# Patient Record
Sex: FEMALE | Race: WHITE | Employment: OTHER | URBAN - METROPOLITAN AREA
[De-identification: names, ages, dates, MRNs, and addresses within clinical notes are randomized per-mention and may not be internally consistent; named-entity substitution may affect disease eponyms.]

---

## 2017-02-22 PROBLEM — M99.05 SOMATIC DYSFUNCTION OF PELVIS REGION: Status: ACTIVE | Noted: 2017-02-22

## 2017-02-22 PROBLEM — M99.06 SOMATIC DYSFUNCTION OF LOWER EXTREMITIES: Status: ACTIVE | Noted: 2017-02-22

## 2017-02-22 PROBLEM — M99.03 SOMATIC DYSFUNCTION OF LUMBAR REGION: Status: ACTIVE | Noted: 2017-02-22

## 2017-10-02 PROBLEM — G89.18 CHEST WALL PAIN FOLLOWING SURGERY: Status: ACTIVE | Noted: 2017-10-02

## 2017-10-02 PROBLEM — R07.89 CHEST WALL PAIN FOLLOWING SURGERY: Status: ACTIVE | Noted: 2017-10-02

## 2018-03-15 ENCOUNTER — NURSE ONLY (OUTPATIENT)
Dept: FAMILY MEDICINE CLINIC | Age: 68
End: 2018-03-15
Payer: COMMERCIAL

## 2018-03-15 ENCOUNTER — HOSPITAL ENCOUNTER (OUTPATIENT)
Age: 68
Discharge: HOME | End: 2018-03-17
Payer: COMMERCIAL

## 2018-03-15 DIAGNOSIS — I10 ESSENTIAL HYPERTENSION: ICD-10-CM

## 2018-03-15 LAB
BUN BLDV-MCNC: 16 MG/DL (ref 8–23)
CREAT SERPL-MCNC: 1 MG/DL (ref 0.5–1)
GFR AFRICAN AMERICAN: >60
GFR NON-AFRICAN AMERICAN: 56 ML/MIN/1.73

## 2018-03-15 PROCEDURE — 84520 ASSAY OF UREA NITROGEN: CPT

## 2018-03-15 PROCEDURE — 36415 COLL VENOUS BLD VENIPUNCTURE: CPT | Performed by: FAMILY MEDICINE

## 2018-03-15 PROCEDURE — 82565 ASSAY OF CREATININE: CPT

## 2018-03-20 ENCOUNTER — HOSPITAL ENCOUNTER (OUTPATIENT)
Dept: MRI IMAGING | Age: 68
Discharge: HOME | End: 2018-03-22
Payer: COMMERCIAL

## 2018-03-20 DIAGNOSIS — M54.16 LUMBAR RADICULOPATHY: ICD-10-CM

## 2018-03-20 PROCEDURE — 6360000004 HC RX CONTRAST MEDICATION: Performed by: RADIOLOGY

## 2018-03-20 PROCEDURE — 72149 MRI LUMBAR SPINE W/DYE: CPT

## 2018-03-20 PROCEDURE — A9579 GAD-BASE MR CONTRAST NOS,1ML: HCPCS | Performed by: RADIOLOGY

## 2018-03-20 RX ADMIN — GADOTERIDOL 17 ML: 279.3 INJECTION, SOLUTION INTRAVENOUS at 18:32

## 2018-03-22 ENCOUNTER — TELEPHONE (OUTPATIENT)
Dept: FAMILY MEDICINE CLINIC | Age: 68
End: 2018-03-22

## 2018-04-05 ENCOUNTER — HOSPITAL ENCOUNTER (OUTPATIENT)
Dept: CT IMAGING | Age: 68
Discharge: HOME | End: 2018-04-07
Admitting: INTERNAL MEDICINE
Payer: COMMERCIAL

## 2018-04-05 DIAGNOSIS — C34.01 MALIGNANT NEOPLASM OF HILUS OF RIGHT LUNG (HCC): ICD-10-CM

## 2018-04-05 PROCEDURE — 74177 CT ABD & PELVIS W/CONTRAST: CPT

## 2018-04-05 PROCEDURE — 71260 CT THORAX DX C+: CPT

## 2018-04-05 PROCEDURE — 6360000004 HC RX CONTRAST MEDICATION: Performed by: RADIOLOGY

## 2018-04-05 RX ADMIN — IOPAMIDOL 110 ML: 755 INJECTION, SOLUTION INTRAVENOUS at 14:22

## 2018-04-05 RX ADMIN — IOHEXOL 50 ML: 240 INJECTION, SOLUTION INTRATHECAL; INTRAVASCULAR; INTRAVENOUS; ORAL at 14:22

## 2018-04-06 ENCOUNTER — HOSPITAL ENCOUNTER (OUTPATIENT)
Dept: INFUSION THERAPY | Age: 68
Discharge: HOME | End: 2018-04-06
Payer: COMMERCIAL

## 2018-04-06 ENCOUNTER — OFFICE VISIT (OUTPATIENT)
Dept: ONCOLOGY | Age: 68
End: 2018-04-06
Payer: COMMERCIAL

## 2018-04-06 VITALS
HEART RATE: 66 BPM | TEMPERATURE: 98 F | HEIGHT: 70 IN | BODY MASS INDEX: 26.13 KG/M2 | SYSTOLIC BLOOD PRESSURE: 138 MMHG | RESPIRATION RATE: 20 BRPM | WEIGHT: 182.5 LBS | DIASTOLIC BLOOD PRESSURE: 73 MMHG

## 2018-04-06 DIAGNOSIS — C34.01 MALIGNANT NEOPLASM OF HILUS OF RIGHT LUNG (HCC): Primary | ICD-10-CM

## 2018-04-06 DIAGNOSIS — C50.919 MALIGNANT NEOPLASM OF FEMALE BREAST, UNSPECIFIED ESTROGEN RECEPTOR STATUS, UNSPECIFIED LATERALITY, UNSPECIFIED SITE OF BREAST (HCC): ICD-10-CM

## 2018-04-06 LAB
ALBUMIN SERPL-MCNC: 3.9 G/DL (ref 3.5–5.2)
ALP BLD-CCNC: 87 U/L (ref 35–104)
ALT SERPL-CCNC: 30 U/L (ref 0–32)
ANION GAP SERPL CALCULATED.3IONS-SCNC: 15 MMOL/L (ref 7–16)
AST SERPL-CCNC: 21 U/L (ref 0–31)
BASOPHILS ABSOLUTE: 0.06 E9/L (ref 0–0.2)
BASOPHILS RELATIVE PERCENT: 0.8 % (ref 0–2)
BILIRUB SERPL-MCNC: <0.2 MG/DL (ref 0–1.2)
BUN BLDV-MCNC: 22 MG/DL (ref 8–23)
CALCIUM SERPL-MCNC: 9.4 MG/DL (ref 8.6–10.2)
CHLORIDE BLD-SCNC: 96 MMOL/L (ref 98–107)
CO2: 25 MMOL/L (ref 22–29)
CREAT SERPL-MCNC: 1 MG/DL (ref 0.5–1)
EOSINOPHILS ABSOLUTE: 0.16 E9/L (ref 0.05–0.5)
EOSINOPHILS RELATIVE PERCENT: 2 % (ref 0–6)
GFR AFRICAN AMERICAN: >60
GFR NON-AFRICAN AMERICAN: 56 ML/MIN/1.73
GLUCOSE BLD-MCNC: 93 MG/DL (ref 74–109)
HCT VFR BLD CALC: 39.3 % (ref 34–48)
HEMOGLOBIN: 13.1 G/DL (ref 11.5–15.5)
IMMATURE GRANULOCYTES #: 0.01 E9/L
IMMATURE GRANULOCYTES %: 0.1 % (ref 0–5)
LYMPHOCYTES ABSOLUTE: 2.98 E9/L (ref 1.5–4)
LYMPHOCYTES RELATIVE PERCENT: 38.2 % (ref 20–42)
MCH RBC QN AUTO: 29.4 PG (ref 26–35)
MCHC RBC AUTO-ENTMCNC: 33.3 % (ref 32–34.5)
MCV RBC AUTO: 88.1 FL (ref 80–99.9)
MONOCYTES ABSOLUTE: 0.75 E9/L (ref 0.1–0.95)
MONOCYTES RELATIVE PERCENT: 9.6 % (ref 2–12)
NEUTROPHILS ABSOLUTE: 3.85 E9/L (ref 1.8–7.3)
NEUTROPHILS RELATIVE PERCENT: 49.3 % (ref 43–80)
PDW BLD-RTO: 12.9 FL (ref 11.5–15)
PLATELET # BLD: 245 E9/L (ref 130–450)
PMV BLD AUTO: 10.4 FL (ref 7–12)
POTASSIUM SERPL-SCNC: 3.9 MMOL/L (ref 3.5–5)
RBC # BLD: 4.46 E12/L (ref 3.5–5.5)
SODIUM BLD-SCNC: 136 MMOL/L (ref 132–146)
TOTAL PROTEIN: 6.8 G/DL (ref 6.4–8.3)
WBC # BLD: 7.8 E9/L (ref 4.5–11.5)

## 2018-04-06 PROCEDURE — 80053 COMPREHEN METABOLIC PANEL: CPT

## 2018-04-06 PROCEDURE — 99213 OFFICE O/P EST LOW 20 MIN: CPT

## 2018-04-06 PROCEDURE — 1036F TOBACCO NON-USER: CPT | Performed by: INTERNAL MEDICINE

## 2018-04-06 PROCEDURE — 85025 COMPLETE CBC W/AUTO DIFF WBC: CPT

## 2018-04-06 PROCEDURE — 3014F SCREEN MAMMO DOC REV: CPT | Performed by: INTERNAL MEDICINE

## 2018-04-06 PROCEDURE — G8400 PT W/DXA NO RESULTS DOC: HCPCS | Performed by: INTERNAL MEDICINE

## 2018-04-06 PROCEDURE — 4040F PNEUMOC VAC/ADMIN/RCVD: CPT | Performed by: INTERNAL MEDICINE

## 2018-04-06 PROCEDURE — 1090F PRES/ABSN URINE INCON ASSESS: CPT | Performed by: INTERNAL MEDICINE

## 2018-04-06 PROCEDURE — 1123F ACP DISCUSS/DSCN MKR DOCD: CPT | Performed by: INTERNAL MEDICINE

## 2018-04-06 PROCEDURE — G8417 CALC BMI ABV UP PARAM F/U: HCPCS | Performed by: INTERNAL MEDICINE

## 2018-04-06 PROCEDURE — G8427 DOCREV CUR MEDS BY ELIG CLIN: HCPCS | Performed by: INTERNAL MEDICINE

## 2018-04-06 PROCEDURE — 3017F COLORECTAL CA SCREEN DOC REV: CPT | Performed by: INTERNAL MEDICINE

## 2018-04-06 PROCEDURE — 99214 OFFICE O/P EST MOD 30 MIN: CPT | Performed by: INTERNAL MEDICINE

## 2018-04-06 PROCEDURE — 36415 COLL VENOUS BLD VENIPUNCTURE: CPT

## 2018-04-12 DIAGNOSIS — E55.9 VITAMIN D DEFICIENCY: ICD-10-CM

## 2018-04-12 RX ORDER — ERGOCALCIFEROL 1.25 MG/1
CAPSULE ORAL
Qty: 30 CAPSULE | Refills: 3 | Status: SHIPPED | OUTPATIENT
Start: 2018-04-12 | End: 2019-05-06 | Stop reason: SDUPTHER

## 2018-04-17 ENCOUNTER — TELEPHONE (OUTPATIENT)
Dept: FAMILY MEDICINE CLINIC | Age: 68
End: 2018-04-17

## 2018-04-18 ENCOUNTER — TELEPHONE (OUTPATIENT)
Dept: FAMILY MEDICINE CLINIC | Age: 68
End: 2018-04-18

## 2018-04-18 DIAGNOSIS — M51.16 LUMBAR DISC DISEASE WITH RADICULOPATHY: Primary | ICD-10-CM

## 2018-05-21 ENCOUNTER — OFFICE VISIT (OUTPATIENT)
Dept: FAMILY MEDICINE CLINIC | Age: 68
End: 2018-05-21
Payer: COMMERCIAL

## 2018-05-21 VITALS
SYSTOLIC BLOOD PRESSURE: 164 MMHG | RESPIRATION RATE: 16 BRPM | DIASTOLIC BLOOD PRESSURE: 84 MMHG | OXYGEN SATURATION: 99 % | TEMPERATURE: 97.8 F | BODY MASS INDEX: 25.2 KG/M2 | HEART RATE: 58 BPM | HEIGHT: 70 IN | WEIGHT: 176 LBS

## 2018-05-21 DIAGNOSIS — G89.29 CHRONIC BILATERAL LOW BACK PAIN WITH SCIATICA, SCIATICA LATERALITY UNSPECIFIED: ICD-10-CM

## 2018-05-21 DIAGNOSIS — I10 ESSENTIAL HYPERTENSION: Primary | ICD-10-CM

## 2018-05-21 DIAGNOSIS — M54.40 CHRONIC BILATERAL LOW BACK PAIN WITH SCIATICA, SCIATICA LATERALITY UNSPECIFIED: ICD-10-CM

## 2018-05-21 PROCEDURE — 1090F PRES/ABSN URINE INCON ASSESS: CPT | Performed by: FAMILY MEDICINE

## 2018-05-21 PROCEDURE — G8400 PT W/DXA NO RESULTS DOC: HCPCS | Performed by: FAMILY MEDICINE

## 2018-05-21 PROCEDURE — 3017F COLORECTAL CA SCREEN DOC REV: CPT | Performed by: FAMILY MEDICINE

## 2018-05-21 PROCEDURE — 99214 OFFICE O/P EST MOD 30 MIN: CPT | Performed by: FAMILY MEDICINE

## 2018-05-21 PROCEDURE — 1123F ACP DISCUSS/DSCN MKR DOCD: CPT | Performed by: FAMILY MEDICINE

## 2018-05-21 PROCEDURE — G8417 CALC BMI ABV UP PARAM F/U: HCPCS | Performed by: FAMILY MEDICINE

## 2018-05-21 PROCEDURE — 1036F TOBACCO NON-USER: CPT | Performed by: FAMILY MEDICINE

## 2018-05-21 PROCEDURE — 4040F PNEUMOC VAC/ADMIN/RCVD: CPT | Performed by: FAMILY MEDICINE

## 2018-05-21 PROCEDURE — G8427 DOCREV CUR MEDS BY ELIG CLIN: HCPCS | Performed by: FAMILY MEDICINE

## 2018-05-21 ASSESSMENT — PATIENT HEALTH QUESTIONNAIRE - PHQ9
1. LITTLE INTEREST OR PLEASURE IN DOING THINGS: 0
2. FEELING DOWN, DEPRESSED OR HOPELESS: 0
SUM OF ALL RESPONSES TO PHQ QUESTIONS 1-9: 0
SUM OF ALL RESPONSES TO PHQ9 QUESTIONS 1 & 2: 0

## 2018-06-08 ENCOUNTER — TELEPHONE (OUTPATIENT)
Dept: FAMILY MEDICINE CLINIC | Age: 68
End: 2018-06-08

## 2018-08-15 ENCOUNTER — TELEPHONE (OUTPATIENT)
Dept: FAMILY MEDICINE CLINIC | Age: 68
End: 2018-08-15

## 2018-08-16 ENCOUNTER — OFFICE VISIT (OUTPATIENT)
Dept: FAMILY MEDICINE CLINIC | Age: 68
End: 2018-08-16
Payer: COMMERCIAL

## 2018-08-16 VITALS
HEIGHT: 70 IN | RESPIRATION RATE: 16 BRPM | SYSTOLIC BLOOD PRESSURE: 134 MMHG | BODY MASS INDEX: 24.77 KG/M2 | HEART RATE: 60 BPM | DIASTOLIC BLOOD PRESSURE: 88 MMHG | WEIGHT: 173 LBS | OXYGEN SATURATION: 97 %

## 2018-08-16 DIAGNOSIS — E06.3 HASHIMOTO'S DISEASE: ICD-10-CM

## 2018-08-16 DIAGNOSIS — L03.211 FACIAL CELLULITIS: Primary | ICD-10-CM

## 2018-08-16 PROCEDURE — 1101F PT FALLS ASSESS-DOCD LE1/YR: CPT | Performed by: FAMILY MEDICINE

## 2018-08-16 PROCEDURE — 4040F PNEUMOC VAC/ADMIN/RCVD: CPT | Performed by: FAMILY MEDICINE

## 2018-08-16 PROCEDURE — 1036F TOBACCO NON-USER: CPT | Performed by: FAMILY MEDICINE

## 2018-08-16 PROCEDURE — G8510 SCR DEP NEG, NO PLAN REQD: HCPCS | Performed by: FAMILY MEDICINE

## 2018-08-16 PROCEDURE — 99213 OFFICE O/P EST LOW 20 MIN: CPT | Performed by: FAMILY MEDICINE

## 2018-08-16 PROCEDURE — G8420 CALC BMI NORM PARAMETERS: HCPCS | Performed by: FAMILY MEDICINE

## 2018-08-16 PROCEDURE — G8427 DOCREV CUR MEDS BY ELIG CLIN: HCPCS | Performed by: FAMILY MEDICINE

## 2018-08-16 PROCEDURE — 1090F PRES/ABSN URINE INCON ASSESS: CPT | Performed by: FAMILY MEDICINE

## 2018-08-16 PROCEDURE — 1123F ACP DISCUSS/DSCN MKR DOCD: CPT | Performed by: FAMILY MEDICINE

## 2018-08-16 PROCEDURE — G8400 PT W/DXA NO RESULTS DOC: HCPCS | Performed by: FAMILY MEDICINE

## 2018-08-16 PROCEDURE — 3017F COLORECTAL CA SCREEN DOC REV: CPT | Performed by: FAMILY MEDICINE

## 2018-08-16 RX ORDER — CEPHALEXIN 500 MG/1
500 CAPSULE ORAL 3 TIMES DAILY
Qty: 30 CAPSULE | Refills: 0 | Status: SHIPPED | OUTPATIENT
Start: 2018-08-16 | End: 2018-08-26

## 2018-08-16 ASSESSMENT — PATIENT HEALTH QUESTIONNAIRE - PHQ9
SUM OF ALL RESPONSES TO PHQ QUESTIONS 1-9: 0
1. LITTLE INTEREST OR PLEASURE IN DOING THINGS: 0
SUM OF ALL RESPONSES TO PHQ QUESTIONS 1-9: 0
SUM OF ALL RESPONSES TO PHQ9 QUESTIONS 1 & 2: 0
2. FEELING DOWN, DEPRESSED OR HOPELESS: 0

## 2018-08-20 ENCOUNTER — OFFICE VISIT (OUTPATIENT)
Dept: FAMILY MEDICINE CLINIC | Age: 68
End: 2018-08-20
Payer: COMMERCIAL

## 2018-08-20 VITALS
DIASTOLIC BLOOD PRESSURE: 64 MMHG | OXYGEN SATURATION: 98 % | SYSTOLIC BLOOD PRESSURE: 128 MMHG | HEART RATE: 64 BPM | WEIGHT: 173 LBS | BODY MASS INDEX: 24.82 KG/M2 | RESPIRATION RATE: 16 BRPM

## 2018-08-20 DIAGNOSIS — L03.211 CELLULITIS, FACE: Primary | ICD-10-CM

## 2018-08-20 PROCEDURE — 99212 OFFICE O/P EST SF 10 MIN: CPT | Performed by: FAMILY MEDICINE

## 2018-08-20 PROCEDURE — G8420 CALC BMI NORM PARAMETERS: HCPCS | Performed by: FAMILY MEDICINE

## 2018-08-20 PROCEDURE — G8400 PT W/DXA NO RESULTS DOC: HCPCS | Performed by: FAMILY MEDICINE

## 2018-08-20 PROCEDURE — 1123F ACP DISCUSS/DSCN MKR DOCD: CPT | Performed by: FAMILY MEDICINE

## 2018-08-20 PROCEDURE — G8427 DOCREV CUR MEDS BY ELIG CLIN: HCPCS | Performed by: FAMILY MEDICINE

## 2018-08-20 PROCEDURE — 4040F PNEUMOC VAC/ADMIN/RCVD: CPT | Performed by: FAMILY MEDICINE

## 2018-08-20 PROCEDURE — 3017F COLORECTAL CA SCREEN DOC REV: CPT | Performed by: FAMILY MEDICINE

## 2018-08-20 PROCEDURE — 1036F TOBACCO NON-USER: CPT | Performed by: FAMILY MEDICINE

## 2018-08-20 PROCEDURE — 1090F PRES/ABSN URINE INCON ASSESS: CPT | Performed by: FAMILY MEDICINE

## 2018-08-20 PROCEDURE — 1101F PT FALLS ASSESS-DOCD LE1/YR: CPT | Performed by: FAMILY MEDICINE

## 2018-08-20 NOTE — PROGRESS NOTES
above. Call or go to ED immediately if symptoms worsen or persist.  No Follow-up on file. , or sooner if necessary. Educational materials and/or home exercises printed for patient's review and were included in patient instructions on his/her After Visit Summary and given to patient at the end of visit. Counseled regarding above diagnosis, including possible risks and complications,  especially if left uncontrolled. Counseled regarding the possible side effects, risks, benefits and alternatives to treatment; patient and/or guardian verbalizes understanding, agrees, feels comfortable with and wishes to proceed with above treatment plan. Advised patient to call with any new medication issues, and read all Rx info from pharmacy to assure aware of all possible risks and side effects of medication before taking. Reviewed age and gender appropriate health screening exams and vaccinations. Advised patient regarding importance of keeping up with recommended health maintenance and to schedule as soon as possible if overdue, as this is important in assessing for undiagnosed pathology, especially cancer, as well as protecting against potentially harmful/life threatening disease. Patient and/or guardian verbalizes understanding and agrees with above counseling, assessment and plan. All questions answered.

## 2018-08-24 ENCOUNTER — PATIENT MESSAGE (OUTPATIENT)
Dept: FAMILY MEDICINE CLINIC | Age: 68
End: 2018-08-24

## 2018-08-24 RX ORDER — FLUCONAZOLE 150 MG/1
150 TABLET ORAL ONCE
Qty: 1 TABLET | Refills: 0 | Status: SHIPPED | OUTPATIENT
Start: 2018-08-24 | End: 2018-08-24

## 2018-09-14 DIAGNOSIS — C34.90 MALIGNANT NEOPLASM OF LUNG, UNSPECIFIED LATERALITY, UNSPECIFIED PART OF LUNG (HCC): Primary | ICD-10-CM

## 2018-09-18 ENCOUNTER — OFFICE VISIT (OUTPATIENT)
Dept: ENDOCRINOLOGY | Age: 68
End: 2018-09-18
Payer: COMMERCIAL

## 2018-09-18 VITALS
HEIGHT: 70 IN | BODY MASS INDEX: 24.62 KG/M2 | RESPIRATION RATE: 16 BRPM | DIASTOLIC BLOOD PRESSURE: 76 MMHG | SYSTOLIC BLOOD PRESSURE: 120 MMHG | OXYGEN SATURATION: 96 % | WEIGHT: 172 LBS | HEART RATE: 70 BPM

## 2018-09-18 DIAGNOSIS — R63.5 WEIGHT GAIN: ICD-10-CM

## 2018-09-18 DIAGNOSIS — E55.9 VITAMIN D DEFICIENCY: ICD-10-CM

## 2018-09-18 DIAGNOSIS — E03.9 HYPOTHYROIDISM, UNSPECIFIED TYPE: ICD-10-CM

## 2018-09-18 DIAGNOSIS — R53.82 CHRONIC FATIGUE: ICD-10-CM

## 2018-09-18 DIAGNOSIS — E78.5 HYPERLIPIDEMIA, UNSPECIFIED HYPERLIPIDEMIA TYPE: Primary | ICD-10-CM

## 2018-09-18 DIAGNOSIS — E06.3 HASHIMOTO'S DISEASE: ICD-10-CM

## 2018-09-18 DIAGNOSIS — I10 RESISTANT HYPERTENSION: ICD-10-CM

## 2018-09-18 PROCEDURE — 99204 OFFICE O/P NEW MOD 45 MIN: CPT | Performed by: INTERNAL MEDICINE

## 2018-09-18 RX ORDER — ATORVASTATIN CALCIUM 10 MG/1
10 TABLET, FILM COATED ORAL DAILY
Qty: 30 TABLET | Refills: 5 | Status: SHIPPED | OUTPATIENT
Start: 2018-09-18 | End: 2019-12-17

## 2018-09-18 NOTE — PROGRESS NOTES
ENDOCRINOLOGY CLINIC NOTE    Date of Service: 9/18/2018    Medical Records Reviewed:   Inpatient records, outpatient records, outside records     Care Team:  Primary Care Physician: Tess Andrade MD.  Provider: Cm Pantoja MD  Other provider(s):            Reason for the visit:  Primary Hypothyroidism, vitD deficiency, chronic fatigue     Type of visit:  New visit     History of Present Illness: The history is provided by the patient. No  was used. Accuracy of the patient data is excellent. Artem Cardona is a very pleasant 79 y.o. female with past medical h/o lunch cancer seen in the clinic today for management of hypothyroidism     The patient was diagnosed with hypothyroidism in 2008 and was told that she has Hashimoto's thyroid disease   She is currently on Levothyroxine 100 mcg daily. Patient takes levothyroxine in the morning at empty stomach, wait one hour before eating , avoid multivitamins containing calcium  or iron with it.    3/2018 TFT   3/2/2018    TSH 1.660   T4 Free 1.53      The patient still c/o unexplained weight gain, fatigue, dry skin, brittle fingernails, and brittle hair    She reported positive FH of thyroid disease in her mother and two sisters      MNG  The patient was also diagnosed with thyroid nodule at the same time of hypothyroidism   Most recent Thyroid US 8/2017   The right thyroid lobe measures 3.8 x 1.4 x 1.4 cm in size. The left thyroid lobe measures  3.3 x 1.0 x 1.1 cm in size. The isthmus measures 0.1 cm  in thickness. The thyroid gland is heterogeneous. A 0.6 cm small hypoechoic nodule present in the thyroid isthmus on the right. No other suspicious nodules noted. Artem Cardona denies any new lumps, bumps in her neck, change in her voice, or shortness of breath. No family history of thyroid cancer. No prior history of radiation to head or neck region.     vitD deficiency    The patient currently taking vitD 50,000 iu/wk and has been on MCG tablet take 1 tablet by mouth once daily 30 tablet 3    hydrALAZINE (APRESOLINE) 25 MG tablet take 1 tablet by mouth twice a day 60 tablet 3    lidocaine (LIDODERM) 5 % PLACE 3 PATCHES ONTO THE SKIN EVERY 24 HOURS, 12 HOUR ON, 12 HOURS OFF 30 patch 5    vitamin D (ERGOCALCIFEROL) 17063 units CAPS capsule take 1 capsule by mouth every week 30 capsule 3    benzonatate (TESSALON) 100 MG capsule take 1 capsule by mouth three times a day if needed for cough 90 capsule 5    tiZANidine (ZANAFLEX) 4 MG tablet take 1 tablet by mouth three times a day 90 tablet 4    ranitidine (ZANTAC) 150 MG tablet Take 1 tablet by mouth 2 times daily 60 tablet 3    SYRINGE-NEEDLE, DISP, 3 ML (LUER LOCK SAFETY SYRINGES) 22G X 1-1/2\" 3 ML MISC Use monthly as directed. 15 each 0    Insulin Syringe-Needle U-100 25G X 1\" 1 ML MISC Use as directed for B12 injection 20 each 0    cyanocobalamin 1000 MCG/ML injection INJECT 1 MILLILITER INTO MUSCLE EVERY 7 DAYS 12 vial 3    RA ACETAMINOPHEN EX  MG tablet take 1 tablet by mouth every 6 hours if needed for pain 60 tablet 2    montelukast (SINGULAIR) 10 MG tablet Take 1 tablet by mouth nightly 30 tablet 5    omega-3 acid ethyl esters (LOVAZA) 1 G capsule Take 1 g by mouth 2 times daily      fluticasone (FLONASE) 50 MCG/ACT nasal spray 1 spray by Nasal route daily 1 Bottle 0    loratadine (CLARITIN) 10 MG tablet Take 10 mg by mouth daily      Umeclidinium-Vilanterol 62.5-25 MCG/INH AEPB Inhale 1 Inhaler into the lungs daily      conjugated estrogens (PREMARIN) 0.625 MG/GM vaginal cream Use as directed two times a week. 1 Tube 3    Melatonin 1 MG SUBL Place 2 mg under the tongue nightly      ipratropium (ATROVENT) 0.02 % nebulizer solution   Take 0.5 mg by nebulization 4 times daily As needed.       atenolol (TENORMIN) 25 MG tablet Take 2 tablets by mouth 2 times daily 360 tablet 3     Current Facility-Administered Medications   Medication Dose Route Frequency Provider Last have reviewed the following:  Lab Results   Component Value Date/Time    WBC 7.8 04/06/2018 09:18 AM    RBC 4.46 04/06/2018 09:18 AM    HGB 13.1 04/06/2018 09:18 AM    HCT 39.3 04/06/2018 09:18 AM    MCV 88.1 04/06/2018 09:18 AM    MCH 29.4 04/06/2018 09:18 AM    MCHC 33.3 04/06/2018 09:18 AM    RDW 12.9 04/06/2018 09:18 AM     04/06/2018 09:18 AM    MPV 10.4 04/06/2018 09:18 AM      Lab Results   Component Value Date/Time     04/06/2018 09:18 AM    K 3.9 04/06/2018 09:18 AM    CO2 25 04/06/2018 09:18 AM    BUN 22 04/06/2018 09:18 AM    CALCIUM 9.4 04/06/2018 09:18 AM      Lab Results   Component Value Date    GLUCOSE 93 04/06/2018    LABCREA 43 02/24/2017     Lab Results   Component Value Date/Time    TSH 1.660 03/02/2018 03:00 PM    T4FREE 1.53 03/02/2018 03:00 PM    FT3 2.6 03/02/2018 03:00 PM     Lab Results   Component Value Date/Time    PTH 44 02/20/2017 08:38 AM     Lab Results   Component Value Date/Time    VITD25 34 10/16/2015 12:00 PM       All labs medical records and images were reviewed independently     [address] [place] [address], a 79 y.o.-old female seen in for evaluation of hypothyroidism     Primary hypothyroidism   · Continue Levothyroxine 100 mcg daily  · Check TFT and adjust the dose if needed   · Will likely switch to name brand synthroid  · With h/o hashimoto's thyroid disease and tiredness, I will check AM cortisol to r/o adrenal insufficiency     Multinodular goiter   · Prevalence of thyroid nodule on thyroid ultrasound is 50% and 95 % of these nodules are benign.   · Given nodule size and lack of ultrasound suspicious features which making the nodule less likely to be malignant, I will continue following with periodic US  · Plan to repeat US in 5/2019     vitD deficiency   · Continue current dose of VitD 50,000 iu/wk  · Check vitD level     Resistant HTN  · Will check Anand/Renin level   · Pt currently on [facility], will keep this in mind while interpreting the result     Follow up:  · 6 months     The above issues were reviewed with the patient who understood and agreed with the plan. 30 minutes were spent today in management of this patient. More than 50% of time spent on counseling of patient on above diagnosis. Thank you for allowing us to participate in the care of this patient. Please do not hesitate to contact us with any additional questions. Diagnosis Orders   1. Hyperlipidemia, unspecified hyperlipidemia type  atorvastatin (LIPITOR) 10 MG tablet    Lipid Panel    Hemoglobin A1C   2. Vitamin D deficiency  Vitamin D 25 Hydrox, D2 & D3   3. Chronic fatigue  Cortisol Total    TSH without Reflex    T4, Free    Vitamin B12    Vitamin D 25 Hydrox, D2 & D3   4. Hypothyroidism, unspecified type     5. Hashimoto's disease  TSH without Reflex    T4, Free   6. Weight gain  Lipid Panel    Hemoglobin A1C   7.  Resistant hypertension  Aldosterone    ALDOSTERONE & RENIN, DIRECT WITH RATIO    Renin       Lidya Pollard MD  Endocrinologist, [facility])   [address] [place] [address], [address] [place] [address],[id number]   Phone: [phone]  Fax: [phone]

## 2018-09-18 NOTE — LETTER
 fluticasone (FLONASE) 50 MCG/ACT nasal spray 1 spray by Nasal route daily 1 Bottle 0    loratadine (CLARITIN) 10 MG tablet Take 10 mg by mouth daily      Umeclidinium-Vilanterol 62.5-25 MCG/INH AEPB Inhale 1 Inhaler into the lungs daily      conjugated estrogens (PREMARIN) 0.625 MG/GM vaginal cream Use as directed two times a week. 1 Tube 3    Melatonin 1 MG SUBL Place 2 mg under the tongue nightly      ipratropium (ATROVENT) 0.02 % nebulizer solution   Take 0.5 mg by nebulization 4 times daily As needed.  atenolol (TENORMIN) 25 MG tablet Take 2 tablets by mouth 2 times daily 360 tablet 3     Current Facility-Administered Medications   Medication Dose Route Frequency Provider Last Rate Last Dose    betamethasone acetate-betamethasone sodium phosphate (CELESTONE) injection 6 mg  6 mg Intra-articular Once Em Comer MD        lidocaine 1 % injection 1 mL  1 mL Intradermal Once Em Comer MD           Review of Systems  Constitutional: No fever, no chills, no diaphoresis, no generalized weakness. HEENT: No blurred vision, No sore throat, no ear pain, no hair loss  Neck: denied any neck swelling, difficulty swallowing,   Cadrdiopulomary: No CP, SOB or palpitation, No orthopnea or PND. No cough or wheezing. GI: No N/V/D, no constipation, No abdominal pain, no melena or hematochezia   : Denied any dysuria, hematuria, flank pain, discharge, or incontinence. Skin: denied any rash, ulcer, Hirsute, or hyperpigmentation. MSK: denied any joint deformity, joint pain/swelling, muscle pain, or back pain.   Neuro: no numbess, no tingling, no weakness,     OBJECTIVE    /76   Pulse 70   Resp 16   Ht 5' 10\" (1.778 m)   Wt 172 lb (78 kg)   LMP  (Exact Date)   SpO2 96%   BMI 24.68 kg/m²    BP Readings from Last 4 Encounters:   09/18/18 120/76   08/20/18 128/64   08/16/18 134/88   05/21/18 (!) 164/84     Wt Readings from Last 6 Encounters:   09/18/18 172 lb (78 kg) 08/20/18 173 lb (78.5 kg)   08/16/18 173 lb (78.5 kg)   05/21/18 176 lb (79.8 kg)   04/06/18 182 lb 8 oz (82.8 kg)   03/02/18 181 lb (82.1 kg)       Physical examination:  General: awake alert, oriented x3, no abnormal position or movements. HEENT: normocephalic non traumatic  Neck: supple, no LN enlargement, no thyromegaly, no thyroid tenderness, no JVD. Pulm: Clear equal air entry no added sounds, no wheezing or rhonchi    CVS: S1 + S2, no murmur, no heave. Dorsalis pedis pulse palpable   Abd: soft lax, no tenderness, no organomegaly, audible bowel sounds. Skin: warm, no lesions, no rash.   Neuro: CN intact, sensation normal , muscle power normal.  Psych: normal mood, and affect    Review of Laboratory Data:  I have reviewed the following:  Lab Results   Component Value Date/Time    WBC 7.8 04/06/2018 09:18 AM    RBC 4.46 04/06/2018 09:18 AM    HGB 13.1 04/06/2018 09:18 AM    HCT 39.3 04/06/2018 09:18 AM    MCV 88.1 04/06/2018 09:18 AM    MCH 29.4 04/06/2018 09:18 AM    MCHC 33.3 04/06/2018 09:18 AM    RDW 12.9 04/06/2018 09:18 AM     04/06/2018 09:18 AM    MPV 10.4 04/06/2018 09:18 AM      Lab Results   Component Value Date/Time     04/06/2018 09:18 AM    K 3.9 04/06/2018 09:18 AM    CO2 25 04/06/2018 09:18 AM    BUN 22 04/06/2018 09:18 AM    CALCIUM 9.4 04/06/2018 09:18 AM      Lab Results   Component Value Date    GLUCOSE 93 04/06/2018    LABCREA 43 02/24/2017     Lab Results   Component Value Date/Time    TSH 1.660 03/02/2018 03:00 PM    T4FREE 1.53 03/02/2018 03:00 PM    FT3 2.6 03/02/2018 03:00 PM     Lab Results   Component Value Date/Time    PTH 44 02/20/2017 08:38 AM     Lab Results   Component Value Date/Time    VITD25 34 10/16/2015 12:00 PM       All labs medical records and images were reviewed independently     [address] [place] [address], a 79 y.o.-old female seen in for evaluation of hypothyroidism     Primary hypothyroidism

## 2018-09-27 ENCOUNTER — HOSPITAL ENCOUNTER (OUTPATIENT)
Dept: INFUSION THERAPY | Age: 68
Discharge: HOME | End: 2018-09-27
Payer: COMMERCIAL

## 2018-09-27 ENCOUNTER — TELEPHONE (OUTPATIENT)
Dept: ENDOCRINOLOGY | Age: 68
End: 2018-09-27

## 2018-09-27 DIAGNOSIS — I10 RESISTANT HYPERTENSION: ICD-10-CM

## 2018-09-27 DIAGNOSIS — C34.90 MALIGNANT NEOPLASM OF LUNG, UNSPECIFIED LATERALITY, UNSPECIFIED PART OF LUNG (HCC): ICD-10-CM

## 2018-09-27 DIAGNOSIS — E78.5 HYPERLIPIDEMIA, UNSPECIFIED HYPERLIPIDEMIA TYPE: ICD-10-CM

## 2018-09-27 DIAGNOSIS — E55.9 VITAMIN D DEFICIENCY: ICD-10-CM

## 2018-09-27 DIAGNOSIS — R53.82 CHRONIC FATIGUE: ICD-10-CM

## 2018-09-27 DIAGNOSIS — E06.3 HASHIMOTO'S DISEASE: ICD-10-CM

## 2018-09-27 DIAGNOSIS — R63.5 WEIGHT GAIN: ICD-10-CM

## 2018-09-27 LAB
ALBUMIN SERPL-MCNC: 4.3 G/DL (ref 3.5–5.2)
ALP BLD-CCNC: 72 U/L (ref 35–104)
ALT SERPL-CCNC: 22 U/L (ref 0–32)
ANION GAP SERPL CALCULATED.3IONS-SCNC: 13 MMOL/L (ref 7–16)
AST SERPL-CCNC: 21 U/L (ref 0–31)
BASOPHILS ABSOLUTE: 0.07 E9/L (ref 0–0.2)
BASOPHILS RELATIVE PERCENT: 1 % (ref 0–2)
BILIRUB SERPL-MCNC: 0.3 MG/DL (ref 0–1.2)
BUN BLDV-MCNC: 15 MG/DL (ref 8–23)
CALCIUM SERPL-MCNC: 9.6 MG/DL (ref 8.6–10.2)
CHLORIDE BLD-SCNC: 97 MMOL/L (ref 98–107)
CHOLESTEROL, TOTAL: 175 MG/DL (ref 0–199)
CO2: 27 MMOL/L (ref 22–29)
CORTISOL TOTAL: 9.9 MCG/DL (ref 2.68–18.4)
CREAT SERPL-MCNC: 1.1 MG/DL (ref 0.5–1)
EOSINOPHILS ABSOLUTE: 0.2 E9/L (ref 0.05–0.5)
EOSINOPHILS RELATIVE PERCENT: 2.9 % (ref 0–6)
GFR AFRICAN AMERICAN: >60
GFR NON-AFRICAN AMERICAN: 50 ML/MIN/1.73
GLUCOSE BLD-MCNC: 100 MG/DL (ref 74–109)
HBA1C MFR BLD: 6.1 % (ref 4–5.6)
HCT VFR BLD CALC: 39.9 % (ref 34–48)
HDLC SERPL-MCNC: 51 MG/DL
HEMOGLOBIN: 13.3 G/DL (ref 11.5–15.5)
IMMATURE GRANULOCYTES #: 0.01 E9/L
IMMATURE GRANULOCYTES %: 0.1 % (ref 0–5)
LDL CHOLESTEROL CALCULATED: 102 MG/DL (ref 0–99)
LYMPHOCYTES ABSOLUTE: 2.32 E9/L (ref 1.5–4)
LYMPHOCYTES RELATIVE PERCENT: 33.8 % (ref 20–42)
MCH RBC QN AUTO: 29 PG (ref 26–35)
MCHC RBC AUTO-ENTMCNC: 33.3 % (ref 32–34.5)
MCV RBC AUTO: 87.1 FL (ref 80–99.9)
MONOCYTES ABSOLUTE: 0.6 E9/L (ref 0.1–0.95)
MONOCYTES RELATIVE PERCENT: 8.7 % (ref 2–12)
NEUTROPHILS ABSOLUTE: 3.67 E9/L (ref 1.8–7.3)
NEUTROPHILS RELATIVE PERCENT: 53.5 % (ref 43–80)
PDW BLD-RTO: 12.5 FL (ref 11.5–15)
PLATELET # BLD: 267 E9/L (ref 130–450)
PMV BLD AUTO: 10.2 FL (ref 7–12)
POTASSIUM SERPL-SCNC: 4 MMOL/L (ref 3.5–5)
RBC # BLD: 4.58 E12/L (ref 3.5–5.5)
SODIUM BLD-SCNC: 137 MMOL/L (ref 132–146)
T4 FREE: 1.92 NG/DL (ref 0.93–1.7)
TOTAL PROTEIN: 7.1 G/DL (ref 6.4–8.3)
TRIGL SERPL-MCNC: 109 MG/DL (ref 0–149)
TSH SERPL DL<=0.05 MIU/L-ACNC: 2 UIU/ML (ref 0.27–4.2)
VITAMIN B-12: 1460 PG/ML (ref 211–946)
VITAMIN D 25-HYDROXY: 49 NG/ML (ref 30–100)
VLDLC SERPL CALC-MCNC: 22 MG/DL
WBC # BLD: 6.9 E9/L (ref 4.5–11.5)

## 2018-09-27 PROCEDURE — 80061 LIPID PANEL: CPT

## 2018-09-27 PROCEDURE — 84244 ASSAY OF RENIN: CPT

## 2018-09-27 PROCEDURE — 82306 VITAMIN D 25 HYDROXY: CPT

## 2018-09-27 PROCEDURE — 85025 COMPLETE CBC W/AUTO DIFF WBC: CPT

## 2018-09-27 PROCEDURE — 36415 COLL VENOUS BLD VENIPUNCTURE: CPT

## 2018-09-27 PROCEDURE — 83036 HEMOGLOBIN GLYCOSYLATED A1C: CPT

## 2018-09-27 PROCEDURE — 82607 VITAMIN B-12: CPT

## 2018-09-27 PROCEDURE — 80053 COMPREHEN METABOLIC PANEL: CPT

## 2018-09-27 PROCEDURE — 84439 ASSAY OF FREE THYROXINE: CPT

## 2018-09-27 PROCEDURE — 82088 ASSAY OF ALDOSTERONE: CPT

## 2018-09-27 PROCEDURE — 84443 ASSAY THYROID STIM HORMONE: CPT

## 2018-09-27 PROCEDURE — 82533 TOTAL CORTISOL: CPT

## 2018-09-27 NOTE — TELEPHONE ENCOUNTER
Notify pt,  I have reviewed your recent lab results    Thyroid hormones are very good. Continue current dose of levothyroxine.  If she still feel tired, will try to switch to name brand synthroid and see if this will help

## 2018-09-28 ENCOUNTER — OFFICE VISIT (OUTPATIENT)
Dept: FAMILY MEDICINE CLINIC | Age: 68
End: 2018-09-28
Payer: COMMERCIAL

## 2018-09-28 VITALS
DIASTOLIC BLOOD PRESSURE: 84 MMHG | OXYGEN SATURATION: 97 % | SYSTOLIC BLOOD PRESSURE: 134 MMHG | RESPIRATION RATE: 20 BRPM | BODY MASS INDEX: 24.82 KG/M2 | HEART RATE: 67 BPM | WEIGHT: 173 LBS

## 2018-09-28 DIAGNOSIS — Z82.49 FAMILY HISTORY OF CHRONIC ISCHEMIC HEART DISEASE: ICD-10-CM

## 2018-09-28 DIAGNOSIS — E03.9 ACQUIRED HYPOTHYROIDISM: ICD-10-CM

## 2018-09-28 DIAGNOSIS — Z23 NEED FOR INFLUENZA VACCINATION: ICD-10-CM

## 2018-09-28 DIAGNOSIS — Z12.39 SCREENING FOR BREAST CANCER: ICD-10-CM

## 2018-09-28 DIAGNOSIS — I10 ESSENTIAL HYPERTENSION: Primary | ICD-10-CM

## 2018-09-28 DIAGNOSIS — E78.2 MIXED HYPERLIPIDEMIA: ICD-10-CM

## 2018-09-28 DIAGNOSIS — F41.9 ANXIETY: ICD-10-CM

## 2018-09-28 PROCEDURE — 1090F PRES/ABSN URINE INCON ASSESS: CPT | Performed by: FAMILY MEDICINE

## 2018-09-28 PROCEDURE — 3017F COLORECTAL CA SCREEN DOC REV: CPT | Performed by: FAMILY MEDICINE

## 2018-09-28 PROCEDURE — 90471 IMMUNIZATION ADMIN: CPT | Performed by: FAMILY MEDICINE

## 2018-09-28 PROCEDURE — 4040F PNEUMOC VAC/ADMIN/RCVD: CPT | Performed by: FAMILY MEDICINE

## 2018-09-28 PROCEDURE — 1036F TOBACCO NON-USER: CPT | Performed by: FAMILY MEDICINE

## 2018-09-28 PROCEDURE — 1123F ACP DISCUSS/DSCN MKR DOCD: CPT | Performed by: FAMILY MEDICINE

## 2018-09-28 PROCEDURE — G8400 PT W/DXA NO RESULTS DOC: HCPCS | Performed by: FAMILY MEDICINE

## 2018-09-28 PROCEDURE — G8420 CALC BMI NORM PARAMETERS: HCPCS | Performed by: FAMILY MEDICINE

## 2018-09-28 PROCEDURE — 1101F PT FALLS ASSESS-DOCD LE1/YR: CPT | Performed by: FAMILY MEDICINE

## 2018-09-28 PROCEDURE — 90662 IIV NO PRSV INCREASED AG IM: CPT | Performed by: FAMILY MEDICINE

## 2018-09-28 PROCEDURE — 99214 OFFICE O/P EST MOD 30 MIN: CPT | Performed by: FAMILY MEDICINE

## 2018-09-28 PROCEDURE — G8427 DOCREV CUR MEDS BY ELIG CLIN: HCPCS | Performed by: FAMILY MEDICINE

## 2018-09-28 RX ORDER — ESCITALOPRAM OXALATE 5 MG/1
5 TABLET ORAL DAILY
Qty: 30 TABLET | Refills: 2 | Status: SHIPPED | OUTPATIENT
Start: 2018-09-28 | End: 2018-10-18 | Stop reason: SDUPTHER

## 2018-09-29 LAB
ALDOSTERONE: 14.3 NG/DL
RENIN ACTIVITY: 4.6 NG/ML/HR

## 2018-10-04 ENCOUNTER — TELEPHONE (OUTPATIENT)
Dept: ADMINISTRATIVE | Age: 68
End: 2018-10-04

## 2018-10-12 ENCOUNTER — HOSPITAL ENCOUNTER (OUTPATIENT)
Dept: INFUSION THERAPY | Age: 68
Discharge: HOME | End: 2018-10-12
Payer: COMMERCIAL

## 2018-10-12 ENCOUNTER — HOSPITAL ENCOUNTER (OUTPATIENT)
Dept: GENERAL RADIOLOGY | Age: 68
Discharge: HOME | End: 2018-10-14
Payer: COMMERCIAL

## 2018-10-12 ENCOUNTER — OFFICE VISIT (OUTPATIENT)
Dept: ONCOLOGY | Age: 68
End: 2018-10-12
Payer: COMMERCIAL

## 2018-10-12 VITALS
SYSTOLIC BLOOD PRESSURE: 133 MMHG | HEIGHT: 70 IN | WEIGHT: 172.2 LBS | HEART RATE: 58 BPM | DIASTOLIC BLOOD PRESSURE: 70 MMHG | RESPIRATION RATE: 20 BRPM | TEMPERATURE: 96.9 F | BODY MASS INDEX: 24.65 KG/M2

## 2018-10-12 DIAGNOSIS — C34.90 NON-SMALL CELL LUNG CANCER, UNSPECIFIED LATERALITY (HCC): Primary | ICD-10-CM

## 2018-10-12 DIAGNOSIS — Z12.39 SCREENING FOR BREAST CANCER: ICD-10-CM

## 2018-10-12 PROCEDURE — G8420 CALC BMI NORM PARAMETERS: HCPCS | Performed by: INTERNAL MEDICINE

## 2018-10-12 PROCEDURE — 1101F PT FALLS ASSESS-DOCD LE1/YR: CPT | Performed by: INTERNAL MEDICINE

## 2018-10-12 PROCEDURE — 77067 SCR MAMMO BI INCL CAD: CPT

## 2018-10-12 PROCEDURE — 99214 OFFICE O/P EST MOD 30 MIN: CPT | Performed by: INTERNAL MEDICINE

## 2018-10-12 PROCEDURE — 99212 OFFICE O/P EST SF 10 MIN: CPT

## 2018-10-12 PROCEDURE — 1036F TOBACCO NON-USER: CPT | Performed by: INTERNAL MEDICINE

## 2018-10-12 PROCEDURE — 4040F PNEUMOC VAC/ADMIN/RCVD: CPT | Performed by: INTERNAL MEDICINE

## 2018-10-12 PROCEDURE — G8482 FLU IMMUNIZE ORDER/ADMIN: HCPCS | Performed by: INTERNAL MEDICINE

## 2018-10-12 PROCEDURE — 1123F ACP DISCUSS/DSCN MKR DOCD: CPT | Performed by: INTERNAL MEDICINE

## 2018-10-12 PROCEDURE — G8427 DOCREV CUR MEDS BY ELIG CLIN: HCPCS | Performed by: INTERNAL MEDICINE

## 2018-10-12 PROCEDURE — G8400 PT W/DXA NO RESULTS DOC: HCPCS | Performed by: INTERNAL MEDICINE

## 2018-10-12 PROCEDURE — 3017F COLORECTAL CA SCREEN DOC REV: CPT | Performed by: INTERNAL MEDICINE

## 2018-10-12 PROCEDURE — 1090F PRES/ABSN URINE INCON ASSESS: CPT | Performed by: INTERNAL MEDICINE

## 2018-10-12 NOTE — PROGRESS NOTES
IA    No Postop RT or chemotherapy required at that time. She was put on surveillance and didn't follow with a medical oncologist.    She was then found to have RUL PET avid lesion in 2013; Flexible bronchoscopy, right redo VATS lobectomy and thoracic lymphadenectomy was performed on 11/04/2013 (at [facility]):  Tumor Location: Right upper lobe  Histologic Type: Invasive Adenocarcinoma  Tumor Size: Greatest dimension: 2 cm  Histologic Grade: G2 Moderately Differentiated  Lymph nodes: All 3 lymph nodes are negative for tumor. Margins: Margins uninvolved by invasive carcinoma  Distance of invasive carcinoma from nearest margin: 1.9 cm  Specify margin: parenchymal resection margin  Venous/arterial invasion: Absent  Lymphatic invasion: Absent  Additional path findings: low R 4 Lymph node dissection: (0/2) lymph nodes: Negative for tumor. Station 7 lymph node (dissection): (0/1) Negative for tumor    No Postop RT or chemotherapy required at that time. She was kept on surveillance. Re-staging scans on 07/01/2016 showed no evidence of recurrent/metastatic disease. Re-staging scans on 12/29/2016 noted no evidence of recurrent/metastatic disease. Re-staging scans on 07/05/2017 noted no evidence of recurrent/metastatic disease. Re-staging scans on 04/05/2018 noted no evidence of recurrent/metastatic disease. Re-staging scans on 10/03/2018 noted no evidence of recurrent/metastatic disease. No clinical evidence of recurrence. Continue surveillance. RTC in 6 months.     Irma Estrada MD   [id number]  Board Certified Medical Oncologist   [address] [facility] MEDICAL ONCOLOGY   [facility] [place]

## 2018-10-18 DIAGNOSIS — F41.9 ANXIETY: ICD-10-CM

## 2018-10-18 RX ORDER — ESCITALOPRAM OXALATE 5 MG/1
5 TABLET ORAL DAILY
Qty: 4 TABLET | Refills: 0 | Status: SHIPPED | OUTPATIENT
Start: 2018-10-18 | End: 2018-12-21 | Stop reason: SDUPTHER

## 2018-10-18 RX ORDER — MONTELUKAST SODIUM 10 MG/1
TABLET ORAL
Qty: 30 TABLET | Refills: 5 | Status: SHIPPED | OUTPATIENT
Start: 2018-10-18 | End: 2018-12-18 | Stop reason: SDUPTHER

## 2018-11-09 ENCOUNTER — OFFICE VISIT (OUTPATIENT)
Dept: CARDIOLOGY CLINIC | Age: 68
End: 2018-11-09
Payer: COMMERCIAL

## 2018-11-09 VITALS
HEIGHT: 70 IN | HEART RATE: 66 BPM | SYSTOLIC BLOOD PRESSURE: 122 MMHG | WEIGHT: 169 LBS | BODY MASS INDEX: 24.2 KG/M2 | DIASTOLIC BLOOD PRESSURE: 62 MMHG

## 2018-11-09 DIAGNOSIS — C34.31 MALIGNANT NEOPLASM OF LOWER LOBE OF RIGHT LUNG (HCC): ICD-10-CM

## 2018-11-09 DIAGNOSIS — J44.9 CHRONIC OBSTRUCTIVE PULMONARY DISEASE, UNSPECIFIED COPD TYPE (HCC): ICD-10-CM

## 2018-11-09 DIAGNOSIS — E78.00 PURE HYPERCHOLESTEROLEMIA: ICD-10-CM

## 2018-11-09 DIAGNOSIS — R07.89 ATYPICAL CHEST PAIN: ICD-10-CM

## 2018-11-09 DIAGNOSIS — I10 ESSENTIAL HYPERTENSION: Primary | ICD-10-CM

## 2018-11-09 PROCEDURE — 3017F COLORECTAL CA SCREEN DOC REV: CPT | Performed by: INTERNAL MEDICINE

## 2018-11-09 PROCEDURE — 1101F PT FALLS ASSESS-DOCD LE1/YR: CPT | Performed by: INTERNAL MEDICINE

## 2018-11-09 PROCEDURE — G8926 SPIRO NO PERF OR DOC: HCPCS | Performed by: INTERNAL MEDICINE

## 2018-11-09 PROCEDURE — 1090F PRES/ABSN URINE INCON ASSESS: CPT | Performed by: INTERNAL MEDICINE

## 2018-11-09 PROCEDURE — 4040F PNEUMOC VAC/ADMIN/RCVD: CPT | Performed by: INTERNAL MEDICINE

## 2018-11-09 PROCEDURE — 99204 OFFICE O/P NEW MOD 45 MIN: CPT | Performed by: INTERNAL MEDICINE

## 2018-11-09 PROCEDURE — G8400 PT W/DXA NO RESULTS DOC: HCPCS | Performed by: INTERNAL MEDICINE

## 2018-11-09 PROCEDURE — G8482 FLU IMMUNIZE ORDER/ADMIN: HCPCS | Performed by: INTERNAL MEDICINE

## 2018-11-09 PROCEDURE — 3023F SPIROM DOC REV: CPT | Performed by: INTERNAL MEDICINE

## 2018-11-09 PROCEDURE — 1123F ACP DISCUSS/DSCN MKR DOCD: CPT | Performed by: INTERNAL MEDICINE

## 2018-11-09 PROCEDURE — G8420 CALC BMI NORM PARAMETERS: HCPCS | Performed by: INTERNAL MEDICINE

## 2018-11-09 PROCEDURE — 1036F TOBACCO NON-USER: CPT | Performed by: INTERNAL MEDICINE

## 2018-11-09 PROCEDURE — 93000 ELECTROCARDIOGRAM COMPLETE: CPT | Performed by: INTERNAL MEDICINE

## 2018-11-09 PROCEDURE — G8427 DOCREV CUR MEDS BY ELIG CLIN: HCPCS | Performed by: INTERNAL MEDICINE

## 2018-11-09 RX ORDER — IBUPROFEN 200 MG
200 TABLET ORAL EVERY 6 HOURS PRN
COMMUNITY
End: 2021-06-17

## 2018-11-09 NOTE — PROGRESS NOTES
found for: [facility]  Lab Results   Component Value Date    TRIG 109 09/27/2018    TRIG 205 (H) 11/14/2017    TRIG 261 (H) 02/20/2017     Lab Results   Component Value Date    HDL 51 09/27/2018    HDL 40 11/14/2017    HDL 43 02/20/2017     Lab Results   Component Value Date    LDLCALC 102 (H) 09/27/2018    LDLCALC 191 (H) 11/14/2017    LDLCALC 201 (H) 02/20/2017       Cardiac Tests:  ECG: A resting electrocardiogram demonstrates evidence sinus rhythm with nonspecific ST changes  Last Echocardiogram: An echocardiogram of November, 2016 demonstrates evidence of a normal size left ventricular chamber with mild concentric left ventricular hypertrophy and normal left ventricular systolic function with no evidence significant valvular pathology      ASSESSMENT / PLAN:  On a clinical basis, the patient presents with no active cardiovascular symptoms or known structural heart disease in the face of a risk profile of hypertension and hyperlipidemia. Based on her absence of symptoms, not recommended additional assessment and have discussed her needs of continued appropriate risk factor modification of blood pressure and serum lipids. Upon review of serial lipid profiles, her most recent studies of tender, 2018 are entirely inconsistent with that previously noted and I presently questioned her accuracy. I have discussed this with her and based on her nonspecific symptoms of muscular discomfort feel that atorvastatin may not be her ideal therapeutic agent. I have permitted his discontinuation and would recommend repeat assessment of her serum lipid status in 6-8 weeks with the results determining needs of present therapy in conjunction with her hypertension and risk profile. At present, I will return her to your care and would happily evaluate her in the future should additional cardiovascular difficulties or concerns arise.       Follow-up office visit as needed should additional cardiovascular difficulties or concerns

## 2018-11-26 DIAGNOSIS — I10 ESSENTIAL HYPERTENSION: ICD-10-CM

## 2018-11-26 RX ORDER — ATENOLOL 25 MG/1
50 TABLET ORAL 2 TIMES DAILY
Qty: 360 TABLET | Refills: 3 | Status: SHIPPED | OUTPATIENT
Start: 2018-11-26 | End: 2019-11-20 | Stop reason: SDUPTHER

## 2018-12-18 ENCOUNTER — OFFICE VISIT (OUTPATIENT)
Dept: ENDOCRINOLOGY | Age: 68
End: 2018-12-18
Payer: COMMERCIAL

## 2018-12-18 VITALS
HEART RATE: 60 BPM | WEIGHT: 167 LBS | RESPIRATION RATE: 16 BRPM | DIASTOLIC BLOOD PRESSURE: 72 MMHG | SYSTOLIC BLOOD PRESSURE: 116 MMHG | HEIGHT: 70 IN | OXYGEN SATURATION: 98 % | BODY MASS INDEX: 23.91 KG/M2

## 2018-12-18 DIAGNOSIS — E55.9 VITAMIN D DEFICIENCY: ICD-10-CM

## 2018-12-18 DIAGNOSIS — R73.03 PREDIABETES: ICD-10-CM

## 2018-12-18 DIAGNOSIS — E03.9 HYPOTHYROIDISM, UNSPECIFIED TYPE: Primary | ICD-10-CM

## 2018-12-18 PROCEDURE — 99214 OFFICE O/P EST MOD 30 MIN: CPT | Performed by: INTERNAL MEDICINE

## 2018-12-18 RX ORDER — LEVOTHYROXINE SODIUM 0.1 MG/1
TABLET ORAL
Qty: 90 TABLET | Refills: 5 | Status: SHIPPED | OUTPATIENT
Start: 2018-12-18 | End: 2019-12-17 | Stop reason: SDUPTHER

## 2018-12-18 NOTE — PROGRESS NOTES
ENDOCRINOLOGY CLINIC NOTE    Date of Service: 12/18/2018    Medical Records Reviewed:   Inpatient records, outpatient records, outside records     Care Team:  Primary Care Physician: Miya Merino MD.  Provider: David Demarco MD  Other provider(s):            Reason for the visit:  Primary Hypothyroidism, vitD deficiency, chronic fatigue     Type of visit:  Follow up     History of Present Illness: The history is provided by the patient. No  was used. Accuracy of the patient data is excellent. Alberto Milian is a very pleasant 79 y.o. female with past medical h/o lunch cancer seen in the clinic today for management of hypothyroidism   The patient was diagnosed with hypothyroidism in 2008 and was told that she has Hashimoto's thyroid disease   She is currently on Levothyroxine 100 mcg daily. Patient takes levothyroxine in the morning at empty stomach, wait one hour before eating , avoid multivitamins containing calcium  or iron with it. Recent labs   Lab Results   Component Value Date/Time    TSH 2.000 09/27/2018 10:13 AM    T4FREE 1.92 (H) 09/27/2018 10:13 AM     The patient still c/o unexplained weight gain, fatigue, dry skin, brittle fingernails, and brittle hair    She reported positive FH of thyroid disease in her mother and two sisters      Given tiredness and autoimmune thyroid disease, I ordered AM cortisol and was 9.9   Component 9/27/2018   Cortisol 9.90       Regarding MNG   The patient was also diagnosed with thyroid nodule at the same time of hypothyroidism   Thyroid US 8/2017   The right thyroid lobe measures 3.8 x 1.4 x 1.4 cm in size. The left thyroid lobe measures  3.3 x 1.0 x 1.1 cm in size. The isthmus measures 0.1 cm  in thickness. The thyroid gland is heterogeneous. A 0.6 cm small hypoechoic nodule present in the thyroid isthmus on the right. No other suspicious nodules noted.    Alberto Milian denies any new lumps, bumps in her neck, change in her voice, or shortness of breath. No family history of thyroid cancer. No prior history of radiation to head or neck region. vitD deficiency    The patient currently taking vitD 50,000 iu/wk and has been on this dose for many years   No fragility fractures     PAST MEDICAL HISTORY   Past Medical History:   Diagnosis Date    Arthritis     Symptoms respond to myofascial release. Not surgical candidate    Asthma     Cancer [facility])     lung cancer- RML- 2008, stage 1A, [id number] Stage 1A 2013    Chronic back pain     COPD (chronic obstructive pulmonary disease) (HCC)     Emphysema of lung ([address]     Fibrocystic breast     GERD (gastroesophageal reflux disease)     Hashimoto's disease 8963    Helicobacter pylori (H. pylori)     EGD 7/2002    Hemorrhoids     Hyperlipidemia     Hypertension     Hyperthyroidism     Post-thoracotomy pain 2008     PAST SURGICAL HISTORY   Past Surgical History:   Procedure Laterality Date    APPENDECTOMY  1981    COLONOSCOPY  6/23/15    polyp and diverticulosis    COLONOSCOPY  6/23/15    colonoscopy    EYE SURGERY      lenses replaced    HYSTERECTOMY  1981    LUNG CANCER SURGERY Right     X 2     SOCIAL HISTORY   Social History     Social History    Marital status:      Spouse name: N/A    Number of children: 3    Years of education: N/A     Occupational History    Not on file.      Social History Main Topics    Smoking status: Former Smoker     Packs/day: 1.00     Years: 30.00     Types: Cigarettes     Quit date: 9/1/2007    Smokeless tobacco: Never Used    Alcohol use 0.0 oz/week     4 - 6 Glasses of wine per week      Comment: SOCIALLY    Drug use: No    Sexual activity: Yes     Partners: Male     Other Topics Concern    Not on file     Social History Narrative    5 children, 3 are her own     FAMILY HISTORY   Family History   Problem Relation Age of Onset    Arthritis Mother     Diabetes Mother     Heart Disease Mother     High Blood Pressure Mother     High Cholesterol Mother     Stroke Mother     Asthma Sister     Cancer Sister 61        lung cancer    High Blood Pressure Sister     High Cholesterol Sister     Substance Abuse Sister     Heart Attack Father         massiv MI    Early Death Brother         car accident    Cancer Sister 50        breast cancer    Other Sister         GERD, diverticulosis, rotator cuff disease, spinal stenosis    Heart Disease Brother         MI    Diabetes Maternal Grandmother     Heart Disease Maternal Grandmother     High Blood Pressure Maternal Grandmother     High Cholesterol Maternal Grandmother     Stroke Maternal Grandmother     Breast Cancer Maternal Aunt 48        breast    Ovarian Cancer Maternal Aunt     Cancer Maternal Aunt 40        ovarian     ALLERGIES AND DRUG REACTIONS   Allergies   Allergen Reactions    Codeine Hives and Itching     AND CODEINE DERIVATIVES----sob  Pt stated tolerated Dilaudid    Fentanyl Other (See Comments)     Disoriented, confused  Other reaction(s): Mental Status Change    Adhesive Tape      burns skin, reddens skin, blisters    Amlodipine Swelling    Bupropion      hyper--couldn't sit still--jumpy    Cortisone      throat closed up    Dipyridamole Hives     Persantine-CST--sob, palpitations, stress test stopped    Levaquin [Levofloxacin In D5w] Other (See Comments)     Yeast infection     Loratadine      hyper--couldn't sit still--jumpy    Nortriptyline      for rosacea--caused flare up. stopped    Other      States any steroids make her face swell and flush    Prednisone Other (See Comments)    Tenex [Guanfacine Hcl]      dizziness    Tramadol Hives     sob    Varenicline     Lyrica [Pregabalin] Other (See Comments)     Leg cramps       CURRENT MEDICATIONS     Current Outpatient Prescriptions   Medication Sig Dispense Refill    atenolol (TENORMIN) 25 MG tablet Take 2 tablets by mouth 2 times daily 360 tablet 3    ibuprofen (ADVIL;MOTRIN) 200 MG tablet Take 200 mg by mouth every 6 hours as needed for Pain      B-D 3CC LUER-JUANCARLOS SYR 99OB4-6/2 22G X 1-1/2\" 3 ML MISC use MONTHLY as directed 15 each 0    hydrALAZINE (APRESOLINE) 25 MG tablet take 1 tablet by mouth twice a day 60 tablet 3    levothyroxine (SYNTHROID) 100 MCG tablet take 1 tablet by mouth once daily 30 tablet 3    benzonatate (TESSALON) 100 MG capsule take 1 capsule by mouth three times a day if needed for cough 90 capsule 5    atorvastatin (LIPITOR) 10 MG tablet Take 1 tablet by mouth daily 30 tablet 5    omeprazole (PRILOSEC) 20 MG delayed release capsule take 1 capsule by mouth once daily 30 capsule 5    losartan-hydrochlorothiazide (HYZAAR) 100-25 MG per tablet take 1 tablet by mouth once daily 30 tablet 5    lidocaine (LIDODERM) 5 % PLACE 3 PATCHES ONTO THE SKIN EVERY 24 HOURS, 12 HOUR ON, 12 HOURS OFF 30 patch 5    vitamin D (ERGOCALCIFEROL) 66442 units CAPS capsule take 1 capsule by mouth every week 30 capsule 3    tiZANidine (ZANAFLEX) 4 MG tablet take 1 tablet by mouth three times a day 90 tablet 4    ranitidine (ZANTAC) 150 MG tablet Take 1 tablet by mouth 2 times daily 60 tablet 3    Insulin Syringe-Needle U-100 25G X 1\" 1 ML MISC Use as directed for B12 injection 20 each 0    cyanocobalamin 1000 MCG/ML injection INJECT 1 MILLILITER INTO MUSCLE EVERY 7 DAYS 12 vial 3    montelukast (SINGULAIR) 10 MG tablet Take 1 tablet by mouth nightly 30 tablet 5    loratadine (CLARITIN) 10 MG tablet Take 10 mg by mouth daily      Umeclidinium-Vilanterol 62.5-25 MCG/INH AEPB Inhale 1 Inhaler into the lungs daily      Melatonin 1 MG SUBL Place 2 mg under the tongue nightly      ipratropium (ATROVENT) 0.02 % nebulizer solution   Take 0.5 mg by nebulization 4 times daily As needed.       escitalopram (LEXAPRO) 5 MG tablet Take 1 tablet by mouth daily 4 tablet 0     Current Facility-Administered Medications   Medication Dose Route Frequency Provider Last Rate Last Dose    betamethasone issues were reviewed with the patient who understood and agreed with the plan. 30 minutes were spent today in management of this patient. More than 50% of time spent on counseling of patient on above diagnosis. Thank you for allowing us to participate in the care of this patient. Please do not hesitate to contact us with any additional questions. Diagnosis Orders   1. Hypothyroidism, unspecified type  Vitamin D 25 Hydrox, D2 & D3   2. Vitamin D deficiency  TSH without Reflex    T4, Free   3.  Prediabetes  Basic Metabolic Panel    Hemoglobin A1C    Ambulatory referral to Diabetic Education       Taniya Coreas MD  Endocrinologist, [facility])   [address] [place] [address], [address] [place] [address],[facility] [id number]   Phone: [phone]  Fax: [phone]

## 2018-12-21 DIAGNOSIS — F41.9 ANXIETY: ICD-10-CM

## 2018-12-21 RX ORDER — ESCITALOPRAM OXALATE 5 MG/1
5 TABLET ORAL DAILY
Qty: 30 TABLET | Refills: 5 | Status: SHIPPED | OUTPATIENT
Start: 2018-12-21 | End: 2019-06-12 | Stop reason: SDUPTHER

## 2018-12-28 ENCOUNTER — OFFICE VISIT (OUTPATIENT)
Dept: FAMILY MEDICINE CLINIC | Age: 68
End: 2018-12-28
Payer: COMMERCIAL

## 2018-12-28 VITALS
TEMPERATURE: 97.6 F | DIASTOLIC BLOOD PRESSURE: 64 MMHG | BODY MASS INDEX: 23.34 KG/M2 | SYSTOLIC BLOOD PRESSURE: 102 MMHG | HEIGHT: 70 IN | RESPIRATION RATE: 16 BRPM | WEIGHT: 163 LBS | OXYGEN SATURATION: 96 % | HEART RATE: 59 BPM

## 2018-12-28 DIAGNOSIS — R19.7 DIARRHEA OF PRESUMED INFECTIOUS ORIGIN: Primary | ICD-10-CM

## 2018-12-28 DIAGNOSIS — I10 ESSENTIAL HYPERTENSION: ICD-10-CM

## 2018-12-28 PROCEDURE — 1090F PRES/ABSN URINE INCON ASSESS: CPT | Performed by: FAMILY MEDICINE

## 2018-12-28 PROCEDURE — 1036F TOBACCO NON-USER: CPT | Performed by: FAMILY MEDICINE

## 2018-12-28 PROCEDURE — G8482 FLU IMMUNIZE ORDER/ADMIN: HCPCS | Performed by: FAMILY MEDICINE

## 2018-12-28 PROCEDURE — G8420 CALC BMI NORM PARAMETERS: HCPCS | Performed by: FAMILY MEDICINE

## 2018-12-28 PROCEDURE — G8400 PT W/DXA NO RESULTS DOC: HCPCS | Performed by: FAMILY MEDICINE

## 2018-12-28 PROCEDURE — 1123F ACP DISCUSS/DSCN MKR DOCD: CPT | Performed by: FAMILY MEDICINE

## 2018-12-28 PROCEDURE — 1101F PT FALLS ASSESS-DOCD LE1/YR: CPT | Performed by: FAMILY MEDICINE

## 2018-12-28 PROCEDURE — G8427 DOCREV CUR MEDS BY ELIG CLIN: HCPCS | Performed by: FAMILY MEDICINE

## 2018-12-28 PROCEDURE — 99213 OFFICE O/P EST LOW 20 MIN: CPT | Performed by: FAMILY MEDICINE

## 2018-12-28 PROCEDURE — 4040F PNEUMOC VAC/ADMIN/RCVD: CPT | Performed by: FAMILY MEDICINE

## 2018-12-28 PROCEDURE — 3017F COLORECTAL CA SCREEN DOC REV: CPT | Performed by: FAMILY MEDICINE

## 2018-12-28 RX ORDER — ONDANSETRON 4 MG/1
4 TABLET, ORALLY DISINTEGRATING ORAL EVERY 8 HOURS PRN
Qty: 20 TABLET | Refills: 0 | Status: SHIPPED
Start: 2018-12-28 | End: 2020-07-23 | Stop reason: SDUPTHER

## 2019-01-04 ENCOUNTER — TELEPHONE (OUTPATIENT)
Dept: ENDOCRINOLOGY | Age: 69
End: 2019-01-04

## 2019-01-04 DIAGNOSIS — R73.03 PREDIABETES: Primary | ICD-10-CM

## 2019-02-14 DIAGNOSIS — I10 ESSENTIAL HYPERTENSION: ICD-10-CM

## 2019-02-14 RX ORDER — LOSARTAN POTASSIUM AND HYDROCHLOROTHIAZIDE 25; 100 MG/1; MG/1
1 TABLET ORAL DAILY
Qty: 30 TABLET | Refills: 5 | Status: SHIPPED | OUTPATIENT
Start: 2019-02-14 | End: 2019-06-18 | Stop reason: SDUPTHER

## 2019-02-14 RX ORDER — OMEPRAZOLE 20 MG/1
20 CAPSULE, DELAYED RELEASE ORAL DAILY
Qty: 30 CAPSULE | Refills: 5 | Status: SHIPPED | OUTPATIENT
Start: 2019-02-14 | End: 2019-06-18 | Stop reason: SDUPTHER

## 2019-04-12 ENCOUNTER — HOSPITAL ENCOUNTER (OUTPATIENT)
Dept: INFUSION THERAPY | Age: 69
End: 2019-04-12
Payer: COMMERCIAL

## 2019-06-12 DIAGNOSIS — F41.9 ANXIETY: ICD-10-CM

## 2019-06-12 RX ORDER — MONTELUKAST SODIUM 10 MG/1
TABLET ORAL
Qty: 30 TABLET | Refills: 5 | Status: SHIPPED | OUTPATIENT
Start: 2019-06-12 | End: 2019-06-18 | Stop reason: SDUPTHER

## 2019-06-12 RX ORDER — ESCITALOPRAM OXALATE 5 MG/1
TABLET ORAL
Qty: 30 TABLET | Refills: 5 | Status: SHIPPED | OUTPATIENT
Start: 2019-06-12 | End: 2019-10-31 | Stop reason: SDUPTHER

## 2019-06-12 RX ORDER — HYDRALAZINE HYDROCHLORIDE 25 MG/1
TABLET, FILM COATED ORAL
Qty: 60 TABLET | Refills: 3 | Status: SHIPPED | OUTPATIENT
Start: 2019-06-12 | End: 2019-10-04 | Stop reason: SDUPTHER

## 2019-06-14 ENCOUNTER — HOSPITAL ENCOUNTER (OUTPATIENT)
Age: 69
Discharge: HOME | End: 2019-06-16
Payer: COMMERCIAL

## 2019-06-14 DIAGNOSIS — E55.9 VITAMIN D DEFICIENCY: ICD-10-CM

## 2019-06-14 DIAGNOSIS — E03.9 HYPOTHYROIDISM, UNSPECIFIED TYPE: ICD-10-CM

## 2019-06-14 DIAGNOSIS — R73.03 PREDIABETES: ICD-10-CM

## 2019-06-14 LAB
ANION GAP SERPL CALCULATED.3IONS-SCNC: 13 MMOL/L (ref 7–16)
BUN BLDV-MCNC: 12 MG/DL (ref 8–23)
CALCIUM SERPL-MCNC: 9.7 MG/DL (ref 8.6–10.2)
CHLORIDE BLD-SCNC: 93 MMOL/L (ref 98–107)
CO2: 22 MMOL/L (ref 22–29)
CREAT SERPL-MCNC: 1 MG/DL (ref 0.5–1)
GFR AFRICAN AMERICAN: >60
GFR NON-AFRICAN AMERICAN: 55 ML/MIN/1.73
GLUCOSE BLD-MCNC: 96 MG/DL (ref 74–99)
HBA1C MFR BLD: 6 % (ref 4–5.6)
POTASSIUM SERPL-SCNC: 4.6 MMOL/L (ref 3.5–5)
SODIUM BLD-SCNC: 128 MMOL/L (ref 132–146)
T4 FREE: 2.07 NG/DL (ref 0.93–1.7)
TSH SERPL DL<=0.05 MIU/L-ACNC: 1.59 UIU/ML (ref 0.27–4.2)
VITAMIN D 25-HYDROXY: 46 NG/ML (ref 30–100)

## 2019-06-14 PROCEDURE — 36415 COLL VENOUS BLD VENIPUNCTURE: CPT

## 2019-06-14 PROCEDURE — 80048 BASIC METABOLIC PNL TOTAL CA: CPT

## 2019-06-14 PROCEDURE — 83036 HEMOGLOBIN GLYCOSYLATED A1C: CPT

## 2019-06-14 PROCEDURE — 82306 VITAMIN D 25 HYDROXY: CPT

## 2019-06-14 PROCEDURE — 84443 ASSAY THYROID STIM HORMONE: CPT

## 2019-06-14 PROCEDURE — 84439 ASSAY OF FREE THYROXINE: CPT

## 2019-06-18 ENCOUNTER — OFFICE VISIT (OUTPATIENT)
Dept: ENDOCRINOLOGY | Age: 69
End: 2019-06-18
Payer: COMMERCIAL

## 2019-06-18 VITALS
OXYGEN SATURATION: 98 % | DIASTOLIC BLOOD PRESSURE: 100 MMHG | SYSTOLIC BLOOD PRESSURE: 150 MMHG | WEIGHT: 166 LBS | HEIGHT: 70 IN | HEART RATE: 67 BPM | BODY MASS INDEX: 23.77 KG/M2

## 2019-06-18 DIAGNOSIS — E04.2 MULTINODULAR GOITER: ICD-10-CM

## 2019-06-18 DIAGNOSIS — E87.1 HYPONATREMIA: ICD-10-CM

## 2019-06-18 DIAGNOSIS — E78.5 HYPERLIPIDEMIA, UNSPECIFIED HYPERLIPIDEMIA TYPE: Primary | ICD-10-CM

## 2019-06-18 DIAGNOSIS — R73.03 PREDIABETES: ICD-10-CM

## 2019-06-18 PROCEDURE — 1123F ACP DISCUSS/DSCN MKR DOCD: CPT | Performed by: INTERNAL MEDICINE

## 2019-06-18 PROCEDURE — 1036F TOBACCO NON-USER: CPT | Performed by: INTERNAL MEDICINE

## 2019-06-18 PROCEDURE — G8400 PT W/DXA NO RESULTS DOC: HCPCS | Performed by: INTERNAL MEDICINE

## 2019-06-18 PROCEDURE — 4040F PNEUMOC VAC/ADMIN/RCVD: CPT | Performed by: INTERNAL MEDICINE

## 2019-06-18 PROCEDURE — G8420 CALC BMI NORM PARAMETERS: HCPCS | Performed by: INTERNAL MEDICINE

## 2019-06-18 PROCEDURE — 3017F COLORECTAL CA SCREEN DOC REV: CPT | Performed by: INTERNAL MEDICINE

## 2019-06-18 PROCEDURE — 1090F PRES/ABSN URINE INCON ASSESS: CPT | Performed by: INTERNAL MEDICINE

## 2019-06-18 PROCEDURE — G8427 DOCREV CUR MEDS BY ELIG CLIN: HCPCS | Performed by: INTERNAL MEDICINE

## 2019-06-18 PROCEDURE — 99214 OFFICE O/P EST MOD 30 MIN: CPT | Performed by: INTERNAL MEDICINE

## 2019-06-18 NOTE — PROGRESS NOTES
ENDOCRINOLOGY CLINIC NOTE    Date of Service: 6/18/2019    Medical Records Reviewed:   I personally reviewed and summarized previous records     Care Team:  Primary Care Physician: Honorio Ennis MD.  Provider: Roshan Deng MD  Other provider(s):            Reason for the visit:  Primary Hypothyroidism, vitD deficiency, chronic fatigue     History of Present Illness: The history is provided by the patient. No  was used. Accuracy of the patient data is excellent. Julio Stone is a very pleasant 79 y.o. female with past medical h/o lunch cancer seen in the clinic today for management of hypothyroidism   The patient was diagnosed with hypothyroidism in 2008 and was told that she has Hashimoto's thyroid disease   She is currently on Levothyroxine 100 mcg daily. Patient takes levothyroxine in the morning at empty stomach, wait one hour before eating , avoid multivitamins containing calcium  or iron with it. Lab Results   Component Value Date/Time    TSH 1.590 06/14/2019 04:01 PM    T4FREE 2.07 (H) 06/14/2019 04:01 PM    FT3 2.6 03/02/2018 03:00 PM       The patient still c/o unexplained weight gain, fatigue, dry skin, brittle fingernails, and brittle hair    She reported positive FH of thyroid disease in her mother and two sisters      Given tiredness and autoimmune thyroid disease, I ordered AM cortisol and was 9.9   Component 9/27/2018   Cortisol 9.90       Regarding MNG   The patient was also diagnosed with thyroid nodule at the same time of hypothyroidism   Thyroid US 8/2017   The right thyroid lobe measures 3.8 x 1.4 x 1.4 cm in size. The left thyroid lobe measures  3.3 x 1.0 x 1.1 cm in size. The isthmus measures 0.1 cm  in thickness. The thyroid gland is heterogeneous. A 0.6 cm small hypoechoic nodule present in the thyroid isthmus on the right. No other suspicious nodules noted.    Julio Stone denies any new lumps, bumps in her neck, change in her voice, or shortness of breath. No family history of thyroid cancer. No prior history of radiation to head or neck region. vitD deficiency    The patient currently taking vitD 50,000 iu/wk and has been on this dose for many years   No fragility fractures     PAST MEDICAL HISTORY   Past Medical History:   Diagnosis Date    Arthritis     Symptoms respond to myofascial release. Not surgical candidate    Asthma     Cancer [facility])     lung cancer- RML- 2008, stage 1A, [id number] Stage 1A     Chronic back pain     COPD (chronic obstructive pulmonary disease) (HCC)     Emphysema of lung ([address]     Fibrocystic breast     GERD (gastroesophageal reflux disease)     Hashimoto's disease 4393    Helicobacter pylori (H. pylori)     EGD 2002    Hemorrhoids     Hyperlipidemia     Hypertension     Hyperthyroidism     Post-thoracotomy pain      PAST SURGICAL HISTORY   Past Surgical History:   Procedure Laterality Date    APPENDECTOMY      COLONOSCOPY  6/23/15    polyp and diverticulosis    COLONOSCOPY  6/23/15    colonoscopy    EYE SURGERY      lenses replaced    HYSTERECTOMY      LUNG CANCER SURGERY Right     X 2     SOCIAL HISTORY   Social History     Socioeconomic History    Marital status:      Spouse name: Not on file    Number of children: 3    Years of education: Not on file    Highest education level: Not on file   Occupational History    Not on file   Social Needs    Financial resource strain: Not on file    Food insecurity:     Worry: Not on file     Inability: Not on file    Transportation needs:     Medical: Not on file     Non-medical: Not on file   Tobacco Use    Smoking status: Former Smoker     Packs/day: 1.00     Years: 30.00     Pack years: 30.00     Types: Cigarettes     Last attempt to quit: 2007     Years since quittin.8    Smokeless tobacco: Never Used   Substance and Sexual Activity    Alcohol use:  Yes     Alcohol/week: 0.0 oz     Types: 4 - 6 Glasses of wine per week Comment: SOCIALLY    Drug use: No    Sexual activity: Yes     Partners: Male   Lifestyle    Physical activity:     Days per week: Not on file     Minutes per session: Not on file    Stress: Not on file   Relationships    Social connections:     Talks on phone: Not on file     Gets together: Not on file     Attends [employer] service: Not on file     Active member of club or organization: Not on file     Attends meetings of clubs or organizations: Not on file     Relationship status: Not on file    Intimate partner violence:     Fear of current or ex partner: Not on file     Emotionally abused: Not on file     Physically abused: Not on file     Forced sexual activity: Not on file   Other Topics Concern    Not on file   Social History Narrative    5 children, 3 are her own     FAMILY HISTORY   Family History   Problem Relation Age of Onset    Arthritis Mother     Diabetes Mother     Heart Disease Mother     High Blood Pressure Mother     High Cholesterol Mother     Stroke Mother     Asthma Sister     Cancer Sister 61        lung cancer    High Blood Pressure Sister     High Cholesterol Sister     Substance Abuse Sister     Heart Attack Father         massiv MI    Early Death Brother         car accident    Cancer Sister 50        breast cancer    Other Sister         GERD, diverticulosis, rotator cuff disease, spinal stenosis    Heart Disease Brother         MI    Diabetes Maternal Grandmother     Heart Disease Maternal Grandmother     High Blood Pressure Maternal Grandmother     High Cholesterol Maternal Grandmother     Stroke Maternal Grandmother     Breast Cancer Maternal Aunt 48        breast    Ovarian Cancer Maternal Aunt     Cancer Maternal Aunt 40        ovarian     ALLERGIES AND DRUG REACTIONS   Allergies   Allergen Reactions    Codeine Hives and Itching     AND CODEINE DERIVATIVES----sob  Pt stated tolerated Dilaudid    Fentanyl Other (See Comments)     Disoriented, confused  Other reaction(s): Mental Status Change    Adhesive Tape      burns skin, reddens skin, blisters    Amlodipine Swelling    Bupropion      hyper--couldn't sit still--jumpy    Cortisone      throat closed up    Dipyridamole Hives     Persantine-CST--sob, palpitations, stress test stopped    Levaquin [Levofloxacin In D5w] Other (See Comments)     Yeast infection     Loratadine      hyper--couldn't sit still--jumpy    Nortriptyline      for rosacea--caused flare up. stopped    Other      States any steroids make her face swell and flush    Prednisone Other (See Comments)    Tenex [Guanfacine Hcl]      dizziness    Tramadol Hives     sob    Varenicline     Lyrica [Pregabalin] Other (See Comments)     Leg cramps       CURRENT MEDICATIONS     Current Outpatient Medications   Medication Sig Dispense Refill    escitalopram (LEXAPRO) 5 MG tablet take 1 tablet by mouth once daily 30 tablet 5    hydrALAZINE (APRESOLINE) 25 MG tablet take 1 tablet by mouth twice a day 60 tablet 3    cyanocobalamin 1000 MCG/ML injection INJECT 1 MILLILITER INTO MUSCLE EVERY 7 DAYS 12 vial 3    vitamin D (ERGOCALCIFEROL) 37118 units CAPS capsule take 1 capsule by mouth weekly 12 capsule 3    losartan-hydrochlorothiazide (HYZAAR) 100-25 MG per tablet take 1 tablet by mouth once daily 30 tablet 5    omeprazole (PRILOSEC) 20 MG delayed release capsule take 1 capsule by mouth once daily 30 capsule 5    levothyroxine (SYNTHROID) 100 MCG tablet take 1 tablet by mouth once daily 90 tablet 5    atenolol (TENORMIN) 25 MG tablet Take 2 tablets by mouth 2 times daily 360 tablet 3    ibuprofen (ADVIL;MOTRIN) 200 MG tablet Take 200 mg by mouth every 6 hours as needed for Pain      B-D 3CC LUER-JUANCARLOS SYR 09QB5-3/2 22G X 1-1/2\" 3 ML MISC use MONTHLY as directed 15 each 0    benzonatate (TESSALON) 100 MG capsule take 1 capsule by mouth three times a day if needed for cough 90 capsule 5    lidocaine (LIDODERM) 5 % PLACE 3 PATCHES weakness,     OBJECTIVE    BP (!) 150/100 (Site: Left Upper Arm, Position: Sitting, Cuff Size: Medium Adult)   Pulse 67   Ht 5' 10\" (1.778 m)   Wt 166 lb (75.3 kg)   LMP  (Exact Date)   SpO2 98%   BMI 23.82 kg/m²   BP Readings from Last 4 Encounters:   06/18/19 (!) 150/100   12/28/18 102/64   12/18/18 116/72   11/09/18 122/62     Wt Readings from Last 6 Encounters:   06/18/19 166 lb (75.3 kg)   12/28/18 163 lb (73.9 kg)   12/18/18 167 lb (75.8 kg)   11/09/18 169 lb (76.7 kg)   10/12/18 172 lb 3.2 oz (78.1 kg)   09/28/18 173 lb (78.5 kg)       Physical examination:  General: awake alert, oriented x3, no abnormal position or movements. HEENT: normocephalic non traumatic  Neck: supple, no LN enlargement, no thyromegaly, no thyroid tenderness, no JVD. Pulm: Clear equal air entry no added sounds, no wheezing or rhonchi    CVS: S1 + S2, no murmur, no heave. Dorsalis pedis pulse palpable   Abd: soft lax, no tenderness, no organomegaly, audible bowel sounds. Skin: warm, no lesions, no rash.   Neuro: CN intact, sensation normal , muscle power normal.  Psych: normal mood, and affect    Review of Laboratory Data:  I have reviewed the following:  Lab Results   Component Value Date/Time    WBC 6.9 09/27/2018 10:13 AM    RBC 4.58 09/27/2018 10:13 AM    HGB 13.3 09/27/2018 10:13 AM    HCT 39.9 09/27/2018 10:13 AM    MCV 87.1 09/27/2018 10:13 AM    MCH 29.0 09/27/2018 10:13 AM    MCHC 33.3 09/27/2018 10:13 AM    RDW 12.5 09/27/2018 10:13 AM     09/27/2018 10:13 AM    MPV 10.2 09/27/2018 10:13 AM      Lab Results   Component Value Date/Time     (L) 06/14/2019 04:01 PM    K 4.6 06/14/2019 04:01 PM    CO2 22 06/14/2019 04:01 PM    BUN 12 06/14/2019 04:01 PM    CALCIUM 9.7 06/14/2019 04:01 PM      Lab Results   Component Value Date    LABA1C 6.0 06/14/2019    GLUCOSE 96 06/14/2019    LABCREA 43 02/24/2017     Lab Results   Component Value Date/Time    TSH 1.590 06/14/2019 04:01 PM    T4FREE 2.07 (H) 06/14/2019

## 2019-06-18 NOTE — LETTER
[address] [place] [address] Department of Endocrinology Diabetes and Metabolism   [address] [place] [address]   Phone: [phone]  Fax: [phone]      Provider: Daron Moore MD  Primary Care Physician: Barbara Georges MD   Referring Provider: No ref. provider found    Patient: Shirley Walters  YOB: 1950  Date of Visit: 6/18/2019      Dear Dr. Barbara Georges MD   I had the pleasure of seeing your patient Shirley Walters today at endocrine clinic for follow up visit and I enclosed a copy of the office visit completed today. Thank you very much for asking us to participate in the care of this very pleasant patient. Please don't hesitate to call if there are any further questions or concerns. Sincerely   Daron Moore MD  Endocrinologist, [facility])   [address] [facility], [address] [place] [address],[address] [id number]   Phone: [phone]  Fax: [phone]      ENDOCRINOLOGY CLINIC NOTE    Date of Service: 6/18/2019    Medical Records Reviewed:   I personally reviewed and summarized previous records     Care Team:  Primary Care Physician: Barbara Georges MD.  Provider: Daron Moore MD  Other provider(s):            Reason for the visit:  Primary Hypothyroidism, vitD deficiency, chronic fatigue     History of Present Illness: The history is provided by the patient. No  was used. Accuracy of the patient data is excellent. Shirley Walters is a very pleasant 79 y.o. female with past medical h/o lunch cancer seen in the clinic today for management of hypothyroidism   The patient was diagnosed with hypothyroidism in 2008 and was told that she has Hashimoto's thyroid disease   She is currently on Levothyroxine 100 mcg daily. Patient takes levothyroxine in the morning at empty stomach, wait one hour before eating , avoid multivitamins containing calcium  or iron with it.   Lab Results   Component Value Date/Time    TSH 1.590 06/14/2019 04:01 PM T4FREE 2.07 (H) 06/14/2019 04:01 PM    FT3 2.6 03/02/2018 03:00 PM       The patient still c/o unexplained weight gain, fatigue, dry skin, brittle fingernails, and brittle hair    She reported positive FH of thyroid disease in her mother and two sisters      Given tiredness and autoimmune thyroid disease, I ordered AM cortisol and was 9.9   Component 9/27/2018   Cortisol 9.90       Regarding MNG   The patient was also diagnosed with thyroid nodule at the same time of hypothyroidism   Thyroid US 8/2017   The right thyroid lobe measures 3.8 x 1.4 x 1.4 cm in size. The left thyroid lobe measures  3.3 x 1.0 x 1.1 cm in size. The isthmus measures 0.1 cm  in thickness. The thyroid gland is heterogeneous. A 0.6 cm small hypoechoic nodule present in the thyroid isthmus on the right. No other suspicious nodules noted. Víctor Conway denies any new lumps, bumps in her neck, change in her voice, or shortness of breath. No family history of thyroid cancer. No prior history of radiation to head or neck region. vitD deficiency    The patient currently taking vitD 50,000 iu/wk and has been on this dose for many years   No fragility fractures     PAST MEDICAL HISTORY   Past Medical History:   Diagnosis Date    Arthritis     Symptoms respond to myofascial release.  Not surgical candidate    Asthma     Cancer [facility])     lung cancer- L- 2008, stage 1A, [id number] Stage 1A 2013    Chronic back pain     COPD (chronic obstructive pulmonary disease) (HCC)     Emphy[facility]ma [facility] lung ([facility] [address]     Fibrocystic breast     GERD (gastroesophageal reflux disease)     Hashimoto's disease 9447    Helicobacter pylori (H. pylori)     EGD 7/2002    Hemorrhoids     Hyperlipidemia     Hypertension     Hyperthyroidism     Post-thoracotomy pain 2008     PAST SURGICAL HISTORY   Past Surgical History:   Procedure Laterality Date    APPENDECTOMY  1981    COLONOSCOPY  6/23/15    polyp and diverticulosis    COLONOSCOPY  6/23/15  Asthma Sister     Cancer Sister 61        lung cancer    High Blood Pressure Sister     High Cholesterol Sister     Substance Abuse Sister     Heart Attack Father         massiv MI    Early Death Brother         car accident    Cancer Sister 50        breast cancer    Other Sister         GERD, diverticulosis, rotator cuff disease, spinal stenosis    Heart Disease Brother         MI    Diabetes Maternal Grandmother     Heart Disease Maternal Grandmother     High Blood Pressure Maternal Grandmother     High Cholesterol Maternal Grandmother     Stroke Maternal Grandmother     Breast Cancer Maternal Aunt 48        breast    Ovarian Cancer Maternal Aunt     Cancer Maternal Aunt 40        ovarian     ALLERGIES AND DRUG REACTIONS   Allergies   Allergen Reactions    Codeine Hives and Itching     AND CODEINE DERIVATIVES----sob  Pt stated tolerated Dilaudid    Fentanyl Other (See Comments)     Disoriented, confused  Other reaction(s): Mental Status Change    Adhesive Tape      burns skin, reddens skin, blisters    Amlodipine Swelling    Bupropion      hyper--couldn't sit still--jumpy    Cortisone      throat closed up    Dipyridamole Hives     Persantine-CST--sob, palpitations, stress test stopped    Levaquin [Levofloxacin In D5w] Other (See Comments)     Yeast infection     Loratadine      hyper--couldn't sit still--jumpy    Nortriptyline      for rosacea--caused flare up. stopped    Other      States any steroids make her face swell and flush    Prednisone Other (See Comments)    Tenex [Guanfacine Hcl]      dizziness    Tramadol Hives     sob    Varenicline     Lyrica [Pregabalin] Other (See Comments)     Leg cramps       CURRENT MEDICATIONS     Current Outpatient Medications   Medication Sig Dispense Refill    escitalopram (LEXAPRO) 5 MG tablet take 1 tablet by mouth once daily 30 tablet 5    hydrALAZINE (APRESOLINE) 25 MG tablet take 1 tablet by mouth twice a day 60 tablet 3 Current Facility-Administered Medications   Medication Dose Route Frequency Provider Last Rate Last Dose    betamethasone acetate-betamethasone sodium phosphate (CELESTONE) injection 6 mg  6 mg Intra-articular Once Nico Leong MD        lidocaine 1 % injection 1 mL  1 mL Intradermal Once Nico Leong MD           Review of Systems  Constitutional: No fever, no chills, no diaphoresis, no generalized weakness. HEENT: No blurred vision, No sore throat, no ear pain, no hair loss  Neck: denied any neck swelling, difficulty swallowing,   Cadrdiopulomary: No CP, SOB or palpitation, No orthopnea or PND. No cough or wheezing. GI: No N/V/D, no constipation, No abdominal pain, no melena or hematochezia   : Denied any dysuria, hematuria, flank pain, discharge, or incontinence. Skin: denied any rash, ulcer, Hirsute, or hyperpigmentation. MSK: denied any joint deformity, joint pain/swelling, muscle pain, or back pain. Neuro: no numbess, no tingling, no weakness,     OBJECTIVE    BP (!) 150/100 (Site: Left Upper Arm, Position: Sitting, Cuff Size: Medium Adult)   Pulse 67   Ht 5' 10\" (1.778 m)   Wt 166 lb (75.3 kg)   LMP  (Exact Date)   SpO2 98%   BMI 23.82 kg/m²    BP Readings from Last 4 Encounters:   06/18/19 (!) 150/100   12/28/18 102/64   12/18/18 116/72   11/09/18 122/62     Wt Readings from Last 6 Encounters:   06/18/19 166 lb (75.3 kg)   12/28/18 163 lb (73.9 kg)   12/18/18 167 lb (75.8 kg)   11/09/18 169 lb (76.7 kg)   10/12/18 172 lb 3.2 oz (78.1 kg)   09/28/18 173 lb (78.5 kg)       Physical examination:  General: awake alert, oriented x3, no abnormal position or movements. HEENT: normocephalic non traumatic  Neck: supple, no LN enlargement, no thyromegaly, no thyroid tenderness, no JVD. Pulm: Clear equal air entry no added sounds, no wheezing or rhonchi    CVS: S1 + S2, no murmur, no heave. Dorsalis pedis pulse palpable   Abd: soft lax, no tenderness, no organomegaly, audible bowel sounds. · A1c now 6.0%  · Encourage lifestyle changes       The above issues were reviewed with the patient who understood and agreed with the plan. 30 minutes were spent today in management of this patient. More than 50% of time spent on counseling of patient on above diagnosis. Thank you for allowing us to participate in the care of this patient. Please do not hesitate to contact us with any additional questions. Diagnosis Orders   1. Hyperlipidemia, unspecified hyperlipidemia type  Lipid Panel    Basic Metabolic Panel   2. Prediabetes  Lipid Panel   3. Multinodular goiter  US Thyroid   4.  Hyponatremia  Basic Metabolic Panel       Lalito Smalls MD  Endocrinologist, [facility])   [address] [facility] [address]   Phone: [phone]  Fax: [phone]

## 2019-06-26 ENCOUNTER — TELEPHONE (OUTPATIENT)
Dept: ENDOCRINOLOGY | Age: 69
End: 2019-06-26

## 2019-06-27 NOTE — TELEPHONE ENCOUNTER
Notify pt,  I have reviewed your recent results    Excellent!  Thyroid nodules still very small and stable comparing with the previous studies     Will discuss this in detail at your next office visit

## 2019-06-29 ENCOUNTER — HOSPITAL ENCOUNTER (OUTPATIENT)
Age: 69
Discharge: HOME | End: 2019-07-01
Payer: COMMERCIAL

## 2019-06-29 DIAGNOSIS — R73.03 PREDIABETES: ICD-10-CM

## 2019-06-29 DIAGNOSIS — E78.5 HYPERLIPIDEMIA, UNSPECIFIED HYPERLIPIDEMIA TYPE: ICD-10-CM

## 2019-06-29 DIAGNOSIS — E87.1 HYPONATREMIA: ICD-10-CM

## 2019-06-29 LAB
ANION GAP SERPL CALCULATED.3IONS-SCNC: 16 MMOL/L (ref 7–16)
BUN BLDV-MCNC: 13 MG/DL (ref 8–23)
CALCIUM SERPL-MCNC: 9.8 MG/DL (ref 8.6–10.2)
CHLORIDE BLD-SCNC: 95 MMOL/L (ref 98–107)
CO2: 20 MMOL/L (ref 22–29)
CREAT SERPL-MCNC: 0.8 MG/DL (ref 0.5–1)
GFR AFRICAN AMERICAN: >60
GFR NON-AFRICAN AMERICAN: >60 ML/MIN/1.73
GLUCOSE BLD-MCNC: 98 MG/DL (ref 74–99)
POTASSIUM SERPL-SCNC: 3.8 MMOL/L (ref 3.5–5)
SODIUM BLD-SCNC: 131 MMOL/L (ref 132–146)

## 2019-06-29 PROCEDURE — 36415 COLL VENOUS BLD VENIPUNCTURE: CPT

## 2019-06-29 PROCEDURE — 80048 BASIC METABOLIC PNL TOTAL CA: CPT

## 2019-06-30 ENCOUNTER — TELEPHONE (OUTPATIENT)
Dept: ENDOCRINOLOGY | Age: 69
End: 2019-06-30

## 2019-07-01 ENCOUNTER — OFFICE VISIT (OUTPATIENT)
Dept: FAMILY MEDICINE CLINIC | Age: 69
End: 2019-07-01
Payer: COMMERCIAL

## 2019-07-01 VITALS
WEIGHT: 162 LBS | OXYGEN SATURATION: 97 % | HEART RATE: 55 BPM | RESPIRATION RATE: 16 BRPM | DIASTOLIC BLOOD PRESSURE: 80 MMHG | TEMPERATURE: 98 F | HEIGHT: 70 IN | BODY MASS INDEX: 23.19 KG/M2 | SYSTOLIC BLOOD PRESSURE: 149 MMHG

## 2019-07-01 DIAGNOSIS — I10 ESSENTIAL HYPERTENSION: ICD-10-CM

## 2019-07-01 DIAGNOSIS — E53.8 B12 DEFICIENCY: ICD-10-CM

## 2019-07-01 DIAGNOSIS — R55 SYNCOPE, UNSPECIFIED SYNCOPE TYPE: ICD-10-CM

## 2019-07-01 DIAGNOSIS — M51.36 DDD (DEGENERATIVE DISC DISEASE), LUMBAR: ICD-10-CM

## 2019-07-01 DIAGNOSIS — M25.551 BILATERAL HIP PAIN: Primary | ICD-10-CM

## 2019-07-01 DIAGNOSIS — Z00.00 ROUTINE GENERAL MEDICAL EXAMINATION AT A HEALTH CARE FACILITY: ICD-10-CM

## 2019-07-01 DIAGNOSIS — R56.9 SEIZURE (HCC): ICD-10-CM

## 2019-07-01 DIAGNOSIS — S46.912A STRAIN OF LEFT SHOULDER, INITIAL ENCOUNTER: ICD-10-CM

## 2019-07-01 DIAGNOSIS — J44.9 CHRONIC OBSTRUCTIVE PULMONARY DISEASE, UNSPECIFIED COPD TYPE (HCC): ICD-10-CM

## 2019-07-01 DIAGNOSIS — M25.552 BILATERAL HIP PAIN: Primary | ICD-10-CM

## 2019-07-01 DIAGNOSIS — E87.1 HYPONATREMIA: ICD-10-CM

## 2019-07-01 PROCEDURE — 4040F PNEUMOC VAC/ADMIN/RCVD: CPT | Performed by: FAMILY MEDICINE

## 2019-07-01 PROCEDURE — G0439 PPPS, SUBSEQ VISIT: HCPCS | Performed by: FAMILY MEDICINE

## 2019-07-01 PROCEDURE — 3017F COLORECTAL CA SCREEN DOC REV: CPT | Performed by: FAMILY MEDICINE

## 2019-07-01 PROCEDURE — 93000 ELECTROCARDIOGRAM COMPLETE: CPT | Performed by: FAMILY MEDICINE

## 2019-07-01 PROCEDURE — 1123F ACP DISCUSS/DSCN MKR DOCD: CPT | Performed by: FAMILY MEDICINE

## 2019-07-01 RX ORDER — LOSARTAN POTASSIUM 50 MG/1
50 TABLET ORAL DAILY
Qty: 30 TABLET | Refills: 5 | Status: SHIPPED | OUTPATIENT
Start: 2019-07-01 | End: 2019-11-26 | Stop reason: SDUPTHER

## 2019-07-01 ASSESSMENT — PATIENT HEALTH QUESTIONNAIRE - PHQ9
SUM OF ALL RESPONSES TO PHQ QUESTIONS 1-9: 2
SUM OF ALL RESPONSES TO PHQ QUESTIONS 1-9: 2

## 2019-07-01 ASSESSMENT — LIFESTYLE VARIABLES: HOW OFTEN DO YOU HAVE A DRINK CONTAINING ALCOHOL: 0

## 2019-07-01 ASSESSMENT — ANXIETY QUESTIONNAIRES: GAD7 TOTAL SCORE: 0

## 2019-07-01 NOTE — PROGRESS NOTES
goiter, no carotid bruits, no LAD  Lungs:  CTA B  Heart:  RRR, no murmurs, gallops or rubs  Abdomen:  Soft/nt/nd, + bowel sounds  Extremities:  No clubbing, cyanosis or edema  Skin: unremarkable    EKG: sinus el    Assessment/Plan:      Rey Whitmore was seen today for medicare awv and seizures. Diagnoses and all orders for this visit:    Bilateral hip pain    Seizure ([facility] [address]  -     [facility]; Future    Syncope, unspecified syncope type  -     EKG 12 Lead  -     CT HEAD W WO CONTRAST; Future    Hyponatremia  -     losartan (COZAAR) 50 MG tablet; Take 1 tablet by mouth daily    Essential hypertension  -     losartan (COZAAR) 50 MG tablet; Take 1 tablet by mouth daily    Routine general medical examination at a health care facility    DDD (degenerative disc disease), lumbar    Strain of left shoulder, initial encounter          Educational materials and/or home exercises printed for patient's review and were included in patient instructions on his/her After Visit Summary and given to patient at the end of visit. Counseled regarding above diagnosis, including possible risks and complications,  especially if left uncontrolled. Counseled regarding the possible side effects, risks, benefits and alternatives to treatment; patient and/or guardian verbalizes understanding, agrees, feels comfortable with and wishes to proceed with above treatment plan. Advised patient to call with any new medication issues, and read all Rx info from pharmacy to assure aware of all possible risks and side effects of medication before taking. Reviewed age and gender appropriate health screening exams and vaccinations. Advised patient regarding importance of keeping up with recommended health maintenance and to schedule as soon as possible if overdue, as this is important in assessing for undiagnosed pathology, especially cancer, as well as protecting against potentially harmful/life threatening disease.         Patient

## 2019-07-18 ENCOUNTER — HOSPITAL ENCOUNTER (OUTPATIENT)
Age: 69
Discharge: HOME | End: 2019-07-20
Payer: COMMERCIAL

## 2019-07-18 DIAGNOSIS — R56.9 SEIZURE (HCC): ICD-10-CM

## 2019-07-18 LAB
ANION GAP SERPL CALCULATED.3IONS-SCNC: 12 MMOL/L (ref 7–16)
BUN BLDV-MCNC: 14 MG/DL (ref 8–23)
CALCIUM SERPL-MCNC: 10.5 MG/DL (ref 8.6–10.2)
CHLORIDE BLD-SCNC: 97 MMOL/L (ref 98–107)
CO2: 27 MMOL/L (ref 22–29)
CREAT SERPL-MCNC: 1 MG/DL (ref 0.5–1)
GFR AFRICAN AMERICAN: >60
GFR NON-AFRICAN AMERICAN: 55 ML/MIN/1.73
GLUCOSE BLD-MCNC: 88 MG/DL (ref 74–99)
POTASSIUM SERPL-SCNC: 4.7 MMOL/L (ref 3.5–5)
SODIUM BLD-SCNC: 136 MMOL/L (ref 132–146)

## 2019-07-18 PROCEDURE — 36415 COLL VENOUS BLD VENIPUNCTURE: CPT

## 2019-07-18 PROCEDURE — 80048 BASIC METABOLIC PNL TOTAL CA: CPT

## 2019-07-23 ENCOUNTER — OFFICE VISIT (OUTPATIENT)
Dept: FAMILY MEDICINE CLINIC | Age: 69
End: 2019-07-23
Payer: COMMERCIAL

## 2019-07-23 VITALS
TEMPERATURE: 98 F | WEIGHT: 162 LBS | OXYGEN SATURATION: 96 % | HEIGHT: 70 IN | DIASTOLIC BLOOD PRESSURE: 79 MMHG | RESPIRATION RATE: 20 BRPM | BODY MASS INDEX: 23.19 KG/M2 | SYSTOLIC BLOOD PRESSURE: 149 MMHG | HEART RATE: 64 BPM

## 2019-07-23 DIAGNOSIS — I10 ESSENTIAL HYPERTENSION: Primary | ICD-10-CM

## 2019-07-23 DIAGNOSIS — R55 SYNCOPE, UNSPECIFIED SYNCOPE TYPE: ICD-10-CM

## 2019-07-23 PROCEDURE — G8420 CALC BMI NORM PARAMETERS: HCPCS | Performed by: FAMILY MEDICINE

## 2019-07-23 PROCEDURE — 1090F PRES/ABSN URINE INCON ASSESS: CPT | Performed by: FAMILY MEDICINE

## 2019-07-23 PROCEDURE — 4040F PNEUMOC VAC/ADMIN/RCVD: CPT | Performed by: FAMILY MEDICINE

## 2019-07-23 PROCEDURE — 1036F TOBACCO NON-USER: CPT | Performed by: FAMILY MEDICINE

## 2019-07-23 PROCEDURE — 3017F COLORECTAL CA SCREEN DOC REV: CPT | Performed by: FAMILY MEDICINE

## 2019-07-23 PROCEDURE — 99214 OFFICE O/P EST MOD 30 MIN: CPT | Performed by: FAMILY MEDICINE

## 2019-07-23 PROCEDURE — 1123F ACP DISCUSS/DSCN MKR DOCD: CPT | Performed by: FAMILY MEDICINE

## 2019-07-23 PROCEDURE — G8427 DOCREV CUR MEDS BY ELIG CLIN: HCPCS | Performed by: FAMILY MEDICINE

## 2019-07-23 PROCEDURE — G8400 PT W/DXA NO RESULTS DOC: HCPCS | Performed by: FAMILY MEDICINE

## 2019-07-23 RX ORDER — AZELASTINE HYDROCHLORIDE, FLUTICASONE PROPIONATE 137; 50 UG/1; UG/1
SPRAY, METERED NASAL
COMMUNITY
End: 2019-07-23

## 2019-07-23 RX ORDER — AZELASTINE HYDROCHLORIDE, FLUTICASONE PROPIONATE 137; 50 UG/1; UG/1
2 SPRAY, METERED NASAL DAILY
Qty: 1 BOTTLE | Refills: 11 | Status: SHIPPED
Start: 2019-07-23 | End: 2020-07-23 | Stop reason: SDUPTHER

## 2019-07-23 RX ORDER — OLMESARTAN MEDOXOMIL 40 MG/1
40 TABLET ORAL DAILY
Qty: 30 TABLET | Refills: 3 | Status: SHIPPED | OUTPATIENT
Start: 2019-07-23 | End: 2019-10-31 | Stop reason: SDUPTHER

## 2019-08-27 RX ORDER — OMEPRAZOLE 20 MG/1
CAPSULE, DELAYED RELEASE ORAL
Qty: 30 CAPSULE | Refills: 5 | Status: SHIPPED | OUTPATIENT
Start: 2019-08-27 | End: 2019-10-31 | Stop reason: SDUPTHER

## 2019-10-04 RX ORDER — HYDRALAZINE HYDROCHLORIDE 25 MG/1
TABLET, FILM COATED ORAL
Qty: 60 TABLET | Refills: 3 | Status: SHIPPED | OUTPATIENT
Start: 2019-10-04 | End: 2020-01-24

## 2019-10-16 ENCOUNTER — NURSE ONLY (OUTPATIENT)
Dept: FAMILY MEDICINE CLINIC | Age: 69
End: 2019-10-16
Payer: COMMERCIAL

## 2019-10-16 DIAGNOSIS — Z12.31 SCREENING MAMMOGRAM, ENCOUNTER FOR: Primary | ICD-10-CM

## 2019-10-16 PROCEDURE — 90653 IIV ADJUVANT VACCINE IM: CPT | Performed by: FAMILY MEDICINE

## 2019-10-16 PROCEDURE — 90471 IMMUNIZATION ADMIN: CPT | Performed by: FAMILY MEDICINE

## 2019-10-31 DIAGNOSIS — F41.9 ANXIETY: ICD-10-CM

## 2019-11-01 RX ORDER — OMEPRAZOLE 20 MG/1
CAPSULE, DELAYED RELEASE ORAL
Qty: 30 CAPSULE | Refills: 5 | Status: SHIPPED
Start: 2019-11-01 | End: 2020-03-23 | Stop reason: SDUPTHER

## 2019-11-01 RX ORDER — OLMESARTAN MEDOXOMIL 40 MG/1
40 TABLET ORAL DAILY
Qty: 30 TABLET | Refills: 3 | Status: SHIPPED
Start: 2019-11-01 | End: 2020-02-19

## 2019-11-01 RX ORDER — ESCITALOPRAM OXALATE 5 MG/1
5 TABLET ORAL DAILY
Qty: 30 TABLET | Refills: 5 | Status: SHIPPED
Start: 2019-11-01 | End: 2020-03-23 | Stop reason: SDUPTHER

## 2019-11-20 DIAGNOSIS — I10 ESSENTIAL HYPERTENSION: ICD-10-CM

## 2019-11-20 RX ORDER — ATENOLOL 25 MG/1
TABLET ORAL
Qty: 360 TABLET | Refills: 0 | Status: SHIPPED
Start: 2019-11-20 | End: 2020-02-17

## 2019-11-26 ENCOUNTER — OFFICE VISIT (OUTPATIENT)
Dept: FAMILY MEDICINE CLINIC | Age: 69
End: 2019-11-26
Payer: COMMERCIAL

## 2019-11-26 VITALS
HEART RATE: 72 BPM | WEIGHT: 161 LBS | BODY MASS INDEX: 23.1 KG/M2 | TEMPERATURE: 98.1 F | SYSTOLIC BLOOD PRESSURE: 109 MMHG | DIASTOLIC BLOOD PRESSURE: 65 MMHG | OXYGEN SATURATION: 97 %

## 2019-11-26 DIAGNOSIS — E87.1 HYPONATREMIA: ICD-10-CM

## 2019-11-26 DIAGNOSIS — I10 ESSENTIAL HYPERTENSION: Primary | ICD-10-CM

## 2019-11-26 DIAGNOSIS — M51.36 DDD (DEGENERATIVE DISC DISEASE), LUMBAR: ICD-10-CM

## 2019-11-26 PROCEDURE — 99214 OFFICE O/P EST MOD 30 MIN: CPT | Performed by: FAMILY MEDICINE

## 2019-11-26 PROCEDURE — 4040F PNEUMOC VAC/ADMIN/RCVD: CPT | Performed by: FAMILY MEDICINE

## 2019-11-26 PROCEDURE — 1123F ACP DISCUSS/DSCN MKR DOCD: CPT | Performed by: FAMILY MEDICINE

## 2019-11-26 PROCEDURE — G8428 CUR MEDS NOT DOCUMENT: HCPCS | Performed by: FAMILY MEDICINE

## 2019-11-26 PROCEDURE — G8420 CALC BMI NORM PARAMETERS: HCPCS | Performed by: FAMILY MEDICINE

## 2019-11-26 PROCEDURE — 1036F TOBACCO NON-USER: CPT | Performed by: FAMILY MEDICINE

## 2019-11-26 PROCEDURE — G8482 FLU IMMUNIZE ORDER/ADMIN: HCPCS | Performed by: FAMILY MEDICINE

## 2019-11-26 PROCEDURE — 3017F COLORECTAL CA SCREEN DOC REV: CPT | Performed by: FAMILY MEDICINE

## 2019-11-26 PROCEDURE — G8400 PT W/DXA NO RESULTS DOC: HCPCS | Performed by: FAMILY MEDICINE

## 2019-11-26 PROCEDURE — 1090F PRES/ABSN URINE INCON ASSESS: CPT | Performed by: FAMILY MEDICINE

## 2019-11-26 RX ORDER — LOSARTAN POTASSIUM 50 MG/1
50 TABLET ORAL DAILY
Qty: 30 TABLET | Refills: 11 | Status: SHIPPED
Start: 2019-11-26 | End: 2020-03-23 | Stop reason: SDUPTHER

## 2019-11-26 RX ORDER — MONTELUKAST SODIUM 10 MG/1
TABLET ORAL
Qty: 30 TABLET | Refills: 0 | Status: SHIPPED | OUTPATIENT
Start: 2019-11-26 | End: 2019-12-30

## 2019-11-27 ENCOUNTER — TELEPHONE (OUTPATIENT)
Dept: FAMILY MEDICINE CLINIC | Age: 69
End: 2019-11-27

## 2019-12-05 ENCOUNTER — PATIENT MESSAGE (OUTPATIENT)
Dept: FAMILY MEDICINE CLINIC | Age: 69
End: 2019-12-05

## 2019-12-06 RX ORDER — TIZANIDINE 4 MG/1
TABLET ORAL
Qty: 90 TABLET | Refills: 0 | Status: SHIPPED | OUTPATIENT
Start: 2019-12-06 | End: 2019-12-20 | Stop reason: SDUPTHER

## 2019-12-11 ENCOUNTER — HOSPITAL ENCOUNTER (OUTPATIENT)
Dept: GENERAL RADIOLOGY | Age: 69
Discharge: HOME | End: 2019-12-13
Payer: COMMERCIAL

## 2019-12-11 DIAGNOSIS — Z12.31 SCREENING MAMMOGRAM, ENCOUNTER FOR: ICD-10-CM

## 2019-12-12 ENCOUNTER — TELEPHONE (OUTPATIENT)
Dept: FAMILY MEDICINE CLINIC | Age: 69
End: 2019-12-12

## 2019-12-12 ENCOUNTER — TELEPHONE (OUTPATIENT)
Dept: ENDOCRINOLOGY | Age: 69
End: 2019-12-12

## 2019-12-12 ENCOUNTER — HOSPITAL ENCOUNTER (OUTPATIENT)
Age: 69
Discharge: HOME | End: 2019-12-14
Payer: COMMERCIAL

## 2019-12-12 DIAGNOSIS — I10 ESSENTIAL HYPERTENSION: ICD-10-CM

## 2019-12-12 DIAGNOSIS — N60.12 FIBROCYSTIC BREAST CHANGES OF BOTH BREASTS: Primary | ICD-10-CM

## 2019-12-12 DIAGNOSIS — N60.11 FIBROCYSTIC BREAST CHANGES OF BOTH BREASTS: Primary | ICD-10-CM

## 2019-12-12 LAB
ANION GAP SERPL CALCULATED.3IONS-SCNC: 19 MMOL/L (ref 7–16)
BUN BLDV-MCNC: 19 MG/DL (ref 8–23)
CALCIUM SERPL-MCNC: 10.2 MG/DL (ref 8.6–10.2)
CHLORIDE BLD-SCNC: 106 MMOL/L (ref 98–107)
CO2: 16 MMOL/L (ref 22–29)
CREAT SERPL-MCNC: 1 MG/DL (ref 0.5–1)
GFR AFRICAN AMERICAN: >60
GFR NON-AFRICAN AMERICAN: 55 ML/MIN/1.73
GLUCOSE BLD-MCNC: 92 MG/DL (ref 74–99)
POTASSIUM SERPL-SCNC: 4.5 MMOL/L (ref 3.5–5)
SODIUM BLD-SCNC: 141 MMOL/L (ref 132–146)

## 2019-12-12 PROCEDURE — 82306 VITAMIN D 25 HYDROXY: CPT

## 2019-12-12 PROCEDURE — 84443 ASSAY THYROID STIM HORMONE: CPT

## 2019-12-12 PROCEDURE — 84439 ASSAY OF FREE THYROXINE: CPT

## 2019-12-12 PROCEDURE — 36415 COLL VENOUS BLD VENIPUNCTURE: CPT

## 2019-12-12 PROCEDURE — 80048 BASIC METABOLIC PNL TOTAL CA: CPT

## 2019-12-13 DIAGNOSIS — E03.9 HYPOTHYROIDISM, UNSPECIFIED TYPE: Primary | ICD-10-CM

## 2019-12-13 DIAGNOSIS — E04.2 MULTINODULAR GOITER: ICD-10-CM

## 2019-12-13 LAB
T4 FREE: 1.98 NG/DL (ref 0.93–1.7)
TSH SERPL DL<=0.05 MIU/L-ACNC: 0.71 UIU/ML (ref 0.27–4.2)
VITAMIN D 25-HYDROXY: 54 NG/ML (ref 30–100)

## 2019-12-17 ENCOUNTER — OFFICE VISIT (OUTPATIENT)
Dept: ENDOCRINOLOGY | Age: 69
End: 2019-12-17
Payer: COMMERCIAL

## 2019-12-17 VITALS
DIASTOLIC BLOOD PRESSURE: 70 MMHG | SYSTOLIC BLOOD PRESSURE: 124 MMHG | OXYGEN SATURATION: 98 % | HEIGHT: 70 IN | BODY MASS INDEX: 23.37 KG/M2 | RESPIRATION RATE: 16 BRPM | HEART RATE: 68 BPM | WEIGHT: 163.2 LBS

## 2019-12-17 DIAGNOSIS — R73.03 PREDIABETES: ICD-10-CM

## 2019-12-17 DIAGNOSIS — E55.9 VITAMIN D DEFICIENCY: ICD-10-CM

## 2019-12-17 DIAGNOSIS — E03.9 HYPOTHYROIDISM, UNSPECIFIED TYPE: Primary | ICD-10-CM

## 2019-12-17 DIAGNOSIS — E78.5 HYPERLIPIDEMIA, UNSPECIFIED HYPERLIPIDEMIA TYPE: ICD-10-CM

## 2019-12-17 PROCEDURE — G8420 CALC BMI NORM PARAMETERS: HCPCS | Performed by: INTERNAL MEDICINE

## 2019-12-17 PROCEDURE — G8482 FLU IMMUNIZE ORDER/ADMIN: HCPCS | Performed by: INTERNAL MEDICINE

## 2019-12-17 PROCEDURE — G8400 PT W/DXA NO RESULTS DOC: HCPCS | Performed by: INTERNAL MEDICINE

## 2019-12-17 PROCEDURE — 1123F ACP DISCUSS/DSCN MKR DOCD: CPT | Performed by: INTERNAL MEDICINE

## 2019-12-17 PROCEDURE — 4040F PNEUMOC VAC/ADMIN/RCVD: CPT | Performed by: INTERNAL MEDICINE

## 2019-12-17 PROCEDURE — 1036F TOBACCO NON-USER: CPT | Performed by: INTERNAL MEDICINE

## 2019-12-17 PROCEDURE — 3017F COLORECTAL CA SCREEN DOC REV: CPT | Performed by: INTERNAL MEDICINE

## 2019-12-17 PROCEDURE — 99214 OFFICE O/P EST MOD 30 MIN: CPT | Performed by: INTERNAL MEDICINE

## 2019-12-17 PROCEDURE — 1090F PRES/ABSN URINE INCON ASSESS: CPT | Performed by: INTERNAL MEDICINE

## 2019-12-17 PROCEDURE — G8427 DOCREV CUR MEDS BY ELIG CLIN: HCPCS | Performed by: INTERNAL MEDICINE

## 2019-12-17 RX ORDER — LEVOTHYROXINE SODIUM 0.1 MG/1
TABLET ORAL
Qty: 90 TABLET | Refills: 5 | Status: SHIPPED
Start: 2019-12-17 | End: 2020-03-23 | Stop reason: SDUPTHER

## 2019-12-19 ENCOUNTER — HOSPITAL ENCOUNTER (OUTPATIENT)
Dept: GENERAL RADIOLOGY | Age: 69
Discharge: HOME | End: 2019-12-21
Payer: COMMERCIAL

## 2019-12-19 DIAGNOSIS — N60.11 FIBROCYSTIC BREAST CHANGES OF BOTH BREASTS: ICD-10-CM

## 2019-12-19 DIAGNOSIS — N63.0 BREAST LUMP IN FEMALE: ICD-10-CM

## 2019-12-19 DIAGNOSIS — N60.12 FIBROCYSTIC BREAST CHANGES OF BOTH BREASTS: ICD-10-CM

## 2019-12-19 PROCEDURE — 76642 ULTRASOUND BREAST LIMITED: CPT

## 2019-12-19 PROCEDURE — G0279 TOMOSYNTHESIS, MAMMO: HCPCS

## 2019-12-20 RX ORDER — TIZANIDINE 4 MG/1
TABLET ORAL
Qty: 90 TABLET | Refills: 0 | Status: SHIPPED
Start: 2019-12-20 | End: 2020-02-18

## 2019-12-30 RX ORDER — MONTELUKAST SODIUM 10 MG/1
TABLET ORAL
Qty: 30 TABLET | Refills: 0 | Status: SHIPPED | OUTPATIENT
Start: 2019-12-30 | End: 2020-01-06 | Stop reason: SDUPTHER

## 2020-01-06 ENCOUNTER — OFFICE VISIT (OUTPATIENT)
Dept: FAMILY MEDICINE CLINIC | Age: 70
End: 2020-01-06
Payer: COMMERCIAL

## 2020-01-06 VITALS
WEIGHT: 163 LBS | HEART RATE: 59 BPM | BODY MASS INDEX: 23.39 KG/M2 | SYSTOLIC BLOOD PRESSURE: 138 MMHG | OXYGEN SATURATION: 98 % | DIASTOLIC BLOOD PRESSURE: 81 MMHG | TEMPERATURE: 98.4 F

## 2020-01-06 PROCEDURE — G8400 PT W/DXA NO RESULTS DOC: HCPCS | Performed by: FAMILY MEDICINE

## 2020-01-06 PROCEDURE — G8482 FLU IMMUNIZE ORDER/ADMIN: HCPCS | Performed by: FAMILY MEDICINE

## 2020-01-06 PROCEDURE — 1123F ACP DISCUSS/DSCN MKR DOCD: CPT | Performed by: FAMILY MEDICINE

## 2020-01-06 PROCEDURE — 4040F PNEUMOC VAC/ADMIN/RCVD: CPT | Performed by: FAMILY MEDICINE

## 2020-01-06 PROCEDURE — 3017F COLORECTAL CA SCREEN DOC REV: CPT | Performed by: FAMILY MEDICINE

## 2020-01-06 PROCEDURE — 99213 OFFICE O/P EST LOW 20 MIN: CPT | Performed by: FAMILY MEDICINE

## 2020-01-06 PROCEDURE — 1090F PRES/ABSN URINE INCON ASSESS: CPT | Performed by: FAMILY MEDICINE

## 2020-01-06 PROCEDURE — G8427 DOCREV CUR MEDS BY ELIG CLIN: HCPCS | Performed by: FAMILY MEDICINE

## 2020-01-06 PROCEDURE — 1036F TOBACCO NON-USER: CPT | Performed by: FAMILY MEDICINE

## 2020-01-06 PROCEDURE — G8420 CALC BMI NORM PARAMETERS: HCPCS | Performed by: FAMILY MEDICINE

## 2020-01-06 RX ORDER — ALBUTEROL SULFATE 90 UG/1
2 AEROSOL, METERED RESPIRATORY (INHALATION) 4 TIMES DAILY PRN
Qty: 1 INHALER | Refills: 1 | Status: SHIPPED
Start: 2020-01-06 | End: 2020-03-23 | Stop reason: SDUPTHER

## 2020-01-06 RX ORDER — AMOXICILLIN AND CLAVULANATE POTASSIUM 875; 125 MG/1; MG/1
1 TABLET, FILM COATED ORAL 2 TIMES DAILY WITH MEALS
Qty: 20 TABLET | Refills: 0 | Status: SHIPPED | OUTPATIENT
Start: 2020-01-06 | End: 2020-01-16

## 2020-01-06 NOTE — PROGRESS NOTES
TMs clear, nose-congested, + sinus tenderness, throat - + erythema  Neck:  Supple, no goiter, no carotid bruits, no LAD  Lungs:  CTA B  Heart:  RRR, no murmurs, gallops or rubs  Abdomen:  Soft, NTND, + bowel sounds  Extremities:  No clubbing, cyanosis or edema  Skin: no rashes    Assessment/Plan:  Goldy Ayala was seen today for cough, wheezing, nasal congestion and pharyngitis. Diagnoses and all orders for this visit:    Bronchitis    Essential hypertension  Comments:  fluctuating blood pressure. will move benicar to morning first.  if not improving, then we will change up the hydralazine. Other orders  -     amoxicillin-clavulanate (AUGMENTIN) 875-125 MG per tablet; Take 1 tablet by mouth 2 times daily (with meals) for 10 days  -     albuterol sulfate  (90 Base) MCG/ACT inhaler; Inhale 2 puffs into the lungs 4 times daily as needed for Wheezing        Increase fluids, rest, Tylenol every 4-6 hours as needed for fever or body aches. Return to office if symptoms worsen. As above. Call or go to ED immediately if symptoms worsen or persist.  No follow-ups on file. , or sooner if necessary. Educational materials and/or home exercises printed for patient's review and were included in patient instructions on his/her After Visit Summary and given to patient at the end of visit. Counseled regarding above diagnosis, including possible risks and complications,  especially if left uncontrolled. Counseled regarding the possible side effects, risks, benefits and alternatives to treatment; patient and/or guardian verbalizes understanding, agrees, feels comfortable with and wishes to proceed with above treatment plan. Advised patient to call with any new medication issues, and read all Rx info from pharmacy to assure aware of all possible risks and side effects of medication before taking. Patient and/or guardian verbalizes understanding and agrees with above counseling, assessment and plan.     All questions answered. Tray Heredia MD  1/6/2020    I have personally reviewed and updated the chief complaint, HPI, Past Medical, Family and Social History, as well as the above Review of Systems.

## 2020-01-17 NOTE — PROGRESS NOTES
[address] [facility] PAIN    [address] [address] [address]  Dept: [phone]        Patient:  Pedro Jimenez  1950    Date of Service:  20     Requesting Physician:  Liberty Monsivais MD    Reason for Consult:      Patient presents with complaints of bilateral, lower back and right leg pain that started many years ago and has progressed over time. HISTORY OF PRESENT ILLNESS:      Pain is constant and is described as aching, dull, sharp, stabbing, throbbing and shooting. Pain does radiate to right leg in L5 distribution. She  has numbness, tingling, weakness of the right leg and does not have bladder or bowel dysfunction. Alleviating factors include: rest.  Aggravating factors include:  walking, standing. She has been on anticoagulation medications to include fish oil and has been on herbal supplements (fish oil). She is not diabetic. Imaging:   Lumbar MRI 2018 -     FINDINGS:       Exam is markedly suboptimal due to lack of axial and sagittal T2,   STIR, and precontrast T1 series. Stable grade 1 retrolisthesis of L3   on L4. Vertebral body height and alignment are otherwise maintained. Multilevel osteophytes and disc space narrowing noted. Bone marrow   signal is normal. The distal spinal cord and cauda equina nerve roots   are normal in appearance. The paraspinal soft tissues are   unremarkable.           Impression       1. Markedly suboptimal exam due to lack of axial and sagittal T2,   STIR, and precontrast T1 series. Stable grade 1 retrolisthesis of L3   on L4.        2. Mild to moderate multilevel degenerative changes. B/l SIJ xray 2017 -   FINDINGS:   Mild osteoarthritis seen in the sacroiliac joints as well as the hip   joints. No fractures noted. Xray left hip 2017 -     Findings: 3 views of pelvis and left hip. Intact alignment at the   hips. Mild symmetric hip joint space narrowing. No fracture. Symmetric   arthritic changes at the sacroiliac joints. Previous treatments: Physical Therapy, Epidural Steroid Injection, acupuncture and myofascial release and medications. Past Medical History:   Diagnosis Date    Arthritis     Symptoms respond to myofascial release. Not surgical candidate    Asthma     Cancer [facility])     lung cancer- RML- 2008, stage 1A, [id number] Stage 1A 2013    Chronic back pain     COPD (chronic obstructive pulmonary disease) ([facility] [address]     Emphysema of lung ([facility] [address]     Fibrocystic breast     GERD (gastroesophageal reflux disease)     Hashimoto's disease 0338    Helicobacter pylori (H. pylori)     EGD 7/2002    Hemorrhoids     Hyperlipidemia     Hypertension     Hyperthyroidism     Post-thoracotomy pain 2008     Past Surgical History:   Procedure Laterality Date    APPENDECTOMY  1981    COLONOSCOPY  6/23/15    polyp and diverticulosis    COLONOSCOPY  6/23/15    colonoscopy    EYE SURGERY      lenses replaced    HYSTERECTOMY  1981    LUNG CANCER SURGERY Right     X 2       Prior to Admission medications    Medication Sig Start Date End Date Taking?  Authorizing Provider   albuterol sulfate  (90 Base) MCG/ACT inhaler Inhale 2 puffs into the lungs 4 times daily as needed for Wheezing 1/6/20  Yes Deborah Flores MD   tiZANidine (ZANAFLEX) 4 MG tablet take 1 tablet by mouth three times a day 12/20/19  Yes Deborah Flores MD   Probiotic Product (PROBIOTIC-10 PO) Take by mouth daily 100 BILLION CFU   Yes Historical Provider, MD   Omega-3 Fatty Acids (FISH OIL PO) Take by mouth daily 1200MG   Yes Historical Provider, MD   Coenzyme Q10 (CO Q 10 PO) Take by mouth daily   Yes Historical Provider, MD   Biotin w/ Vitamins C & E (HAIR/SKIN/NAILS PO) Take by mouth daily VIT C 90MG  VIT E 5,000MCG  ZINC 8MG  COPPER 1MG   Yes Historical Provider, MD   NONFORMULARY CBD CAPSULES----- ONCE DAILY  CBD OINTMENT FOR BACK   60MG   Yes Historical Provider, MD   levothyroxine (SYNTHROID) 100 MCG tablet take 1 tablet 6 days a week and 1/2 tablet MD Dawna   montelukast (SINGULAIR) 10 MG tablet Take 1 tablet by mouth nightly 12/19/16  Yes LOIS Keith   loratadine (CLARITIN) 10 MG tablet Take 10 mg by mouth daily   Yes Historical Provider, MD   Umeclidinium-Vilanterol 62.5-25 MCG/INH AEPB Inhale 1 Inhaler into the lungs daily   Yes Historical Provider, MD   Melatonin 1 MG SUBL Place 2 mg under the tongue nightly   Yes Historical Provider, MD   ipratropium (ATROVENT) 0.02 % nebulizer solution   Take 0.5 mg by nebulization 4 times daily As needed.    Yes Historical Provider, MD       Allergies   Allergen Reactions    Codeine Hives and Itching     AND CODEINE DERIVATIVES----sob  Pt stated tolerated Dilaudid    Fentanyl Other (See Comments)     Disoriented, confused  Other reaction(s): Mental Status Change    Adhesive Tape      burns skin, reddens skin, blisters    Amlodipine Swelling    Bupropion      hyper--couldn't sit still--jumpy    Cortisone      throat closed up    Dipyridamole Hives     Persantine-CST--sob, palpitations, stress test stopped    Levaquin [Levofloxacin In D5w] Other (See Comments)     Yeast infection     Loratadine      hyper--couldn't sit still--jumpy    Nortriptyline      for rosacea--caused flare up. stopped    Other      States any steroids make her face swell and flush    Prednisone Other (See Comments)    Tenex [Guanfacine Hcl]      dizziness    Tramadol Hives     sob    Varenicline     Lyrica [Pregabalin] Other (See Comments)     Leg cramps       Social History     Socioeconomic History    Marital status:      Spouse name: Not on file    Number of children: 3    Years of education: Not on file    Highest education level: Not on file   Occupational History    Not on file   Social Needs    Financial resource strain: Not on file    Food insecurity:     Worry: Not on file     Inability: Not on file    Transportation needs:     Medical: Not on file     Non-medical: Not on file   Tobacco Use    Smoking status: Former Smoker     Packs/day: 1.00     Years: 30.00     Pack years: 30.00     Types: Cigarettes     Last attempt to quit: 2007     Years since quittin.3    Smokeless tobacco: Never Used   Substance and Sexual Activity    Alcohol use:  Yes     Alcohol/week: 0.0 standard drinks     Types: 4 - 6 Glasses of wine per week     Comment: SOCIALLY    Drug use: No    Sexual activity: Yes     Partners: Male   Lifestyle    Physical activity:     Days per week: Not on file     Minutes per session: Not on file    Stress: Not on file   Relationships    Social connections:     Talks on phone: Not on file     Gets together: Not on file     Attends [employer] service: Not on file     Active member of club or organization: Not on file     Attends meetings of clubs or organizations: Not on file     Relationship status: Not on file    Intimate partner violence:     Fear of current or ex partner: Not on file     Emotionally abused: Not on file     Physically abused: Not on file     Forced sexual activity: Not on file   Other Topics Concern    Not on file   Social History Narrative    5 children, 3 are her own       Family History   Problem Relation Age of Onset    Arthritis Mother     Diabetes Mother     Heart Disease Mother     High Blood Pressure Mother     High Cholesterol Mother     Stroke Mother     Asthma Sister     Cancer Sister 61        lung cancer    High Blood Pressure Sister     High Cholesterol Sister     Substance Abuse Sister     Heart Attack Father         massiv MI    Early Death Brother         car accident    Cancer Sister 50        breast cancer    Other Sister         GERD, diverticulosis, rotator cuff disease, spinal stenosis    Heart Disease Brother         MI    Diabetes Maternal Grandmother     Heart Disease Maternal Grandmother     High Blood Pressure Maternal Grandmother     High Cholesterol Maternal Grandmother     Stroke Maternal Grandmother     Breast touch BLE except decreased sensation RLE in L5 distribution    Motor:                Right Quadriceps5/5          Left Quadriceps5/5           Right Gastrocnemius5/5    Left Gastrocnemius5/5  Right Ant Tibialis5/5  Left Ant Tibialis5/5    Reflexes:    Right Quadriceps reflex2+  Left Quadriceps reflex2+  Right Achilles reflex2+  Left Achilles reflex2+  Gait:normal station    Dermatology:    Skin:no rashes or lesions noted    Assessment/Plan:    LBP radiating into the b/l lateral \"hips\" and RLE in L5 distribution  Right lateral hip pain, no pain on ROM, xray 2017 shows mild degenertive changes  Right knee pain with OA changes on exam  Hx lung cancer s/p RMLectomy 2008 at [facility] and RULectomy 2013 at [facility] and \"autoimmune diseases\" and prediabetes (follows with Dr. Marco Michel)  Concern for addiction tendencies in the family (she and  smoked for many years, quit together, also \"liked red wine\" but quit drinking in 2017 when son diagnosed with severe pancreatitis related to alcohol abuse  Uses CBD products topically and takes a capsule  Multiple allergies including after lumbar injection    Reviewed pain records from [facility], surgery records from [facility], endocrinology records from Dr. Earl Rodrigues prior records from Dr. Yessica Bishop  Right hip and knee xray (standing), lumbar MRI w/o contrast as she failed contrast injection MRI previously   Reviewed referral documents and imaging  Urine screen today  OARRS report reviewed  Pt is not a candidate for chronic opioid therapy, we discussed avoidance  Gabapentin 100 mg titration as tolerated up to TID - discussed common SE's and encouraged pt to call w/ any concerns  Pt with prior allergic rxn after lumbar injections, discussed reaction in detail  Can consider referral to an allergist to determine what she is allergic to that would be used in an injection  Patient encouraged to stay active  Treatment plan discussed with the patient including medication and procedure side effects     > 60 minutes spent on this case with >50% of that time spent in face to face contact    CC:  Referring physician    SEFERINO Morejon.

## 2020-01-21 ENCOUNTER — OFFICE VISIT (OUTPATIENT)
Dept: PAIN MANAGEMENT | Age: 70
End: 2020-01-21
Payer: COMMERCIAL

## 2020-01-21 ENCOUNTER — HOSPITAL ENCOUNTER (OUTPATIENT)
Age: 70
Discharge: HOME | End: 2020-01-23
Payer: COMMERCIAL

## 2020-01-21 VITALS
OXYGEN SATURATION: 98 % | HEIGHT: 70 IN | RESPIRATION RATE: 18 BRPM | BODY MASS INDEX: 22.62 KG/M2 | TEMPERATURE: 97.9 F | HEART RATE: 78 BPM | SYSTOLIC BLOOD PRESSURE: 138 MMHG | DIASTOLIC BLOOD PRESSURE: 72 MMHG | WEIGHT: 158 LBS

## 2020-01-21 PROBLEM — M54.16 LUMBAR RADICULOPATHY: Status: ACTIVE | Noted: 2020-01-21

## 2020-01-21 PROBLEM — G89.4 CHRONIC PAIN SYNDROME: Status: ACTIVE | Noted: 2020-01-21

## 2020-01-21 LAB — SPECIFIC GRAVITY UA: 1.02 (ref 1–1.03)

## 2020-01-21 PROCEDURE — 99205 OFFICE O/P NEW HI 60 MIN: CPT | Performed by: PAIN MEDICINE

## 2020-01-21 PROCEDURE — G8427 DOCREV CUR MEDS BY ELIG CLIN: HCPCS | Performed by: PAIN MEDICINE

## 2020-01-21 PROCEDURE — G8420 CALC BMI NORM PARAMETERS: HCPCS | Performed by: PAIN MEDICINE

## 2020-01-21 PROCEDURE — G0480 DRUG TEST DEF 1-7 CLASSES: HCPCS

## 2020-01-21 PROCEDURE — 4040F PNEUMOC VAC/ADMIN/RCVD: CPT | Performed by: PAIN MEDICINE

## 2020-01-21 PROCEDURE — 1090F PRES/ABSN URINE INCON ASSESS: CPT | Performed by: PAIN MEDICINE

## 2020-01-21 PROCEDURE — 3017F COLORECTAL CA SCREEN DOC REV: CPT | Performed by: PAIN MEDICINE

## 2020-01-21 PROCEDURE — 1123F ACP DISCUSS/DSCN MKR DOCD: CPT | Performed by: PAIN MEDICINE

## 2020-01-21 PROCEDURE — G8400 PT W/DXA NO RESULTS DOC: HCPCS | Performed by: PAIN MEDICINE

## 2020-01-21 PROCEDURE — 1036F TOBACCO NON-USER: CPT | Performed by: PAIN MEDICINE

## 2020-01-21 PROCEDURE — 80307 DRUG TEST PRSMV CHEM ANLYZR: CPT

## 2020-01-21 PROCEDURE — G8482 FLU IMMUNIZE ORDER/ADMIN: HCPCS | Performed by: PAIN MEDICINE

## 2020-01-21 RX ORDER — GABAPENTIN 100 MG/1
CAPSULE ORAL
Qty: 69 CAPSULE | Refills: 0 | Status: SHIPPED
Start: 2020-01-21 | End: 2020-06-18

## 2020-01-21 NOTE — PROGRESS NOTES
Pedro Jimenez presents to the [facility] on 1/21/2020. Margarita Mcdonald is complaining of pain IN THE JOINTS BACK RIGHT HIP AND KNEES PELVIS . The pain is constant. The pain is described as aching, throbbing, shooting, stabbing, dull and sharp. Pain is rated on her best day at a 2, on her worst day at a 10, and on average at a 5 on the VAS scale. She took her last dose of Jewelene Gisselle  Any procedures since your last visit: Yes, WITH DR Hunter Smalls . Pacemaker or defibrilator: No managed by NONE. She is not on NSAIDS and is not on anticoagulation medication  /72   Pulse 78   Temp 97.9 °F (36.6 °C)   Resp 18   Ht 5' 10\" (1.778 m)   Wt 158 lb (71.7 kg)   LMP  (Exact Date)   SpO2 98%   BMI 22.67 kg/m²      No LMP recorded (exact date). Patient has had a hysterectomy.

## 2020-01-24 LAB
6AM URINE: <10 NG/ML
CODEINE, URINE: <20 NG/ML
HYDROCODONE, URINE: <20 NG/ML
HYDROMORPHONE, URINE: <20 NG/ML
MORPHINE URINE: <20 NG/ML
NORHYDROCODONE, URINE: <20 NG/ML
NOROXYCODONE, URINE: <20 NG/ML
NOROXYMORPHONE, URINE: <20 NG/ML
OXYCODONE, URINE CONFIRMATION: <20 NG/ML
OXYMORPHONE, URINE: <20 NG/ML

## 2020-01-24 RX ORDER — HYDRALAZINE HYDROCHLORIDE 25 MG/1
TABLET, FILM COATED ORAL
Qty: 60 TABLET | Refills: 3 | Status: SHIPPED
Start: 2020-01-24 | End: 2020-04-27

## 2020-01-24 RX ORDER — MONTELUKAST SODIUM 10 MG/1
TABLET ORAL
Qty: 30 TABLET | Refills: 5 | Status: SHIPPED
Start: 2020-01-24 | End: 2020-09-29 | Stop reason: SDUPTHER

## 2020-01-25 LAB
7-AMINOCLONAZEPAM, URINE: <5 NG/ML
ALPHA-HYDROXYALPRAZOLAM, URINE: <5 NG/ML
ALPHA-HYDROXYMIDAZOLAM, URINE: <20 NG/ML
ALPRAZOLAM, URINE: <5 NG/ML
CHLORDIAZEPOXIDE, URINE: <20 NG/ML
CLONAZEPAM, URINE: <5 NG/ML
DIAZEPAM, URINE: <20 NG/ML
LORAZEPAM, URINE: <20 NG/ML
MIDAZOLAM, URINE: <20 NG/ML
NORDIAZEPAM, URINE: <20 NG/ML
OXAZEPAM, URINE: <20 NG/ML
TEMAZEPAM, URINE: <20 NG/ML

## 2020-01-27 LAB
Lab: NORMAL
REPORT: NORMAL
THIS TEST SENT TO: NORMAL

## 2020-02-17 RX ORDER — ATENOLOL 25 MG/1
TABLET ORAL
Qty: 360 TABLET | Refills: 0 | Status: SHIPPED
Start: 2020-02-17 | End: 2020-03-18

## 2020-02-18 RX ORDER — TIZANIDINE 4 MG/1
TABLET ORAL
Qty: 90 TABLET | Refills: 0 | Status: SHIPPED
Start: 2020-02-18 | End: 2020-03-18

## 2020-02-19 RX ORDER — OLMESARTAN MEDOXOMIL 40 MG/1
TABLET ORAL
Qty: 30 TABLET | Refills: 3 | Status: SHIPPED
Start: 2020-02-19 | End: 2020-03-16

## 2020-02-20 NOTE — PROGRESS NOTES
L4. Vertebral body height and alignment are otherwise maintained. Multilevel osteophytes and disc space narrowing noted. Bone marrow   signal is normal. The distal spinal cord and cauda equina nerve roots   are normal in appearance. The paraspinal soft tissues are   unremarkable.           Impression       1. Markedly suboptimal exam due to lack of axial and sagittal T2,   STIR, and precontrast T1 series. Stable grade 1 retrolisthesis of L3   on L4.        2. Mild to moderate multilevel degenerative changes.      B/l SIJ xray 2017 -   FINDINGS:   Mild osteoarthritis seen in the sacroiliac joints as well as the hip   joints. No fractures noted.      Xray left hip 2017 -      Findings: 3 views of pelvis and left hip. Intact alignment at the   hips. Mild symmetric hip joint space narrowing. No fracture. Symmetric   arthritic changes at the sacroiliac joints. Potential Aberrant Drug-Related Behavior: no    Urine Drug Screenin2020 + alcohol metabolites    OARRS report:  2020 consistent    Past Medical History:   Diagnosis Date    Arthritis     Symptoms respond to myofascial release. Not surgical candidate    Asthma     Cancer [facility])     [facility]- [facility]- 2008, stage 1A, [facility] Stage 1A     Chronic back pain     COPD (chronic obstructive pulmonary disease) ([address]     Emphysema of lung ([address]     Fibrocystic breast     GERD (gastroesophageal reflux disease)     Hashimoto's disease 6213    Helicobacter pylori (H. pylori)     EGD 2002    Hemorrhoids     Hyperlipidemia     Hypertension     Hyperthyroidism     Post-thoracotomy pain        Past Surgical History:   Procedure Laterality Date    APPENDECTOMY      COLONOSCOPY  6/23/15    polyp and diverticulosis    COLONOSCOPY  6/23/15    colonoscopy    EYE SURGERY      lenses replaced    HYSTERECTOMY      LUNG CANCER SURGERY Right     X 2       Prior to Admission medications    Medication Sig Start Date End Date Taking? Authorizing Provider   olmesartan (BENICAR) 40 MG tablet take 1 tablet by mouth once daily 2/19/20  Yes Ailyn Figueroa MD   tiZANidine (ZANAFLEX) 4 MG tablet take 1 tablet by mouth three times a day 2/18/20  Yes David Perdomo MD   SYRINGE-NEEDLE, DISP, 3 ML (B-D 3CC LUER-JUANCARLOS SYR 08QN7-9/2) 22G X 1-1/2\" 3 ML MISC USE MONTHLY AS DIRECTED WITH B-12 2/18/20  Yes David Perdomo MD   atenolol (TENORMIN) 25 MG tablet take 2 tablets by mouth twice a day 2/17/20  Yes Ailyn Figueroa MD   hydrALAZINE (APRESOLINE) 25 MG tablet take 1 tablet by mouth twice a day 1/24/20  Yes Ailyn Figueroa MD   montelukast (SINGULAIR) 10 MG tablet take 1 tablet by mouth every evening 1/24/20  Yes David Perdomo MD   albuterol sulfate  (90 Base) MCG/ACT inhaler Inhale 2 puffs into the lungs 4 times daily as needed for Wheezing 1/6/20  Yes Ailyn Figueroa MD   Probiotic Product (PROBIOTIC-10 PO) Take by mouth daily 100 BILLION CFU   Yes Historical Provider, MD   Coenzyme Q10 (CO Q 10 PO) Take by mouth daily   Yes Historical Provider, MD   Biotin w/ Vitamins C & E (HAIR/SKIN/NAILS PO) Take by mouth daily VIT C 90MG  VIT E 5,000MCG  ZINC 8MG  COPPER 1MG   Yes Historical Provider, MD   NONFORMULARY CBD CAPSULES----- ONCE DAILY  CBD OINTMENT FOR BACK   60MG   Yes Historical Provider, MD   levothyroxine (SYNTHROID) 100 MCG tablet take 1 tablet 6 days a week and 1/2 tablet on Sunday 12/17/19  Yes Georgie Rajan MD   losartan (COZAAR) 50 MG tablet Take 1 tablet by mouth daily 11/26/19  Yes Ailyn Figueroa MD   omeprazole (PRILOSEC) 20 MG delayed release capsule take 1 capsule by mouth once daily 11/1/19  Yes Ailyn Figueroa MD   escitalopram (LEXAPRO) 5 MG tablet Take 1 tablet by mouth daily 11/1/19  Yes David Perdomo MD   benzonatate (TESSALON) 100 MG capsule take 1 capsule by mouth three times a day if needed for cough 10/24/19  Yes Ailyn Figueroa MD   Azelastine-Fluticasone 137-50 MCG/ACT SUSP 2 sprays by Nasal route daily 7/23/19  Yes Janie Vincent MD   cyanocobalamin 1000 MCG/ML injection INJECT 1 MILLILITER INTO MUSCLE EVERY 7 DAYS 6/5/19  Yes Janie Vincent MD   vitamin D (ERGOCALCIFEROL) 65000 units CAPS capsule take 1 capsule by mouth weekly 5/6/19  Yes Janie Vincent MD   ondansetron (ZOFRAN ODT) 4 MG disintegrating tablet Take 1 tablet by mouth every 8 hours as needed for Nausea or Vomiting 12/28/18  Yes Janie Vincent MD   ranitidine (ZANTAC) 150 MG tablet Take 1 tablet by mouth 2 times daily 12/8/17  Yes Janie Vincent MD   Insulin Syringe-Needle U-100 25G X 1\" 1 ML MISC Use as directed for B12 injection 10/16/17  Yes Janie Vincent MD   loratadine (CLARITIN) 10 MG tablet Take 10 mg by mouth daily   Yes Historical Provider, MD   Melatonin 1 MG SUBL Place 2 mg under the tongue nightly   Yes Historical Provider, MD   ipratropium (ATROVENT) 0.02 % nebulizer solution   Take 0.5 mg by nebulization 4 times daily As needed. Yes Historical Provider, MD   gabapentin (NEURONTIN) 100 MG capsule qHS x 7 days then BID x 7 days then TID thereafter 1/21/20 2/20/20  Ben Garduno,    Omega-3 Fatty Acids (FISH OIL PO) Take by mouth daily 1200MG    Historical Provider, MD   lidocaine (LIDODERM) 5 % apply 3 patches to the affected area daily. Leave on for 12 hours and then off for 12 hours.   Patient not taking: Reported on 2/21/2020 7/11/19   Janie Vincent MD   ibuprofen (ADVIL;MOTRIN) 200 MG tablet Take 200 mg by mouth every 6 hours as needed for Pain    Historical Provider, MD   Umeclidinium-Vilanterol 62.5-25 MCG/INH AEPB Inhale 1 Inhaler into the lungs daily    Historical Provider, MD       Allergies   Allergen Reactions    Codeine Hives and Itching     AND CODEINE DERIVATIVES----sob  Pt stated tolerated Dilaudid    Fentanyl Other (See Comments)     Disoriented, confused  Other reaction(s): Mental Status Change    Adhesive Tape      burns skin, reddens skin, blisters    Amlodipine Swelling    Bupropion      hyper--couldn't sit still--jumpy    Cortisone      throat closed up    Dipyridamole Hives     Persantine-CST--sob, palpitations, stress test stopped    Levaquin [Levofloxacin In D5w] Other (See Comments)     Yeast infection     Loratadine      hyper--couldn't sit still--jumpy    Nortriptyline      for rosacea--caused flare up. stopped    Other      States any steroids make her face swell and flush    Prednisone Other (See Comments)    Tenex [Guanfacine Hcl]      dizziness    Tramadol Hives     sob    Varenicline     Lyrica [Pregabalin] Other (See Comments)     Leg cramps       Social History     Socioeconomic History    Marital status:      Spouse name: Not on file    Number of children: 3    Years of education: Not on file    Highest education level: Not on file   Occupational History    Not on file   Social Needs    Financial resource strain: Not on file    Food insecurity:     Worry: Not on file     Inability: Not on file    Transportation needs:     Medical: Not on file     Non-medical: Not on file   Tobacco Use    Smoking status: Former Smoker     Packs/day: 1.00     Years: 30.00     Pack years: 30.00     Types: Cigarettes     Last attempt to quit: 2007     Years since quittin.4    Smokeless tobacco: Never Used   Substance and Sexual Activity    Alcohol use:  Yes     Alcohol/week: 0.0 standard drinks     Types: 4 - 6 Glasses of wine per week     Comment: SOCIALLY    Drug use: No    Sexual activity: Yes     Partners: Male   Lifestyle    Physical activity:     Days per week: Not on file     Minutes per session: Not on file    Stress: Not on file   Relationships    Social connections:     Talks on phone: Not on file     Gets together: Not on file     Attends Scientologist service: Not on file     Active member of club or organization: Not on file     Attends meetings of clubs or organizations: Not on file     Relationship status: Not on file    Intimate partner violence:     Fear of current or ex partner: Not on file     Emotionally abused: Not on file     Physically abused: Not on file     Forced sexual activity: Not on file   Other Topics Concern    Not on file   Social History Narrative    5 children, 3 are her own       Family History   Problem Relation Age of Onset    Arthritis Mother     Diabetes Mother     Heart Disease Mother     High Blood Pressure Mother     High Cholesterol Mother     Stroke Mother     Asthma Sister     Cancer Sister 61        lung cancer    High Blood Pressure Sister     High Cholesterol Sister     Substance Abuse Sister     Heart Attack Father         massiv MI    Early Death Brother         car accident    Cancer Sister 50        breast cancer    Other Sister         GERD, diverticulosis, rotator cuff disease, spinal stenosis    Heart Disease Brother         MI    Diabetes Maternal Grandmother     Heart Disease Maternal Grandmother     High Blood Pressure Maternal Grandmother     High Cholesterol Maternal Grandmother     Stroke Maternal Grandmother     Breast Cancer Maternal Aunt 48        breast    Ovarian Cancer Maternal Aunt     Cancer Maternal Aunt 40        ovarian       REVIEW OF SYSTEMS:     Andres Verdin denies fever/chills, chest pain, shortness of breath, new bowel or bladder complaints. All other review of systems was negative.     PHYSICAL EXAMINATION:      /78   Pulse 60   Temp 97.4 °F (36.3 °C) (Oral)   Resp 16   Ht 5' 10\" (1.778 m)   Wt 159 lb (72.1 kg)   LMP  (Exact Date)   BMI 22.81 kg/m²     General:       General appearance:pleasant and well-hydrated, in no distress and A & O x3  Build:Normal Weight  Function:Rises from a seated position easily.     HEENT:     Head:normocephalic, atraumatic  Pupils:regular, round, equal  Sclera: icterus absent     Lungs:     Breathing:normal breathing pattern     Abdomen:     Shape:non-distended and normal  Tenderness:none  Guarding:none     Lumbar spine:     Spine inspection:normal   CVA tenderness:No   Palpation:tenderness paravertebral muscles, left, right and positive. Range of motion:abnormal mildly in lateral bending, flexion, extension rotation bilateral and is painful.     Musculoskeletal:     Trigger points in Paraveteral:absent bilaterally  SI joint tenderness:negative right, negative left              MUKESH test:not done right, not done left  Piriformis tenderness:negative right, negative left  Trochanteric bursa tenderness:negative right, negative left  SLR:negative right, negative left, sitting      Extremities:     Tremors:None bilaterally upper and lower  Range of motion: pain with internal rotation of hips negative.   Intact:Yes  Varicose veins:absent   Pulses:present Lt radial  Cyanosis:none  Edema:none x all 4 extremities     Knee:     Inspection:symmetric, swelling none bilaterally  Tenderness of Bony Landmarks:Medial, right  Tenderness of Bursa:none, right  Drawer Test:negative  Effusion:absent right  Crepitus:present right  ROM:Right full      Neurological:     Sensory:normal to light touch BLE except decreased sensation RLE in L5 distribution     Motor:                Right Quadriceps5/5          Left Quadriceps5/5           Right Gastrocnemius5/5    Left Gastrocnemius5/5  Right Ant Tibialis5/5  Left Ant Tibialis5/5     Reflexes:    Right Quadriceps reflex2+  Left Quadriceps reflex2+  Right Achilles reflex2+  Left Achilles reflex2+  Gait:normal station     Dermatology:     Skin:no rashes or lesions noted     Assessment/Plan:     LBP radiating into the b/l lateral \"hips\" and RLE in L5 distribution  RIGHT lateral hip pain, no pain on ROM, 2020 xray shows mild degenerative changes  Right knee pain, 2020 standing xray negative  Lumbar MRI 2020 shows L3-4 and L4-5 with foraminal narrowing without neural compression  Hx lung cancer s/p RMLectomy [facility] at [facility] and [address] 2013 at [facility], [address] [place] [address] and \"autoimmune diseases\" and prediabetes

## 2020-02-21 ENCOUNTER — OFFICE VISIT (OUTPATIENT)
Dept: PAIN MANAGEMENT | Age: 70
End: 2020-02-21
Payer: COMMERCIAL

## 2020-02-21 VITALS
HEIGHT: 70 IN | WEIGHT: 159 LBS | DIASTOLIC BLOOD PRESSURE: 78 MMHG | BODY MASS INDEX: 22.76 KG/M2 | SYSTOLIC BLOOD PRESSURE: 138 MMHG | RESPIRATION RATE: 16 BRPM | HEART RATE: 60 BPM | TEMPERATURE: 97.4 F

## 2020-02-21 PROCEDURE — 4040F PNEUMOC VAC/ADMIN/RCVD: CPT | Performed by: PAIN MEDICINE

## 2020-02-21 PROCEDURE — G8427 DOCREV CUR MEDS BY ELIG CLIN: HCPCS | Performed by: PAIN MEDICINE

## 2020-02-21 PROCEDURE — G8400 PT W/DXA NO RESULTS DOC: HCPCS | Performed by: PAIN MEDICINE

## 2020-02-21 PROCEDURE — G8482 FLU IMMUNIZE ORDER/ADMIN: HCPCS | Performed by: PAIN MEDICINE

## 2020-02-21 PROCEDURE — 1090F PRES/ABSN URINE INCON ASSESS: CPT | Performed by: PAIN MEDICINE

## 2020-02-21 PROCEDURE — 1036F TOBACCO NON-USER: CPT | Performed by: PAIN MEDICINE

## 2020-02-21 PROCEDURE — G8420 CALC BMI NORM PARAMETERS: HCPCS | Performed by: PAIN MEDICINE

## 2020-02-21 PROCEDURE — 1123F ACP DISCUSS/DSCN MKR DOCD: CPT | Performed by: PAIN MEDICINE

## 2020-02-21 PROCEDURE — 99214 OFFICE O/P EST MOD 30 MIN: CPT | Performed by: PAIN MEDICINE

## 2020-02-21 PROCEDURE — 3017F COLORECTAL CA SCREEN DOC REV: CPT | Performed by: PAIN MEDICINE

## 2020-02-21 RX ORDER — PREGABALIN 25 MG/1
25 CAPSULE ORAL 2 TIMES DAILY
Qty: 60 CAPSULE | Refills: 0 | Status: SHIPPED
Start: 2020-02-21 | End: 2020-03-19 | Stop reason: SDUPTHER

## 2020-03-16 RX ORDER — OLMESARTAN MEDOXOMIL 40 MG/1
TABLET ORAL
Qty: 30 TABLET | Refills: 3 | Status: SHIPPED
Start: 2020-03-16 | End: 2020-03-23 | Stop reason: SDUPTHER

## 2020-03-18 RX ORDER — TIZANIDINE 4 MG/1
TABLET ORAL
Qty: 90 TABLET | Refills: 0 | Status: SHIPPED
Start: 2020-03-18 | End: 2020-03-23 | Stop reason: SDUPTHER

## 2020-03-18 RX ORDER — ATENOLOL 25 MG/1
TABLET ORAL
Qty: 360 TABLET | Refills: 0 | Status: SHIPPED
Start: 2020-03-18 | End: 2020-03-23 | Stop reason: SDUPTHER

## 2020-03-19 RX ORDER — PREGABALIN 25 MG/1
25 CAPSULE ORAL 2 TIMES DAILY
Qty: 60 CAPSULE | Refills: 1 | Status: CANCELLED | OUTPATIENT
Start: 2020-03-19 | End: 2020-04-18

## 2020-03-19 RX ORDER — PREGABALIN 25 MG/1
25 CAPSULE ORAL 2 TIMES DAILY
Qty: 60 CAPSULE | Refills: 1 | Status: SHIPPED
Start: 2020-03-19 | End: 2020-05-21 | Stop reason: SDUPTHER

## 2020-03-19 NOTE — TELEPHONE ENCOUNTER
Pt. Called to cancel her appointment because of the coronavirus. Lyrica needs to be refilled and medication pended.

## 2020-04-27 RX ORDER — HYDRALAZINE HYDROCHLORIDE 25 MG/1
TABLET, FILM COATED ORAL
Qty: 60 TABLET | Refills: 3 | Status: SHIPPED
Start: 2020-04-27 | End: 2020-05-21

## 2020-05-08 RX ORDER — SYRINGE WITH NEEDLE, 1 ML 25GX5/8"
SYRINGE, EMPTY DISPOSABLE MISCELLANEOUS
Qty: 15 EACH | Refills: 0 | Status: SHIPPED
Start: 2020-05-08 | End: 2021-06-17

## 2020-05-20 NOTE — PROGRESS NOTES
level shows no significant disc disease.        L6-S1 is normal.           Impression   1. Mild diffuse disc bulges not grossly contacting exiting nerve   roots. 2. 1 to 2 mm retrolisthesis L3 over L4.      Xray b/l hips  -  FINDINGS:   No acute fracture. Mild arthritic changes similar to the contralateral   hip      Xray b/l knees standing  -  FINDINGS:   Knee joints are maintained. No fracture or bone lesion.      Lumbar MRI 2018 -     FINDINGS:       Exam is markedly suboptimal due to lack of axial and sagittal T2,   STIR, and precontrast T1 series. Stable grade 1 retrolisthesis of L3   on L4. Vertebral body height and alignment are otherwise maintained. Multilevel osteophytes and disc space narrowing noted. Bone marrow   signal is normal. The distal spinal cord and cauda equina nerve roots   are normal in appearance. The paraspinal soft tissues are   unremarkable.           Impression       1. Markedly suboptimal exam due to lack of axial and sagittal T2,   STIR, and precontrast T1 series. Stable grade 1 retrolisthesis of L3   on L4.        2. Mild to moderate multilevel degenerative changes.      B/l SIJ xray 2017 -   FINDINGS:   Mild osteoarthritis seen in the sacroiliac joints as well as the hip   joints. No fractures noted.      Xray left hip 2017 -      Findings: 3 views of pelvis and left hip. Intact alignment at the   hips. Mild symmetric hip joint space narrowing. No fracture. Symmetric   arthritic changes at the sacroiliac joints.                                          Potential Aberrant Drug-Related Behavior: no     Urine Drug Screenin2020 + alcohol metabolites     OARRS report:  2020 consistent   2020 consistent    Past Medical History: Reviewed    Past Surgical History: Reviewed     Home Medications: Reviewed    Allergies: Reviewed     Social History: Reviewed     REVIEW OF SYSTEMS:     Alexander Yanez denies fever/chills, chest pain, shortness of breath, new bowel or bladder including death. We discussed that these medications may cause drowsiness, sedation or dizziness and have counseled the patient not to drive or operate machinery. We have discussed that these medications will be prescribed only by one provider. We have discussed with the patient about age related risk factors and have thoroughly discussed the importance of taking these medications as prescribed. The patient verbalizes understanding. Patient advised regarding steps to help prevent the spread of COVID-19   SOURCE - https://addi-kusum.info/. html     1-Stay home except to get medical care  2-Clean your hands often for atleast 20 seconds, avoid touching: Avoid touching your eyes, nose, and mouth with unwashed hands. 3-Seek medical attention: Seek prompt medical attention if your illness is worsening (e.g., difficulty breathing). Call you doctor first.  3-Wear a facemask if you are sick   4-Cover your coughs and sneezes           I affirm this is a Patient Initiated Episode with an established Patient who has not had a related appointment within my department in the past 7 days or scheduled within the next 24 hours.     Total Time: 16 minutes    Cc: Referring physician

## 2020-05-21 ENCOUNTER — VIRTUAL VISIT (OUTPATIENT)
Dept: PAIN MANAGEMENT | Age: 70
End: 2020-05-21
Payer: COMMERCIAL

## 2020-05-21 PROCEDURE — G8400 PT W/DXA NO RESULTS DOC: HCPCS | Performed by: PAIN MEDICINE

## 2020-05-21 PROCEDURE — 4040F PNEUMOC VAC/ADMIN/RCVD: CPT | Performed by: PAIN MEDICINE

## 2020-05-21 PROCEDURE — 3017F COLORECTAL CA SCREEN DOC REV: CPT | Performed by: PAIN MEDICINE

## 2020-05-21 PROCEDURE — G8427 DOCREV CUR MEDS BY ELIG CLIN: HCPCS | Performed by: PAIN MEDICINE

## 2020-05-21 PROCEDURE — 1123F ACP DISCUSS/DSCN MKR DOCD: CPT | Performed by: PAIN MEDICINE

## 2020-05-21 PROCEDURE — 99213 OFFICE O/P EST LOW 20 MIN: CPT | Performed by: PAIN MEDICINE

## 2020-05-21 PROCEDURE — 1090F PRES/ABSN URINE INCON ASSESS: CPT | Performed by: PAIN MEDICINE

## 2020-05-21 RX ORDER — HYDRALAZINE HYDROCHLORIDE 25 MG/1
TABLET, FILM COATED ORAL
Qty: 60 TABLET | Refills: 3 | Status: SHIPPED
Start: 2020-05-21 | End: 2020-07-22 | Stop reason: SDUPTHER

## 2020-05-21 RX ORDER — PREGABALIN 25 MG/1
25 CAPSULE ORAL 2 TIMES DAILY
Qty: 60 CAPSULE | Refills: 1 | Status: SHIPPED
Start: 2020-05-21 | End: 2020-07-23 | Stop reason: SDUPTHER

## 2020-06-12 ENCOUNTER — HOSPITAL ENCOUNTER (OUTPATIENT)
Age: 70
Discharge: HOME | End: 2020-06-14
Payer: COMMERCIAL

## 2020-06-12 PROCEDURE — 84443 ASSAY THYROID STIM HORMONE: CPT

## 2020-06-12 PROCEDURE — 36415 COLL VENOUS BLD VENIPUNCTURE: CPT

## 2020-06-12 PROCEDURE — 84439 ASSAY OF FREE THYROXINE: CPT

## 2020-06-13 LAB
T4 FREE: 1.75 NG/DL (ref 0.93–1.7)
TSH SERPL DL<=0.05 MIU/L-ACNC: 0.39 UIU/ML (ref 0.27–4.2)

## 2020-06-18 ENCOUNTER — VIRTUAL VISIT (OUTPATIENT)
Dept: ENDOCRINOLOGY | Age: 70
End: 2020-06-18
Payer: COMMERCIAL

## 2020-06-18 PROCEDURE — 4040F PNEUMOC VAC/ADMIN/RCVD: CPT | Performed by: INTERNAL MEDICINE

## 2020-06-18 PROCEDURE — 99214 OFFICE O/P EST MOD 30 MIN: CPT | Performed by: INTERNAL MEDICINE

## 2020-06-18 PROCEDURE — G8420 CALC BMI NORM PARAMETERS: HCPCS | Performed by: INTERNAL MEDICINE

## 2020-06-18 PROCEDURE — G8400 PT W/DXA NO RESULTS DOC: HCPCS | Performed by: INTERNAL MEDICINE

## 2020-06-18 PROCEDURE — 1036F TOBACCO NON-USER: CPT | Performed by: INTERNAL MEDICINE

## 2020-06-18 PROCEDURE — 1123F ACP DISCUSS/DSCN MKR DOCD: CPT | Performed by: INTERNAL MEDICINE

## 2020-06-18 PROCEDURE — 1090F PRES/ABSN URINE INCON ASSESS: CPT | Performed by: INTERNAL MEDICINE

## 2020-06-18 PROCEDURE — G8427 DOCREV CUR MEDS BY ELIG CLIN: HCPCS | Performed by: INTERNAL MEDICINE

## 2020-06-18 PROCEDURE — 3017F COLORECTAL CA SCREEN DOC REV: CPT | Performed by: INTERNAL MEDICINE

## 2020-06-18 NOTE — LETTER
[address] [place] [address] Department of Endocrinology Diabetes and Metabolism   [address] [place] [address]   Phone: [phone]  Fax: [phone]      Provider: Candance Raymond MD  Primary Care Physician: Lesia Louis MD   Referring Provider: No ref. provider found    Patient: Lorrie Kelsey  YOB: 1950  Date of Visit: 6/18/2020      Dear Dr. Lesia Louis MD   I had the pleasure of seeing your patient Lorrie Kelsey today at endocrine clinic for follow up visit and I enclosed a copy of the office visit completed today. Thank you very much for asking us to participate in the care of this very pleasant patient. Please don't hesitate to call if there are any further questions or concerns. Sincerely   Candance Raymond MD  Endocrinologist, [address] [address]   Phone: [phone]  Fax: [phone] [facility] Department of Endocrinology Diabetes and Metabolism   [address] [place] [address]   Phone: [phone]  Fax: [phone]    Date of Service: 6/18/2020    Primary Care Physician: Lesia Louis MD.  Provider: Candance Raymond MD    Reason for the visit:  Primary Hypothyroidism, Prediabetes vitD deficiency, chronic fatigue     History of Present Illness: The history is provided by the patient. No  was used. Accuracy of the patient data is excellent. Lorrie Kelsey is a very pleasant 81 y.o. female with past medical h/o lunch cancer seen seen today for follow up visit   The patient was diagnosed with hypothyroidism in 2008 and was told that she has Hashimoto's thyroid disease   She is currently on Levothyroxine 100 mcg 1 tab 6 days a week and 1/2 on Sunday. Patient takes levothyroxine in the morning at empty stomach, wait one hour before eating , avoid multivitamins containing calcium  or iron with it.   Lab Results   Component Value Date/Time TSH 0.388 06/12/2020 04:08 PM    T4FREE 1.75 (H) 06/12/2020 04:08 PM    FT3 2.6 03/02/2018 03:00 PM     The patient still c/o unexplained weight gain, fatigue, dry skin, brittle fingernails, and brittle hair    She reported positive FH of thyroid disease in her mother and two sisters      Given tiredness and autoimmune thyroid disease, I ordered AM cortisol and was 9.9   Component 9/27/2018   Cortisol 9.90       Regarding MNG   The patient was also diagnosed with thyroid nodule at the same time of hypothyroidism   Thyroid US 8/2017   The right thyroid lobe measures 3.8 x 1.4 x 1.4 cm in size. The left thyroid lobe measures  3.3 x 1.0 x 1.1 cm in size. The isthmus measures 0.1 cm  in thickness. The thyroid gland is heterogeneous. A 0.6 cm small hypoechoic nodule present in the thyroid isthmus on the right. No other suspicious nodules noted. Repeated thyroid US 6/25/2019  Rt thyroid lobe measures 4.3 cm x 1.2 cm x 1.3 cm. There is heterogeneous echogenicity. No discrete nodule is identified. Isthmus measures 0.2 cm in thickness at the midline. There is a solid hypoechoic nodule with smooth margins to the right of the isthmus measuring 0.6 cm x 0.4 cm x 0.2 cm  Lt thyroid lobe measures 3.1 cm x 1.1 cm x 1.1 cm. There is heterogeneous echogenicity. No discrete nodule is identified    Radha Mi denies any new lumps, bumps in her neck, change in her voice, or shortness of breath. No family history of thyroid cancer. No prior history of radiation to head or neck region. vitD deficiency    The patient currently taking vitD 50,000 iu/wk and has been on this dose for many years   No fragility fractures     PAST MEDICAL HISTORY   Past Medical History:   Diagnosis Date    Arthritis     Symptoms respond to myofascial release.  Not surgical candidate    Asthma     Cancer [facility])     lung cancer- RML- 2008, stage 1A, [facility] Stage 1A 2013    Chronic back pain  Codeine Hives and Itching     AND CODEINE DERIVATIVES----sob  Pt stated tolerated Dilaudid    Fentanyl Other (See Comments)     Disoriented, confused  Other reaction(s): Mental Status Change    Adhesive Tape      burns skin, reddens skin, blisters    Amlodipine Swelling    Bupropion      hyper--couldn't sit still--jumpy    Cortisone      throat closed up    Dipyridamole Hives     Persantine-CST--sob, palpitations, stress test stopped    Levaquin [Levofloxacin In D5w] Other (See Comments)     Yeast infection     Loratadine      hyper--couldn't sit still--jumpy    Nortriptyline      for rosacea--caused flare up. stopped    Other      States any steroids make her face swell and flush    Prednisone Other (See Comments)    Tenex [Guanfacine Hcl]      dizziness    Tramadol Hives     sob    Varenicline     Lyrica [Pregabalin] Other (See Comments)     Leg cramps       CURRENT MEDICATIONS     Current Outpatient Medications   Medication Sig Dispense Refill    hydrALAZINE (APRESOLINE) 25 MG tablet take 1 tablet by mouth twice a day 60 tablet 3    pregabalin (LYRICA) 25 MG capsule Take 1 capsule by mouth 2 times daily for 60 days.  60 capsule 1    vitamin D (ERGOCALCIFEROL) 1.25 MG (84627 UT) CAPS capsule take 1 capsule by mouth weekly 4 capsule 0    cyanocobalamin 1000 MCG/ML injection INJECT 1 MILLILITER INTO MUSCLE EVERY 7 DAYS 12 vial 3    SYRINGE-NEEDLE, DISP, 3 ML (B-D 3CC LUER-JUANCARLOS SYR 46HX2-1/2) 22G X 1-1/2\" 3 ML MISC USE MONTHLY AS DIRECTED WITH B-12 15 each 0    albuterol sulfate  (90 Base) MCG/ACT inhaler Inhale 2 puffs into the lungs 4 times daily as needed for Wheezing 3 Inhaler 1    atenolol (TENORMIN) 25 MG tablet take 2 tablets by mouth twice a day 360 tablet 3    escitalopram (LEXAPRO) 5 MG tablet Take 1 tablet by mouth daily 90 tablet 3    levothyroxine (SYNTHROID) 100 MCG tablet take 1 tablet 6 days a week and 1/2 tablet on Sunday 90 tablet 3  losartan (COZAAR) 50 MG tablet Take 1 tablet by mouth daily 90 tablet 3    olmesartan (BENICAR) 40 MG tablet take 1 tablet by mouth once daily 90 tablet 3    tiZANidine (ZANAFLEX) 4 MG tablet Take 1 tab by mouth 3 times daily 90 tablet 1    raNITIdine (ZANTAC) 150 MG tablet Take 1 tablet by mouth 2 times daily 180 tablet 3    omeprazole (PRILOSEC) 20 MG delayed release capsule take 1 capsule by mouth once daily 90 capsule 3    montelukast (SINGULAIR) 10 MG tablet take 1 tablet by mouth every evening 30 tablet 5    Probiotic Product (PROBIOTIC-10 PO) Take by mouth daily 100 BILLION CFU      Coenzyme Q10 (CO Q 10 PO) Take by mouth daily      Biotin w/ Vitamins C & E (HAIR/SKIN/NAILS PO) Take by mouth daily VIT C 90MG  VIT E 5,000MCG  ZINC 8MG  COPPER 1MG      NONFORMULARY CBD CAPSULES----- ONCE DAILY  CBD OINTMENT FOR BACK   60MG      benzonatate (TESSALON) 100 MG capsule take 1 capsule by mouth three times a day if needed for cough 90 capsule 5    Azelastine-Fluticasone 137-50 MCG/ACT SUSP 2 sprays by Nasal route daily 1 Bottle 11    lidocaine (LIDODERM) 5 % apply 3 patches to the affected area daily. Leave on for 12 hours and then off for 12 hours. 30 patch 5    ondansetron (ZOFRAN ODT) 4 MG disintegrating tablet Take 1 tablet by mouth every 8 hours as needed for Nausea or Vomiting 20 tablet 0    ibuprofen (ADVIL;MOTRIN) 200 MG tablet Take 200 mg by mouth every 6 hours as needed for Pain      Insulin Syringe-Needle U-100 25G X 1\" 1 ML MISC Use as directed for B12 injection 20 each 0    loratadine (CLARITIN) 10 MG tablet Take 10 mg by mouth daily      Umeclidinium-Vilanterol 62.5-25 MCG/INH AEPB Inhale 1 Inhaler into the lungs daily      Melatonin 1 MG SUBL Place 2 mg under the tongue nightly      ipratropium (ATROVENT) 0.02 % nebulizer solution   Take 0.5 mg by nebulization 4 times daily As needed.        Current Facility-Administered Medications Medication Dose Route Frequency Provider Last Rate Last Dose    betamethasone acetate-betamethasone sodium phosphate (CELESTONE) injection 6 mg  6 mg Intra-articular Once Carline Yancey MD        lidocaine 1 % injection 1 mL  1 mL Intradermal Once Carline Yancey MD           Review of Systems  Constitutional: No fever, no chills, no diaphoresis, no generalized weakness. HEENT: No blurred vision, No sore throat, no ear pain, no hair loss  Neck: denied any neck swelling, difficulty swallowing,   Cadrdiopulomary: No CP, SOB or palpitation, No orthopnea or PND. No cough or wheezing. GI: No N/V/D, no constipation, No abdominal pain, no melena or hematochezia   : Denied any dysuria, hematuria, flank pain, discharge, or incontinence. Skin: denied any rash, ulcer, Hirsute, or hyperpigmentation. MSK: denied any joint deformity, joint pain/swelling, muscle pain, or back pain. Neuro: no numbess, no tingling, no weakness,     OBJECTIVE    There were no vitals taken for this visit. BP Readings from Last 4 Encounters:   02/21/20 138/78   01/21/20 138/72   01/06/20 138/81   12/17/19 124/70     Wt Readings from Last 6 Encounters:   02/21/20 159 lb (72.1 kg)   01/21/20 158 lb (71.7 kg)   01/06/20 163 lb (73.9 kg)   12/17/19 163 lb 3.2 oz (74 kg)   11/26/19 161 lb (73 kg)   07/23/19 162 lb (73.5 kg)     Physical examination:  Due to this being a TeleHealth encounter, evaluation of the following organ systems is limited: EENT/Resp/CV/GI//MS/Neuro/Skin/Heme-Lymph-Imm. General: awake alert, oriented x3, no abnormal position or movements.   Pulm: move with respiration   Skin: no rash  Psych: normal mood, and affect    Review of Laboratory Data:  I have reviewed the following:  Lab Results   Component Value Date/Time    WBC 6.9 09/27/2018 10:13 AM    RBC 4.58 09/27/2018 10:13 AM    HGB 13.3 09/27/2018 10:13 AM    HCT 39.9 09/27/2018 10:13 AM    MCV 87.1 09/27/2018 10:13 AM    MCH 29.0 09/27/2018 10:13 AM

## 2020-06-18 NOTE — PROGRESS NOTES
[address] [place] [address] Department of Endocrinology Diabetes and Metabolism   [address] [facility] [address]   Phone: [phone]  Fax: [phone]    Date of Service: 6/18/2020    Primary Care Physician: Ashley Campbell MD.  Provider: Breann Funez MD    Reason for the visit:  Primary Hypothyroidism, Prediabetes vitD deficiency, chronic fatigue     History of Present Illness: The history is provided by the patient. No  was used. Accuracy of the patient data is excellent. Ashley Abernathy is a very pleasant 81 y.o. female with past medical h/o lunch cancer seen seen today for follow up visit   The patient was diagnosed with hypothyroidism in 2008 and was told that she has Hashimoto's thyroid disease   She is currently on Levothyroxine 100 mcg 1 tab 6 days a week and 1/2 on Sunday. Patient takes levothyroxine in the morning at empty stomach, wait one hour before eating , avoid multivitamins containing calcium  or iron with it. Lab Results   Component Value Date/Time    TSH 0.388 06/12/2020 04:08 PM    T4FREE 1.75 (H) 06/12/2020 04:08 PM    FT3 2.6 03/02/2018 03:00 PM     The patient still c/o unexplained weight gain, fatigue, dry skin, brittle fingernails, and brittle hair    She reported positive FH of thyroid disease in her mother and two sisters      Given tiredness and autoimmune thyroid disease, I ordered AM cortisol and was 9.9   Component 9/27/2018   Cortisol 9.90       Regarding MNG   The patient was also diagnosed with thyroid nodule at the same time of hypothyroidism   Thyroid US 8/2017   The right thyroid lobe measures 3.8 x 1.4 x 1.4 cm in size. The left thyroid lobe measures  3.3 x 1.0 x 1.1 cm in size. The isthmus measures 0.1 cm  in thickness. The thyroid gland is heterogeneous. A 0.6 cm small hypoechoic nodule present in the thyroid isthmus on the right. No other suspicious nodules noted.      Repeated thyroid US 6/25/2019  Rt thyroid lobe change dose to Levothyroxine 100 mcg 1 tab 6 days a week and none on Sunday   · TFT in 8 weeks     Multinodular goiter   · Prevalence of thyroid nodule on thyroid ultrasound is 50% and 95 % of these nodules are benign. · Given nodule size and lack of ultrasound suspicious features which making the nodule less likely to be malignant, I will continue following with periodic US  · Will follow with intermittent US     vitD deficiency   · Currently on VitD 50,000 iu/wk (has been on this dose for long time)   · Check level with next lab in 8 wks     Prediabetes  · Continue watching diet and continue exercise   · A1c with next lab in 8 wks     Return in about 1 year (around 6/18/2021) for Hypothyroidism, vitD deficinecy . The above issues were reviewed with the patient who understood and agreed with the plan. Thank you for allowing us to participate in the care of this patient. Please do not hesitate to contact us with any additional questions. Diagnosis Orders   1. Hypothyroidism, unspecified type  TSH without Reflex    T4, Free   2. Prediabetes  Basic Metabolic Panel    Hemoglobin A1C   3. Vitamin D deficiency  Basic Metabolic Panel    Vitamin D 25 Hydroxy   4. Multinodular goiter         Nitish Pike MD  Endocrinologist, [facility])   [address] [facility], [address] [place] [address],[address] [address]   Phone: [phone]  Fax: [phone]  ---------------------------------  An electronic signature was used to authenticate this note. Lexa Adams MD on 6/18/2020 at 2:55 PM  Services were provided through a video synchronous discussion virtually to substitute for in-person clinic visit.

## 2020-07-15 ENCOUNTER — TELEPHONE (OUTPATIENT)
Dept: GENERAL RADIOLOGY | Age: 70
End: 2020-07-15

## 2020-07-15 NOTE — TELEPHONE ENCOUNTER
Spoke with patient regarding 6 month follow up. Patient states Dr. Lisa Kidd left her practice, so she no longer sees her. Patient inquires if I can recommend another physician. [facility]) Physician pamphlet mailed to patient today. Patient states she will establish with a new physician before her 6 month follow up on 9-24-20.   Patient rescheduled follow up due to covid-19, she prefers to wait until Sept.

## 2020-07-17 RX ORDER — MONTELUKAST SODIUM 10 MG/1
TABLET ORAL
Qty: 30 TABLET | Refills: 5 | OUTPATIENT
Start: 2020-07-17

## 2020-07-22 ENCOUNTER — TELEPHONE (OUTPATIENT)
Dept: ADMINISTRATIVE | Age: 70
End: 2020-07-22

## 2020-07-22 RX ORDER — BENZONATATE 100 MG/1
CAPSULE ORAL
Qty: 90 CAPSULE | Refills: 5 | OUTPATIENT
Start: 2020-07-22

## 2020-07-22 RX ORDER — LIDOCAINE 50 MG/G
PATCH TOPICAL
Qty: 30 PATCH | Refills: 5 | OUTPATIENT
Start: 2020-07-22

## 2020-07-22 NOTE — TELEPHONE ENCOUNTER
Pt called in to schedule NTP appt with Chrisluis daniel, however she is all out of her medication and the pharmacy will not fill without permission from the pcp, would like to know if someone could approve her medication until she has her NTP appt on 10/9 please advise.

## 2020-07-22 NOTE — TELEPHONE ENCOUNTER
Pt called in to schedule NTP appt with Chrisluis daniel, however she is all out of her medication and the pharmacy will not fill without permission from the pcp, would like to know if someone could approve her medication until she has her NTP appt on 10/9 please advise

## 2020-07-22 NOTE — TELEPHONE ENCOUNTER
Right middle and upper lobes of lung have been removed.   This is why she needs tessalon pearls      Visit date not found  10/9/2020

## 2020-07-23 RX ORDER — ONDANSETRON 4 MG/1
4 TABLET, ORALLY DISINTEGRATING ORAL EVERY 8 HOURS PRN
Qty: 20 TABLET | Refills: 0 | Status: SHIPPED
Start: 2020-07-23 | End: 2020-10-09

## 2020-07-23 RX ORDER — ESCITALOPRAM OXALATE 5 MG/1
5 TABLET ORAL DAILY
Qty: 90 TABLET | Refills: 0 | Status: SHIPPED
Start: 2020-07-23 | End: 2020-10-09 | Stop reason: SDUPTHER

## 2020-07-23 RX ORDER — TIZANIDINE 4 MG/1
TABLET ORAL
Qty: 90 TABLET | Refills: 0 | Status: SHIPPED
Start: 2020-07-23 | End: 2020-10-09 | Stop reason: SDUPTHER

## 2020-07-23 RX ORDER — ATENOLOL 25 MG/1
TABLET ORAL
Qty: 360 TABLET | Refills: 0 | Status: SHIPPED
Start: 2020-07-23 | End: 2020-10-09 | Stop reason: SDUPTHER

## 2020-07-23 RX ORDER — LEVOTHYROXINE SODIUM 0.1 MG/1
TABLET ORAL
Qty: 90 TABLET | Refills: 3 | Status: SHIPPED
Start: 2020-07-23 | End: 2020-12-29 | Stop reason: SDUPTHER

## 2020-07-23 RX ORDER — HYDRALAZINE HYDROCHLORIDE 25 MG/1
TABLET, FILM COATED ORAL
Qty: 180 TABLET | Refills: 0 | Status: SHIPPED
Start: 2020-07-23 | End: 2020-10-09

## 2020-07-23 RX ORDER — PREGABALIN 25 MG/1
25 CAPSULE ORAL 2 TIMES DAILY
Qty: 180 CAPSULE | Refills: 0 | Status: SHIPPED
Start: 2020-07-23 | End: 2020-10-09 | Stop reason: SDUPTHER

## 2020-07-23 RX ORDER — LOSARTAN POTASSIUM 50 MG/1
50 TABLET ORAL DAILY
Qty: 90 TABLET | Refills: 0 | Status: SHIPPED
Start: 2020-07-23 | End: 2020-10-09

## 2020-07-23 RX ORDER — RANITIDINE 150 MG/1
150 TABLET ORAL 2 TIMES DAILY
Qty: 180 TABLET | Refills: 0 | Status: SHIPPED
Start: 2020-07-23 | End: 2020-12-29 | Stop reason: SDUPTHER

## 2020-07-23 RX ORDER — BENZONATATE 100 MG/1
CAPSULE ORAL
Qty: 90 CAPSULE | Refills: 0 | Status: SHIPPED
Start: 2020-07-23 | End: 2020-10-08 | Stop reason: SDUPTHER

## 2020-07-23 RX ORDER — CYANOCOBALAMIN 1000 UG/ML
INJECTION INTRAMUSCULAR; SUBCUTANEOUS
Qty: 12 VIAL | Refills: 3 | Status: SHIPPED | OUTPATIENT
Start: 2020-07-23 | End: 2020-10-09 | Stop reason: SDUPTHER

## 2020-07-23 RX ORDER — ERGOCALCIFEROL 1.25 MG/1
CAPSULE ORAL
Qty: 4 CAPSULE | Refills: 0 | Status: SHIPPED
Start: 2020-07-23 | End: 2020-10-09 | Stop reason: SDUPTHER

## 2020-07-23 RX ORDER — ALBUTEROL SULFATE 90 UG/1
2 AEROSOL, METERED RESPIRATORY (INHALATION) 4 TIMES DAILY PRN
Qty: 3 INHALER | Refills: 1 | Status: SHIPPED
Start: 2020-07-23 | End: 2020-12-29 | Stop reason: SDUPTHER

## 2020-07-23 RX ORDER — OLMESARTAN MEDOXOMIL 40 MG/1
TABLET ORAL
Qty: 90 TABLET | Refills: 3 | Status: SHIPPED
Start: 2020-07-23 | End: 2020-10-09 | Stop reason: SDUPTHER

## 2020-07-23 RX ORDER — OMEPRAZOLE 20 MG/1
CAPSULE, DELAYED RELEASE ORAL
Qty: 90 CAPSULE | Refills: 1 | Status: SHIPPED
Start: 2020-07-23 | End: 2020-10-09 | Stop reason: SDUPTHER

## 2020-07-23 RX ORDER — AZELASTINE HYDROCHLORIDE, FLUTICASONE PROPIONATE 137; 50 UG/1; UG/1
2 SPRAY, METERED NASAL DAILY
Qty: 3 BOTTLE | Refills: 2 | Status: SHIPPED
Start: 2020-07-23 | End: 2020-10-09 | Stop reason: SDUPTHER

## 2020-07-28 NOTE — PROGRESS NOTES
[facility]  [address] [facility], [address] [place] [address]  [phone]    Tele health follow up Note      Christina Rodriguez     Date of Visit:  7/29/2020    CC:  Tele health follow up   Chief Complaint   Patient presents with    Other     no pain today       Consent:  Telehealth Visit due to [place] 19 pandemic  The patient and/or health care decision maker is aware that he/she may receive a bill for this tele-health service [employer], depending on his insurance coverage, and has provided verbal consent to proceed: Yes  I have considered the risks of abuse, dependence, addiction and diversion. My patient is aware that they will need a follow-up visit (in-person or virtually) at the appropriate time indicated for continued medications. Further, my patient is aware that when this acute crisis has lifted, they will be expected to return for an in-person visit and all elements of standard local and hospital guidelines in order to continue this medication. Patient Location:Home   Physician Location: Other [facility] address    HPI:    Pain is resolved/managed with pregabalin. Change in quality of symptoms:no. Medication side effects:none  Recent diagnostic testing:none. Recent interventional procedures:none.     She has been on anticoagulation medications to include fish oil and has been on herbal supplements (fish oil).   She is not diabetic.     Imaging:   Lumbar MRI w/o 2020 -   FINDINGS: The kidneys are normal. No paraspinal mass. Aorta is not   enlarged.       L1-2 shows no disease.       L2-3 demonstrates a broad-based disc bulge which flattens the anterior   thecal sac. The neuroforamina have exited uninvolved.       The L3-L4 level demonstrates 1 mm retrolisthesis.  A diffuse disc bulge   is present causing bilateral neuroforaminal narrowing not grossly   contacting the exiting nerve roots.       The L4-5 level also demonstrates a broad-based diffuse disc bulge   causing bilateral neuroforaminal narrowing not contacting exiting   nerve roots.       The L5-6 level shows no significant disc disease.        L6-S1 is normal.           Impression   1. Mild diffuse disc bulges not grossly contacting exiting nerve   roots. 2. 1 to 2 mm retrolisthesis L3 over L4.      Xray b/l hips  -  FINDINGS:   No acute fracture. Mild arthritic changes similar to the contralateral   hip      Xray b/l knees standing  -  FINDINGS:   Knee joints are maintained. No fracture or bone lesion.      Lumbar MRI 2018 -     FINDINGS:       Exam is markedly suboptimal due to lack of axial and sagittal T2,   STIR, and precontrast T1 series. Stable grade 1 retrolisthesis of L3   on L4. Vertebral body height and alignment are otherwise maintained. Multilevel osteophytes and disc space narrowing noted. Bone marrow   signal is normal. The distal spinal cord and cauda equina nerve roots   are normal in appearance. The paraspinal soft tissues are   unremarkable.           Impression       1. Markedly suboptimal exam due to lack of axial and sagittal T2,   STIR, and precontrast T1 series. Stable grade 1 retrolisthesis of L3   on L4.        2. Mild to moderate multilevel degenerative changes.      B/l SIJ xray 2017 -   FINDINGS:   Mild osteoarthritis seen in the sacroiliac joints as well as the hip   joints. No fractures noted.      Xray left hip 2017 -      Findings: 3 views of pelvis and left hip. Intact alignment at the   hips. Mild symmetric hip joint space narrowing. No fracture. Symmetric   arthritic changes at the sacroiliac joints.                                          Potential Aberrant Drug-Related Behavior: no     Urine Drug Screenin2020 + alcohol metabolites     [facility] report:  2020 consistent   2020 consistent  2020 consistent - filled 90 day supply recently of pregabalin from PCP    Past Medical History: Reviewed    Past Surgical History: Reviewed     Home Medications: Reviewed    Allergies: Reviewed     Social History: Reviewed     REVIEW OF SYSTEMS:     Brunilda Simmonds denies fever/chills, chest pain, shortness of breath, new bowel or bladder complaints. All other review of systems was negative. PHYSICAL EXAMINATION:      General:       A & O x3    HEENT:    Head:normocephalic and atraumatic  Sclera: icterus absent,     Lungs:    Breathing:non-labored    Neurological:     Motor:          No apparent weakness per patient       [address] [facility]    Dermatology:    Skin:no unusual rashes and no skin lesions    Impression:    LBP radiating into the b/l lateral \"hips\" and RLE in L5 distribution  RIGHT lateral hip pain, no pain on ROM, 2020 xray shows mild degenerative changes  Right knee pain, 2020 standing xray negative  Lumbar MRI 2020 shows L3-4 and L4-5 with foraminal narrowing without neural compression  Hx lung cancer s/p RMLectomy 2008 at [facility] and RULectomy 2013 at [facility], [address] [place] [address] and \"autoimmune diseases\" and prediabetes (follows with Dr. Humera Vance)  Concern for addiction tendencies in the family (she and  smoked for many years, quit together, also \"liked red wine\" but quit drinking in 2017 when son diagnosed with severe pancreatitis related to alcohol abuse)  Uses CBD products topically and takes a capsule  Multiple allergies including after lumbar injection    Will be having upcoming oral surgery with bone grafts, will be on a liquid diet for some time, discussed she has permission to fill from oral surgeon.     Plan:    Have not been able to obtain prior records from Dr. Ronda Morales report reviewed  Pt is not a candidate for chronic opioid therapy, we discussed avoidance  failed Gabapentin 100 mg tid caused a mental fog  Continue Lyrica 25 mg titration to BID - recently ordered through PCP, not sure if PCP will continue  Pt with prior allergic rxn after lumbar injections, discussed reaction in detail  Can consider referral to an allergist to determine what she is allergic to that would be used in an injection  Consider ortho referral for further eval prn  Patient encouraged to stay active  Treatment plan discussed with the patient including medication and procedure side effects                 We discussed with the patient that combining opioids, benzodiazepines, alcohol, illicit drugs or sleep aids increases the risk of respiratory depression including death. We discussed that these medications may cause drowsiness, sedation or dizziness and have counseled the patient not to drive or operate machinery. We have discussed that these medications will be prescribed only by one provider. We have discussed with the patient about age related risk factors and have thoroughly discussed the importance of taking these medications as prescribed. The patient verbalizes understanding. Patient advised regarding steps to help prevent the spread of COVID-19   SOURCE - https://addi-noe.info/. html     1-Stay home except to get medical care  2-Clean your hands often for atleast 20 seconds, avoid touching: Avoid touching your eyes, nose, and mouth with unwashed hands. 3-Seek medical attention: Seek prompt medical attention if your illness is worsening (e.g., difficulty breathing). Call you doctor first.  3-Wear a facemask if you are sick   4-Cover your coughs and sneezes           I affirm this is a Patient Initiated Episode with an established Patient who has not had a related appointment within my department in the past 7 days or scheduled within the next 24 hours.     Total Time: 18 minutes    Cc: Referring physician

## 2020-07-29 ENCOUNTER — VIRTUAL VISIT (OUTPATIENT)
Dept: PAIN MANAGEMENT | Age: 70
End: 2020-07-29
Payer: COMMERCIAL

## 2020-07-29 PROCEDURE — 1090F PRES/ABSN URINE INCON ASSESS: CPT | Performed by: PAIN MEDICINE

## 2020-07-29 PROCEDURE — 3017F COLORECTAL CA SCREEN DOC REV: CPT | Performed by: PAIN MEDICINE

## 2020-07-29 PROCEDURE — G8400 PT W/DXA NO RESULTS DOC: HCPCS | Performed by: PAIN MEDICINE

## 2020-07-29 PROCEDURE — G8427 DOCREV CUR MEDS BY ELIG CLIN: HCPCS | Performed by: PAIN MEDICINE

## 2020-07-29 PROCEDURE — 1036F TOBACCO NON-USER: CPT | Performed by: PAIN MEDICINE

## 2020-07-29 PROCEDURE — 1123F ACP DISCUSS/DSCN MKR DOCD: CPT | Performed by: PAIN MEDICINE

## 2020-07-29 PROCEDURE — 99213 OFFICE O/P EST LOW 20 MIN: CPT | Performed by: PAIN MEDICINE

## 2020-07-29 PROCEDURE — 4040F PNEUMOC VAC/ADMIN/RCVD: CPT | Performed by: PAIN MEDICINE

## 2020-07-29 PROCEDURE — G8420 CALC BMI NORM PARAMETERS: HCPCS | Performed by: PAIN MEDICINE

## 2020-07-29 NOTE — PROGRESS NOTES
Taina العلي was read the following message We want to confirm that, for purposes of billing, this is a virtual visit with your provider for which we will submit a claim for reimbursement with your insurance company. You will be responsible for any copays, coinsurance amounts or other amounts not covered by your insurance company. If you do not accept this, unfortunately we will not be able to schedule a virtual visit with the provider. Do you accept? Nan Ron responded Yes .

## 2020-08-17 RX ORDER — ERGOCALCIFEROL 1.25 MG/1
CAPSULE ORAL
Qty: 4 CAPSULE | Refills: 0 | OUTPATIENT
Start: 2020-08-17

## 2020-09-21 RX ORDER — MONTELUKAST SODIUM 10 MG/1
TABLET ORAL
Qty: 30 TABLET | Refills: 5 | OUTPATIENT
Start: 2020-09-21

## 2020-09-21 RX ORDER — CYANOCOBALAMIN 1000 UG/ML
INJECTION INTRAMUSCULAR; SUBCUTANEOUS
Qty: 12 ML | OUTPATIENT
Start: 2020-09-21

## 2020-09-21 RX ORDER — ERGOCALCIFEROL 1.25 MG/1
CAPSULE ORAL
Qty: 4 CAPSULE | Refills: 0 | OUTPATIENT
Start: 2020-09-21

## 2020-09-21 RX ORDER — LIDOCAINE 50 MG/G
PATCH TOPICAL
Qty: 30 PATCH | Refills: 5 | OUTPATIENT
Start: 2020-09-21

## 2020-09-23 ENCOUNTER — HOSPITAL ENCOUNTER (OUTPATIENT)
Age: 70
Discharge: HOME | End: 2020-09-25
Payer: COMMERCIAL

## 2020-09-23 LAB
ANION GAP SERPL CALCULATED.3IONS-SCNC: 16 MMOL/L (ref 7–16)
BUN BLDV-MCNC: 16 MG/DL (ref 8–23)
CALCIUM SERPL-MCNC: 9.9 MG/DL (ref 8.6–10.2)
CHLORIDE BLD-SCNC: 101 MMOL/L (ref 98–107)
CO2: 20 MMOL/L (ref 22–29)
CREAT SERPL-MCNC: 1 MG/DL (ref 0.5–1)
GFR AFRICAN AMERICAN: >60
GFR NON-AFRICAN AMERICAN: 55 ML/MIN/1.73
GLUCOSE BLD-MCNC: 79 MG/DL (ref 74–99)
HBA1C MFR BLD: 5.9 % (ref 4–5.6)
POTASSIUM SERPL-SCNC: 4.6 MMOL/L (ref 3.5–5)
SODIUM BLD-SCNC: 137 MMOL/L (ref 132–146)
T4 FREE: 1.49 NG/DL (ref 0.93–1.7)
TSH SERPL DL<=0.05 MIU/L-ACNC: 3.52 UIU/ML (ref 0.27–4.2)
VITAMIN D 25-HYDROXY: 34 NG/ML (ref 30–100)

## 2020-09-23 PROCEDURE — 80048 BASIC METABOLIC PNL TOTAL CA: CPT

## 2020-09-23 PROCEDURE — 36415 COLL VENOUS BLD VENIPUNCTURE: CPT

## 2020-09-23 PROCEDURE — 84443 ASSAY THYROID STIM HORMONE: CPT

## 2020-09-23 PROCEDURE — 82306 VITAMIN D 25 HYDROXY: CPT

## 2020-09-23 PROCEDURE — 83036 HEMOGLOBIN GLYCOSYLATED A1C: CPT

## 2020-09-23 PROCEDURE — 84439 ASSAY OF FREE THYROXINE: CPT

## 2020-09-24 ENCOUNTER — OFFICE VISIT (OUTPATIENT)
Dept: FAMILY MEDICINE CLINIC | Age: 70
End: 2020-09-24
Payer: COMMERCIAL

## 2020-09-24 VITALS
TEMPERATURE: 98.2 F | RESPIRATION RATE: 16 BRPM | WEIGHT: 167.6 LBS | HEART RATE: 58 BPM | SYSTOLIC BLOOD PRESSURE: 134 MMHG | DIASTOLIC BLOOD PRESSURE: 76 MMHG | BODY MASS INDEX: 24.05 KG/M2 | OXYGEN SATURATION: 97 %

## 2020-09-24 PROCEDURE — 1036F TOBACCO NON-USER: CPT | Performed by: PHYSICIAN ASSISTANT

## 2020-09-24 PROCEDURE — G8427 DOCREV CUR MEDS BY ELIG CLIN: HCPCS | Performed by: PHYSICIAN ASSISTANT

## 2020-09-24 PROCEDURE — 4040F PNEUMOC VAC/ADMIN/RCVD: CPT | Performed by: PHYSICIAN ASSISTANT

## 2020-09-24 PROCEDURE — 1090F PRES/ABSN URINE INCON ASSESS: CPT | Performed by: PHYSICIAN ASSISTANT

## 2020-09-24 PROCEDURE — 3017F COLORECTAL CA SCREEN DOC REV: CPT | Performed by: PHYSICIAN ASSISTANT

## 2020-09-24 PROCEDURE — 99214 OFFICE O/P EST MOD 30 MIN: CPT | Performed by: PHYSICIAN ASSISTANT

## 2020-09-24 PROCEDURE — G8400 PT W/DXA NO RESULTS DOC: HCPCS | Performed by: PHYSICIAN ASSISTANT

## 2020-09-24 PROCEDURE — G8420 CALC BMI NORM PARAMETERS: HCPCS | Performed by: PHYSICIAN ASSISTANT

## 2020-09-24 PROCEDURE — 1123F ACP DISCUSS/DSCN MKR DOCD: CPT | Performed by: PHYSICIAN ASSISTANT

## 2020-09-24 RX ORDER — ADHESIVE BANDAGE 3/4"
BANDAGE TOPICAL
Qty: 1 EACH | Refills: 0 | Status: SHIPPED | OUTPATIENT
Start: 2020-09-24 | End: 2021-06-15

## 2020-09-24 RX ORDER — BLOOD-GLUCOSE METER
KIT MISCELLANEOUS
Qty: 1 KIT | Refills: 0 | Status: SHIPPED
Start: 2020-09-24 | End: 2021-06-17

## 2020-09-24 ASSESSMENT — PATIENT HEALTH QUESTIONNAIRE - PHQ9
SUM OF ALL RESPONSES TO PHQ QUESTIONS 1-9: 1
SUM OF ALL RESPONSES TO PHQ QUESTIONS 1-9: 1
1. LITTLE INTEREST OR PLEASURE IN DOING THINGS: 0
SUM OF ALL RESPONSES TO PHQ9 QUESTIONS 1 & 2: 1
2. FEELING DOWN, DEPRESSED OR HOPELESS: 1

## 2020-09-24 NOTE — PROGRESS NOTES
[pregabalin]    Physical Exam:  (Vital signs reviewed) /76   Pulse 58   Temp 98.2 °F (36.8 °C)   Resp 16   Wt 167 lb 9.6 oz (76 kg)   SpO2 97%   BMI 24.05 kg/m²   Oxygen Saturation Interpretation: Normal.   Constitutional:  Alert, development consistent with age. Eyes:  PERRL, EOMI, no discharge or conjunctival injection. Ears:  External ears without lesions. Neck:  Normal ROM. Supple. Lungs:  Clear to auscultation and breath sounds equal.  Heart:  Regular rate and rhythm, normal heart sounds, without pathological murmurs, ectopy, gallops, or rubs. Abdomen:  Soft, nontender, good bowel sounds. No firm or pulsatile mass. Back:  No costovertebral tenderness. Skin:  Normal turgor. Warm, dry, without visible rash, unless noted elsewhere. Neurological:  Alert and oriented. Motor functions intact.    ------------------------------------------Test Results Section----------------------------------------------  (All laboratory and radiology results have been personally reviewed by myself)  Laboratory:  Results for orders placed or performed during the hospital encounter of 09/23/20   T4, Free   Result Value Ref Range    T4 Free 1.49 0.93 - 1.70 ng/dL   TSH without Reflex   Result Value Ref Range    TSH 3.520 0.270 - 4.200 uIU/mL   Vitamin D 25 Hydroxy   Result Value Ref Range    Vit D, 25-Hydroxy 34 30 - 100 ng/mL   Hemoglobin A1C   Result Value Ref Range    Hemoglobin A1C 5.9 (H) 4.0 - 5.6 %   Basic Metabolic Panel   Result Value Ref Range    Sodium 137 132 - 146 mmol/L    Potassium 4.6 3.5 - 5.0 mmol/L    Chloride 101 98 - 107 mmol/L    CO2 20 (L) 22 - 29 mmol/L    Anion Gap 16 7 - 16 mmol/L    Glucose 79 74 - 99 mg/dL    BUN 16 8 - 23 mg/dL    CREATININE 1.0 0.5 - 1.0 mg/dL    GFR Non-African American 55 >=60 mL/min/1.73    GFR African American >60     Calcium 9.9 8.6 - 10.2 mg/dL     Radiology:   All Radiology results interpreted by Radiologist unless otherwise noted.    -------------------------------------Impression & Disposition Section-----------------------------------  Impression(s):  Kassandra Duke was seen today for hypertension. Diagnoses and all orders for this visit:    Essential hypertension  -     Blood Pressure Monitoring (BLOOD PRESSURE CUFF) MISC; Dx:  Hypertension with labile blood pressure    Prediabetes  -     Blood Glucose Monitoring Suppl (ONE TOUCH ULTRA MINI) w/Device KIT; Check blood sugar bid    Breast cancer screening  -     HAIDER DIGITAL SCREEN BILATERAL PER PROTOCOL; Future  -     US BREAST LIMITED RIGHT; Future    Encounter for screening for osteoporosis  -     DEXA BONE DENSITY AXIAL SKELETON; Future    Lump of right breast  -     HAIDER DIGITAL SCREEN BILATERAL PER PROTOCOL; Future  -     US BREAST LIMITED RIGHT; Future      Patient will eat breakfast prior to pills, then take BS and BP if episode still occurs. To call in the next few days with update. If still occurring, patient would like to stop Benicar. Has appointment already scheduled for the beginning of October to establish with PCP.

## 2020-09-25 ENCOUNTER — TELEPHONE (OUTPATIENT)
Dept: ENDOCRINOLOGY | Age: 70
End: 2020-09-25

## 2020-09-29 RX ORDER — LANCETS 30 GAUGE
1 EACH MISCELLANEOUS DAILY
Qty: 100 EACH | Refills: 5 | Status: SHIPPED
Start: 2020-09-29 | End: 2020-12-29 | Stop reason: SDUPTHER

## 2020-09-29 RX ORDER — MONTELUKAST SODIUM 10 MG/1
TABLET ORAL
Qty: 30 TABLET | Refills: 5 | Status: SHIPPED
Start: 2020-09-29 | End: 2020-10-09 | Stop reason: SDUPTHER

## 2020-09-29 RX ORDER — GLUCOSAMINE HCL/CHONDROITIN SU 500-400 MG
CAPSULE ORAL
Qty: 300 STRIP | Refills: 1 | Status: SHIPPED
Start: 2020-09-29 | End: 2020-12-29 | Stop reason: SDUPTHER

## 2020-09-30 RX ORDER — BENZONATATE 100 MG/1
CAPSULE ORAL
Qty: 90 CAPSULE | Refills: 0 | OUTPATIENT
Start: 2020-09-30

## 2020-09-30 RX ORDER — ERGOCALCIFEROL 1.25 MG/1
CAPSULE ORAL
Qty: 4 CAPSULE | Refills: 0 | OUTPATIENT
Start: 2020-09-30

## 2020-10-09 ENCOUNTER — OFFICE VISIT (OUTPATIENT)
Dept: FAMILY MEDICINE CLINIC | Age: 70
End: 2020-10-09
Payer: COMMERCIAL

## 2020-10-09 VITALS
BODY MASS INDEX: 24.2 KG/M2 | DIASTOLIC BLOOD PRESSURE: 80 MMHG | HEIGHT: 70 IN | WEIGHT: 169 LBS | RESPIRATION RATE: 18 BRPM | HEART RATE: 62 BPM | TEMPERATURE: 97 F | OXYGEN SATURATION: 98 % | SYSTOLIC BLOOD PRESSURE: 128 MMHG

## 2020-10-09 PROBLEM — R56.9 SEIZURE (HCC): Status: ACTIVE | Noted: 2020-10-09

## 2020-10-09 PROCEDURE — 99214 OFFICE O/P EST MOD 30 MIN: CPT | Performed by: FAMILY MEDICINE

## 2020-10-09 RX ORDER — OLMESARTAN MEDOXOMIL 40 MG/1
TABLET ORAL
Qty: 90 TABLET | Refills: 3 | Status: SHIPPED
Start: 2020-10-09 | End: 2020-12-29 | Stop reason: SDUPTHER

## 2020-10-09 RX ORDER — ERGOCALCIFEROL 1.25 MG/1
CAPSULE ORAL
Qty: 4 CAPSULE | Refills: 0 | Status: SHIPPED
Start: 2020-10-09 | End: 2020-11-24 | Stop reason: SDUPTHER

## 2020-10-09 RX ORDER — MONTELUKAST SODIUM 10 MG/1
10 TABLET ORAL NIGHTLY
Qty: 90 TABLET | Refills: 3 | Status: SHIPPED
Start: 2020-10-09 | End: 2020-12-29 | Stop reason: SDUPTHER

## 2020-10-09 RX ORDER — BENZONATATE 100 MG/1
CAPSULE ORAL
Qty: 90 CAPSULE | Refills: 0 | Status: SHIPPED
Start: 2020-10-09 | End: 2020-12-15 | Stop reason: SDUPTHER

## 2020-10-09 RX ORDER — CYANOCOBALAMIN 1000 UG/ML
INJECTION INTRAMUSCULAR; SUBCUTANEOUS
Qty: 12 VIAL | Refills: 3 | Status: SHIPPED
Start: 2020-10-09 | End: 2020-12-29 | Stop reason: SDUPTHER

## 2020-10-09 RX ORDER — OMEPRAZOLE 20 MG/1
CAPSULE, DELAYED RELEASE ORAL
Qty: 90 CAPSULE | Refills: 3 | Status: SHIPPED
Start: 2020-10-09 | End: 2020-12-29 | Stop reason: SDUPTHER

## 2020-10-09 RX ORDER — ATENOLOL 25 MG/1
50 TABLET ORAL DAILY
Qty: 90 TABLET | Refills: 3 | Status: SHIPPED
Start: 2020-10-09 | End: 2020-11-24 | Stop reason: SDUPTHER

## 2020-10-09 RX ORDER — LIDOCAINE 50 MG/G
PATCH TOPICAL
Qty: 90 PATCH | Refills: 3 | Status: SHIPPED
Start: 2020-10-09 | End: 2020-12-29 | Stop reason: SDUPTHER

## 2020-10-09 RX ORDER — AZELASTINE HYDROCHLORIDE, FLUTICASONE PROPIONATE 137; 50 UG/1; UG/1
2 SPRAY, METERED NASAL DAILY
Qty: 3 BOTTLE | Refills: 5 | Status: SHIPPED
Start: 2020-10-09 | End: 2021-06-24 | Stop reason: ALTCHOICE

## 2020-10-09 RX ORDER — PREGABALIN 25 MG/1
25 CAPSULE ORAL 2 TIMES DAILY
Qty: 60 CAPSULE | Refills: 0 | Status: SHIPPED | OUTPATIENT
Start: 2020-10-09 | End: 2020-11-24 | Stop reason: SDUPTHER

## 2020-10-09 RX ORDER — TIZANIDINE 4 MG/1
4 TABLET ORAL EVERY 8 HOURS PRN
Qty: 270 TABLET | Refills: 3 | Status: SHIPPED
Start: 2020-10-09 | End: 2020-12-29 | Stop reason: SDUPTHER

## 2020-10-09 RX ORDER — HYDROCHLOROTHIAZIDE 25 MG/1
25 TABLET ORAL DAILY
Qty: 90 TABLET | Refills: 3 | Status: SHIPPED
Start: 2020-10-09 | End: 2020-11-03 | Stop reason: SDUPTHER

## 2020-10-09 RX ORDER — ESCITALOPRAM OXALATE 5 MG/1
5 TABLET ORAL DAILY
Qty: 90 TABLET | Refills: 3 | Status: SHIPPED
Start: 2020-10-09 | End: 2020-12-29 | Stop reason: SDUPTHER

## 2020-10-09 NOTE — PROGRESS NOTES
CC: Candace Steward is a 78 y.o. yo female here for evaluation of the following medical concerns: Established New Doctor (Med ck and refills, Rt hip, knee pain and lower back)      HPI:    Establish care    Hx of Lung cancer x2 / COPD  Follows with onc  Has a chronic cough   On long term tessalon  Quite smoking 2007; Diagnosed with lung ca 2008  Has R upper and middle lobectomy  Currently controlled with inhalers    Chronic pain  Lumbar pain; hip pain  Was seeing PMR; Dr. Bel Mckeon with lyrica    HTN  adherent with Hydralazine; atenolol; benicar; cozaar  Recent possible lows v low BS    GERD  Adherent with prilosec    Anxiety / depression  Controlled with lexapro  No SI/HI    Hashimoto's  Adherent with synthroid  Follows with endo    Past Medical History:   Diagnosis Date    Arthritis     Symptoms respond to myofascial release.  Not surgical candidate    Asthma     Cancer [facility])     lung cancer- RML- 2008, stage 1A, [id number] Stage 1A 2013    Chronic back pain     COPD (chronic obstructive pulmonary disease) (HCC)     Emphysema of lung ([address]     Fibrocystic breast     GERD (gastroesophageal reflux disease)     Hashimoto's disease 6600    Helicobacter pylori (H. pylori)     EGD 7/2002    Hemorrhoids     Hyperlipidemia     Hypertension     Hyperthyroidism     Post-thoracotomy pain 2008     Past Surgical History:   Procedure Laterality Date    APPENDECTOMY  1981    COLONOSCOPY  6/23/15    polyp and diverticulosis    COLONOSCOPY  6/23/15    colonoscopy    EYE SURGERY      lenses replaced    HYSTERECTOMY  1981    LUNG CANCER SURGERY Right     X 2     Family History   Problem Relation Age of Onset    Arthritis Mother     Diabetes Mother     Heart Disease Mother     High Blood Pressure Mother     High Cholesterol Mother     Stroke Mother     Asthma Sister     Cancer Sister 61        lung cancer    High Blood Pressure Sister     High Cholesterol Sister     Substance Abuse Sister  Heart Attack Father         massiv MI    Early Death Brother         car accident    Cancer Sister 50        breast cancer    Other Sister         GERD, diverticulosis, rotator cuff disease, spinal stenosis    Heart Disease Brother         MI    Diabetes Maternal Grandmother     Heart Disease Maternal Grandmother     High Blood Pressure Maternal Grandmother     High Cholesterol Maternal Grandmother     Stroke Maternal Grandmother     Breast Cancer Maternal Aunt 50        breast    Ovarian Cancer Maternal Aunt     Cancer Maternal Aunt 40        ovarian     Social History     Tobacco Use    Smoking status: Former Smoker     Packs/day: 1.00     Years: 30.00     Pack years: 30.00     Types: Cigarettes     Last attempt to quit: 2007     Years since quittin.1    Smokeless tobacco: Never Used   Substance Use Topics    Alcohol use:  Yes     Alcohol/week: 0.0 standard drinks     Types: 4 - 6 Glasses of wine per week     Comment: SOCIALLY    Drug use: No     Allergies   Allergen Reactions    Codeine Hives and Itching     AND CODEINE DERIVATIVES----sob  Pt stated tolerated Dilaudid    Fentanyl Other (See Comments)     Disoriented, confused  Other reaction(s): Mental Status Change    Adhesive Tape      burns skin, reddens skin, blisters    Amlodipine Swelling    Bupropion      hyper--couldn't sit still--jumpy    Cortisone      throat closed up    Dipyridamole Hives     Persantine-CST--sob, palpitations, stress test stopped    Levaquin [Levofloxacin In D5w] Other (See Comments)     Yeast infection     Loratadine      hyper--couldn't sit still--jumpy    Nortriptyline      for rosacea--caused flare up. stopped    Other      States any steroids make her face swell and flush    Prednisone Other (See Comments)    Tenex [Guanfacine Hcl]      dizziness    Tramadol Hives     sob    Varenicline     Lyrica [Pregabalin] Other (See Comments)     Leg cramps     Prior to Admission medications Medication Sig Start Date End Date Taking? Authorizing Provider   benzonatate (TESSALON) 100 MG capsule take 1 capsule by mouth three times a day if needed for cough 10/9/20  Yes Loreta Hutson MD   cyanocobalamin 1000 MCG/ML injection INJECT 1 MILLILITER INTO MUSCLE EVERY 7 DAYS 10/9/20  Yes Loreta Hutson MD   vitamin D (ERGOCALCIFEROL) 1.25 MG (86867 UT) CAPS capsule take 1 capsule by mouth weekly 10/9/20  Yes Loreta Hutson MD   pregabalin (LYRICA) 25 MG capsule Take 1 capsule by mouth 2 times daily for 60 days. 10/9/20 12/8/20 Yes Loreta Hutson MD   tiZANidine (ZANAFLEX) 4 MG tablet Take 1 tablet by mouth every 8 hours as needed (muscle spasm) Take 1 tab by mouth 3 times daily 10/9/20  Yes Loreta Hutson MD   lidocaine (LIDODERM) 5 % apply 3 patches to the affected area daily. Leave on for 12 hours and then off for 12 hours. 10/9/20  Yes Loreta Hutson MD   montelukast (SINGULAIR) 10 MG tablet Take 1 tablet by mouth nightly take 1 tablet by mouth every evening 10/9/20  Yes Loreta Hutson MD   atenolol (TENORMIN) 25 MG tablet Take 2 tablets by mouth daily take 2 tablets by mouth twice a day 10/9/20  Yes Loreta Hutson MD   olmesartan (BENICAR) 40 MG tablet take 1 tablet by mouth once daily 10/9/20  Yes Loreta Hutson MD   omeprazole (PRILOSEC) 20 MG delayed release capsule take 1 capsule by mouth once daily 10/9/20  Yes Loreta Hutson MD   Azelastine-Fluticasone 137-50 MCG/ACT SUSP 2 sprays by Nasal route daily 10/9/20  Yes Loreta Hutson MD   escitalopram (LEXAPRO) 5 MG tablet Take 1 tablet by mouth daily 10/9/20  Yes Loreta Hutson MD   hydroCHLOROthiazide (HYDRODIURIL) 25 MG tablet Take 1 tablet by mouth daily 10/9/20  Yes Loreta Hutson MD   Lancets MISC 1 each by Does not apply route daily 9/29/20  Yes Mukesh Ponce PA-C   blood glucose monitor strips Test 2 times a day & as needed for symptoms of irregular blood glucose.  Dispense sufficient amount for indicated testing frequency plus additional to accommodate PRN testing needs. 9/29/20  Yes Nathalie Gross PA-C   Blood Pressure Monitoring (BLOOD PRESSURE CUFF) MISC Dx:  Hypertension with labile blood pressure 9/24/20  Yes Nathalie Gross PA-C   Blood Glucose Monitoring Suppl (ONE TOUCH ULTRA MINI) w/Device KIT Check blood sugar bid 9/24/20  Yes Nathalie Gross PA-C   levothyroxine (SYNTHROID) 100 MCG tablet take 1 tablet 6 days a week and 1/2 tablet on Sunday 7/23/20  Yes Perry Willis MD   albuterol sulfate  (90 Base) MCG/ACT inhaler Inhale 2 puffs into the lungs 4 times daily as needed for Wheezing 7/23/20  Yes Perry Willis MD   SYRINGE-NEEDLE, DISP, 3 ML (B-D 3CC LUER-JUANCARLOS SYR 52LZ4-0/2) 22G X 1-1/2\" 3 ML MISC USE MONTHLY AS DIRECTED WITH B-12 5/8/20  Yes Tricia Carrillo MD   Probiotic Product (PROBIOTIC-10 PO) Take by mouth daily 100 BILLION CFU   Yes Historical Provider, MD   Coenzyme Q10 (CO Q 10 PO) Take by mouth daily   Yes Historical Provider, MD   Biotin w/ Vitamins C & E (HAIR/SKIN/NAILS PO) Take by mouth daily VIT C 90MG  VIT E 5,000MCG  ZINC 8MG  COPPER 1MG   Yes Historical Provider, MD   ibuprofen (ADVIL;MOTRIN) 200 MG tablet Take 200 mg by mouth every 6 hours as needed for Pain   Yes Historical Provider, MD   Insulin Syringe-Needle U-100 25G X 1\" 1 ML MISC Use as directed for B12 injection 10/16/17  Yes Salty Cheng MD   loratadine (CLARITIN) 10 MG tablet Take 10 mg by mouth daily   Yes Historical Provider, MD   Umeclidinium-Vilanterol 62.5-25 MCG/INH AEPB Inhale 1 Inhaler into the lungs daily   Yes Historical Provider, MD   Melatonin 1 MG SUBL Place 2 mg under the tongue nightly   Yes Historical Provider, MD   ipratropium (ATROVENT) 0.02 % nebulizer solution   Take 0.5 mg by nebulization 4 times daily As needed.    Yes Historical Provider, MD   raNITIdine (ZANTAC) 150 MG tablet Take 1 tablet by mouth 2 times daily 7/23/20   Perry Willis MD   NONFORMULARY CBD CAPSULES----- breathing  Cardiovascular - normal rate, regular rhythm, normal S1, S2, no murmurs, rubs, clicks or gallops; Extremities - peripheral pulses normal, no pedal edema, no clubbing or cyanosis  Abdomen - soft, nontender, nondistended, no masses or organomegaly  Musculoskeletal - gait normal; no clubbing, cyanosis of extremities noted; no joint deformity or swelling noted; full active range of motion noted without pain  Skin - normal coloration and turgor, no rashes, no suspicious skin lesions noted  Neurological - alert, oriented; no obvious CN deficits, normal speech, no focal findings or movement disorder noted  Psychiatric - alert, oriented to person, place, and time, normal mood, behavior, speech, dress, motor activity, and thought processes, affect appropriate to mood    Labs:  Pertinent labs, imaging, other diagnostic data and other clinician documentation reviewed in electronic medical record. Assessment / Plan   Diagnosis Orders   1. Encounter to establish care     2. Essential hypertension  atenolol (TENORMIN) 25 MG tablet   3. Hyperlipidemia, unspecified hyperlipidemia type  Lipid Panel   4. Anxiety  escitalopram (LEXAPRO) 5 MG tablet   5. Malignant neoplasm of lower lobe of right lung ([facility]     6. Chronic obstructive pulmonary disease, unspecified COPD type ([facility]     7. Chronic pain syndrome  pregabalin (LYRICA) 25 MG capsule   8. Lumbar radiculopathy  pregabalin (LYRICA) 25 MG capsule   9. Right hip pain  pregabalin (LYRICA) 25 MG capsule   10. Chronic pain of right knee  pregabalin (LYRICA) 25 MG capsule   11. Trochanteric bursitis of both hips  lidocaine (LIDODERM) 5 %   12. Encounter for long-term (current) drug use  URINE DRUG SCREEN   13. Vitamin B12 deficiency  cyanocobalamin 1000 MCG/ML injection   14. Vitamin D deficiency  vitamin D (ERGOCALCIFEROL) 1.25 MG (71303 UT) CAPS capsule   15. Other seasonal allergic rhinitis  montelukast (SINGULAIR) 10 MG tablet   16. Seizure ([facility]     17.  Healthcare maintenance  HEPATITIS C ANTIBODY       Stop cozaar (she is on benicar as well)  Stop hydralazine  Start HCT 25mg; return to office in 3 weeks for BP recheck and bring logs  I will fill lyrica for her; we need urine test and med contract  Labs as ordered; she gets most labs through oncology and endo      RTO: Return in about 3 weeks (around 10/30/2020) for BP check.       An electronic signature was used to authenticate this note.  ---- Sandhya Huntley MD on 10/9/2020 at 12:41 PM

## 2020-10-12 ENCOUNTER — HOSPITAL ENCOUNTER (OUTPATIENT)
Dept: GENERAL RADIOLOGY | Age: 70
Discharge: HOME | End: 2020-10-14
Payer: COMMERCIAL

## 2020-10-12 PROCEDURE — 77080 DXA BONE DENSITY AXIAL: CPT

## 2020-10-12 PROCEDURE — 76642 ULTRASOUND BREAST LIMITED: CPT

## 2020-10-12 PROCEDURE — 77066 DX MAMMO INCL CAD BI: CPT

## 2020-10-13 ENCOUNTER — HOSPITAL ENCOUNTER (OUTPATIENT)
Dept: INFUSION THERAPY | Age: 70
Discharge: HOME | End: 2020-10-13
Payer: COMMERCIAL

## 2020-10-13 ENCOUNTER — OFFICE VISIT (OUTPATIENT)
Dept: ONCOLOGY | Age: 70
End: 2020-10-13
Payer: COMMERCIAL

## 2020-10-13 VITALS
SYSTOLIC BLOOD PRESSURE: 171 MMHG | DIASTOLIC BLOOD PRESSURE: 79 MMHG | TEMPERATURE: 96.1 F | OXYGEN SATURATION: 98 % | WEIGHT: 167 LBS | HEIGHT: 70 IN | BODY MASS INDEX: 23.91 KG/M2 | HEART RATE: 55 BPM

## 2020-10-13 DIAGNOSIS — C34.91 ADENOCARCINOMA OF RIGHT LUNG (HCC): ICD-10-CM

## 2020-10-13 DIAGNOSIS — Z00.00 HEALTHCARE MAINTENANCE: ICD-10-CM

## 2020-10-13 DIAGNOSIS — E78.5 HYPERLIPIDEMIA, UNSPECIFIED HYPERLIPIDEMIA TYPE: ICD-10-CM

## 2020-10-13 LAB
ALBUMIN SERPL-MCNC: 4.2 G/DL (ref 3.5–5.2)
ALP BLD-CCNC: 70 U/L (ref 35–104)
ALT SERPL-CCNC: 32 U/L (ref 0–32)
ANION GAP SERPL CALCULATED.3IONS-SCNC: 12 MMOL/L (ref 7–16)
AST SERPL-CCNC: 32 U/L (ref 0–31)
BASOPHILS ABSOLUTE: 0.03 E9/L (ref 0–0.2)
BASOPHILS RELATIVE PERCENT: 0.6 % (ref 0–2)
BILIRUB SERPL-MCNC: 0.4 MG/DL (ref 0–1.2)
BUN BLDV-MCNC: 17 MG/DL (ref 8–23)
CALCIUM SERPL-MCNC: 9.8 MG/DL (ref 8.6–10.2)
CHLORIDE BLD-SCNC: 97 MMOL/L (ref 98–107)
CHOLESTEROL, TOTAL: 278 MG/DL (ref 0–199)
CO2: 22 MMOL/L (ref 22–29)
CREAT SERPL-MCNC: 0.9 MG/DL (ref 0.5–1)
EOSINOPHILS ABSOLUTE: 0.15 E9/L (ref 0.05–0.5)
EOSINOPHILS RELATIVE PERCENT: 2.9 % (ref 0–6)
GFR AFRICAN AMERICAN: >60
GFR NON-AFRICAN AMERICAN: >60 ML/MIN/1.73
GLUCOSE BLD-MCNC: 97 MG/DL (ref 74–99)
HCT VFR BLD CALC: 42.2 % (ref 34–48)
HDLC SERPL-MCNC: 43 MG/DL
HEMOGLOBIN: 13.9 G/DL (ref 11.5–15.5)
IMMATURE GRANULOCYTES #: 0.01 E9/L
IMMATURE GRANULOCYTES %: 0.2 % (ref 0–5)
LDL CHOLESTEROL CALCULATED: 184 MG/DL (ref 0–99)
LYMPHOCYTES ABSOLUTE: 1.95 E9/L (ref 1.5–4)
LYMPHOCYTES RELATIVE PERCENT: 38.2 % (ref 20–42)
MCH RBC QN AUTO: 29.7 PG (ref 26–35)
MCHC RBC AUTO-ENTMCNC: 32.9 % (ref 32–34.5)
MCV RBC AUTO: 90.2 FL (ref 80–99.9)
MONOCYTES ABSOLUTE: 0.47 E9/L (ref 0.1–0.95)
MONOCYTES RELATIVE PERCENT: 9.2 % (ref 2–12)
NEUTROPHILS ABSOLUTE: 2.49 E9/L (ref 1.8–7.3)
NEUTROPHILS RELATIVE PERCENT: 48.9 % (ref 43–80)
PDW BLD-RTO: 12.9 FL (ref 11.5–15)
PLATELET # BLD: 234 E9/L (ref 130–450)
PMV BLD AUTO: 9.8 FL (ref 7–12)
POTASSIUM SERPL-SCNC: 4 MMOL/L (ref 3.5–5)
RBC # BLD: 4.68 E12/L (ref 3.5–5.5)
SODIUM BLD-SCNC: 131 MMOL/L (ref 132–146)
TOTAL PROTEIN: 7.2 G/DL (ref 6.4–8.3)
TRIGL SERPL-MCNC: 256 MG/DL (ref 0–149)
VLDLC SERPL CALC-MCNC: 51 MG/DL
WBC # BLD: 5.1 E9/L (ref 4.5–11.5)

## 2020-10-13 PROCEDURE — 86803 HEPATITIS C AB TEST: CPT

## 2020-10-13 PROCEDURE — 99214 OFFICE O/P EST MOD 30 MIN: CPT | Performed by: INTERNAL MEDICINE

## 2020-10-13 PROCEDURE — 99213 OFFICE O/P EST LOW 20 MIN: CPT

## 2020-10-13 PROCEDURE — G8484 FLU IMMUNIZE NO ADMIN: HCPCS | Performed by: INTERNAL MEDICINE

## 2020-10-13 PROCEDURE — 1090F PRES/ABSN URINE INCON ASSESS: CPT | Performed by: INTERNAL MEDICINE

## 2020-10-13 PROCEDURE — G8427 DOCREV CUR MEDS BY ELIG CLIN: HCPCS | Performed by: INTERNAL MEDICINE

## 2020-10-13 PROCEDURE — 1123F ACP DISCUSS/DSCN MKR DOCD: CPT | Performed by: INTERNAL MEDICINE

## 2020-10-13 PROCEDURE — 36415 COLL VENOUS BLD VENIPUNCTURE: CPT

## 2020-10-13 PROCEDURE — 80053 COMPREHEN METABOLIC PANEL: CPT

## 2020-10-13 PROCEDURE — 85025 COMPLETE CBC W/AUTO DIFF WBC: CPT

## 2020-10-13 PROCEDURE — G8420 CALC BMI NORM PARAMETERS: HCPCS | Performed by: INTERNAL MEDICINE

## 2020-10-13 PROCEDURE — 3017F COLORECTAL CA SCREEN DOC REV: CPT | Performed by: INTERNAL MEDICINE

## 2020-10-13 PROCEDURE — 80061 LIPID PANEL: CPT

## 2020-10-13 PROCEDURE — 1036F TOBACCO NON-USER: CPT | Performed by: INTERNAL MEDICINE

## 2020-10-13 PROCEDURE — G8399 PT W/DXA RESULTS DOCUMENT: HCPCS | Performed by: INTERNAL MEDICINE

## 2020-10-13 PROCEDURE — 4040F PNEUMOC VAC/ADMIN/RCVD: CPT | Performed by: INTERNAL MEDICINE

## 2020-10-13 NOTE — PROGRESS NOTES
Department of [place] [id number]   Attending Clinic Note    Reason for Visit: Follow-up on a patient with Hx of RML and RUL Lung Adenocarcinoma. PCP:  Dominic Barba MD    History of Present Illness:  79 y/o  female, former smoker (1 pack/day for 30 years; Quit in 2008), with Hx of Stage IA RML Invasive Lung adenocarcinoma, s/p Bronchoscopy, cervical mediastinoscopy, right thoracotomy, right middle lobectomy, radical lymphadenectomy on 2/13/2008 at [facility] [place] by Dr. Cornelius Bell. Pathology demonstrated:   1. LYMPH NODE #1, EXCISION (PART A) INFLAMED THYROID TISSUE. 2. LYMPH NODE R4, #7, R2, #3, LR, #7, R11, EXCISIONS (B-F) BENIGN REACTIVE LYMPH NODES. 3. RIGHT LUNG, MIDDLE LOBE, LOBECTOMY (PART G) ADENOCARCINOMA, MIXED TYPE WITH ACINAR AND BRONCHIOALVEOLAR CELL COMPONENTS.  -CENTRILOBULAR EMPHYSEMA. COMMENT  Tumor Location: Right middle lobe  Tumor Size: 2.3 x 1.8 x 1.0 cm  Vascular Invasion: Absent  Lymphatic Invasion: Absent  Bronchial Margin: Negative  Vascular Margin: Negative  Parenchymal Margins: Negative  Pleura/Soft Tissue Margin: Visceral pleura is negative for neoplasm. Pathologic Stage (AJCC): T1 N0 MX-Stage IA    No Postop RT or chemotherapy required at that time. She was put on surveillance and didn't follow with a medical oncologist;     She was then found to have RUL PET avid lesion in 2013; Flexible bronchoscopy, right redo VATS lobectomy and thoracic lymphadenectomy was performed on 11/04/2013 (at [facility]):  Tumor Location: Right upper lobe  Histologic Type: Invasive Adenocarcinoma  Tumor Size: Greatest dimension: 2 cm  Histologic Grade: G2 Moderately Differentiated  Lymph nodes: All 3 lymph nodes are negative for tumor.   Margins: Margins uninvolved by invasive carcinoma  Distance of invasive carcinoma from nearest margin: 1.9 cm  Specify margin: parenchymal resection margin  Venous/arterial invasion: Absent  Lymphatic invasion: Absent  Additional path findings: low R 4 Lymph node dissection: (0/2) lymph nodes: Negative for tumor. Station 7 lymph node (dissection): (0/1) Negative for tumor    No Postop RT or chemotherapy required at that time. She was kept on surveillance. Re-Staging scans in Nov 2015 noted no evidence of recurrent/metastatic disease. Re-staging scans on 07/01/2016 showed no evidence of recurrent/metastatic disease. Re-staging scans on 12/29/2016 noted no evidence of recurrent/metastatic disease. Re-staging scans on 07/05/2017 noted no evidence of recurrent/metastatic disease. Re-staging scans on 04/05/2018 noted no evidence of recurrent/metastatic disease. Re-staging scans on 10/03/2018 noted no evidence of recurrent/metastatic disease. She was lost to follow-up and presented today 10/13/2020 for f/u. No fever chills. Fair appetite and energy level. No chest pain or shortness of breath. No nausea vomiting. Recent abnormal mammogram biopsy scheduled next Monday. Review of Systems;  CONSTITUTIONAL: No fever, chills. Fair appetite and energy level. ENMT: Eyes: No diplopia; Nose: No epistaxis. Mouth: No sore throat. RESPIRATORY: No hemoptysis, shortness of breath, cough. CARDIOVASCULAR: No chest pain, palpitations. GASTROINTESTINAL: No nausea/vomiting, abdominal pain, diarrhea/constipation. GENITOURINARY: No dysuria, urinary frequency, hematuria. NEURO: No syncope, presyncope, headache. Past Medical History:      Diagnosis Date    Arthritis     Symptoms respond to myofascial release.  Not surgical candidate    Asthma     Cancer [facility])     lung cancer- [facility]- 2008, stage 1A, [id number] Stage 1A 2013    Chronic back pain     COPD (chronic obstructive pulmonary disease) (HCC)     [facility] ([facility]     Fibrocystic breast     GERD (gastroesophageal reflux disease)     Hashimoto's disease 0103    Helicobacter pylori (H. pylori)     EGD 7/2002    Hemorrhoids     Hyperlipidemia     Hypertension     Hyperthyroidism     Post-thoracotomy pain 2008     Medications:  Reviewed and reconciled. Allergies: Allergies   Allergen Reactions    Codeine Hives and Itching     AND CODEINE DERIVATIVES----sob  Pt stated tolerated Dilaudid    Fentanyl Other (See Comments)     Disoriented, confused  Other reaction(s): Mental Status Change    Adhesive Tape      burns skin, reddens skin, blisters    Amlodipine Swelling    Bupropion      hyper--couldn't sit still--jumpy    Cortisone      throat closed up    Dipyridamole Hives     Persantine-CST--sob, palpitations, stress test stopped    Levaquin [Levofloxacin In D5w] Other (See Comments)     Yeast infection     Loratadine      hyper--couldn't sit still--jumpy    Nortriptyline      for rosacea--caused flare up. stopped    Other      States any steroids make her face swell and flush    Prednisone Other (See Comments)    Tenex [Guanfacine Hcl]      dizziness    Tramadol Hives     sob    Varenicline     Lyrica [Pregabalin] Other (See Comments)     Leg cramps     Physical Exam:  BP (!) 171/79 (Site: Right Upper Arm, Position: Sitting, Cuff Size: Medium Adult)   Pulse 55   Temp 96.1 °F (35.6 °C) (Temporal)   Ht 5' 10\" (1.778 m)   Wt 167 lb (75.8 kg)   SpO2 98%   BMI 23.96 kg/m²   GENERAL: Alert, oriented x 3, not in acute distress. HEENT: PERRLA; EOMI. Oropharynx clear. NECK: Supple. Without lymphadenopathy. LUNGS : No wheezing, crackles or ronchi. CARDIOVASCULAR: Regular rate. No murmurs, rubs or gallops. ABDOMEN: Soft. Non-tender, non-distended. Positive bowel sounds. EXTREMITIES: Without clubbing, cyanosis, or edema. NEUROLOGIC: No focal deficits. ECOG PS 1    Pathology:  Bronchoscopy, cervical mediastinoscopy, right thoracotomy, right middle lobectomy, radical lymphadenectomy. on 2/13/2008 at [facility]:  1. LYMPH NODE #1, EXCISION (PART A) INFLAMED THYROID TISSUE. 2. LYMPH NODE R4, #7, R2, #3, LR, #7, R11, EXCISIONS (B-F) BENIGN REACTIVE LYMPH NODES.   3. RIGHT LUNG, MIDDLE LOBE, LOBECTOMY (PART G) ADENOCARCINOMA, MIXED TYPE WITH ACINAR AND BRONCHIOALVEOLAR CELL COMPONENTS.  -CENTRILOBULAR EMPHYSEMA. COMMENT  Tumor Location: Right middle lobe  Tumor Size: 2.3 x 1.8 x 1.0 cm  Vascular Invasion: Absent  Lymphatic Invasion: Absent  Bronchial Margin: Negative  Vascular Margin: Negative  Parenchymal Margins: Negative  Pleura/Soft Tissue Margin: Visceral pleura is negative for neoplasm. Pathologic Stage (AJCC): T1 N0 MX-Stage IA    Flexible bronchoscopy, right redo VATS lobectomy and thoracic lymphadenectomy on 11/04/2013 (at [facility]:  Tumor Location: Right upper lobe  Histologic Type: Invasive Adenocarcinoma  Tumor Size: Greatest dimension: 2 cm  Histologic Grade: G2 Moderately Differentiated  Lymph nodes: All 3 lymph nodes are negative for tumor. Margins: Margins uninvolved by invasive carcinoma  Distance of invasive carcinoma from nearest margin: 1.9 cm  Specify margin: parenchymal resection margin  Venous/arterial invasion: Absent  Lymphatic invasion: Absent  Additional path findings: low R 4 Lymph node dissection: (0/2) lymph nodes: Negative for tumor. Station 7 lymph node (dissection): (0/1) Negative for tumor  Pathologic Stage (AJCC): pT1a N0 MX-Stage IA    Impression/Plan:  77 y/o  female former smoker (1 pack/day for 30 years; Quit in 2008), with Hx of Stage IA RML Invasive Lung adenocarcinoma, s/p Bronchoscopy, cervical mediastinoscopy, right thoracotomy, right middle lobectomy, radical lymphadenectomy on 2/13/2008 at [facility] [place] by Dr. Madelin Lima. Pathology demonstrated:   1. LYMPH NODE #1, EXCISION (PART A) INFLAMED THYROID TISSUE. 2. LYMPH NODE R4, #7, R2, #3, LR, #7, R11, EXCISIONS (B-F) BENIGN REACTIVE LYMPH NODES. 3. RIGHT LUNG, MIDDLE LOBE, LOBECTOMY (PART G) ADENOCARCINOMA, MIXED TYPE WITH ACINAR AND BRONCHIOALVEOLAR CELL COMPONENTS.  -CENTRILOBULAR EMPHYSEMA.     COMMENT  Tumor Location: Right middle lobe  Tumor Size: 2.3 x 1.8 x 1.0 cm  Vascular Invasion: Absent  Lymphatic Invasion: Absent  Bronchial Margin: Negative  Vascular Margin: Negative  Parenchymal Margins: Negative  Pleura/Soft Tissue Margin: Visceral pleura is negative for neoplasm. Pathologic Stage (AJCC): T1 N0 MX-Stage IA    No Postop RT or chemotherapy required at that time. She was put on surveillance and didn't follow with a medical oncologist.    She was then found to have RUL PET avid lesion in 2013; Flexible bronchoscopy, right redo VATS lobectomy and thoracic lymphadenectomy was performed on 11/04/2013 (at [facility]):  Tumor Location: Right upper lobe  Histologic Type: Invasive Adenocarcinoma  Tumor Size: Greatest dimension: 2 cm  Histologic Grade: G2 Moderately Differentiated  Lymph nodes: All 3 lymph nodes are negative for tumor. Margins: Margins uninvolved by invasive carcinoma  Distance of invasive carcinoma from nearest margin: 1.9 cm  Specify margin: parenchymal resection margin  Venous/arterial invasion: Absent  Lymphatic invasion: Absent  Additional path findings: low R 4 Lymph node dissection: (0/2) lymph nodes: Negative for tumor. Station 7 lymph node (dissection): (0/1) Negative for tumor    No Postop RT or chemotherapy required at that time. She was kept on surveillance. Re-staging scans on 07/01/2016 showed no evidence of recurrent/metastatic disease. Re-staging scans on 12/29/2016 noted no evidence of recurrent/metastatic disease. Re-staging scans on 07/05/2017 noted no evidence of recurrent/metastatic disease. Re-staging scans on 04/05/2018 noted no evidence of recurrent/metastatic disease. Re-staging scans on 10/03/2018 noted no evidence of recurrent/metastatic disease. No clinical evidence of NSCLC recurrence. Scans ordered given the last scans were in Oct 2018. RTC 2 weeks.   As far as the abnormal findings seen on mammogram right breast ultrasound 10/12/2020 showing irregular focal area of atypical itchy echogenicity with poorly defined margins measuring 15 x 7 x 13 mm; ultrasound-guided biopsy scheduled next Monday.     Fannie Beltre MD   [id number]  Board Certified Medical Oncologist   [id number] [facility] MEDICAL ONCOLOGY   [facility]

## 2020-10-14 LAB — HEPATITIS C ANTIBODY INTERPRETATION: NORMAL

## 2020-10-19 ENCOUNTER — HOSPITAL ENCOUNTER (OUTPATIENT)
Dept: GENERAL RADIOLOGY | Age: 70
Discharge: HOME | End: 2020-10-21
Payer: COMMERCIAL

## 2020-10-19 PROCEDURE — 88305 TISSUE EXAM BY PATHOLOGIST: CPT

## 2020-10-19 PROCEDURE — A4648 IMPLANTABLE TISSUE MARKER: HCPCS

## 2020-10-19 PROCEDURE — 77065 DX MAMMO INCL CAD UNI: CPT

## 2020-10-19 PROCEDURE — 2500000003 HC RX 250 WO HCPCS

## 2020-10-19 RX ORDER — PREGABALIN 25 MG/1
CAPSULE ORAL
Qty: 180 CAPSULE | OUTPATIENT
Start: 2020-10-19

## 2020-10-19 NOTE — PROGRESS NOTES
Met with patient prior to her breast biopsy. Instructed on breast biopsy procedure. Denies use of blood thinners or aspirin products within the past 5 days. I remained with her during the biopsy to provide instruction. She tolerated procedure well. Upon questioning regarding results notification, patient indicates that she would like to receive breast biopsy results by phone from the breast center. Instructed that results will be available in approximately 3-5 business days. Instructed that her physician will also be notified of results. Provided with folder containing my contact information and post biopsy discharge instructions. Instructed to call me if she has any questions or concerns about her biopsy. Verbalizes understanding.  Electronically signed by Lisbeth Panchal RN, BSN on 10/19/2020 at 11:11 AM

## 2020-10-21 ENCOUNTER — VIRTUAL VISIT (OUTPATIENT)
Dept: PAIN MANAGEMENT | Age: 70
End: 2020-10-21
Payer: COMMERCIAL

## 2020-10-21 PROCEDURE — 3017F COLORECTAL CA SCREEN DOC REV: CPT | Performed by: PHYSICIAN ASSISTANT

## 2020-10-21 PROCEDURE — 99213 OFFICE O/P EST LOW 20 MIN: CPT | Performed by: PHYSICIAN ASSISTANT

## 2020-10-21 PROCEDURE — G8399 PT W/DXA RESULTS DOCUMENT: HCPCS | Performed by: PHYSICIAN ASSISTANT

## 2020-10-21 PROCEDURE — 1123F ACP DISCUSS/DSCN MKR DOCD: CPT | Performed by: PHYSICIAN ASSISTANT

## 2020-10-21 PROCEDURE — 1090F PRES/ABSN URINE INCON ASSESS: CPT | Performed by: PHYSICIAN ASSISTANT

## 2020-10-21 PROCEDURE — G8427 DOCREV CUR MEDS BY ELIG CLIN: HCPCS | Performed by: PHYSICIAN ASSISTANT

## 2020-10-21 PROCEDURE — 4040F PNEUMOC VAC/ADMIN/RCVD: CPT | Performed by: PHYSICIAN ASSISTANT

## 2020-10-21 NOTE — PROGRESS NOTES
Dia Li was read the following message We want to confirm that, for purposes of billing, this is a virtual visit with your provider for which we will submit a claim for reimbursement with your insurance company. You will be responsible for any copays, coinsurance amounts or other amounts not covered by your insurance company. If you do not accept this, unfortunately we will not be able to schedule a virtual visit with the provider. Do you accept?  Jade Prieto responded YES

## 2020-10-21 NOTE — PROGRESS NOTES
[facility]  [address]  J[place] AR[place] [facility] - BEHAVIORAL HE[facility], [facility]    Tele health follow up Note      Amina Méndez     Date of Visit:  10/21/2020    CC:  Tele health follow up   Chief Complaint   Patient presents with    Follow-up     back, r hip & r knee 4/10 has been bearable       Consent:  Telehealth Visit due to [place] 19 pandemic  The patient and/or health care decision maker is aware that he/she may receive a bill for this tele-health service Doxy Me, depending on his insurance coverage, and has provided verbal consent to proceed: Yes  I have considered the risks of abuse, dependence, addiction and diversion. My patient is aware that they will need a follow-up visit (in-person or virtually) at the appropriate time indicated for continued medications. Further, my patient is aware that when this acute crisis has lifted, they will be expected to return for an in-person visit and all elements of standard local and hospital guidelines in order to continue this medication. Patient Location:Home   Physician Location: Other address in [place]    HPI:  Pain is better. Patient having no current issues. Reports that her PCP will order the lyrica. Appropriate analgesia with current medications regimen: yes. Change in quality of symptoms:no. Medication side effects:none. Recent diagnostic testing:none. Recent interventional procedures:none. She has been on anticoagulation medications to include fish oil and has been on herbal supplements (fish oil).   She is not diabetic.     Imaging:   Lumbar MRI w/o 2020 -   FINDINGS: The kidneys are normal. No paraspinal mass. Aorta is not   enlarged.       L1-2 shows no disease.       L2-3 demonstrates a broad-based disc bulge which flattens the anterior   thecal sac. The neuroforamina have exited uninvolved.       The L3-L4 level demonstrates 1 mm retrolisthesis.  A diffuse disc bulge   is present causing bilateral neuroforaminal narrowing not grossly pregabalin from PCP  10/2020 consistent       Past Medical History: Reviewed    Past Surgical History: Reviewed     Home Medications: Reviewed    Allergies: Reviewed     Social History: Reviewed     REVIEW OF SYSTEMS:     Rochelle Carbine denies fever/chills, chest pain, shortness of breath, new bowel or bladder complaints. All other review of systems was negative. PHYSICAL EXAMINATION:      General:       A & O x3    HEENT:    Head:normocephalic and atraumatic  Sclera: icterus absent,     Lungs:    Breathing:Normal expansion. No rales, rhonchi, or wheezing. Lumbar spine:    Range of motion:abnormal mildly Lateral bending, flexion, extension rotation bilateral and is not painful. Extremities:    Tremors:None bilaterally upper and lower  Range of motion:Generally normal shoulders, pain with internal rotation of hips not done. Intact:Yes    Neurological:     Motor:          No apparent weakness per patient       Gait: Not observed.       Dermatology:    Skin:no unusual rashes, no skin lesions, no palpable subcutaneous nodules and good skin turgor    Impression:    LBP radiating into the b/l lateral \"hips\" and RLE in L5 distribution  RIGHT lateral hip pain, no pain on ROM, 2020 xray shows mild degenerative changes  Right knee pain, 2020 standing xray negative  Lumbar MRI 2020 shows L3-4 and L4-5 with foraminal narrowing without neural compression  Hx lung cancer s/p RMLectomy 2008 at [facility] and RULectomy 2013 at [facility], [address] [place] [address] and \"autoimmune diseases\" and prediabetes (follows with Dr. Michael Gallegos)  Concern for addiction tendencies in the family (she and  smoked for many years, quit together, also \"liked red wine\" but quit drinking in 2017 when son diagnosed with severe pancreatitis related to alcohol abuse)  Uses CBD products topically and takes a capsule  Multiple allergies including after lumbar injection     Will be having upcoming oral surgery with bone grafts, will be on a liquid diet for some time, discussed she has permission to fill from oral surgeon. Continues to wait at this point.       Plan:     Have not been able to obtain prior records from Dr. Freedom Sam report reviewed  Pt is not a candidate for chronic opioid therapy, we discussed avoidance  Failed Gabapentin 100 mg tid caused a mental fog  Continue Lyrica 25 mg titration to BID - PCP will continue to ordered. Doing very well. No mental fog. Pt with prior allergic rxn after lumbar injections, discussed reaction in detail  Can consider referral to an allergist to determine what she is allergic to that would be used in an injection. She is holding off. Doing well now. Consider ortho referral for further eval prn  Patient encouraged to stay active  Treatment plan discussed with the patient                 Controlled Substance Monitoring:    Acute and Chronic Pain Monitoring:   RX Monitoring 10/21/2020   Attestation -   Periodic Controlled Substance Monitoring No signs of potential drug abuse or diversion identified. ;Assessed functional status. We discussed with the patient that combining opioids, benzodiazepines, alcohol, illicit drugs or sleep aids increases the risk of respiratory depression including death. We discussed that these medications may cause drowsiness, sedation or dizziness and have counseled the patient not to drive or operate machinery. We have discussed that these medications will be prescribed only by one provider. We have discussed with the patient about age related risk factors and have thoroughly discussed the importance of taking these medications as prescribed. The patient verbalizes understanding. Patient advised regarding steps to help prevent the spread of COVID-19   SOURCE - https://addi-kusum.info/. html     1-Stay home except to get medical care  2-Clean your hands often for atleast 20 seconds, avoid touching: Avoid touching your eyes, nose, and mouth with unwashed hands. 3-Seek medical attention: Seek prompt medical attention if your illness is worsening (e.g., difficulty breathing). Call you doctor first.  3-Wear a facemask if you are sick   4-Cover your coughs and sneezes           I affirm this is a Patient Initiated Episode with an established Patient who has not had a related appointment within my department in the past 7 days or scheduled within the next 24 hours.     Total Time: 15 minutes    Cc: Referring physician

## 2020-10-23 ENCOUNTER — TELEPHONE (OUTPATIENT)
Dept: GENERAL RADIOLOGY | Age: 70
End: 2020-10-23

## 2020-10-23 NOTE — TELEPHONE ENCOUNTER
Per [facility] Protocol, patient was asked prior to biopsy how she would like notified of her breast biopsy results and she elected telephone call from breast navigator. Call to patient today to instruct her that the pathology report from her recent right  breast biopsy indicates a benign finding. Instructed that the performing radiologist reviewed her breast images and the pathology results for concordance. Instructed that a follow up ultrasound of the right breast in 6 months is recommended. She verbalizes understanding. Pathology report faxed routed to Gilbert Turpin and Dr. Shara Schaumann.  Electronically signed by Lisa Tan RN, BSN on 10/23/2020 at 12:56 PM

## 2020-11-02 RX ORDER — ERGOCALCIFEROL 1.25 MG/1
CAPSULE ORAL
Qty: 4 CAPSULE | Refills: 0 | OUTPATIENT
Start: 2020-11-02

## 2020-11-03 ENCOUNTER — HOSPITAL ENCOUNTER (OUTPATIENT)
Age: 70
Discharge: HOME | End: 2020-11-05
Payer: COMMERCIAL

## 2020-11-03 ENCOUNTER — OFFICE VISIT (OUTPATIENT)
Dept: FAMILY MEDICINE CLINIC | Age: 70
End: 2020-11-03
Payer: COMMERCIAL

## 2020-11-03 VITALS
DIASTOLIC BLOOD PRESSURE: 80 MMHG | OXYGEN SATURATION: 98 % | SYSTOLIC BLOOD PRESSURE: 164 MMHG | TEMPERATURE: 96.8 F | RESPIRATION RATE: 18 BRPM | BODY MASS INDEX: 24.62 KG/M2 | HEIGHT: 70 IN | HEART RATE: 56 BPM | WEIGHT: 172 LBS

## 2020-11-03 LAB
AMPHETAMINE SCREEN, URINE: NOT DETECTED
BARBITURATE SCREEN URINE: NOT DETECTED
BENZODIAZEPINE SCREEN, URINE: NOT DETECTED
CANNABINOID SCREEN URINE: NOT DETECTED
COCAINE METABOLITE SCREEN URINE: NOT DETECTED
FENTANYL SCREEN, URINE: NOT DETECTED
Lab: NORMAL
METHADONE SCREEN, URINE: NOT DETECTED
OPIATE SCREEN URINE: NOT DETECTED
OXYCODONE URINE: NOT DETECTED
PHENCYCLIDINE SCREEN URINE: NOT DETECTED

## 2020-11-03 PROCEDURE — 99214 OFFICE O/P EST MOD 30 MIN: CPT | Performed by: FAMILY MEDICINE

## 2020-11-03 RX ORDER — HYDROCHLOROTHIAZIDE 50 MG/1
50 TABLET ORAL DAILY
Qty: 90 TABLET | Refills: 3 | Status: SHIPPED
Start: 2020-11-03 | End: 2020-12-29 | Stop reason: SDUPTHER

## 2020-11-03 RX ORDER — PRAVASTATIN SODIUM 20 MG
20 TABLET ORAL EVERY EVENING
Qty: 90 TABLET | Refills: 1 | Status: SHIPPED
Start: 2020-11-03 | End: 2020-11-24

## 2020-11-03 NOTE — PROGRESS NOTES
CC: Bushra Alfredo is a 78 y.o. yo female is here for evaluation evaluation for the following acute & chronic medical concerns: Hypertension (brought bp cuff with her to this appointment )      HPI:      HLD  ASCVD 19%  Tried statin in past and had side effects     HTN  Uncontrolled today  We stopped cozaar (she was on benicar as well)  We stopped the hydralazine and started HCTZ 25mg  Ambulatory readings are high as well and office BP today    Hx of Lung cancer x2 / COPD  Follows with onc  Has a chronic cough   On long term tessalon  Quite smoking 2007; Diagnosed with lung ca 2008  Has R upper and middle lobectomy  Currently controlled with inhalers     Chronic pain  Lumbar pain; hip pain  Was seeing PMR; Dr. Katie Garg with [facility]  I will fill lyrica for her; no longer seeing Dr. Nadira MENDES  Adherent with prilosec     Anxiety / depression  Controlled with lexapro  No SI/HI     Hashimoto's / thyroid nodule  Adherent with synthroid  Follows with endo  Last US over 1 year ago    ROS negative unless otherwise noted       Social hx:   Bushra Alfredo  reports that she quit smoking about 13 years ago. Her smoking use included cigarettes. She has a 30.00 pack-year smoking history. She has never used smokeless tobacco. She reports current alcohol use. She reports that she does not use drugs.     I reviewed the patient's past past medical history, past surgical history, family history, social history, medications and allergies during this visit    Vitals:   BP (!) 164/80 (Site: Right Upper Arm, Position: Sitting)   Pulse 56   Temp 96.8 °F (36 °C)   Resp 18   Ht 5' 10\" (1.778 m)   Wt 172 lb (78 kg)   SpO2 98%   BMI 24.68 kg/m²   Wt Readings from Last 3 Encounters:   11/03/20 172 lb (78 kg)   10/13/20 167 lb (75.8 kg)   10/09/20 169 lb (76.7 kg)       PE:  Constitutional - alert, well appearing, and in no distress  Eyes - extraocular eye movements intact, left eye normal, right eye normal, no conjunctivitis noted  Neck - symmetric, no obvious masses noted  Respiratory- clear to auscultation, no wheezes, rales or rhonchi, symmetric air entry; no increased work of breathing  Cardiovascular - normal rate, regular rhythm, normal S1, S2, no murmurs, rubs, clicks or gallops  Extremities - no edema noted  Abdomen - soft, nontender, nondistended  Skin - normal coloration and turgor, no rashes, no suspicious skin lesions noted  Neurological - no obvious CN deficits or focal neurological deficits  Psychiatric - alert, oriented; normal mood, behavior, speech, dress    Data:  Pertinent labs, imaging, other diagnostic data and other clinician documentation reviewed in electronic medical record. A / P:   Diagnosis Orders   1. Hyperlipidemia, unspecified hyperlipidemia type  pravastatin (PRAVACHOL) 20 MG tablet    CK   2. Essential hypertension  hydroCHLOROthiazide (HYDRODIURIL) 50 MG tablet    BASIC METABOLIC PANEL   3. Thyroid nodule  US HEAD NECK SOFT TISSUE THYROID       Recheck thyroid US as it has been over 1 year; she does follow with Dr. Jeny Hines  Increase HCTZ to 50mg and close f/u for BP check in 3 weeks  Start pravastatin for increased ASVD risk; we will check a CK with upcoming labs as well  Recheck BMP to evaluate the recent borderline low Na. F/u in 3 months for lyrica refills      RTO: Return in about 3 weeks (around 11/24/2020) for First office visit for BP check.       An electronic signature was used to authenticate this note.  ---- Matthew Valadez MD on 11/3/2020 at 11:04 AM

## 2020-11-16 RX ORDER — PREGABALIN 25 MG/1
CAPSULE ORAL
Qty: 60 CAPSULE | OUTPATIENT
Start: 2020-11-16

## 2020-11-17 ENCOUNTER — OFFICE VISIT (OUTPATIENT)
Dept: ONCOLOGY | Age: 70
End: 2020-11-17
Payer: COMMERCIAL

## 2020-11-17 VITALS
OXYGEN SATURATION: 97 % | HEART RATE: 58 BPM | WEIGHT: 171.9 LBS | HEIGHT: 70 IN | TEMPERATURE: 96.2 F | BODY MASS INDEX: 24.61 KG/M2 | SYSTOLIC BLOOD PRESSURE: 135 MMHG | DIASTOLIC BLOOD PRESSURE: 67 MMHG

## 2020-11-17 PROCEDURE — G8399 PT W/DXA RESULTS DOCUMENT: HCPCS | Performed by: INTERNAL MEDICINE

## 2020-11-17 PROCEDURE — 1123F ACP DISCUSS/DSCN MKR DOCD: CPT | Performed by: INTERNAL MEDICINE

## 2020-11-17 PROCEDURE — 4040F PNEUMOC VAC/ADMIN/RCVD: CPT | Performed by: INTERNAL MEDICINE

## 2020-11-17 PROCEDURE — G8484 FLU IMMUNIZE NO ADMIN: HCPCS | Performed by: INTERNAL MEDICINE

## 2020-11-17 PROCEDURE — G8428 CUR MEDS NOT DOCUMENT: HCPCS | Performed by: INTERNAL MEDICINE

## 2020-11-17 PROCEDURE — 99214 OFFICE O/P EST MOD 30 MIN: CPT | Performed by: INTERNAL MEDICINE

## 2020-11-17 PROCEDURE — 3017F COLORECTAL CA SCREEN DOC REV: CPT | Performed by: INTERNAL MEDICINE

## 2020-11-17 PROCEDURE — 1090F PRES/ABSN URINE INCON ASSESS: CPT | Performed by: INTERNAL MEDICINE

## 2020-11-17 PROCEDURE — 1036F TOBACCO NON-USER: CPT | Performed by: INTERNAL MEDICINE

## 2020-11-17 PROCEDURE — 99213 OFFICE O/P EST LOW 20 MIN: CPT

## 2020-11-17 PROCEDURE — G8420 CALC BMI NORM PARAMETERS: HCPCS | Performed by: INTERNAL MEDICINE

## 2020-11-17 NOTE — PROGRESS NOTES
Department of [place] [address]   Attending Clinic Note    Reason for Visit: Follow-up on a patient with Hx of RML and RUL Lung Adenocarcinoma. PCP:  José Miguel Mendez MD    History of Present Illness:  79 y/o  female, former smoker (1 pack/day for 30 years; Quit in 2008), with Hx of Stage IA RML Invasive Lung adenocarcinoma, s/p Bronchoscopy, cervical mediastinoscopy, right thoracotomy, right middle lobectomy, radical lymphadenectomy on 2/13/2008 at [address] [place], [place] [address]y by Dr. Tim De Jesus. Pathology demonstrated:   1. LYMPH NODE #1, EXCISION (PART A) INFLAMED THYROID TISSUE. 2. LYMPH NODE R4, #7, R2, #3, LR, #7, R11, EXCISIONS (B-F) BENIGN REACTIVE LYMPH NODES. 3. RIGHT LUNG, MIDDLE LOBE, LOBECTOMY (PART G) ADENOCARCINOMA, MIXED TYPE WITH ACINAR AND BRONCHIOALVEOLAR CELL COMPONENTS.  -CENTRILOBULAR EMPHYSEMA. COMMENT  Tumor Location: Right middle lobe  Tumor Size: 2.3 x 1.8 x 1.0 cm  Vascular Invasion: Absent  Lymphatic Invasion: Absent  Bronchial Margin: Negative  Vascular Margin: Negative  Parenchymal Margins: Negative  Pleura/Soft Tissue Margin: Visceral pleura is negative for neoplasm. Pathologic Stage (AJCC): T1 N0 MX-Stage IA    No Postop RT or chemotherapy required at that time. She was put on surveillance and didn't follow with a medical oncologist;     She was then found to have RUL PET avid lesion in 2013; Flexible bronchoscopy, right redo VATS lobectomy and thoracic [facility]  Tumor Location: Right upper lobe  Histologic Type: Invasive Adenocarcinoma  Tumor Size: Greatest dimension: 2 cm  Histologic Grade: G2 Moderately Differentiated  Lymph nodes: All 3 lymph nodes are negative for tumor.   Margins: Margins uninvolved by invasive carcinoma  Distance of invasive carcinoma from nearest margin: 1.9 cm  Specify margin: parenchymal resection margin  Venous/arterial invasion: Absent  Lymphatic invasion: Absent  Additional path findings: low R 4 Lymph node dissection: (0/2) lymph nodes: Negative for tumor. Station 7 lymph node (dissection): (0/1) Negative for tumor    No Postop RT or chemotherapy required at that time. She was kept on surveillance. Re-Staging scans in Nov 2015 noted no evidence of recurrent/metastatic disease. Re-staging scans on 07/01/2016 showed no evidence of recurrent/metastatic disease. Re-staging scans on 12/29/2016 noted no evidence of recurrent/metastatic disease. Re-staging scans on 07/05/2017 noted no evidence of recurrent/metastatic disease. Re-staging scans on 04/05/2018 noted no evidence of recurrent/metastatic disease. Re-staging scans on 10/03/2018 noted no evidence of recurrent/metastatic disease. Re-staging scans on 10/15/2020 noted no evidence of recurrent/metastatic disease. She was lost to follow-up and presented today 11/17/2020 for f/u. No fever chills. Fair appetite and energy level. No chest pain or shortness of breath. No nausea vomiting. Review of Systems;  CONSTITUTIONAL: No fever, chills. Fair appetite and energy level. ENMT: Eyes: No diplopia; Nose: No epistaxis. Mouth: No sore throat. RESPIRATORY: No hemoptysis, shortness of breath, cough. CARDIOVASCULAR: No chest pain, palpitations. GASTROINTESTINAL: No nausea/vomiting, abdominal pain, diarrhea/constipation. GENITOURINARY: No dysuria, urinary frequency, hematuria. NEURO: No syncope, presyncope, headache. Past Medical History:      Diagnosis Date    Arthritis     Symptoms respond to myofascial release.  Not surgical candidate    Asthma     Cancer [facility])     lung cancer- L- 2008, stage 1A, [id number] Stage 1A 2013    Chronic back pain     COPD (chronic obstructive pulmonary disease) (HCC)     Emphysema of lung ([facility] [address]     Fibrocystic breast     GERD (gastroesophageal reflux disease)     Hashimoto's disease 2915    Helicobacter pylori (H. pylori)     EGD 7/2002    Hemorrhoids     Hyperlipidemia     Hypertension     Hyperthyroidism     Post-thoracotomy pain 2008     Medications:  Reviewed and reconciled. Allergies: Allergies   Allergen Reactions    Codeine Hives and Itching     AND CODEINE DERIVATIVES----sob  Pt stated tolerated Dilaudid    Fentanyl Other (See Comments)     Disoriented, confused  Other reaction(s): Mental Status Change    Adhesive Tape      burns skin, reddens skin, blisters    Amlodipine Swelling    Bupropion      hyper--couldn't sit still--jumpy    Cortisone      throat closed up    Dipyridamole Hives     Persantine-CST--sob, palpitations, stress test stopped    Levaquin [Levofloxacin In D5w] Other (See Comments)     Yeast infection     Loratadine      hyper--couldn't sit still--jumpy    Nortriptyline      for rosacea--caused flare up. stopped    Other      States any steroids make her face swell and flush    Prednisone Other (See Comments)    Tenex [Guanfacine Hcl]      dizziness    Tramadol Hives     sob    Varenicline     Lyrica [Pregabalin] Other (See Comments)     Leg cramps     Physical Exam:  /67 (Site: Left Upper Arm, Position: Sitting, Cuff Size: Medium Adult)   Pulse 58   Temp 96.2 °F (35.7 °C) (Temporal)   Ht 5' 10\" (1.778 m)   Wt 171 lb 14.4 oz (78 kg)   SpO2 97%   BMI 24.67 kg/m²   GENERAL: Alert, oriented x 3, not in acute distress. HEENT: PERRLA; EOMI. Oropharynx clear. NECK: Supple. Without lymphadenopathy. LUNGS : No wheezing, crackles or ronchi. CARDIOVASCULAR: Regular rate. No murmurs, rubs or gallops. ABDOMEN: Soft. Non-tender, non-distended. Positive bowel sounds. EXTREMITIES: Without clubbing, cyanosis, or edema. NEUROLOGIC: No focal deficits. ECOG PS 1    Pathology:  Bronchoscopy, cervical mediastinoscopy, right thoracotomy, right middle lobectomy, radical lymphadenectomy. on 2/13/2008 at [facility]:  1. LYMPH NODE #1, EXCISION (PART A) INFLAMED THYROID TISSUE.   2. LYMPH NODE R4, #7, R2, #3, LR, #7, R11, EXCISIONS (B-F) BENIGN REACTIVE LYMPH 2.3 x 1.8 x 1.0 cm  Vascular Invasion: Absent  Lymphatic Invasion: Absent  Bronchial Margin: Negative  Vascular Margin: Negative  Parenchymal Margins: Negative  Pleura/Soft Tissue Margin: Visceral pleura is negative for neoplasm. Pathologic Stage (AJCC): T1 N0 MX-Stage IA    No Postop RT or chemotherapy required at that time. She was put on surveillance and didn't follow with a medical oncologist.    She was then found to have RUL PET avid lesion in 2013; Flexible bronchoscopy, right redo VATS lobectomy and thoracic lymphadenectomy was performed on 11/04/2013 (at [facility]):  Tumor Location: Right upper lobe  Histologic Type: Invasive Adenocarcinoma  Tumor Size: Greatest dimension: 2 cm  Histologic Grade: G2 Moderately Differentiated  Lymph nodes: All 3 lymph nodes are negative for tumor. Margins: Margins uninvolved by invasive carcinoma  Distance of invasive carcinoma from nearest margin: 1.9 cm  Specify margin: parenchymal resection margin  Venous/arterial invasion: Absent  Lymphatic invasion: Absent  Additional path findings: low R 4 Lymph node dissection: (0/2) lymph nodes: Negative for tumor. Station 7 lymph node (dissection): (0/1) Negative for tumor    No Postop RT or chemotherapy required at that time. She was kept on surveillance. Re-staging scans on 07/01/2016 showed no evidence of recurrent/metastatic disease. Re-staging scans on 12/29/2016 noted no evidence of recurrent/metastatic disease. Re-staging scans on 07/05/2017 noted no evidence of recurrent/metastatic disease. Re-staging scans on 04/05/2018 noted no evidence of recurrent/metastatic disease. Re-staging scans on 10/03/2018 noted no evidence of recurrent/metastatic disease. Re-staging scans on 10/15/2020 noted no evidence of recurrent/metastatic disease. Images reviewed. No clinical evidence of NSCLC recurrence.     RTC one year with prior scan     Paris Joseph MD   11/52/2463  Board Certified Medical Oncologist [address] [facility] Glenda Gilman

## 2020-11-20 DIAGNOSIS — I10 ESSENTIAL HYPERTENSION: ICD-10-CM

## 2020-11-20 DIAGNOSIS — E78.5 HYPERLIPIDEMIA, UNSPECIFIED HYPERLIPIDEMIA TYPE: ICD-10-CM

## 2020-11-20 LAB
ANION GAP SERPL CALCULATED.3IONS-SCNC: 14 MMOL/L (ref 7–16)
BUN BLDV-MCNC: 19 MG/DL (ref 8–23)
CALCIUM SERPL-MCNC: 9.9 MG/DL (ref 8.6–10.2)
CHLORIDE BLD-SCNC: 96 MMOL/L (ref 98–107)
CO2: 23 MMOL/L (ref 22–29)
CREAT SERPL-MCNC: 1.1 MG/DL (ref 0.5–1)
GFR AFRICAN AMERICAN: 60
GFR NON-AFRICAN AMERICAN: 49 ML/MIN/1.73
GLUCOSE BLD-MCNC: 96 MG/DL (ref 74–99)
POTASSIUM SERPL-SCNC: 4 MMOL/L (ref 3.5–5)
SODIUM BLD-SCNC: 133 MMOL/L (ref 132–146)
TOTAL CK: 350 U/L (ref 20–180)

## 2020-11-24 ENCOUNTER — OFFICE VISIT (OUTPATIENT)
Dept: FAMILY MEDICINE CLINIC | Age: 70
End: 2020-11-24
Payer: COMMERCIAL

## 2020-11-24 VITALS
WEIGHT: 173.4 LBS | HEIGHT: 70 IN | SYSTOLIC BLOOD PRESSURE: 144 MMHG | OXYGEN SATURATION: 98 % | BODY MASS INDEX: 24.82 KG/M2 | HEART RATE: 61 BPM | DIASTOLIC BLOOD PRESSURE: 76 MMHG | TEMPERATURE: 97 F

## 2020-11-24 PROCEDURE — 99214 OFFICE O/P EST MOD 30 MIN: CPT | Performed by: FAMILY MEDICINE

## 2020-11-24 RX ORDER — EZETIMIBE 10 MG/1
10 TABLET ORAL DAILY
Qty: 90 TABLET | Refills: 3 | Status: SHIPPED
Start: 2020-11-24 | End: 2020-12-29 | Stop reason: SDUPTHER

## 2020-11-24 RX ORDER — PREGABALIN 25 MG/1
50 CAPSULE ORAL 2 TIMES DAILY
Qty: 120 CAPSULE | Refills: 0 | Status: SHIPPED
Start: 2020-11-24 | End: 2020-12-07 | Stop reason: SDUPTHER

## 2020-11-24 RX ORDER — ERGOCALCIFEROL 1.25 MG/1
CAPSULE ORAL
Qty: 11 CAPSULE | Refills: 5 | Status: SHIPPED
Start: 2020-11-24 | End: 2020-12-29 | Stop reason: SDUPTHER

## 2020-11-24 RX ORDER — ATENOLOL 100 MG/1
100 TABLET ORAL DAILY
Qty: 90 TABLET | Refills: 3 | Status: SHIPPED
Start: 2020-11-24 | End: 2020-12-29 | Stop reason: SDUPTHER

## 2020-11-24 NOTE — PROGRESS NOTES
CC: Freddie Macias is a 78 y.o. yo female is here for evaluation evaluation for the following chronic medical concerns: Blood Pressure Check      HPI:    HTN f/u:   Increased HCTZ to 50mg  Dizzy at times and sweats at times    HLD:  started pravastatin. CK elevated. Achy at times; muscle cramps     ROS negative unless otherwise noted      Social hx:   Freddie Macias  reports that she quit smoking about 13 years ago. Her smoking use included cigarettes. She has a 30.00 pack-year smoking history. She has never used smokeless tobacco. She reports current alcohol use. She reports that she does not use drugs. I reviewed the patient's past past medical history, past surgical history, family history, social history, medications and allergies during this visit    Vitals:   BP (!) 144/76   Pulse 61   Temp 97 °F (36.1 °C)   Ht 5' 10\" (1.778 m)   Wt 173 lb 6.4 oz (78.7 kg)   SpO2 98%   BMI 24.88 kg/m²   Wt Readings from Last 3 Encounters:   11/24/20 173 lb 6.4 oz (78.7 kg)   11/17/20 171 lb 14.4 oz (78 kg)   11/03/20 172 lb (78 kg)       PE:  Constitutional - alert, well appearing, and in no distress  Eyes - extraocular eye movements intact, left eye normal, right eye normal, no conjunctivitis noted  Neck - symmetric, no obvious masses noted  Respiratory- clear to auscultation, no wheezes, rales or rhonchi, symmetric air entry; no increased work of breathing  Cardiovascular - normal rate, regular rhythm, normal S1, S2, no murmurs, rubs, clicks or gallops  Extremities - no edema noted  Abdomen - soft, nontender, nondistended  Skin - normal coloration and turgor, no rashes, no suspicious skin lesions noted  Neurological - no obvious CN deficits or focal neurological deficits  Psychiatric - alert, oriented; normal mood, behavior, speech, dress    Data:  Pertinent labs, imaging, other diagnostic data and other clinician documentation reviewed in electronic medical record. A / P:   Diagnosis Orders   1.

## 2020-12-04 RX ORDER — PREGABALIN 50 MG/1
50 CAPSULE ORAL 2 TIMES DAILY
Qty: 180 CAPSULE | Refills: 0 | Status: CANCELLED | OUTPATIENT
Start: 2020-12-04 | End: 2021-03-04

## 2020-12-04 NOTE — TELEPHONE ENCOUNTER
Patient called in regards to her Lyrica medication. Her insurance does not cover 2 25 mg tablets but will cover one 50 mg tablet-Can she get Rx sent in for the 50 mg? She has been out of medication x 1 week.

## 2020-12-07 RX ORDER — PREGABALIN 50 MG/1
50 CAPSULE ORAL 2 TIMES DAILY
Qty: 60 CAPSULE | Refills: 0 | OUTPATIENT
Start: 2020-12-07 | End: 2020-12-29 | Stop reason: SDUPTHER

## 2020-12-14 RX ORDER — BENZONATATE 100 MG/1
CAPSULE ORAL
Qty: 90 CAPSULE | Refills: 0 | OUTPATIENT
Start: 2020-12-14

## 2020-12-15 RX ORDER — BENZONATATE 100 MG/1
CAPSULE ORAL
Qty: 90 CAPSULE | Refills: 0 | Status: SHIPPED
Start: 2020-12-15 | End: 2021-05-04 | Stop reason: SDUPTHER

## 2020-12-27 ENCOUNTER — PATIENT MESSAGE (OUTPATIENT)
Dept: FAMILY MEDICINE CLINIC | Age: 70
End: 2020-12-27

## 2020-12-28 NOTE — TELEPHONE ENCOUNTER
From: Raphael Morning  To: Brittany Alejandra MD  Sent: 12/27/2020 3:28 PM EST  Subject: Prescription Question    Brigid Oshea and Jennifer Sanderson! My  and I are changing our medical insurance coverage to Medicare with separate prescription drug coverage beginning 01/01/21. The cost for prescription drugs varies between pharmacies , so in order to ensure we are getting the best prices, I would like to receive my prescriptions on paper and for a 90 day supply whenever possible until we can find the best pharmacy for each medication. Thank you!   Naveen Ware

## 2020-12-29 NOTE — TELEPHONE ENCOUNTER
Medication Refill Request    LOV 11/24/2020  NOV 2/4/2021      Orders to be printed and mailed to patient.  Please see below  Refills all changed to print option     Lab Results   Component Value Date    CREATININE 1.1 (H) 11/20/2020

## 2020-12-31 RX ORDER — ATENOLOL 100 MG/1
100 TABLET ORAL DAILY
Qty: 90 TABLET | Refills: 3 | Status: SHIPPED | OUTPATIENT
Start: 2020-12-31 | End: 2021-04-08 | Stop reason: SDUPTHER

## 2020-12-31 RX ORDER — ERGOCALCIFEROL 1.25 MG/1
CAPSULE ORAL
Qty: 11 CAPSULE | Refills: 5 | Status: SHIPPED | OUTPATIENT
Start: 2020-12-31 | End: 2021-09-09

## 2020-12-31 RX ORDER — LORATADINE 10 MG/1
10 TABLET ORAL DAILY
Qty: 30 TABLET | Refills: 11 | Status: SHIPPED | OUTPATIENT
Start: 2020-12-31 | End: 2021-03-26 | Stop reason: SDUPTHER

## 2020-12-31 RX ORDER — TIZANIDINE 4 MG/1
4 TABLET ORAL EVERY 8 HOURS PRN
Qty: 270 TABLET | Refills: 3 | Status: SHIPPED | OUTPATIENT
Start: 2020-12-31 | End: 2021-09-09 | Stop reason: SDUPTHER

## 2020-12-31 RX ORDER — OLMESARTAN MEDOXOMIL 40 MG/1
TABLET ORAL
Qty: 90 TABLET | Refills: 3 | Status: SHIPPED | OUTPATIENT
Start: 2020-12-31 | End: 2021-09-09 | Stop reason: SDUPTHER

## 2020-12-31 RX ORDER — MONTELUKAST SODIUM 10 MG/1
10 TABLET ORAL NIGHTLY
Qty: 90 TABLET | Refills: 3 | Status: SHIPPED | OUTPATIENT
Start: 2020-12-31 | End: 2021-05-04 | Stop reason: SDUPTHER

## 2020-12-31 RX ORDER — OMEPRAZOLE 20 MG/1
CAPSULE, DELAYED RELEASE ORAL
Qty: 90 CAPSULE | Refills: 3 | Status: SHIPPED | OUTPATIENT
Start: 2020-12-31 | End: 2021-09-09 | Stop reason: SDUPTHER

## 2020-12-31 RX ORDER — EZETIMIBE 10 MG/1
10 TABLET ORAL DAILY
Qty: 90 TABLET | Refills: 3 | Status: SHIPPED | OUTPATIENT
Start: 2020-12-31 | End: 2021-09-09 | Stop reason: SDUPTHER

## 2020-12-31 RX ORDER — LEVOTHYROXINE SODIUM 0.1 MG/1
TABLET ORAL
Qty: 90 TABLET | Refills: 3 | Status: SHIPPED | OUTPATIENT
Start: 2020-12-31 | End: 2021-06-17

## 2020-12-31 RX ORDER — CYANOCOBALAMIN 1000 UG/ML
INJECTION INTRAMUSCULAR; SUBCUTANEOUS
Qty: 12 VIAL | Refills: 3 | Status: SHIPPED | OUTPATIENT
Start: 2020-12-31 | End: 2021-10-15 | Stop reason: SDUPTHER

## 2020-12-31 RX ORDER — ALBUTEROL SULFATE 90 UG/1
2 AEROSOL, METERED RESPIRATORY (INHALATION) 4 TIMES DAILY PRN
Qty: 3 INHALER | Refills: 1 | Status: SHIPPED | OUTPATIENT
Start: 2020-12-31 | End: 2022-03-29 | Stop reason: SDUPTHER

## 2020-12-31 RX ORDER — LIDOCAINE 50 MG/G
PATCH TOPICAL
Qty: 90 PATCH | Refills: 3 | Status: SHIPPED | OUTPATIENT
Start: 2020-12-31 | End: 2021-01-26 | Stop reason: SDUPTHER

## 2020-12-31 RX ORDER — PREGABALIN 50 MG/1
50 CAPSULE ORAL 2 TIMES DAILY
Qty: 60 CAPSULE | Refills: 0 | Status: SHIPPED | OUTPATIENT
Start: 2020-12-31 | End: 2021-02-03 | Stop reason: SDUPTHER

## 2020-12-31 RX ORDER — HYDROCHLOROTHIAZIDE 50 MG/1
50 TABLET ORAL DAILY
Qty: 90 TABLET | Refills: 3 | Status: SHIPPED | OUTPATIENT
Start: 2020-12-31 | End: 2021-06-15

## 2020-12-31 RX ORDER — ESCITALOPRAM OXALATE 5 MG/1
5 TABLET ORAL DAILY
Qty: 90 TABLET | Refills: 3 | Status: SHIPPED | OUTPATIENT
Start: 2020-12-31 | End: 2021-05-04 | Stop reason: SDUPTHER

## 2020-12-31 RX ORDER — GLUCOSAMINE HCL/CHONDROITIN SU 500-400 MG
CAPSULE ORAL
Qty: 300 STRIP | Refills: 1 | Status: SHIPPED | OUTPATIENT
Start: 2020-12-31 | End: 2021-06-17

## 2020-12-31 RX ORDER — RANITIDINE 150 MG/1
150 TABLET ORAL 2 TIMES DAILY
Qty: 180 TABLET | Refills: 0 | Status: SHIPPED | OUTPATIENT
Start: 2020-12-31 | End: 2021-03-24

## 2020-12-31 RX ORDER — LANCETS 30 GAUGE
1 EACH MISCELLANEOUS DAILY
Qty: 100 EACH | Refills: 5 | Status: SHIPPED | OUTPATIENT
Start: 2020-12-31 | End: 2021-06-17

## 2021-01-25 ENCOUNTER — PATIENT MESSAGE (OUTPATIENT)
Dept: FAMILY MEDICINE CLINIC | Age: 71
End: 2021-01-25

## 2021-01-26 DIAGNOSIS — M70.61 TROCHANTERIC BURSITIS OF BOTH HIPS: ICD-10-CM

## 2021-01-26 DIAGNOSIS — M70.62 TROCHANTERIC BURSITIS OF BOTH HIPS: ICD-10-CM

## 2021-01-26 RX ORDER — LIDOCAINE 50 MG/G
PATCH TOPICAL
Qty: 90 PATCH | Refills: 3 | Status: SHIPPED | OUTPATIENT
Start: 2021-01-26

## 2021-01-26 NOTE — TELEPHONE ENCOUNTER
Medication Refill Request    LOV 11/24/2020  NOV 3/24/2021    Lab Results   Component Value Date    CREATININE 1.1 (H) 11/20/2020

## 2021-02-03 DIAGNOSIS — G89.4 CHRONIC PAIN SYNDROME: ICD-10-CM

## 2021-02-03 DIAGNOSIS — M25.551 RIGHT HIP PAIN: ICD-10-CM

## 2021-02-03 DIAGNOSIS — M25.561 CHRONIC PAIN OF RIGHT KNEE: ICD-10-CM

## 2021-02-03 DIAGNOSIS — M54.16 LUMBAR RADICULOPATHY: ICD-10-CM

## 2021-02-03 DIAGNOSIS — G89.29 CHRONIC PAIN OF RIGHT KNEE: ICD-10-CM

## 2021-02-03 RX ORDER — PREGABALIN 50 MG/1
50 CAPSULE ORAL 2 TIMES DAILY
Qty: 60 CAPSULE | Refills: 0 | Status: SHIPPED
Start: 2021-02-03 | End: 2021-03-01 | Stop reason: SDUPTHER

## 2021-02-03 RX ORDER — PREGABALIN 50 MG/1
50 CAPSULE ORAL 2 TIMES DAILY
Qty: 60 CAPSULE | Refills: 0 | OUTPATIENT
Start: 2021-02-03 | End: 2021-03-05

## 2021-02-13 ENCOUNTER — IMMUNIZATION (OUTPATIENT)
Dept: PRIMARY CARE CLINIC | Age: 71
End: 2021-02-13
Payer: MEDICARE

## 2021-02-13 PROCEDURE — 0001A COVID-19, PFIZER VACCINE 30MCG/0.3ML DOSE: CPT | Performed by: INTERNAL MEDICINE

## 2021-02-13 PROCEDURE — 91300 COVID-19, PFIZER VACCINE 30MCG/0.3ML DOSE: CPT | Performed by: INTERNAL MEDICINE

## 2021-03-01 RX ORDER — PREGABALIN 50 MG/1
50 CAPSULE ORAL 2 TIMES DAILY
Qty: 60 CAPSULE | Refills: 0 | Status: SHIPPED
Start: 2021-03-01 | End: 2021-03-24 | Stop reason: SDUPTHER

## 2021-03-01 NOTE — TELEPHONE ENCOUNTER
From: Leroy Teixeira  To: Kandi Paniagua MD  Sent: 1/25/2021 9:58 PM EST  Subject: Prescription Question    Hello,  [employer] needs to complete a coverage review for Lidocaine 5% patch and ipratropium . 02% nebulizer solution before they will cover these medications. They say, \"To initiate this process, please have your physician call [phone]. \"   Could someone please do this for me?   Thanks  Elsie Baird

## 2021-03-08 ENCOUNTER — IMMUNIZATION (OUTPATIENT)
Dept: PRIMARY CARE CLINIC | Age: 71
End: 2021-03-08
Payer: MEDICARE

## 2021-03-08 PROCEDURE — 0002A COVID-19, PFIZER VACCINE 30MCG/0.3ML DOSE: CPT | Performed by: NURSE PRACTITIONER

## 2021-03-08 PROCEDURE — 91300 COVID-19, PFIZER VACCINE 30MCG/0.3ML DOSE: CPT | Performed by: NURSE PRACTITIONER

## 2021-03-20 ASSESSMENT — LIFESTYLE VARIABLES
HAS A RELATIVE, FRIEND, DOCTOR, OR ANOTHER HEALTH PROFESSIONAL EXPRESSED CONCERN ABOUT YOUR DRINKING OR SUGGESTED YOU CUT DOWN: NO
HOW OFTEN DURING THE LAST YEAR HAVE YOU FOUND THAT YOU WERE NOT ABLE TO STOP DRINKING ONCE YOU HAD STARTED: 0
HOW OFTEN DO YOU HAVE A DRINK CONTAINING ALCOHOL: 2
HOW OFTEN DO YOU HAVE A DRINK CONTAINING ALCOHOL: TWO TO FOUR TIMES A MONTH
HOW OFTEN DURING THE LAST YEAR HAVE YOU NEEDED AN ALCOHOLIC DRINK FIRST THING IN THE MORNING TO GET YOURSELF GOING AFTER A NIGHT OF HEAVY DRINKING: NEVER
HOW OFTEN DURING THE LAST YEAR HAVE YOU FAILED TO DO WHAT WAS NORMALLY EXPECTED FROM YOU BECAUSE OF DRINKING: NEVER
HAVE YOU OR SOMEONE ELSE BEEN INJURED AS A RESULT OF YOUR DRINKING: NO
AUDIT TOTAL SCORE: 2
AUDIT-C TOTAL SCORE: 2
HOW OFTEN DURING THE LAST YEAR HAVE YOU BEEN UNABLE TO REMEMBER WHAT HAPPENED THE NIGHT BEFORE BECAUSE YOU HAD BEEN DRINKING: NEVER
AUDIT-C TOTAL SCORE: 0
HOW OFTEN DO YOU HAVE SIX OR MORE DRINKS ON ONE OCCASION: NEVER
HOW OFTEN DURING THE LAST YEAR HAVE YOU HAD A FEELING OF GUILT OR REMORSE AFTER DRINKING: NEVER
HOW OFTEN DURING THE LAST YEAR HAVE YOU FOUND THAT YOU WERE NOT ABLE TO STOP DRINKING ONCE YOU HAD STARTED: NEVER
AUDIT TOTAL SCORE: 0
HOW MANY STANDARD DRINKS CONTAINING ALCOHOL DO YOU HAVE ON A TYPICAL DAY: ONE OR TWO
HOW OFTEN DURING THE LAST YEAR HAVE YOU BEEN UNABLE TO REMEMBER WHAT HAPPENED THE NIGHT BEFORE BECAUSE YOU HAD BEEN DRINKING: 0
HOW OFTEN DURING THE LAST YEAR HAVE YOU NEEDED AN ALCOHOLIC DRINK FIRST THING IN THE MORNING TO GET YOURSELF GOING AFTER A NIGHT OF HEAVY DRINKING: 0

## 2021-03-20 ASSESSMENT — PATIENT HEALTH QUESTIONNAIRE - PHQ9
SUM OF ALL RESPONSES TO PHQ QUESTIONS 1-9: 0
2. FEELING DOWN, DEPRESSED OR HOPELESS: 0
SUM OF ALL RESPONSES TO PHQ QUESTIONS 1-9: 0
SUM OF ALL RESPONSES TO PHQ9 QUESTIONS 1 & 2: 0

## 2021-03-24 ENCOUNTER — OFFICE VISIT (OUTPATIENT)
Dept: FAMILY MEDICINE CLINIC | Age: 71
End: 2021-03-24
Payer: MEDICARE

## 2021-03-24 VITALS
TEMPERATURE: 97 F | OXYGEN SATURATION: 98 % | DIASTOLIC BLOOD PRESSURE: 72 MMHG | WEIGHT: 179.2 LBS | BODY MASS INDEX: 27.16 KG/M2 | HEART RATE: 62 BPM | RESPIRATION RATE: 16 BRPM | SYSTOLIC BLOOD PRESSURE: 128 MMHG | HEIGHT: 68 IN

## 2021-03-24 DIAGNOSIS — M25.561 CHRONIC PAIN OF RIGHT KNEE: ICD-10-CM

## 2021-03-24 DIAGNOSIS — E78.00 PURE HYPERCHOLESTEROLEMIA: ICD-10-CM

## 2021-03-24 DIAGNOSIS — I83.819 VARICOSE VEINS WITH PAIN: ICD-10-CM

## 2021-03-24 DIAGNOSIS — G89.29 CHRONIC PAIN OF RIGHT KNEE: ICD-10-CM

## 2021-03-24 DIAGNOSIS — Z79.899 ENCOUNTER FOR LONG-TERM (CURRENT) DRUG USE: ICD-10-CM

## 2021-03-24 DIAGNOSIS — Z00.00 ROUTINE GENERAL MEDICAL EXAMINATION AT A HEALTH CARE FACILITY: Primary | ICD-10-CM

## 2021-03-24 DIAGNOSIS — M60.9 STATIN-INDUCED MYOSITIS: ICD-10-CM

## 2021-03-24 DIAGNOSIS — I10 ESSENTIAL HYPERTENSION: ICD-10-CM

## 2021-03-24 DIAGNOSIS — T46.6X5A STATIN-INDUCED MYOSITIS: ICD-10-CM

## 2021-03-24 DIAGNOSIS — M25.551 RIGHT HIP PAIN: ICD-10-CM

## 2021-03-24 DIAGNOSIS — M54.16 LUMBAR RADICULOPATHY: ICD-10-CM

## 2021-03-24 DIAGNOSIS — G89.4 CHRONIC PAIN SYNDROME: ICD-10-CM

## 2021-03-24 PROBLEM — R07.89 ATYPICAL CHEST PAIN: Status: RESOLVED | Noted: 2018-11-09 | Resolved: 2021-03-24

## 2021-03-24 PROCEDURE — G0439 PPPS, SUBSEQ VISIT: HCPCS | Performed by: FAMILY MEDICINE

## 2021-03-24 PROCEDURE — 1123F ACP DISCUSS/DSCN MKR DOCD: CPT | Performed by: FAMILY MEDICINE

## 2021-03-24 PROCEDURE — G8482 FLU IMMUNIZE ORDER/ADMIN: HCPCS | Performed by: FAMILY MEDICINE

## 2021-03-24 PROCEDURE — 4040F PNEUMOC VAC/ADMIN/RCVD: CPT | Performed by: FAMILY MEDICINE

## 2021-03-24 PROCEDURE — 3017F COLORECTAL CA SCREEN DOC REV: CPT | Performed by: FAMILY MEDICINE

## 2021-03-24 RX ORDER — PREGABALIN 50 MG/1
50 CAPSULE ORAL 2 TIMES DAILY
Qty: 180 CAPSULE | Refills: 3 | Status: CANCELLED | OUTPATIENT
Start: 2021-03-24 | End: 2021-04-23

## 2021-03-24 RX ORDER — PREGABALIN 50 MG/1
50 CAPSULE ORAL 2 TIMES DAILY
Qty: 60 CAPSULE | Refills: 0 | Status: SHIPPED
Start: 2021-03-31 | End: 2021-04-01

## 2021-03-24 NOTE — PROGRESS NOTES
CC: Zane Staples is a 78 y.o. 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 Liyah Kaur; currently controlled with Jonathan Level; I fill lyrica; no longer following with PMR  I will fill lyrica for her; no longer seeing Dr. Liyah Kaur     [address] Monitoring:    Last PDMP Selene Gasca as Reviewed [facility]):  Review User Review Instant Review Result   Stacie Roberts 3/24/2021  9:42 AM Reviewed PDMP [1]     Last Controlled Substance Monitoring Documentation      Virtual Visit from 10/21/2020 in [address] [place] [address]   Periodic Controlled Substance Monitoring  No signs of potential drug abuse or diversion identified. , Assessed functional status.  filed at 10/21/2020 1546        Urine Drug Screenings (1 yr)     URINE DRUG SCREEN  Collected: 11/3/2020  7:45 AM (Final result)    Complete Results          Opiate, quantitative, urine  Collected: 1/21/2020 10:00 AM (Final result)    Narrative: Pain Management Panel;  Screen w/Reflex to Quantitation (ARUP Code [id number])    Complete Results          Benzodiazepine, Quantitative, Urine  Collected: 1/21/2020 10:00 AM (Final result)    Narrative: Pain Management Panel;  Screen w/Reflex to Quantitation (ARUP Code [id number]) Complete Results              Medication Contract and Consent for Opioid Use Documents Filed     Patient Documents       Type of Document Status Date Received Received By Description     Medication Contract Received 1/21/2020  8:16 AM CHRISTIANO Aleja MARTIN med contract                  Today asking about pain in the R leg; feels this is from her varicose veins    ROS negative unless otherwise noted    Vitals:   /72   Pulse 62   Temp 97 °F (36.1 °C)   Resp 16   Ht 5' 8\" (1.727 m)   Wt 179 lb 3.2 oz (81.3 kg)   SpO2 98%   BMI 27.25 kg/m²   Wt Readings from Last 3 Encounters:   03/24/21 179 lb 3.2 oz (81.3 kg)   11/24/20 173 lb 6.4 oz (78.7 kg)   11/17/20 171 lb 14.4 oz (78 kg)       PE:  Constitutional - alert, well appearing, and in no distress  Eyes - extraocular eye movements intact, left eye normal, right eye normal, no conjunctivitis noted  Neck - symmetric, no obvious masses noted  Respiratory- clear to auscultation, no wheezes, rales or rhonchi, symmetric air entry; no increased work of breathing  Cardiovascular - normal rate, regular rhythm, normal S1, S2, no murmurs, rubs, clicks or gallops  Extremities - no edema noted  Abdomen - soft, nontender, nondistended  Skin - normal coloration and turgor, no rashes, no suspicious skin lesions noted  MSK - pain R LE when palpating varicose vein    A / P:     Diagnosis Orders   1. Routine general medical examination at a health care facility     2. Encounter for long-term (current) drug use     3. Essential hypertension     4. Pure hypercholesterolemia     5. Statin-induced myositis  CK   6. Chronic pain syndrome  pregabalin (LYRICA) 50 MG capsule   7. Varicose veins with pain  Felix Verdugo MD, Vascular Surgery, ' [facility]   8. Lumbar radiculopathy  pregabalin (LYRICA) 50 MG capsule   9. Right hip pain  pregabalin (LYRICA) 50 MG capsule   10.  Chronic pain of right knee  pregabalin (LYRICA) 50 MG capsule       F/u in 3 months for lyrica refills; she will call in monthly for refills  No other medication changes today  Repeat CK  Refer to vascular for R lower leg pain       RTO: Return for Medicare Annual Wellness Visit in 1 year + 3 months.       An electronic signature was used to authenticate this note.  ---- Daryle Rhea, MD on 3/24/2021 at 9:42 AM

## 2021-03-24 NOTE — PROGRESS NOTES
Medicare Annual Wellness Visit  Name: Agustín Cici Date: 3/24/2021   MRN: [id number] Sex: Female   Age: 78 y.o. Ethnicity: Non-/Non    : 1950 Race: Delmar Wallace is here for Medicare AWV (how do we handle they lyrica, would like to discuss further, would like 80 day script) and Referral - General (veins, they hurt, numb, hurts )    Screenings for behavioral, psychosocial and functional/safety risks, and cognitive dysfunction are all negative except as indicated below. These results, as well as other patient data from the [address] form, are documented in Flowsheets linked to this Encounter. Allergies   Allergen Reactions    Codeine Hives and Itching     AND CODEINE DERIVATIVES----sob  Pt stated tolerated Dilaudid    Fentanyl Other (See Comments)     Disoriented, confused  Other reaction(s): Mental Status Change    Adhesive Tape      burns skin, reddens skin, blisters    Amlodipine Swelling    Bupropion      hyper--couldn't sit still--jumpy    Cortisone      throat closed up    Dipyridamole Hives     Persantine-CST--sob, palpitations, stress test stopped    Levaquin [Levofloxacin In D5w] Other (See Comments)     Yeast infection     Loratadine      hyper--couldn't sit still--jumpy    Nortriptyline      for rosacea--caused flare up. stopped    Other      States any steroids make her face swell and flush    Prednisone Other (See Comments)    Tenex [Guanfacine Hcl]      dizziness    Tramadol Hives     sob    Varenicline     Lyrica [Pregabalin] Other (See Comments)     Leg cramps         Prior to Visit Medications    Medication Sig Taking? Authorizing Provider   pregabalin (LYRICA) 50 MG capsule Take 1 capsule by mouth 2 times daily for 30 days. Yes Fantasma Arias MD   ipratropium (ATROVENT) 0.02 % nebulizer solution Take 2.5 mLs by nebulization 4 times daily As needed.  Yes Fantasma Arias MD   lidocaine (LIDODERM) 5 % apply 3 patches to the affected area daily. Leave on for 12 hours and then off for 12 hours. Yes Scott Chavira MD   albuterol sulfate  (90 Base) MCG/ACT inhaler Inhale 2 puffs into the lungs 4 times daily as needed for Wheezing Yes Scott Chavira MD   atenolol (TENORMIN) 100 MG tablet Take 1 tablet by mouth daily take 2 tablets by mouth twice a day Yes Scott Chavira MD   blood glucose monitor strips Test 2 times a day & as needed for symptoms of irregular blood glucose. Dispense sufficient amount for indicated testing frequency plus additional to accommodate PRN testing needs.  Yes Scott Chavira MD   cyanocobalamin 1000 MCG/ML injection INJECT 1 MILLILITER INTO MUSCLE EVERY 7 DAYS Yes Scott Chavira MD   escitalopram (LEXAPRO) 5 MG tablet Take 1 tablet by mouth daily Yes Scott Chavira MD   ezetimibe (ZETIA) 10 MG tablet Take 1 tablet by mouth daily Yes Scott Chavira MD   hydroCHLOROthiazide (HYDRODIURIL) 50 MG tablet Take 1 tablet by mouth daily Yes Scott Chavira MD   Insulin Syringe-Needle U-100 25G X 1\" 1 ML MISC Use as directed for B12 injection Yes Scott Chavira MD   Lancets MISC 1 each by Does not apply route daily Yes Scott Chavira MD   levothyroxine (SYNTHROID) 100 MCG tablet take 1 tablet 6 days a week and 1/2 tablet on Sunday Yes Scott Chavira MD   loratadine (CLARITIN) 10 MG tablet Take 1 tablet by mouth daily Yes Scott Chavira MD   montelukast (SINGULAIR) 10 MG tablet Take 1 tablet by mouth nightly take 1 tablet by mouth every evening Yes Scott Chavira MD   olmesartan (BENICAR) 40 MG tablet take 1 tablet by mouth once daily Yes Scott Chavira MD   omeprazole (PRILOSEC) 20 MG delayed release capsule take 1 capsule by mouth once daily Yes Scott Chavira MD   tiZANidine (ZANAFLEX) 4 MG tablet Take 1 tablet by mouth every 8 hours as needed (muscle spasm) Take 1 tab by mouth 3 times daily Yes Scott Chavira MD   umeclidinium-vilanterol [facility] polyp and diverticulosis    COLONOSCOPY  6/23/15    colonoscopy    EYE SURGERY      lenses replaced    HYSTERECTOMY  1981    LUNG CANCER SURGERY Right     X 2    US BREAST NEEDLE BIOPSY RIGHT  10/19/2020    US BREAST NEEDLE BIOPSY RIGHT 10/19/2020 [facility]         Family History   Problem Relation Age of Onset    Arthritis Mother     Diabetes Mother     Heart Disease Mother     High Blood Pressure Mother     High Cholesterol Mother     Stroke Mother     Asthma Sister     Cancer Sister 61        lung cancer    High Blood Pressure Sister     High Cholesterol Sister     Substance Abuse Sister     Heart Attack Father         massiv MI    Early Death Brother         car accident    Cancer Sister 50        breast cancer    Other Sister         GERD, diverticulosis, rotator cuff disease, spinal stenosis    Heart Disease Brother         MI    Diabetes Maternal Grandmother     Heart Disease Maternal Grandmother     High Blood Pressure Maternal Grandmother     High Cholesterol Maternal Grandmother     Stroke Maternal Grandmother     Breast Cancer Maternal Aunt 48        breast    Ovarian Cancer Maternal Aunt     Cancer Maternal Aunt 40        ovarian       CareTeam (Including outside providers/suppliers regularly involved in providing care):   Patient Care Team:  Elinor Keyes MD as PCP - General (Family Medicine)  Elinor Keyes MD as PCP - [facility] Empaneled Provider  Rishi Morales MD as Consulting Physician (Nephrology)  Gissel Henry MD as Consulting Physician (Cardiology)    Wt Readings from Last 3 Encounters:   03/24/21 179 lb 3.2 oz (81.3 kg)   11/24/20 173 lb 6.4 oz (78.7 kg)   11/17/20 171 lb 14.4 oz (78 kg)     Vitals:    03/24/21 0851   BP: 128/72   Pulse: 62   Resp: 16   Temp: 97 °F (36.1 °C)   SpO2: 98%   Weight: 179 lb 3.2 oz (81.3 kg)   Height: 5' 8\" (1.727 m)     Body mass index is 27.25 kg/m².     Based upon direct observation of the patient, evaluation of cognition reveals recent and remote memory intact. General Appearance: alert and oriented to person, place and time, well developed and well- nourished, in no acute distress  Skin: warm and dry, no rash or erythema  Head: normocephalic and atraumatic  Eyes: extraocular eye movements intact, conjunctivae normal  Neck: supple and non-tender without mass, no thyromegaly or thyroid nodules, no cervical lymphadenopathy  Pulmonary/Chest: clear to auscultation bilaterally- no wheezes, rales or rhonchi, normal air movement, no respiratory distress  Cardiovascular: normal rate, regular rhythm, normal S1 and S2, no murmurs, rubs, clicks, or gallops  Abdomen: soft, non-tender, non-distended  Extremities: no cyanosis, clubbing or edema  Musculoskeletal: normal range of motion, no joint swelling, deformity or tenderness  Neurologic: no focal deficits      Patient's complete Health Risk Assessment and screening values have been reviewed and are found in Flowsheets. The following problems were reviewed today and where indicated follow up appointments were made and/or referrals ordered. Positive Risk Factor Screenings with Interventions:            General Health and ACP:  General  In general, how would you say your health is?: Good  In the past 7 days, have you experienced any of the following?  New or Increased Pain, New or Increased Fatigue, Loneliness, Social Isolation, Stress or Anger?: None of These  Do you get the social and emotional support that you need?: Yes  Do you have a Living Will?: (!) No  Advance Directives     Power of  Living Will ACP-Advance Directive ACP-Power of     Not on File Filed on 02/09/17 Filed Not on File      General Health Risk Interventions:  · No Living Will: Advance Care Planning addressed with patient today     Hearing/Vision:  No exam data present  Hearing/Vision  Do you or your family notice any trouble with your hearing that hasn't been managed with hearing aids?: No  Do you have difficulty driving, watching TV, or doing any of your daily activities because of your eyesight?: No  Have you had an eye exam within the past year?: (!) No  Hearing/Vision Interventions:  · Vision concerns:  patient encouraged to make appointment with his/her eye specialist    Safety:  Safety  Do you have working smoke detectors?: Yes  Have all throw rugs been removed or fastened?: Yes  Do you have non-slip mats or surfaces in all bathtubs/showers?: (!) No  Do all of your stairways have a railing or banister?: Yes  Are your doorways, halls and stairs free of clutter?: Yes  Do you always fasten your seatbelt when you are in a car?: Yes  Safety Interventions:  · Home safety tips provided     Personalized Preventive Plan   Current Health Maintenance Status  Immunization History   Administered Date(s) Administered    COVID-19, Pfizer, PF, 30mcg/0.3mL 02/13/2021, 03/08/2021    Influenza Virus Vaccine 09/03/2015    Influenza, High Dose (Fluzone 65 yrs and older) 08/28/2017, 09/28/2018    Influenza, Quadv, IM, PF (6 mo and older Fluzone, Flulaval, Fluarix, and 3 yrs and older Afluria) 09/03/2015, 09/19/2020    Influenza, Quadv, adjuvanted, 65 yrs +, IM, PF (Fluad) 09/19/2020    Influenza, Triv, inactivated, subunit, adjuvanted, IM (Fluad 65 yrs and older) 10/16/2019    Pneumococcal Conjugate 13-valent ([id number]) 11/14/2017    Pneumococcal Conjugate Vaccine 12/31/2012    Pneumococcal Polysaccharide ([id number]) 11/11/2016    Pneumococcal Vaccine 12/31/2012    Tdap (Boostrix, Adacel) 10/16/2015    Zoster Live (Zostavax) 12/16/2015    Zoster Recombinant (Shingrix) 02/16/2019, 10/26/2020        Health Maintenance   Topic Date Due    Annual Wellness Visit (AWV)  Never done    A1C test (Diabetic or Prediabetic)  09/23/2021    Low dose CT lung screening  10/15/2021    Pneumococcal 65+ years Vaccine (2 of 2 - PPSV23) 11/11/2021    Potassium monitoring  11/20/2021    Creatinine monitoring  11/20/2021    Breast cancer screen  10/19/2022    Colon cancer screen colonoscopy  06/23/2025    Lipid screen  10/13/2025    DTaP/Tdap/Td vaccine (2 - Td) 10/16/2025    DEXA (modify frequency per FRAX score)  Completed    Flu vaccine  Completed    Shingles Vaccine  Completed    COVID-19 Vaccine  Completed    Hepatitis C screen  Completed    Hepatitis A vaccine  Aged Out    Hepatitis B vaccine  Aged Out    Hib vaccine  Aged Out    Meningococcal (ACWY) vaccine  Aged Out     Recommendations for [employer] Due: see orders and patient instructions/AVS.  . Recommended screening schedule for the next 5-10 years is provided to the patient in written form: see Patient Drea Edmondson was seen today for medicare awv and referral - general.    Diagnoses and all orders for this visit:    Routine general medical examination at a health care facility    Encounter for long-term (current) drug use    Essential hypertension    Pure hypercholesterolemia    Statin-induced myositis  -     CK; Future    Chronic pain syndrome  -     pregabalin (LYRICA) 50 MG capsule; Take 1 capsule by mouth 2 times daily for 30 days. Varicose veins with pain  -     Wale Sandra MD, Vascular Surgery, [place]    Lumbar radiculopathy  -     pregabalin (LYRICA) 50 MG capsule; Take 1 capsule by mouth 2 times daily for 30 days. Right hip pain  -     pregabalin (LYRICA) 50 MG capsule; Take 1 capsule by mouth 2 times daily for 30 days. Chronic pain of right knee  -     pregabalin (LYRICA) 50 MG capsule; Take 1 capsule by mouth 2 times daily for 30 days. Return for Medicare Annual Wellness Visit in 1 year + 3 months.     Electronically signed by Natalie Sierra MD on 3/24/2021 at 9:43 AM

## 2021-03-24 NOTE — PATIENT INSTRUCTIONS
Personalized Preventive Plan for Ravi Soriano - 3/24/2021  Medicare offers a range of preventive health benefits. Some of the tests and screenings are paid in full while other may be subject to a deductible, co-insurance, and/or copay. Some of these benefits include a comprehensive review of your medical history including lifestyle, illnesses that may run in your family, and various assessments and screenings as appropriate. After reviewing your medical record and screening and assessments performed today your provider may have ordered immunizations, labs, imaging, and/or referrals for you. A list of these orders (if applicable) as well as your Preventive Care list are included within your After Visit Summary for your review. Other Preventive Recommendations:    · A preventive eye exam performed by an eye specialist is recommended every 1-2 years to screen for glaucoma; cataracts, macular degeneration, and other eye disorders. · A preventive dental visit is recommended every 6 months. · Try to get at least 150 minutes of exercise per week or 10,000 steps per day on a pedometer . · Order or download the FREE \"Exercise & Physical Activity: Your Everyday Guide\" from [employer]. Call 8-403.940.5499 or search [employer] online. · You need 4654-7056 mg of calcium and 0500-1557 IU of vitamin D per day. It is possible to meet your calcium requirement with diet alone, but a vitamin D supplement is usually necessary to meet this goal.  · When exposed to the sun, use a sunscreen that protects against both UVA and UVB radiation with an SPF of 30 or greater. Reapply every 2 to 3 hours or after sweating, drying off with a towel, or swimming. · Always wear a seat belt when traveling in a car. Always wear a helmet when riding a bicycle or motorcycle.

## 2021-03-26 RX ORDER — LORATADINE 10 MG/1
10 TABLET ORAL DAILY
Qty: 90 TABLET | Refills: 2 | Status: SHIPPED
Start: 2021-03-26 | End: 2022-02-05 | Stop reason: SDUPTHER

## 2021-03-30 ENCOUNTER — TELEPHONE (OUTPATIENT)
Dept: VASCULAR SURGERY | Age: 71
End: 2021-03-30

## 2021-03-30 NOTE — TELEPHONE ENCOUNTER
Spoke with the pt, scheduled office consultation with Dr. Bernardino Chanel per Dr. Angelica Treadwell' referral.

## 2021-04-01 DIAGNOSIS — G89.29 CHRONIC PAIN OF RIGHT KNEE: ICD-10-CM

## 2021-04-01 DIAGNOSIS — M54.16 LUMBAR RADICULOPATHY: ICD-10-CM

## 2021-04-01 DIAGNOSIS — M25.551 RIGHT HIP PAIN: ICD-10-CM

## 2021-04-01 DIAGNOSIS — G89.4 CHRONIC PAIN SYNDROME: ICD-10-CM

## 2021-04-01 DIAGNOSIS — M25.561 CHRONIC PAIN OF RIGHT KNEE: ICD-10-CM

## 2021-04-01 RX ORDER — PREGABALIN 50 MG/1
50 CAPSULE ORAL 2 TIMES DAILY
Qty: 180 CAPSULE | Refills: 0 | Status: SHIPPED
Start: 2021-04-30 | End: 2021-04-02 | Stop reason: SDUPTHER

## 2021-04-02 DIAGNOSIS — G89.29 CHRONIC PAIN OF RIGHT KNEE: ICD-10-CM

## 2021-04-02 DIAGNOSIS — G89.4 CHRONIC PAIN SYNDROME: ICD-10-CM

## 2021-04-02 DIAGNOSIS — M25.551 RIGHT HIP PAIN: ICD-10-CM

## 2021-04-02 DIAGNOSIS — M25.561 CHRONIC PAIN OF RIGHT KNEE: ICD-10-CM

## 2021-04-02 DIAGNOSIS — M54.16 LUMBAR RADICULOPATHY: ICD-10-CM

## 2021-04-02 RX ORDER — PREGABALIN 50 MG/1
50 CAPSULE ORAL 2 TIMES DAILY
Qty: 60 CAPSULE | Refills: 0 | Status: SHIPPED
Start: 2021-04-30 | End: 2021-04-05 | Stop reason: SDUPTHER

## 2021-04-05 DIAGNOSIS — G89.29 CHRONIC PAIN OF RIGHT KNEE: ICD-10-CM

## 2021-04-05 DIAGNOSIS — M25.561 CHRONIC PAIN OF RIGHT KNEE: ICD-10-CM

## 2021-04-05 DIAGNOSIS — M54.16 LUMBAR RADICULOPATHY: ICD-10-CM

## 2021-04-05 DIAGNOSIS — G89.4 CHRONIC PAIN SYNDROME: ICD-10-CM

## 2021-04-05 DIAGNOSIS — M25.551 RIGHT HIP PAIN: ICD-10-CM

## 2021-04-05 RX ORDER — PREGABALIN 50 MG/1
50 CAPSULE ORAL 2 TIMES DAILY
Qty: 60 CAPSULE | Refills: 0 | Status: SHIPPED
Start: 2021-04-30 | End: 2021-05-04 | Stop reason: SDUPTHER

## 2021-04-06 ENCOUNTER — TELEPHONE (OUTPATIENT)
Dept: FAMILY MEDICINE CLINIC | Age: 71
End: 2021-04-06

## 2021-04-07 DIAGNOSIS — C34.90 NON-SMALL CELL LUNG CANCER, UNSPECIFIED LATERALITY (HCC): ICD-10-CM

## 2021-04-07 DIAGNOSIS — T46.6X5A STATIN-INDUCED MYOSITIS: ICD-10-CM

## 2021-04-07 DIAGNOSIS — M60.9 STATIN-INDUCED MYOSITIS: ICD-10-CM

## 2021-04-07 LAB — TOTAL CK: 153 U/L (ref 20–180)

## 2021-04-08 ENCOUNTER — OFFICE VISIT (OUTPATIENT)
Dept: FAMILY MEDICINE CLINIC | Age: 71
End: 2021-04-08
Payer: MEDICARE

## 2021-04-08 VITALS
DIASTOLIC BLOOD PRESSURE: 56 MMHG | WEIGHT: 177.8 LBS | RESPIRATION RATE: 16 BRPM | HEART RATE: 57 BPM | BODY MASS INDEX: 27.03 KG/M2 | TEMPERATURE: 97.8 F | OXYGEN SATURATION: 98 % | SYSTOLIC BLOOD PRESSURE: 120 MMHG

## 2021-04-08 DIAGNOSIS — I10 ESSENTIAL HYPERTENSION: ICD-10-CM

## 2021-04-08 PROCEDURE — 3017F COLORECTAL CA SCREEN DOC REV: CPT | Performed by: PHYSICIAN ASSISTANT

## 2021-04-08 PROCEDURE — 1090F PRES/ABSN URINE INCON ASSESS: CPT | Performed by: PHYSICIAN ASSISTANT

## 2021-04-08 PROCEDURE — 4040F PNEUMOC VAC/ADMIN/RCVD: CPT | Performed by: PHYSICIAN ASSISTANT

## 2021-04-08 PROCEDURE — 99213 OFFICE O/P EST LOW 20 MIN: CPT | Performed by: PHYSICIAN ASSISTANT

## 2021-04-08 PROCEDURE — G8417 CALC BMI ABV UP PARAM F/U: HCPCS | Performed by: PHYSICIAN ASSISTANT

## 2021-04-08 PROCEDURE — G8399 PT W/DXA RESULTS DOCUMENT: HCPCS | Performed by: PHYSICIAN ASSISTANT

## 2021-04-08 PROCEDURE — G8427 DOCREV CUR MEDS BY ELIG CLIN: HCPCS | Performed by: PHYSICIAN ASSISTANT

## 2021-04-08 PROCEDURE — 1036F TOBACCO NON-USER: CPT | Performed by: PHYSICIAN ASSISTANT

## 2021-04-08 PROCEDURE — 1123F ACP DISCUSS/DSCN MKR DOCD: CPT | Performed by: PHYSICIAN ASSISTANT

## 2021-04-08 RX ORDER — ATENOLOL 100 MG/1
100 TABLET ORAL DAILY
Qty: 90 TABLET | Refills: 3 | Status: SHIPPED
Start: 2021-04-08 | End: 2021-07-01 | Stop reason: SDUPTHER

## 2021-04-08 NOTE — PROGRESS NOTES
Chief Complaint:  Hypotension (Pt states blood pressure has been low recently. Making her light headed.)    History of Present Illness:  Source of history provided by:  patient. - Patient presents for hypotension    - Taking Benicar 40mg in the morning, HCTZ 50mg in the morning, Atenolol 200mg in the morning and 200mg at night  - sees BP drops after morning medications  - systolic is in the 47H, diastolic is in the 71P  - systolic increases goes higher as the day goes on  - lower HR readings as well  - Gets lightheaded when this occurs  - Looked extensive through past few months of notes and she should be taking Benicar 40, HCTZ 50mg, and Atenolol 100mg daily. However, her script for Atenolol says both 100mg daily and 2 pills 2x daily. I believe this is related to changing from 25mg tablets to 100mg.   - She thinks she recently got a new script from the pharmacy    Review of Systems: Unless otherwise stated in this report or unable to obtain because of the patient's clinical or mental status as evidenced by the medical record, this patients's positive and negative responses for Review of Systems, constitutional, psych, eyes, ENT, cardiovascular, respiratory, gastrointestinal, neurological, genitourinary, musculoskeletal, integument systems and systems related to the presenting problem are either stated in the preceding or were not pertinent or were negative for the symptoms and/or complaints related to the medical problem. Past Medical History:  has a past medical history of Arthritis, Asthma, Cancer ([address] Chronic back pain, COPD (chronic obstructive pulmonary disease) ([address] Emphysema of lung ([address] Fibrocystic breast, GERD (gastroesophageal reflux disease), Hashimoto's disease, Helicobacter pylori (H. pylori), Hemorrhoids, Hyperlipidemia, Hypertension, Hyperthyroidism, and Post-thoracotomy pain. Past Surgical History:  has a past surgical history that includes Appendectomy (1981);  Hysterectomy (1981); Lung cancer surgery (Right); Colonoscopy (6/23/15); Colonoscopy (6/23/15); Eye surgery; and US BREAST BIOPSY W LOC DEVICE 1ST LESION RIGHT (10/19/2020). Social History:  reports that she quit smoking about 13 years ago. Her smoking use included cigarettes. She has a 30.00 pack-year smoking history. She has never used smokeless tobacco. She reports current alcohol use. She reports that she does not use drugs. Family History: family history includes Arthritis in her mother; Asthma in her sister; Breast Cancer (age of onset: 50) in her maternal aunt; Cancer (age of onset: 36) in her maternal aunt; Cancer (age of onset: 50) in her sister; Cancer (age of onset: 61) in her sister; Diabetes in her maternal grandmother and mother; Early Death in her brother; Heart Attack in her father; Heart Disease in her brother, maternal grandmother, and mother; High Blood Pressure in her maternal grandmother, mother, and sister; High Cholesterol in her maternal grandmother, mother, and sister; Other in her sister; Ovarian Cancer in her maternal aunt; Stroke in her maternal grandmother and mother; Substance Abuse in her sister. Allergies: Codeine, Fentanyl, Adhesive tape, Amlodipine, Bupropion, Cortisone, Dipyridamole, Levaquin [levofloxacin in d5w], Loratadine, Nortriptyline, Other, Prednisone, Tenex [guanfacine hcl], Tramadol, Varenicline, and Lyrica [pregabalin]    Physical Exam:  (Vital signs reviewed) BP (!) 120/56 (Position: Standing)   Pulse 57   Temp 97.8 °F (36.6 °C)   Resp 16   Wt 177 lb 12.8 oz (80.6 kg)   SpO2 98%   BMI 27.03 kg/m²   Oxygen Saturation Interpretation: Normal.   Constitutional:  Alert, development consistent with age. Eyes:  PERRL, EOMI, no discharge or conjunctival injection. Ears:  External ears without lesions. Neck:  Normal ROM. Supple.   Lungs:  Clear to auscultation and breath sounds equal.  Heart:  Regular rate and rhythm, normal heart sounds, without pathological murmurs, ectopy, gallops, or rubs. Abdomen:  Soft, nontender, good bowel sounds. No firm or pulsatile mass. Back:  No costovertebral tenderness. Skin:  Normal turgor. Warm, dry, without visible rash, unless noted elsewhere. Neurological:  Alert and oriented. Motor functions intact.    ------------------------------------------Test Results Section----------------------------------------------  (All laboratory and radiology results have been personally reviewed by myself)  Laboratory:    Radiology: All Radiology results interpreted by Radiologist unless otherwise noted. -------------------------------------Impression & Disposition Section-----------------------------------  Impression(s):  Dianna Mcelroy was seen today for hypotension. Diagnoses and all orders for this visit:    Essential hypertension  -     atenolol (TENORMIN) 100 MG tablet; Take 1 tablet by mouth daily  -     Continue Benicar 40mg daily, HCTZ 50mg daily  -     Will resend Atenolol as was previously dosed and she will verify at home that she was truly taking this 2 pills bid. She will continue to monitor BP and keep log. To bring log and cuff back to appointment in 2 weeks. Sooner if needed. BW possible at that time.

## 2021-04-22 ENCOUNTER — OFFICE VISIT (OUTPATIENT)
Dept: FAMILY MEDICINE CLINIC | Age: 71
End: 2021-04-22
Payer: MEDICARE

## 2021-04-22 VITALS
SYSTOLIC BLOOD PRESSURE: 132 MMHG | RESPIRATION RATE: 16 BRPM | OXYGEN SATURATION: 99 % | WEIGHT: 178.4 LBS | BODY MASS INDEX: 27.13 KG/M2 | TEMPERATURE: 97.1 F | DIASTOLIC BLOOD PRESSURE: 84 MMHG | HEART RATE: 62 BPM

## 2021-04-22 DIAGNOSIS — E55.9 VITAMIN D DEFICIENCY: ICD-10-CM

## 2021-04-22 DIAGNOSIS — I10 ESSENTIAL HYPERTENSION: ICD-10-CM

## 2021-04-22 DIAGNOSIS — E78.00 PURE HYPERCHOLESTEROLEMIA: ICD-10-CM

## 2021-04-22 DIAGNOSIS — E03.9 HYPOTHYROIDISM, UNSPECIFIED TYPE: ICD-10-CM

## 2021-04-22 DIAGNOSIS — E53.8 VITAMIN B12 DEFICIENCY: ICD-10-CM

## 2021-04-22 DIAGNOSIS — I10 ESSENTIAL HYPERTENSION: Primary | ICD-10-CM

## 2021-04-22 PROCEDURE — 4040F PNEUMOC VAC/ADMIN/RCVD: CPT | Performed by: PHYSICIAN ASSISTANT

## 2021-04-22 PROCEDURE — 1123F ACP DISCUSS/DSCN MKR DOCD: CPT | Performed by: PHYSICIAN ASSISTANT

## 2021-04-22 PROCEDURE — 3017F COLORECTAL CA SCREEN DOC REV: CPT | Performed by: PHYSICIAN ASSISTANT

## 2021-04-22 PROCEDURE — 99213 OFFICE O/P EST LOW 20 MIN: CPT | Performed by: PHYSICIAN ASSISTANT

## 2021-04-22 PROCEDURE — G8417 CALC BMI ABV UP PARAM F/U: HCPCS | Performed by: PHYSICIAN ASSISTANT

## 2021-04-22 PROCEDURE — G8399 PT W/DXA RESULTS DOCUMENT: HCPCS | Performed by: PHYSICIAN ASSISTANT

## 2021-04-22 PROCEDURE — G8427 DOCREV CUR MEDS BY ELIG CLIN: HCPCS | Performed by: PHYSICIAN ASSISTANT

## 2021-04-22 PROCEDURE — 1090F PRES/ABSN URINE INCON ASSESS: CPT | Performed by: PHYSICIAN ASSISTANT

## 2021-04-22 PROCEDURE — 1036F TOBACCO NON-USER: CPT | Performed by: PHYSICIAN ASSISTANT

## 2021-04-22 NOTE — PROGRESS NOTES
ago. Her smoking use included cigarettes. She has a 30.00 pack-year smoking history. She has never used smokeless tobacco. She reports current alcohol use. She reports that she does not use drugs. Family History: family history includes Arthritis in her mother; Asthma in her sister; Breast Cancer (age of onset: 50) in her maternal aunt; Cancer (age of onset: 36) in her maternal aunt; Cancer (age of onset: 50) in her sister; Cancer (age of onset: 61) in her sister; Diabetes in her maternal grandmother and mother; Early Death in her brother; Heart Attack in her father; Heart Disease in her brother, maternal grandmother, and mother; High Blood Pressure in her maternal grandmother, mother, and sister; High Cholesterol in her maternal grandmother, mother, and sister; Other in her sister; Ovarian Cancer in her maternal aunt; Stroke in her maternal grandmother and mother; Substance Abuse in her sister. Allergies: Codeine, Fentanyl, Adhesive tape, Amlodipine, Bupropion, Cortisone, Dipyridamole, Levaquin [levofloxacin in d5w], Loratadine, Nortriptyline, Other, Prednisone, Tenex [guanfacine hcl], Tramadol, Varenicline, and Lyrica [pregabalin]    Physical Exam:  (Vital signs reviewed) /84   Pulse 62   Temp 97.1 °F (36.2 °C)   Resp 16   Wt 178 lb 6.4 oz (80.9 kg)   SpO2 99%   BMI 27.13 kg/m²   Oxygen Saturation Interpretation: Normal.   Constitutional:  Alert, development consistent with age. Eyes:  PERRL, EOMI, no discharge or conjunctival injection. Ears:  External ears without lesions. Neck:  Normal ROM. Supple. Lungs:  Clear to auscultation and breath sounds equal.  Heart:  Regular rate and rhythm, normal heart sounds, without pathological murmurs, ectopy, gallops, or rubs. Back:  No costovertebral tenderness. Skin:  Normal turgor. Warm, dry, without visible rash, unless noted elsewhere. Neurological:  Alert and oriented.   Motor functions intact.    ------------------------------------------Test

## 2021-05-04 ENCOUNTER — TELEPHONE (OUTPATIENT)
Dept: VASCULAR SURGERY | Age: 71
End: 2021-05-04

## 2021-05-04 DIAGNOSIS — G89.29 CHRONIC PAIN OF RIGHT KNEE: ICD-10-CM

## 2021-05-04 DIAGNOSIS — M25.561 CHRONIC PAIN OF RIGHT KNEE: ICD-10-CM

## 2021-05-04 DIAGNOSIS — R05.9 COUGH: ICD-10-CM

## 2021-05-04 DIAGNOSIS — M25.551 RIGHT HIP PAIN: ICD-10-CM

## 2021-05-04 DIAGNOSIS — M54.16 LUMBAR RADICULOPATHY: ICD-10-CM

## 2021-05-04 DIAGNOSIS — G89.4 CHRONIC PAIN SYNDROME: ICD-10-CM

## 2021-05-04 RX ORDER — PREGABALIN 50 MG/1
50 CAPSULE ORAL 2 TIMES DAILY
Qty: 60 CAPSULE | Refills: 0 | Status: SHIPPED
Start: 2021-05-04 | End: 2021-06-01 | Stop reason: SDUPTHER

## 2021-05-04 RX ORDER — BENZONATATE 100 MG/1
CAPSULE ORAL
Qty: 90 CAPSULE | Refills: 0 | Status: SHIPPED
Start: 2021-05-04 | End: 2021-07-09 | Stop reason: SDUPTHER

## 2021-05-05 ENCOUNTER — OFFICE VISIT (OUTPATIENT)
Dept: VASCULAR SURGERY | Age: 71
End: 2021-05-05
Payer: MEDICARE

## 2021-05-05 VITALS
WEIGHT: 176 LBS | SYSTOLIC BLOOD PRESSURE: 130 MMHG | BODY MASS INDEX: 25.2 KG/M2 | HEIGHT: 70 IN | DIASTOLIC BLOOD PRESSURE: 80 MMHG

## 2021-05-05 DIAGNOSIS — I73.9 CLAUDICATION OF RIGHT LOWER EXTREMITY (HCC): Primary | ICD-10-CM

## 2021-05-05 PROCEDURE — 1123F ACP DISCUSS/DSCN MKR DOCD: CPT | Performed by: PHYSICIAN ASSISTANT

## 2021-05-05 PROCEDURE — 3017F COLORECTAL CA SCREEN DOC REV: CPT | Performed by: PHYSICIAN ASSISTANT

## 2021-05-05 PROCEDURE — 4040F PNEUMOC VAC/ADMIN/RCVD: CPT | Performed by: PHYSICIAN ASSISTANT

## 2021-05-05 PROCEDURE — 1036F TOBACCO NON-USER: CPT | Performed by: PHYSICIAN ASSISTANT

## 2021-05-05 PROCEDURE — G8417 CALC BMI ABV UP PARAM F/U: HCPCS | Performed by: PHYSICIAN ASSISTANT

## 2021-05-05 PROCEDURE — 1090F PRES/ABSN URINE INCON ASSESS: CPT | Performed by: PHYSICIAN ASSISTANT

## 2021-05-05 PROCEDURE — G8427 DOCREV CUR MEDS BY ELIG CLIN: HCPCS | Performed by: PHYSICIAN ASSISTANT

## 2021-05-05 PROCEDURE — 99213 OFFICE O/P EST LOW 20 MIN: CPT | Performed by: PHYSICIAN ASSISTANT

## 2021-05-05 PROCEDURE — G8399 PT W/DXA RESULTS DOCUMENT: HCPCS | Performed by: PHYSICIAN ASSISTANT

## 2021-05-05 NOTE — PROGRESS NOTES
Vascular Surgery Outpatient Consultation      Chief Complaint   Patient presents with    Consultation     new pt. varicose veins     Reason for Consult:  RLE pain, spider veins    Requesting Physician:  Dr. Gaudencio Walker:                The patient is a 78 y.o. female who states a 6 month history of painful varicose veins in her right leg. She states she has history of low back pain for many years which radiates into her R hip and into her knee. There was no injury to the back. Over the last month, she has developed cramping pain to her R calf when she is walking. She is unable to quantify the distance but states she is unable to walk around as much in stores as she used to due to the cramping. She notes it is somewhat lifestyle limiting because she likes to shop. Denies rest pain or ulceration. The patient denies a history of DVT. She does not wear compression stockings. She has mild swelling to bilateral legs, worse at the end of the day. Past Medical History:        Diagnosis Date    Arthritis     Symptoms respond to myofascial release.  Not surgical candidate    Asthma     Cancer [facility])     lung cancer- L- 2008, stage 1A, [id number] Stage 1A 2013    Chronic back pain     COPD (chronic obstructive pulmonary disease) ([address]     Emphysema of lung ([address]     Fibrocystic breast     GERD (gastroesophageal reflux disease)     Hashimoto's disease 8480    Helicobacter pylori (H. pylori)     EGD 7/2002    Hemorrhoids     Hyperlipidemia     Hypertension     Hyperthyroidism     Post-thoracotomy pain 2008     Past Surgical History:        Procedure Laterality Date    APPENDECTOMY  1981    COLONOSCOPY  6/23/15    polyp and diverticulosis    COLONOSCOPY  6/23/15    colonoscopy    EYE SURGERY      lenses replaced    HYSTERECTOMY  1981    LUNG CANCER SURGERY Right     X 2    US BREAST NEEDLE BIOPSY RIGHT  10/19/2020    US BREAST NEEDLE BIOPSY RIGHT 10/19/2020 [facility] Current Medications:   Prior to Admission medications    Medication Sig Start Date End Date Taking? Authorizing Provider   pregabalin (LYRICA) 50 MG capsule Take 1 capsule by mouth 2 times daily for 30 days. 5/4/21 6/3/21  Jenaro Diallo MD   escitalopram (LEXAPRO) 5 MG tablet Take 1 tablet by mouth daily 5/4/21   Jenaro Diallo MD   montelukast (SINGULAIR) 10 MG tablet Take 1 tablet by mouth nightly take 1 tablet by mouth every evening 5/4/21   Jenaro Diallo MD   benzonatate (TESSALON) 100 MG capsule take 1 capsule by mouth three times a day if needed for cough 5/4/21   Jenaro Diallo MD   atenolol (TENORMIN) 100 MG tablet Take 1 tablet by mouth daily 4/8/21   Nathalie Gross PA-C   loratadine (CLARITIN) 10 MG tablet Take 1 tablet by mouth daily 3/26/21   Jenaro Diallo MD   ipratropium (ATROVENT) 0.02 % nebulizer solution Take 2.5 mLs by nebulization 4 times daily As needed. 1/26/21   Jenaro Diallo MD   lidocaine (LIDODERM) 5 % apply 3 patches to the affected area daily. Leave on for 12 hours and then off for 12 hours. 1/26/21   Jenaro Diallo MD   albuterol sulfate  (90 Base) MCG/ACT inhaler Inhale 2 puffs into the lungs 4 times daily as needed for Wheezing 12/31/20   Jenaro Diallo MD   blood glucose monitor strips Test 2 times a day & as needed for symptoms of irregular blood glucose. Dispense sufficient amount for indicated testing frequency plus additional to accommodate PRN testing needs.  12/31/20   Jenaro Diallo MD   cyanocobalamin 1000 MCG/ML injection INJECT 1 MILLILITER INTO MUSCLE EVERY 7 DAYS 12/31/20   Jenaro Diallo MD   ezetimibe (ZETIA) 10 MG tablet Take 1 tablet by mouth daily 12/31/20   Jenaro Diallo MD   hydroCHLOROthiazide (HYDRODIURIL) 50 MG tablet Take 1 tablet by mouth daily  Patient taking differently: Take 50 mg by mouth daily Taking qhs 12/31/20   Jenaro Diallo MD   Insulin Syringe-Needle U-100 25G X 1\" 1 ML MISC Use as directed for B12 injection 12/31/20   Maira Pacheco MD   Lancets MISC 1 each by Does not apply route daily 12/31/20   Maira Pacheco MD   levothyroxine (SYNTHROID) 100 MCG tablet take 1 tablet 6 days a week and 1/2 tablet on Sunday 12/31/20   Maira Pacheco MD   olmesartan (BENICAR) 40 MG tablet take 1 tablet by mouth once daily 12/31/20   Maira Pacheco MD   omeprazole (PRILOSEC) 20 MG delayed release capsule take 1 capsule by mouth once daily 12/31/20   Maira Pacheco MD   tiZANidine (ZANAFLEX) 4 MG tablet Take 1 tablet by mouth every 8 hours as needed (muscle spasm) Take 1 tab by mouth 3 times daily 12/31/20   Maira Pacheco MD   umeclidinium-vilanterol (ANORO ELLIPTA) 62.5-25 MCG/INH AEPB inhaler Inhale 1 puff into the lungs daily 12/31/20   Maira Pacheco MD   vitamin D (ERGOCALCIFEROL) 1.25 MG (62898 UT) CAPS capsule take 1 capsule by mouth weekly 12/31/20   Maira Pacheco MD   Azelastine-Fluticasone 137-50 MCG/ACT SUSP 2 sprays by Nasal route daily 10/9/20   Maira Pacheco MD   Blood Pressure Monitoring (BLOOD PRESSURE CUFF) MISC Dx:  Hypertension with labile blood pressure 9/24/20   Nathalie Gross PA-C   Blood Glucose Monitoring Suppl (ONE TOUCH ULTRA MINI) w/Device KIT Check blood sugar bid 9/24/20   Nathalie Gross PA-C   SYRINGE-NEEDLE, DISP, 3 ML (B-D 3CC LUER-JUANCARLOS SYR 94CR6-7/2) 22G X 1-1/2\" 3 ML MISC USE MONTHLY AS DIRECTED WITH B-12 5/8/20   Luann Foy MD   Coenzyme Q10 (CO Q 10 PO) Take by mouth daily    Historical Provider, MD   Biotin w/ Vitamins C & E (HAIR/SKIN/NAILS PO) Take by mouth daily VIT C 90MG  VIT E 5,000MCG  ZINC 8MG  COPPER 1MG    Historical Provider, MD   ibuprofen (ADVIL;MOTRIN) 200 MG tablet Take 200 mg by mouth every 6 hours as needed for Pain    Historical Provider, MD   Melatonin 1 MG SUBL Place 2 mg under the tongue nightly    Historical Provider, MD     Allergies:  Codeine, Fentanyl, Adhesive tape, Amlodipine, Bupropion, Cortisone, Dipyridamole, Levaquin [levofloxacin in d5w], Loratadine, Nortriptyline, Other, Prednisone, Tenex [guanfacine hcl], Tramadol, Varenicline, and Lyrica [pregabalin]    Social History     Socioeconomic History    Marital status:      Spouse name: Not on file    Number of children: 3    Years of education: Not on file    Highest education level: Not on file   Occupational History    Not on file   Social Needs    Financial resource strain: Not on file    Food insecurity     Worry: Not on file     Inability: Not on file   [employer] needs     Medical: Not on file     Non-medical: Not on file   Tobacco Use    Smoking status: Former Smoker     Packs/day: 1.00     Years: 30.00     Pack years: 30.00     Types: Cigarettes     Quit date: 2007     Years since quittin.6    Smokeless tobacco: Never Used   Substance and Sexual Activity    Alcohol use:  Yes     Alcohol/week: 0.0 standard drinks     Types: 4 - 6 Glasses of wine per week     Comment: SOCIALLY    Drug use: No    Sexual activity: Yes     Partners: Male   Lifestyle    Physical activity     Days per week: Not on file     Minutes per session: Not on file    Stress: Not on file   Relationships    Social connections     Talks on phone: Not on file     Gets together: Not on file     Attends Anabaptist service: Not on file     Active member of club or organization: Not on file     Attends meetings of clubs or organizations: Not on file     Relationship status: Not on file    Intimate partner violence     Fear of current or ex partner: Not on file     Emotionally abused: Not on file     Physically abused: Not on file     Forced sexual activity: Not on file   Other Topics Concern    Not on file   Social History Narrative    5 children, 3 are her own        Family History   Problem Relation Age of Onset    Arthritis Mother     Diabetes Mother     Heart Disease Mother     High Blood Pressure Mother     High Cholesterol Mother     Stroke Mother     Asthma Sister     Cancer Sister 61        lung cancer    High Blood Pressure Sister     High Cholesterol Sister     Substance Abuse Sister     Heart Attack Father         massiv MI    Early Death Brother         car accident    Cancer Sister 50        breast cancer    Other Sister         GERD, diverticulosis, rotator cuff disease, spinal stenosis    Heart Disease Brother         MI    Diabetes Maternal Grandmother     Heart Disease Maternal Grandmother     High Blood Pressure Maternal Grandmother     High Cholesterol Maternal Grandmother     Stroke Maternal Grandmother     Breast Cancer Maternal Aunt 48        breast    Ovarian Cancer Maternal Aunt     Cancer Maternal Aunt 40        ovarian       REVIEW OF SYSTEMS (Recent symptoms): Negative if blank [], Positive if [x]       Constitutional    [] Fevers / chills  [] General weakness   [] Poor appetite    [] Weight gain or loss  Eyes    [] Blurred vision  [] Wear glasses / contacts   [] Visual disturbances   [] Blindness  Ears, Nose, Throat    [] Hearing loss  [] Ringing in ears   [] Nose bleeding    [] Voice hoarseness  [] Difficulty swallowing      Lungs    [] Cough  [] Chest pain   [] Wheezing    [] Difficult breathing  Heart    [] Chest pain during activity  [] Dizziness   [] Irregular heart beat    [] Fainting episode    [x] Leg swelling   Stomach, Intestines    [] Intestinal bleeding  [] Heart burn   [] Abdominal pain    [] Stomach ulcers   [] Change in bowel habits      Kidney, Prostate    [] Frequent need to urinate  [] Incontinence   [] Blood in urine    [] Erectile dysfunction (men)      [] All negative   Hematologic, Lymphatic,   Skin, Musculoskeletal    [] Easy bruising  [] Swollen lymph nodes   [] Skin lesions, ulcers   [] Skin rash  [x] Neck or back pain   [x] Leg / foot pain     Neurologic    [] Speech problems  [] Memory loss   [] Headaches     [] Walking problems    [] Hand / arm / leg weakness   [] Numbness in arms or legs       PHYSICAL EXAM:      CONSTITUTIONAL:  awake, alert, cooperative, no apparent distress, and appears stated age  EYES:  extra-ocular muscles intact and vision intact  ENT:  normocepalic, without obvious abnormality, atraumatic  NECK:  supple, symmetrical, trachea midline, no jugular venous distension, no carotid bruits,  and MASSES:  no masses  LUNGS:  no increased work of breathing, good air exchange and clear to auscultation  CARDIOVASCULAR:  regular rate and rhythm and no murmur noted  ABDOMEN:  soft, non-distended, non-tender, Aorta not palpated  SKIN:  no lesions, no bulging saphenous vein branches, + spider veins anterior lower leg  EXTREMITIES: no edema BLE          Right      Left   Brachial     Radial 2 2   Femoral 2 2   Popliteal     Dorsalis Pedis 2 2   Posterior Tibial 2 2   (3=normal, 2=diminished, 1=barely palpable, 4=widened)      IMPRESSION/RECOMMENDATIONS:      Problem List Items Addressed This Visit        Vascular Problems    Claudication of right lower extremity ([address] - Primary    Relevant Orders    VL STRESS ARTERIAL SEGMENTAL BILATERAL        Pt presents for evaluation of RLE pain. She does have small telangiectasias to the R anterior lower leg but I discussed with the patient that these are not contributing to her cramping pain in the leg. She had no large varicose branches. She does have a history of low back pain which radiates down the R leg, possibly consistent with sciatic pain. She describes R calf cramping with ambulation. Although, she has palpable pulses, we will obtain stress ABIs to ensure there is no vascular insufficiency contributing to her pain. We will review these when available and arrange fu at that time. If these are normal, patient could fu prn. Pt seen and plan reviewed with Dr. Christal Maria.      Roque Galo PA-C

## 2021-05-20 DIAGNOSIS — E78.00 PURE HYPERCHOLESTEROLEMIA: ICD-10-CM

## 2021-05-20 DIAGNOSIS — E53.8 VITAMIN B12 DEFICIENCY: ICD-10-CM

## 2021-05-20 DIAGNOSIS — E03.9 HYPOTHYROIDISM, UNSPECIFIED TYPE: ICD-10-CM

## 2021-05-20 DIAGNOSIS — I10 ESSENTIAL HYPERTENSION: ICD-10-CM

## 2021-05-20 DIAGNOSIS — E55.9 VITAMIN D DEFICIENCY: ICD-10-CM

## 2021-05-20 LAB
ALBUMIN SERPL-MCNC: 4.2 G/DL (ref 3.5–5.2)
ALP BLD-CCNC: 66 U/L (ref 35–104)
ALT SERPL-CCNC: 42 U/L (ref 0–32)
ANION GAP SERPL CALCULATED.3IONS-SCNC: 9 MMOL/L (ref 7–16)
AST SERPL-CCNC: 31 U/L (ref 0–31)
BASOPHILS ABSOLUTE: 0.03 E9/L (ref 0–0.2)
BASOPHILS RELATIVE PERCENT: 0.6 % (ref 0–2)
BILIRUB SERPL-MCNC: 0.2 MG/DL (ref 0–1.2)
BUN BLDV-MCNC: 25 MG/DL (ref 6–23)
CALCIUM SERPL-MCNC: 9.7 MG/DL (ref 8.6–10.2)
CHLORIDE BLD-SCNC: 102 MMOL/L (ref 98–107)
CHOLESTEROL, TOTAL: 238 MG/DL (ref 0–199)
CO2: 26 MMOL/L (ref 22–29)
CREAT SERPL-MCNC: 1.2 MG/DL (ref 0.5–1)
EOSINOPHILS ABSOLUTE: 0.14 E9/L (ref 0.05–0.5)
EOSINOPHILS RELATIVE PERCENT: 2.6 % (ref 0–6)
FOLATE: 11.9 NG/ML (ref 4.8–24.2)
GFR AFRICAN AMERICAN: 54
GFR NON-AFRICAN AMERICAN: 45 ML/MIN/1.73
GLUCOSE BLD-MCNC: 100 MG/DL (ref 74–99)
HCT VFR BLD CALC: 40.6 % (ref 34–48)
HDLC SERPL-MCNC: 47 MG/DL
HEMOGLOBIN: 12.7 G/DL (ref 11.5–15.5)
IMMATURE GRANULOCYTES #: 0.01 E9/L
IMMATURE GRANULOCYTES %: 0.2 % (ref 0–5)
LDL CHOLESTEROL CALCULATED: 152 MG/DL (ref 0–99)
LYMPHOCYTES ABSOLUTE: 2.08 E9/L (ref 1.5–4)
LYMPHOCYTES RELATIVE PERCENT: 38.8 % (ref 20–42)
MCH RBC QN AUTO: 29.7 PG (ref 26–35)
MCHC RBC AUTO-ENTMCNC: 31.3 % (ref 32–34.5)
MCV RBC AUTO: 94.9 FL (ref 80–99.9)
MONOCYTES ABSOLUTE: 0.45 E9/L (ref 0.1–0.95)
MONOCYTES RELATIVE PERCENT: 8.4 % (ref 2–12)
NEUTROPHILS ABSOLUTE: 2.65 E9/L (ref 1.8–7.3)
NEUTROPHILS RELATIVE PERCENT: 49.4 % (ref 43–80)
PDW BLD-RTO: 13.3 FL (ref 11.5–15)
PLATELET # BLD: 241 E9/L (ref 130–450)
PMV BLD AUTO: 10.3 FL (ref 7–12)
POTASSIUM SERPL-SCNC: 4.7 MMOL/L (ref 3.5–5)
RBC # BLD: 4.28 E12/L (ref 3.5–5.5)
SODIUM BLD-SCNC: 137 MMOL/L (ref 132–146)
TOTAL PROTEIN: 7 G/DL (ref 6.4–8.3)
TRIGL SERPL-MCNC: 194 MG/DL (ref 0–149)
TSH SERPL DL<=0.05 MIU/L-ACNC: 8.17 UIU/ML (ref 0.27–4.2)
VITAMIN B-12: >2000 PG/ML (ref 211–946)
VITAMIN D 25-HYDROXY: 43 NG/ML (ref 30–100)
VLDLC SERPL CALC-MCNC: 39 MG/DL
WBC # BLD: 5.4 E9/L (ref 4.5–11.5)

## 2021-06-01 DIAGNOSIS — G89.29 CHRONIC PAIN OF RIGHT KNEE: ICD-10-CM

## 2021-06-01 DIAGNOSIS — M25.561 CHRONIC PAIN OF RIGHT KNEE: ICD-10-CM

## 2021-06-01 DIAGNOSIS — G89.4 CHRONIC PAIN SYNDROME: ICD-10-CM

## 2021-06-01 DIAGNOSIS — M25.551 RIGHT HIP PAIN: ICD-10-CM

## 2021-06-01 DIAGNOSIS — M54.16 LUMBAR RADICULOPATHY: ICD-10-CM

## 2021-06-01 RX ORDER — PREGABALIN 50 MG/1
50 CAPSULE ORAL 2 TIMES DAILY
Qty: 60 CAPSULE | Refills: 0 | Status: SHIPPED
Start: 2021-06-01 | End: 2021-07-01 | Stop reason: SDUPTHER

## 2021-06-01 NOTE — TELEPHONE ENCOUNTER
Last Appointment:  3/24/2021  Future Appointments   Date Time Provider Samir Witt   6/4/2021  2:00 PM [facility] [place]   6/15/2021  8:30 AM Brynn Cowden, MD [place] [facility]   6/17/2021  3:00 PM Karina Durham MD [facility]   10/29/2021  2:00 PM Kim Clark MD [facility]   3/29/2022  9:00 AM Brynn Cowden, MD [place] [facility]

## 2021-06-03 ENCOUNTER — TELEPHONE (OUTPATIENT)
Dept: VASCULAR SURGERY | Age: 71
End: 2021-06-03

## 2021-06-04 ENCOUNTER — HOSPITAL ENCOUNTER (OUTPATIENT)
Dept: CARDIOLOGY | Age: 71
Discharge: HOME | End: 2021-06-04
Payer: MEDICARE

## 2021-06-04 DIAGNOSIS — I73.9 CLAUDICATION OF RIGHT LOWER EXTREMITY (HCC): ICD-10-CM

## 2021-06-04 PROCEDURE — 93923 UPR/LXTR ART STDY 3+ LVLS: CPT

## 2021-06-15 ENCOUNTER — OFFICE VISIT (OUTPATIENT)
Dept: FAMILY MEDICINE CLINIC | Age: 71
End: 2021-06-15
Payer: MEDICARE

## 2021-06-15 VITALS
HEART RATE: 60 BPM | HEIGHT: 70 IN | WEIGHT: 182.6 LBS | RESPIRATION RATE: 18 BRPM | DIASTOLIC BLOOD PRESSURE: 80 MMHG | BODY MASS INDEX: 26.14 KG/M2 | TEMPERATURE: 97.1 F | SYSTOLIC BLOOD PRESSURE: 128 MMHG | OXYGEN SATURATION: 98 %

## 2021-06-15 DIAGNOSIS — R56.9 SEIZURE (HCC): ICD-10-CM

## 2021-06-15 DIAGNOSIS — R79.89 ELEVATED SERUM CREATININE: ICD-10-CM

## 2021-06-15 DIAGNOSIS — I10 ESSENTIAL HYPERTENSION: Primary | ICD-10-CM

## 2021-06-15 DIAGNOSIS — C34.31 MALIGNANT NEOPLASM OF LOWER LOBE OF RIGHT LUNG (HCC): ICD-10-CM

## 2021-06-15 DIAGNOSIS — E04.1 THYROID NODULE: ICD-10-CM

## 2021-06-15 DIAGNOSIS — K42.9 UMBILICAL HERNIA WITHOUT OBSTRUCTION AND WITHOUT GANGRENE: ICD-10-CM

## 2021-06-15 DIAGNOSIS — E03.9 HYPOTHYROIDISM, UNSPECIFIED TYPE: ICD-10-CM

## 2021-06-15 DIAGNOSIS — J44.9 CHRONIC OBSTRUCTIVE PULMONARY DISEASE, UNSPECIFIED COPD TYPE (HCC): ICD-10-CM

## 2021-06-15 DIAGNOSIS — E78.5 HYPERLIPIDEMIA, UNSPECIFIED HYPERLIPIDEMIA TYPE: ICD-10-CM

## 2021-06-15 DIAGNOSIS — C34.90 NON-SMALL CELL LUNG CANCER, UNSPECIFIED LATERALITY (HCC): ICD-10-CM

## 2021-06-15 DIAGNOSIS — M60.9 STATIN-INDUCED MYOSITIS: ICD-10-CM

## 2021-06-15 DIAGNOSIS — T46.6X5A STATIN-INDUCED MYOSITIS: ICD-10-CM

## 2021-06-15 DIAGNOSIS — I73.9 CLAUDICATION OF RIGHT LOWER EXTREMITY (HCC): ICD-10-CM

## 2021-06-15 LAB
ANION GAP SERPL CALCULATED.3IONS-SCNC: 14 MMOL/L (ref 7–16)
BUN BLDV-MCNC: 15 MG/DL (ref 6–23)
CALCIUM SERPL-MCNC: 9.7 MG/DL (ref 8.6–10.2)
CHLORIDE BLD-SCNC: 99 MMOL/L (ref 98–107)
CO2: 23 MMOL/L (ref 22–29)
CREAT SERPL-MCNC: 1 MG/DL (ref 0.5–1)
GFR AFRICAN AMERICAN: >60
GFR NON-AFRICAN AMERICAN: 55 ML/MIN/1.73
GLUCOSE BLD-MCNC: 101 MG/DL (ref 74–99)
POTASSIUM SERPL-SCNC: 4.6 MMOL/L (ref 3.5–5)
SODIUM BLD-SCNC: 136 MMOL/L (ref 132–146)
TSH SERPL DL<=0.05 MIU/L-ACNC: 5.15 UIU/ML (ref 0.27–4.2)

## 2021-06-15 PROCEDURE — 3017F COLORECTAL CA SCREEN DOC REV: CPT | Performed by: FAMILY MEDICINE

## 2021-06-15 PROCEDURE — 3023F SPIROM DOC REV: CPT | Performed by: FAMILY MEDICINE

## 2021-06-15 PROCEDURE — 1090F PRES/ABSN URINE INCON ASSESS: CPT | Performed by: FAMILY MEDICINE

## 2021-06-15 PROCEDURE — 4040F PNEUMOC VAC/ADMIN/RCVD: CPT | Performed by: FAMILY MEDICINE

## 2021-06-15 PROCEDURE — G8427 DOCREV CUR MEDS BY ELIG CLIN: HCPCS | Performed by: FAMILY MEDICINE

## 2021-06-15 PROCEDURE — G8926 SPIRO NO PERF OR DOC: HCPCS | Performed by: FAMILY MEDICINE

## 2021-06-15 PROCEDURE — 1123F ACP DISCUSS/DSCN MKR DOCD: CPT | Performed by: FAMILY MEDICINE

## 2021-06-15 PROCEDURE — 1036F TOBACCO NON-USER: CPT | Performed by: FAMILY MEDICINE

## 2021-06-15 PROCEDURE — 99215 OFFICE O/P EST HI 40 MIN: CPT | Performed by: FAMILY MEDICINE

## 2021-06-15 PROCEDURE — G8399 PT W/DXA RESULTS DOCUMENT: HCPCS | Performed by: FAMILY MEDICINE

## 2021-06-15 PROCEDURE — G8417 CALC BMI ABV UP PARAM F/U: HCPCS | Performed by: FAMILY MEDICINE

## 2021-06-15 SDOH — ECONOMIC STABILITY: TRANSPORTATION INSECURITY
IN THE PAST 12 MONTHS, HAS LACK OF TRANSPORTATION KEPT YOU FROM MEETINGS, WORK, OR FROM GETTING THINGS NEEDED FOR DAILY LIVING?: NO

## 2021-06-15 SDOH — ECONOMIC STABILITY: FOOD INSECURITY: WITHIN THE PAST 12 MONTHS, THE FOOD YOU BOUGHT JUST DIDN'T LAST AND YOU DIDN'T HAVE MONEY TO GET MORE.: NEVER TRUE

## 2021-06-15 SDOH — ECONOMIC STABILITY: FOOD INSECURITY: WITHIN THE PAST 12 MONTHS, YOU WORRIED THAT YOUR FOOD WOULD RUN OUT BEFORE YOU GOT MONEY TO BUY MORE.: NEVER TRUE

## 2021-06-15 SDOH — ECONOMIC STABILITY: TRANSPORTATION INSECURITY
IN THE PAST 12 MONTHS, HAS THE LACK OF TRANSPORTATION KEPT YOU FROM MEDICAL APPOINTMENTS OR FROM GETTING MEDICATIONS?: NO

## 2021-06-15 ASSESSMENT — SOCIAL DETERMINANTS OF HEALTH (SDOH): HOW HARD IS IT FOR YOU TO PAY FOR THE VERY BASICS LIKE FOOD, HOUSING, MEDICAL CARE, AND HEATING?: NOT HARD AT ALL

## 2021-06-15 NOTE — PROGRESS NOTES
CC: Valdez Lara is a 78 y.o. 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 Brandon Eubanks; currently controlled with Veronica Mcintyre; I yumi lyrica; no longer following with PMR     PDMP Monitoring:    Last PDMP Shashank as Reviewed [facility]):  Review User Review Instant Review Result   Arvindbartolome Komal 6/15/2021  9:17 AM Reviewed PDMP [1]     Last Controlled Substance Monitoring Documentation      Virtual Visit from 10/21/2020 in [address] [place] [address]   Periodic Controlled Substance Monitoring  No signs of potential drug abuse or diversion identified. , Assessed functional status.  filed at 10/21/2020 1546        Urine Drug Screenings (1 yr)     URINE DRUG SCREEN  Collected: 11/3/2020  7:45 AM (Final result)    Complete Results          Opiate, quantitative, urine  Collected: 1/21/2020 10:00 AM (Final result)    Narrative: Pain Management Panel;  Screen w/Reflex to Quantitation (ARUP Code [id number])    Complete Results          Benzodiazepine, Quantitative, Urine  Collected: 1/21/2020 10:00 AM (Final result)    Narrative: Pain Management Panel;  Screen w/Reflex to Quantitation ([facility] Code [id number])    Complete Results              Medication Contract and Consent for Opioid Use Documents Filed     Patient Documents       Type of Document Status Date Received Received By Description     Medication Contract Received 1/21/2020  8:16 AM Kiara ALVARADO med contract                  Today asking about pain in the R leg; feels this is from her varicose veins    ROS negative unless otherwise noted    Vitals:   /80   Pulse 60   Temp 97.1 °F (36.2 °C)   Resp 18   Ht 5' 10\" (1.778 m)   Wt 182 lb 9.6 oz (82.8 kg)   SpO2 98%   BMI 26.20 kg/m²   Wt Readings from Last 3 Encounters:   06/15/21 182 lb 9.6 oz (82.8 kg)   05/05/21 176 lb (79.8 kg)   04/22/21 178 lb 6.4 oz (80.9 kg)       PE:  Constitutional - alert, well appearing, and in no distress  Eyes - extraocular eye movements intact, left eye normal, right eye normal, no conjunctivitis noted  Neck - symmetric, no obvious masses noted  Respiratory- clear to auscultation, no wheezes, rales or rhonchi, symmetric air entry; no increased work of breathing  Cardiovascular - normal rate, regular rhythm, normal S1, S2, no murmurs, rubs, clicks or gallops  Extremities - no edema noted  Abdomen - soft, nontender, nondistended; umbilical hernia  Skin - normal coloration and turgor, no rashes, no suspicious skin lesions noted      A / P:     Diagnosis Orders   1. Essential hypertension     2. Hyperlipidemia, unspecified hyperlipidemia type     3. Statin-induced myositis     4. Hypothyroidism, unspecified type  TSH without Reflex   5. Thyroid nodule     6. Chronic obstructive pulmonary disease, unspecified COPD type ([facility]  AFL ([facility]) - Fantasma Marquez MD, Pulmonary, [place]   7.  Non-small cell lung cancer, unspecified laterality ([facility]  AFL ([facility]) - Fantasma Marquez,

## 2021-06-17 ENCOUNTER — OFFICE VISIT (OUTPATIENT)
Dept: ENDOCRINOLOGY | Age: 71
End: 2021-06-17
Payer: MEDICARE

## 2021-06-17 ENCOUNTER — TELEPHONE (OUTPATIENT)
Dept: FAMILY MEDICINE CLINIC | Age: 71
End: 2021-06-17

## 2021-06-17 VITALS
HEART RATE: 94 BPM | WEIGHT: 180.4 LBS | SYSTOLIC BLOOD PRESSURE: 142 MMHG | DIASTOLIC BLOOD PRESSURE: 82 MMHG | HEIGHT: 70 IN | BODY MASS INDEX: 25.83 KG/M2

## 2021-06-17 DIAGNOSIS — E55.9 VITAMIN D DEFICIENCY: ICD-10-CM

## 2021-06-17 DIAGNOSIS — E03.9 HYPOTHYROIDISM, UNSPECIFIED TYPE: Primary | ICD-10-CM

## 2021-06-17 PROCEDURE — 3017F COLORECTAL CA SCREEN DOC REV: CPT | Performed by: INTERNAL MEDICINE

## 2021-06-17 PROCEDURE — 1036F TOBACCO NON-USER: CPT | Performed by: INTERNAL MEDICINE

## 2021-06-17 PROCEDURE — G8417 CALC BMI ABV UP PARAM F/U: HCPCS | Performed by: INTERNAL MEDICINE

## 2021-06-17 PROCEDURE — 1123F ACP DISCUSS/DSCN MKR DOCD: CPT | Performed by: INTERNAL MEDICINE

## 2021-06-17 PROCEDURE — G8427 DOCREV CUR MEDS BY ELIG CLIN: HCPCS | Performed by: INTERNAL MEDICINE

## 2021-06-17 PROCEDURE — G8399 PT W/DXA RESULTS DOCUMENT: HCPCS | Performed by: INTERNAL MEDICINE

## 2021-06-17 PROCEDURE — 4040F PNEUMOC VAC/ADMIN/RCVD: CPT | Performed by: INTERNAL MEDICINE

## 2021-06-17 PROCEDURE — 1090F PRES/ABSN URINE INCON ASSESS: CPT | Performed by: INTERNAL MEDICINE

## 2021-06-17 PROCEDURE — 99214 OFFICE O/P EST MOD 30 MIN: CPT | Performed by: INTERNAL MEDICINE

## 2021-06-17 RX ORDER — LEVOTHYROXINE SODIUM 100 MCG
TABLET ORAL
Qty: 36 TABLET | Refills: 11
Start: 2021-06-17 | End: 2022-02-11 | Stop reason: SDUPTHER

## 2021-06-17 NOTE — PROGRESS NOTES
[address] [place] [address] Department of Endocrinology Diabetes and Metabolism   [address] [facility], [address] [place] [address],[id number]   Phone: [phone]  Fax: [phone]    Date of Service: 6/17/2021  Primary Care Physician: Nana Spatz, MD.  Provider: Billie Layton MD    Reason for the visit:  Primary Hypothyroidism, Prediabetes vitD deficiency, chronic fatigue     History of Present Illness: The history is provided by the patient. No  was used. Accuracy of the patient data is excellent. Tom Torres is a very pleasant 78 y.o. female with past medical h/o lunch cancer seen seen today for follow up visit   The patient was diagnosed with hypothyroidism in 2008 and was told that she has Hashimoto's thyroid disease   She is currently on Levothyroxine 100 mcg 1 tab 6 days a week and 0.5 tab on Sunday. Patient takes levothyroxine in the morning at empty stomach, wait one hour before eating , avoid multivitamins containing calcium  or iron with it. Lab Results   Component Value Date/Time    TSH 5.150 (H) 06/15/2021 09:41 AM    T4FREE 1.49 09/23/2020 08:44 AM    FT3 2.6 03/02/2018 03:00 PM     The patient still c/o unexplained weight gain, fatigue, dry skin, brittle fingernails, and brittle hair    She reported positive FH of thyroid disease in her mother and two sisters      Given tiredness and autoimmune thyroid disease, I ordered AM cortisol and was 9.9   Component 9/27/2018   Cortisol 9.90       Regarding MNG   The patient was also diagnosed with thyroid nodule at the same time of hypothyroidism   Thyroid US 8/2017   The right thyroid lobe measures 3.8 x 1.4 x 1.4 cm in size. The left thyroid lobe measures  3.3 x 1.0 x 1.1 cm in size. The isthmus measures 0.1 cm  in thickness. The thyroid gland is heterogeneous. A 0.6 cm small hypoechoic nodule present in the thyroid isthmus on the right. No other suspicious nodules noted.      Repeated thyroid US 6/25/2019  Rt thyroid lobe measures 4.3 cm x 1.2 cm x 1.3 cm. There is heterogeneous echogenicity. No discrete nodule is identified. Isthmus measures 0.2 cm in thickness at the midline. There is a solid hypoechoic nodule with smooth margins to the right of the isthmus measuring 0.6 cm x 0.4 cm x 0.2 cm  Lt thyroid lobe measures 3.1 cm x 1.1 cm x 1.1 cm. There is heterogeneous echogenicity. No discrete nodule is identified    Duglas Patrizia denies any new lumps, bumps in her neck, change in her voice, or shortness of breath. No family history of thyroid cancer. No prior history of radiation to head or neck region. vitD deficiency    The patient currently taking vitD 50,000 iu/wk and has been on this dose for many years   No fragility fractures     PAST MEDICAL HISTORY   Past Medical History:   Diagnosis Date    Arthritis     Symptoms respond to myofascial release. Not surgical candidate    Asthma     Cancer [facility])     lung cancer- RML- 2008, stage 1A, [id number] Stage 1A 2013    Chronic back pain     COPD (chronic obstructive pulmonary disease) (HCC)     [facility]phy[facility] lung ([facility] [address]     Fibrocystic breast     GERD (gastroesophageal reflux disease)     Hashimoto's disease 7196    Helicobacter pylori (H. pylori)     EGD 7/2002    Hemorrhoids     Hyperlipidemia     Hypertension     Hyperthyroidism     Post-thoracotomy pain 2008     PAST SURGICAL HISTORY   Past Surgical History:   Procedure Laterality Date    APPENDECTOMY  1981    COLONOSCOPY  6/23/15    polyp and diverticulosis    COLONOSCOPY  6/23/15    colonoscopy    EYE SURGERY      lenses replaced    HYSTERECTOMY  1981    LUNG CANCER SURGERY Right     X 2    US BREAST NEEDLE BIOPSY RIGHT  10/19/2020    US BREAST NEEDLE BIOPSY RIGHT 10/19/2020 [facility]     SOCIAL HISTORY   Tobacco:   reports that she quit smoking about 13 years ago. Her smoking use included cigarettes. She has a 30.00 pack-year smoking history.  She has never used smokeless tobacco.  Alcol: reports current alcohol use. Illicit Drugs:   reports no history of drug use.     FAMILY HISTORY   Family History   Problem Relation Age of Onset    Arthritis Mother     Diabetes Mother     Heart Disease Mother     High Blood Pressure Mother     High Cholesterol Mother     Stroke Mother     Asthma Sister     Cancer Sister 61        lung cancer    High Blood Pressure Sister     High Cholesterol Sister     Substance Abuse Sister     Heart Attack Father         massiv MI    Early Death Brother         car accident    Cancer Sister 50        breast cancer    Other Sister         GERD, diverticulosis, rotator cuff disease, spinal stenosis    Heart Disease Brother         MI    Diabetes Maternal Grandmother     Heart Disease Maternal Grandmother     High Blood Pressure Maternal Grandmother     High Cholesterol Maternal Grandmother     Stroke Maternal Grandmother     Breast Cancer Maternal Aunt 48        breast    Ovarian Cancer Maternal Aunt     Cancer Maternal Aunt 40        ovarian     ALLERGIES AND DRUG REACTIONS   Allergies   Allergen Reactions    Codeine Hives and Itching     AND CODEINE DERIVATIVES----sob  Pt stated tolerated Dilaudid    Fentanyl Other (See Comments)     Disoriented, confused  Other reaction(s): Mental Status Change    Adhesive Tape      burns skin, reddens skin, blisters    Amlodipine Swelling    Bupropion      hyper--couldn't sit still--jumpy    Cortisone      throat closed up    Dipyridamole Hives     Persantine-CST--sob, palpitations, stress test stopped    Levaquin [Levofloxacin In D5w] Other (See Comments)     Yeast infection     Loratadine      hyper--couldn't sit still--jumpy    Nortriptyline      for rosacea--caused flare up. stopped    Other      States any steroids make her face swell and flush    Prednisone Other (See Comments)    Tenex [Guanfacine Hcl]      dizziness    Tramadol Hives     sob    Varenicline     Lyrica [Pregabalin] Other (See Comments)     Leg cramps       CURRENT MEDICATIONS     Current Outpatient Medications   Medication Sig Dispense Refill    pregabalin (LYRICA) 50 MG capsule Take 1 capsule by mouth 2 times daily for 30 days. 60 capsule 0    escitalopram (LEXAPRO) 5 MG tablet Take 1 tablet by mouth daily 90 tablet 0    montelukast (SINGULAIR) 10 MG tablet Take 1 tablet by mouth nightly take 1 tablet by mouth every evening 90 tablet 0    benzonatate (TESSALON) 100 MG capsule take 1 capsule by mouth three times a day if needed for cough 90 capsule 0    atenolol (TENORMIN) 100 MG tablet Take 1 tablet by mouth daily (Patient taking differently: Take 100 mg by mouth nightly ) 90 tablet 3    loratadine (CLARITIN) 10 MG tablet Take 1 tablet by mouth daily 90 tablet 2    ipratropium (ATROVENT) 0.02 % nebulizer solution Take 2.5 mLs by nebulization 4 times daily As needed. 2.5 mL 3    lidocaine (LIDODERM) 5 % apply 3 patches to the affected area daily. Leave on for 12 hours and then off for 12 hours.  90 patch 3    albuterol sulfate  (90 Base) MCG/ACT inhaler Inhale 2 puffs into the lungs 4 times daily as needed for Wheezing 3 Inhaler 1    cyanocobalamin 1000 MCG/ML injection INJECT 1 MILLILITER INTO MUSCLE EVERY 7 DAYS 12 vial 3    ezetimibe (ZETIA) 10 MG tablet Take 1 tablet by mouth daily 90 tablet 3    levothyroxine (SYNTHROID) 100 MCG tablet take 1 tablet 6 days a week and 1/2 tablet on Sunday 90 tablet 3    olmesartan (BENICAR) 40 MG tablet take 1 tablet by mouth once daily 90 tablet 3    omeprazole (PRILOSEC) 20 MG delayed release capsule take 1 capsule by mouth once daily 90 capsule 3    tiZANidine (ZANAFLEX) 4 MG tablet Take 1 tablet by mouth every 8 hours as needed (muscle spasm) Take 1 tab by mouth 3 times daily 270 tablet 3    umeclidinium-vilanterol (ANORO ELLIPTA) 62.5-25 MCG/INH AEPB inhaler Inhale 1 puff into the lungs daily 14 each 3    vitamin D (ERGOCALCIFEROL) 1.25 MG (98103 UT) CAPS capsule take 1 capsule by mouth weekly 11 capsule 5    Azelastine-Fluticasone 137-50 MCG/ACT SUSP 2 sprays by Nasal route daily 3 Bottle 5    Coenzyme Q10 (CO Q 10 PO) Take by mouth daily      Biotin w/ Vitamins C & E (HAIR/SKIN/NAILS PO) Take by mouth daily VIT C 90MG  VIT E 5,000MCG  ZINC 8MG  COPPER 1MG      Melatonin 1 MG SUBL Place 2 mg under the tongue nightly      blood glucose monitor strips Test 2 times a day & as needed for symptoms of irregular blood glucose. Dispense sufficient amount for indicated testing frequency plus additional to accommodate PRN testing needs. 300 strip 1    Insulin Syringe-Needle U-100 25G X 1\" 1 ML MISC Use as directed for B12 injection 20 each 0    Lancets MISC 1 each by Does not apply route daily 100 each 5    Blood Glucose Monitoring Suppl (ONE TOUCH ULTRA MINI) w/Device KIT Check blood sugar bid 1 kit 0    SYRINGE-NEEDLE, DISP, 3 ML (B-D 3CC LUER-JUANCARLOS SYR 88SC2-8/2) 22G X 1-1/2\" 3 ML MISC USE MONTHLY AS DIRECTED WITH B-12 15 each 0    ibuprofen (ADVIL;MOTRIN) 200 MG tablet Take 200 mg by mouth every 6 hours as needed for Pain       Current Facility-Administered Medications   Medication Dose Route Frequency Provider Last Rate Last Admin    betamethasone acetate-betamethasone sodium phosphate (CELESTONE) injection 6 mg  6 mg Intra-articular Once Keith Clements MD        lidocaine 1 % injection 1 mL  1 mL Intradermal Once Keith Clements MD           Review of Systems  Constitutional: No fever, no chills, no diaphoresis, no generalized weakness. HEENT: No blurred vision, No sore throat, no ear pain, no hair loss  Neck: denied any neck swelling, difficulty swallowing,   Cadrdiopulomary: No CP, SOB or palpitation, No orthopnea or PND. No cough or wheezing. GI: No N/V/D, no constipation, No abdominal pain, no melena or hematochezia   : Denied any dysuria, hematuria, flank pain, discharge, or incontinence. Skin: denied any rash, ulcer, Hirsute, or hyperpigmentation.    MSK:

## 2021-06-24 ENCOUNTER — OFFICE VISIT (OUTPATIENT)
Dept: SURGERY | Age: 71
End: 2021-06-24

## 2021-06-24 VITALS
WEIGHT: 178 LBS | RESPIRATION RATE: 16 BRPM | DIASTOLIC BLOOD PRESSURE: 78 MMHG | SYSTOLIC BLOOD PRESSURE: 146 MMHG | HEIGHT: 70 IN | TEMPERATURE: 97.3 F | BODY MASS INDEX: 25.48 KG/M2 | HEART RATE: 58 BPM

## 2021-06-24 DIAGNOSIS — K43.2 INCISIONAL HERNIA, WITHOUT OBSTRUCTION OR GANGRENE: Primary | ICD-10-CM

## 2021-06-24 PROCEDURE — 99024 POSTOP FOLLOW-UP VISIT: CPT | Performed by: SURGERY

## 2021-06-24 RX ORDER — SODIUM CHLORIDE 0.9 % (FLUSH) 0.9 %
10 SYRINGE (ML) INJECTION EVERY 12 HOURS SCHEDULED
Status: CANCELLED | OUTPATIENT
Start: 2021-06-24

## 2021-06-24 RX ORDER — SODIUM CHLORIDE 0.9 % (FLUSH) 0.9 %
10 SYRINGE (ML) INJECTION PRN
Status: CANCELLED | OUTPATIENT
Start: 2021-06-24

## 2021-06-24 RX ORDER — SODIUM CHLORIDE 9 MG/ML
25 INJECTION, SOLUTION INTRAVENOUS PRN
Status: CANCELLED | OUTPATIENT
Start: 2021-06-24

## 2021-06-24 NOTE — PROGRESS NOTES
History and Physical - General Surgery    Patient's Name/Date of Birth: Randall Snyder / 1950    Date: 6/24/2021    PCP: Andrea Donaldson MD    Referring Physician:   Ebony Garibay MD  [phone]      CHIEF COMPLAINT:    Chief Complaint   Patient presents with    New Patient    Hernia     umbilical          HISTORY OF PRESENT ILLNESS:    Randall Snyder is an 78 y.o. female who presents with an abdominal wall bulge. She said it has been there a few months. It bothers her a little. Nothing gets stuck, it does reduce. No n/v/d/c. She said her son had hernia repair recently. She has had surgery and had an incision in that area in the past.       Past Medical History:   Past Medical History:   Diagnosis Date    Arthritis     Symptoms respond to myofascial release.  Not surgical candidate    Asthma     Cancer [facility])     lung cancer- L- 2008, stage 1A, [id number] Stage 1A 2013    Chronic back pain     COPD (chronic obstructive pulmonary disease) (HCC)     Emphysema of lung ([address]     Fibrocystic breast     GERD (gastroesophageal reflux disease)     Hashimoto's disease 4799    Helicobacter pylori (H. pylori)     EGD 7/2002    Hemorrhoids     Hyperlipidemia     Hypertension     Hyperthyroidism     Post-thoracotomy pain 2008        Past Surgical History:   Past Surgical History:   Procedure Laterality Date    APPENDECTOMY  1981    COLONOSCOPY  6/23/15    polyp and diverticulosis    COLONOSCOPY  6/23/15    colonoscopy    EYE SURGERY      lenses replaced    HYSTERECTOMY  1981    LUNG CANCER SURGERY Right     X 2    US BREAST NEEDLE BIOPSY RIGHT  10/19/2020    US BREAST NEEDLE BIOPSY RIGHT 10/19/2020 [facility]        Allergies: Codeine, Fentanyl, Adhesive tape, Amlodipine, Bupropion, Cortisone, Dipyridamole, Levaquin [levofloxacin in d5w], Loratadine, Nortriptyline, Other, Prednisone, Tenex [guanfacine hcl], Tramadol, and Varenicline     Medications:   Current Outpatient Medications   Medication Sig Dispense Refill    SYNTHROID 100 MCG tablet Take 1 tablet 5 days a week and 1.5 tab on Saturday and Sunday 36 tablet 11    pregabalin (LYRICA) 50 MG capsule Take 1 capsule by mouth 2 times daily for 30 days. 60 capsule 0    escitalopram (LEXAPRO) 5 MG tablet Take 1 tablet by mouth daily 90 tablet 0    montelukast (SINGULAIR) 10 MG tablet Take 1 tablet by mouth nightly take 1 tablet by mouth every evening 90 tablet 0    benzonatate (TESSALON) 100 MG capsule take 1 capsule by mouth three times a day if needed for cough 90 capsule 0    atenolol (TENORMIN) 100 MG tablet Take 1 tablet by mouth daily (Patient taking differently: Take 100 mg by mouth nightly ) 90 tablet 3    loratadine (CLARITIN) 10 MG tablet Take 1 tablet by mouth daily 90 tablet 2    ipratropium (ATROVENT) 0.02 % nebulizer solution Take 2.5 mLs by nebulization 4 times daily As needed. 2.5 mL 3    lidocaine (LIDODERM) 5 % apply 3 patches to the affected area daily. Leave on for 12 hours and then off for 12 hours.  90 patch 3    albuterol sulfate  (90 Base) MCG/ACT inhaler Inhale 2 puffs into the lungs 4 times daily as needed for Wheezing 3 Inhaler 1    cyanocobalamin 1000 MCG/ML injection INJECT 1 MILLILITER INTO MUSCLE EVERY 7 DAYS 12 vial 3    ezetimibe (ZETIA) 10 MG tablet Take 1 tablet by mouth daily 90 tablet 3    olmesartan (BENICAR) 40 MG tablet take 1 tablet by mouth once daily 90 tablet 3    omeprazole (PRILOSEC) 20 MG delayed release capsule take 1 capsule by mouth once daily 90 capsule 3    tiZANidine (ZANAFLEX) 4 MG tablet Take 1 tablet by mouth every 8 hours as needed (muscle spasm) Take 1 tab by mouth 3 times daily 270 tablet 3    vitamin D (ERGOCALCIFEROL) 1.25 MG (47885 UT) CAPS capsule take 1 capsule by mouth weekly 11 capsule 5    Coenzyme Q10 (CO Q 10 PO) Take by mouth daily      Biotin w/ Vitamins C & E (HAIR/SKIN/NAILS PO) Take by mouth daily VIT C 90MG  VIT E 5,000MCG  ZINC 8MG  COPPER 1MG      Melatonin 1 MG SUBL Place 2 mg under the tongue nightly       Current Facility-Administered Medications   Medication Dose Route Frequency Provider Last Rate Last Admin    betamethasone acetate-betamethasone sodium phosphate (CELESTONE) injection 6 mg  6 mg Intra-articular Once Beatriz Martinez MD        lidocaine 1 % injection 1 mL  1 mL Intradermal Once Beatriz Martinez MD             Social History:   Social History     Tobacco Use    Smoking status: Former Smoker     Packs/day: 1.00     Years: 30.00     Pack years: 30.00     Types: Cigarettes     Quit date: 2007     Years since quittin.8    Smokeless tobacco: Never Used   Substance Use Topics    Alcohol use:  Yes     Alcohol/week: 0.0 standard drinks     Types: 4 - 6 Glasses of wine per week     Comment: SOCIALLY        Family History:   Family History   Problem Relation Age of Onset    Arthritis Mother     Diabetes Mother     Heart Disease Mother     High Blood Pressure Mother     High Cholesterol Mother     Stroke Mother     Asthma Sister     Cancer Sister 61        lung cancer    High Blood Pressure Sister     High Cholesterol Sister     Substance Abuse Sister     Heart Attack Father         massiv MI    Early Death Brother         car accident    Cancer Sister 50        breast cancer    Other Sister         GERD, diverticulosis, rotator cuff disease, spinal stenosis    Heart Disease Brother         MI    Diabetes Maternal Grandmother     Heart Disease Maternal Grandmother     High Blood Pressure Maternal Grandmother     High Cholesterol Maternal Grandmother     Stroke Maternal Grandmother     Breast Cancer Maternal Aunt 48        breast    Ovarian Cancer Maternal Aunt     Cancer Maternal Aunt 40        ovarian       REVIEW OF SYSTEMS:    Constitutional: negative  Eyes: negative  Ears, nose, mouth, throat, and face: negative  Respiratory: negative  Cardiovascular: negative  Gastrointestinal: as in HPI  Genitourinary:negative  Integument/breast: as in hpi  Hematologic/lymphatic: negative  Musculoskeletal:negative  Neurological: negative  Allergic/Immunologic: negative    PHYSICAL EXAM   BP (!) 146/78   Pulse 58   Temp 97.3 °F (36.3 °C) (Temporal)   Resp 16   Ht 5' 10\" (1.778 m)   Wt 178 lb (80.7 kg)   BMI 25.54 kg/m²     General appearance: alert, cooperative and in no acute distress. Eyes: Grossly normal   Lungs: Normal work of breathing  Heart: regular rate  Abdomen: reducible incisional hernia, soft, mildly tender, non distended  Skin: No skin abnormalities  Neurologic: Alert and oriented x 3. Grossly normal  Musculoskeletal: No edema. ASSESSMENT AND PLAN:     Kadi Hernandez is an 78 y.o. female who presents with an incisional hernia    Robotic assisted laparoscopic incisional hernia repair  I explained the risks, benefits, alternatives, and potential complications associated with the above procedure to be performed and transfusions when applicable with the patient/responsible person prior to the procedure. All of the patient's questions were answered. The patient understands and agrees to surgery.            Physician Signature: Electronically signed by Pascual Armendariz MD, General Surgery    Send copy of H&P to PCP, Reji Aguiar MD and referring physician, Jackie Burleson MD

## 2021-06-25 ENCOUNTER — TELEPHONE (OUTPATIENT)
Dept: SURGERY | Age: 71
End: 2021-06-25

## 2021-07-01 DIAGNOSIS — I10 ESSENTIAL HYPERTENSION: ICD-10-CM

## 2021-07-01 DIAGNOSIS — G89.29 CHRONIC PAIN OF RIGHT KNEE: ICD-10-CM

## 2021-07-01 DIAGNOSIS — M25.551 RIGHT HIP PAIN: ICD-10-CM

## 2021-07-01 DIAGNOSIS — M54.16 LUMBAR RADICULOPATHY: ICD-10-CM

## 2021-07-01 DIAGNOSIS — M25.561 CHRONIC PAIN OF RIGHT KNEE: ICD-10-CM

## 2021-07-01 DIAGNOSIS — G89.4 CHRONIC PAIN SYNDROME: ICD-10-CM

## 2021-07-01 NOTE — TELEPHONE ENCOUNTER
Medication Refill Request    LOV 6/15/2021  NOV 9/9/2021    Lab Results   Component Value Date    CREATININE 1.0 06/15/2021

## 2021-07-02 RX ORDER — ATENOLOL 100 MG/1
100 TABLET ORAL DAILY
Qty: 90 TABLET | Refills: 0 | Status: SHIPPED
Start: 2021-07-02 | End: 2021-07-03

## 2021-07-02 NOTE — TELEPHONE ENCOUNTER
Last Appointment:  6/15/2021  Future Appointments   Date Time Provider Samir Witt   7/6/2021 10:00 AM Jesus RITCHIE [facility]   9/9/2021  9:15 AM MD CHAU Rousseau [facility]   10/29/2021  2:00 PM Alexis Rai MD [facility]   3/29/2022  9:00 AM MD CHAU Rousseau [facility]   6/14/2022  1:30 PM [place] Balderas MD [facility]

## 2021-07-03 RX ORDER — PREGABALIN 50 MG/1
50 CAPSULE ORAL 2 TIMES DAILY
Qty: 60 CAPSULE | Refills: 0 | Status: SHIPPED
Start: 2021-07-03 | End: 2021-08-05 | Stop reason: SDUPTHER

## 2021-07-03 RX ORDER — ATENOLOL 100 MG/1
100 TABLET ORAL DAILY
Qty: 90 TABLET | Refills: 0 | Status: SHIPPED
Start: 2021-07-03 | End: 2021-09-09 | Stop reason: SDUPTHER

## 2021-07-06 ENCOUNTER — OFFICE VISIT (OUTPATIENT)
Dept: FAMILY MEDICINE CLINIC | Age: 71
End: 2021-07-06
Payer: MEDICARE

## 2021-07-06 VITALS
WEIGHT: 179.2 LBS | BODY MASS INDEX: 27.16 KG/M2 | OXYGEN SATURATION: 98 % | SYSTOLIC BLOOD PRESSURE: 124 MMHG | DIASTOLIC BLOOD PRESSURE: 60 MMHG | TEMPERATURE: 97.4 F | HEART RATE: 61 BPM | HEIGHT: 68 IN | RESPIRATION RATE: 16 BRPM

## 2021-07-06 DIAGNOSIS — Z01.818 PRE-OP EXAM: Primary | ICD-10-CM

## 2021-07-06 PROCEDURE — G8417 CALC BMI ABV UP PARAM F/U: HCPCS | Performed by: PHYSICIAN ASSISTANT

## 2021-07-06 PROCEDURE — 99214 OFFICE O/P EST MOD 30 MIN: CPT | Performed by: PHYSICIAN ASSISTANT

## 2021-07-06 PROCEDURE — 1123F ACP DISCUSS/DSCN MKR DOCD: CPT | Performed by: PHYSICIAN ASSISTANT

## 2021-07-06 PROCEDURE — 93000 ELECTROCARDIOGRAM COMPLETE: CPT | Performed by: PHYSICIAN ASSISTANT

## 2021-07-06 PROCEDURE — 1036F TOBACCO NON-USER: CPT | Performed by: PHYSICIAN ASSISTANT

## 2021-07-06 PROCEDURE — G8399 PT W/DXA RESULTS DOCUMENT: HCPCS | Performed by: PHYSICIAN ASSISTANT

## 2021-07-06 PROCEDURE — 3017F COLORECTAL CA SCREEN DOC REV: CPT | Performed by: PHYSICIAN ASSISTANT

## 2021-07-06 PROCEDURE — 1090F PRES/ABSN URINE INCON ASSESS: CPT | Performed by: PHYSICIAN ASSISTANT

## 2021-07-06 PROCEDURE — G8427 DOCREV CUR MEDS BY ELIG CLIN: HCPCS | Performed by: PHYSICIAN ASSISTANT

## 2021-07-06 PROCEDURE — 4040F PNEUMOC VAC/ADMIN/RCVD: CPT | Performed by: PHYSICIAN ASSISTANT

## 2021-07-06 NOTE — PROGRESS NOTES
Chief Complaint:  Pre-op Exam (hernia repair 7/14/21)    History of Present Illness:  Source of history provided by:  patient. Patient presents for preoperative clearance. She will be having hernia repair with Dr. Fanny Pimentel on 7/14. No acute concerns today, doing well. Denies issues with anesthesia in the past. Denies cardiac or pulmonary issues. Saw pulmonology last week and received pulmonology clearance for surgery. Denies fever, chills, CP, SOB, recent illness, cough, congestion, abdominal pain, N/V/D, dysuria, hematuria, neck pain, or lethargy. Review of Systems: Unless otherwise stated in this report or unable to obtain because of the patient's clinical or mental status as evidenced by the medical record, this patients's positive and negative responses for Review of Systems, constitutional, psych, eyes, ENT, cardiovascular, respiratory, gastrointestinal, neurological, genitourinary, musculoskeletal, integument systems and systems related to the presenting problem are either stated in the preceding or were not pertinent or were negative for the symptoms and/or complaints related to the medical problem. Past Medical History:  has a past medical history of Arthritis, Asthma, Cancer ([address] Chronic back pain, COPD (chronic obstructive pulmonary disease) ([address] Emphysema of lung ([address] Fibrocystic breast, GERD (gastroesophageal reflux disease), Hashimoto's disease, Helicobacter pylori (H. pylori), Hemorrhoids, Hyperlipidemia, Hypertension, Hyperthyroidism, and Post-thoracotomy pain. Past Surgical History:  has a past surgical history that includes Appendectomy (1981); Hysterectomy (1981); Lung cancer surgery (Right); Colonoscopy (6/23/15); Colonoscopy (6/23/15); Eye surgery; and US BREAST BIOPSY W LOC DEVICE 1ST LESION RIGHT (10/19/2020). Social History:  reports that she quit smoking about 13 years ago. Her smoking use included cigarettes. She has a 30.00 pack-year smoking history.  She has never used smokeless tobacco. She reports current alcohol use. She reports that she does not use drugs. Family History: family history includes Arthritis in her mother; Asthma in her sister; Breast Cancer (age of onset: 50) in her maternal aunt; Cancer (age of onset: 36) in her maternal aunt; Cancer (age of onset: 50) in her sister; Cancer (age of onset: 61) in her sister; Diabetes in her maternal grandmother and mother; Early Death in her brother; Heart Attack in her father; Heart Disease in her brother, maternal grandmother, and mother; High Blood Pressure in her maternal grandmother, mother, and sister; High Cholesterol in her maternal grandmother, mother, and sister; Other in her sister; Ovarian Cancer in her maternal aunt; Stroke in her maternal grandmother and mother; Substance Abuse in her sister. Allergies: Codeine, Fentanyl, Adhesive tape, Amlodipine, Bupropion, Cortisone, Dipyridamole, Levaquin [levofloxacin in d5w], Loratadine, Nortriptyline, Other, Prednisone, Tenex [guanfacine hcl], Tramadol, and Varenicline    Physical Exam:  (Vital signs reviewed) /60   Pulse 61   Temp 97.4 °F (36.3 °C)   Resp 16   Ht 5' 8.25\" (1.734 m)   Wt 179 lb 3.2 oz (81.3 kg)   SpO2 98%   BMI 27.05 kg/m²   Oxygen Saturation Interpretation: Normal.   Constitutional:  Alert, development consistent with age. Eyes:  PERRL, EOMI, no discharge or conjunctival injection. Ears:  External ears without lesions. TM's clear without erythema or perforation bilaterally. Throat:  Pharynx without injection, exudate, or tonsillar hypertrophy. Airway patient. Neck:  Normal ROM. Supple. Lungs:  Clear to auscultation and breath sounds equal.  Heart:  Regular rate and rhythm, normal heart sounds, without pathological murmurs, ectopy, gallops, or rubs. Abdomen:  Soft, nontender, good bowel sounds. No firm or pulsatile mass. Back:  No costovertebral tenderness. Skin:  Normal turgor.   Warm, dry, without visible rash, unless noted elsewhere. Neurological:  Alert and oriented. Motor functions intact.    ------------------------------------------Test Results Section----------------------------------------------  (All laboratory and radiology results have been personally reviewed by myself)  Laboratory:  Results for orders placed or performed in visit on 51/14/88   BASIC METABOLIC PANEL   Result Value Ref Range    Sodium 136 132 - 146 mmol/L    Potassium 4.6 3.5 - 5.0 mmol/L    Chloride 99 98 - 107 mmol/L    CO2 23 22 - 29 mmol/L    Anion Gap 14 7 - 16 mmol/L    Glucose 101 (H) 74 - 99 mg/dL    BUN 15 6 - 23 mg/dL    CREATININE 1.0 0.5 - 1.0 mg/dL    GFR Non-African American 55 >=60 mL/min/1.73    GFR African American >60     Calcium 9.7 8.6 - 10.2 mg/dL   TSH without Reflex   Result Value Ref Range    TSH 5.150 (H) 0.270 - 4.200 uIU/mL     Radiology: All Radiology results interpreted by Radiologist unless otherwise noted.     -------------------------------------Impression & Disposition Section-----------------------------------  Impression(s):  Rey Wells was seen today for pre-op exam.    Diagnoses and all orders for this visit:    Pre-op exam  -     EKG 12 Lead  -     Stable for surgery

## 2021-07-09 DIAGNOSIS — R05.9 COUGH: ICD-10-CM

## 2021-07-09 RX ORDER — BENZONATATE 100 MG/1
CAPSULE ORAL
Qty: 90 CAPSULE | Refills: 0 | Status: SHIPPED
Start: 2021-07-09 | End: 2021-08-18 | Stop reason: SDUPTHER

## 2021-07-09 NOTE — PROGRESS NOTES
Tim PRE-ADMISSION TESTING INSTRUCTIONS    The Preadmission Testing patient is instructed accordingly using the following criteria (check applicable):    ARRIVAL INSTRUCTIONS:  [x] Parking the day of Surgery is located in the Main Entrance lot. Upon entering the door, make an immediate right to the surgery reception desk    [x] Bring photo ID and insurance card    [] Bring in a copy of Living will or Durable Power of  papers. [x] Please be sure to arrange for responsible adult to provide transportation to and from the hospital    [x] Please arrange for responsible adult to be with you for the 24 hour period post procedure due to having anesthesia      GENERAL INSTRUCTIONS:    [x] Nothing by mouth after midnight, including gum, candy, mints or water    [x] You may brush your teeth, but do not swallow any water    [x] Take medications as instructed with 1-2 oz of water    [] Stop herbal supplements and vitamins 5 days prior to procedure    [] Follow preop dosing of blood thinners per physician instructions    [] Take 1/2 dose of evening insulin, but no insulin after midnight    [] No oral diabetic medications after midnight    [] If diabetic and have low blood sugar or feel symptomatic, take 1-2oz apple juice only    [x] Bring inhalers day of surgery    [] Bring C-PAP/ Bi-Pap day of surgery    [] Bring urine specimen day of surgery    [x] Shower or bath with soap, lather and rinse well, AM of Surgery, no lotion, powders or creams to surgical site    [] Follow bowel prep as instructed per surgeon    [x] No tobacco products within 24 hours of surgery     [x] No alcohol or illegal drug use within 24 hours of surgery.     [x] Jewelry, body piercing's, eyeglasses, contact lenses and dentures are not permitted into surgery (bring cases)      [x] Please do not wear any nail polish, make up or hair products on the day of surgery    [x] You can expect a call the business day prior to procedure to notify you if your arrival time changes    [x] If you receive a survey after surgery we would greatly appreciate your comments    [] Parent/guardian of a minor must accompany their child and remain on the premises  the entire time they are under our care     [] Pediatric patients may bring favorite toy, blanket or comfort item with them    [] A caregiver or family member must remain with the patient during their stay if they are mentally handicapped, have dementia, disoriented or unable to use a call light or would be a safety concern if left unattended    [x] Please notify surgeon if you develop any illness between now and time of surgery (cold, cough, sore throat, fever, nausea, vomiting) or any signs of infections  including skin, wounds, and dental.    [x]  The Outpatient Pharmacy is available to fill your prescription here on your day of surgery, ask your preop nurse for details    [] Other instructions    EDUCATIONAL MATERIALS PROVIDED:    [] PAT Preoperative Education Packet/Booklet     [] Medication List    [] Transfusion bracelet applied with instructions    [] Shower with soap, lather and rinse well, and use CHG wipes provided the evening before surgery as instructed    [] Incentive spirometer with instructions

## 2021-07-09 NOTE — PROGRESS NOTES

## 2021-07-13 ENCOUNTER — ANESTHESIA EVENT (OUTPATIENT)
Dept: OPERATING ROOM | Age: 71
End: 2021-07-13
Payer: MEDICARE

## 2021-07-13 ASSESSMENT — LIFESTYLE VARIABLES: SMOKING_STATUS: 0

## 2021-07-13 NOTE — ANESTHESIA PRE PROCEDURE
Department of Anesthesiology  Preprocedure Note       Name:  Jake Raymond   Age:  73 y.o.  :  1950                                          MRN:  [id number]         Date:  2021      Surgeon: Elijah Marquis):  Radha Page MD    Procedure: Procedure(s):  LAPAROSCOPIC ROBOTIC XI ASSISTED INCISIONAL HERNIA REPAIR WITH MESH POSSIBLE OPEN    Medications prior to admission:   Prior to Admission medications    Medication Sig Start Date End Date Taking? Authorizing Provider   benzonatate (TESSALON) 100 MG capsule take 1 capsule by mouth three times a day if needed for cough 21  Yes Brent Pan MD   pregabalin (LYRICA) 50 MG capsule Take 1 capsule by mouth 2 times daily for 30 days. 7/3/21 8/2/21 Yes Brent Pan MD   atenolol (TENORMIN) 100 MG tablet Take 1 tablet by mouth daily 7/3/21  Yes Brent Pan MD   SYNTHROID 100 MCG tablet Take 1 tablet 5 days a week and 1.5 tab on Saturday and 21  Yes Torin Galicia MD   escitalopram (LEXAPRO) 5 MG tablet Take 1 tablet by mouth daily 21  Yes Brent Pan MD   montelukast (SINGULAIR) 10 MG tablet Take 1 tablet by mouth nightly take 1 tablet by mouth every evening 21  Yes Brent Pan MD   loratadine (CLARITIN) 10 MG tablet Take 1 tablet by mouth daily 3/26/21  Yes Brent Pan MD   ipratropium (ATROVENT) 0.02 % nebulizer solution Take 2.5 mLs by nebulization 4 times daily As needed. 21  Yes Brent Pan MD   lidocaine (LIDODERM) 5 % apply 3 patches to the affected area daily. Leave on for 12 hours and then off for 12 hours.  21  Yes Brent Pan MD   albuterol sulfate  (90 Base) MCG/ACT inhaler Inhale 2 puffs into the lungs 4 times daily as needed for Wheezing 20  Yes Brent Pan MD   cyanocobalamin 1000 MCG/ML injection INJECT 1 MILLILITER INTO MUSCLE EVERY 7 DAYS 20  Yes Brent Pan MD   ezetimibe (ZETIA) 10 MG tablet Take 1 tablet by mouth daily 12/31/20  Yes Luz Elena Fontaine MD   olmesartan (BENICAR) 40 MG tablet take 1 tablet by mouth once daily 12/31/20  Yes Luz Elena Fontaine MD   omeprazole (PRILOSEC) 20 MG delayed release capsule take 1 capsule by mouth once daily 12/31/20  Yes Luz Elena Fontaine MD   tiZANidine (ZANAFLEX) 4 MG tablet Take 1 tablet by mouth every 8 hours as needed (muscle spasm) Take 1 tab by mouth 3 times daily 12/31/20  Yes Luz Elena Fontaine MD   vitamin D (ERGOCALCIFEROL) 1.25 MG (60380 UT) CAPS capsule take 1 capsule by mouth weekly 12/31/20  Yes Luz Elena Fontaine MD   Coenzyme Q10 (CO Q 10 PO) Take by mouth daily   Yes Historical Provider, MD   Biotin w/ Vitamins C & E (HAIR/SKIN/NAILS PO) Take by mouth daily VIT C 90MG  VIT E 5,000MCG  ZINC 8MG  COPPER 1MG   Yes Historical Provider, MD   Melatonin 1 MG SUBL Place 2 mg under the tongue nightly   Yes Historical Provider, MD       Current medications:    Current Facility-Administered Medications   Medication Dose Route Frequency Provider Last Rate Last Admin    betamethasone acetate-betamethasone sodium phosphate (CELESTONE) injection 6 mg  6 mg Intra-articular Once Xavier Win MD        lidocaine 1 % injection 1 mL  1 mL Intradermal Once Xavier Win MD         Current Outpatient Medications   Medication Sig Dispense Refill    benzonatate (TESSALON) 100 MG capsule take 1 capsule by mouth three times a day if needed for cough 90 capsule 0    pregabalin (LYRICA) 50 MG capsule Take 1 capsule by mouth 2 times daily for 30 days.  60 capsule 0    atenolol (TENORMIN) 100 MG tablet Take 1 tablet by mouth daily 90 tablet 0    SYNTHROID 100 MCG tablet Take 1 tablet 5 days a week and 1.5 tab on Saturday and Sunday 36 tablet 11    escitalopram (LEXAPRO) 5 MG tablet Take 1 tablet by mouth daily 90 tablet 0    montelukast (SINGULAIR) 10 MG tablet Take 1 tablet by mouth nightly take 1 tablet by mouth every evening 90 tablet 0    loratadine (CLARITIN) 10 MG tablet Take 1 tablet by mouth daily 90 tablet 2    ipratropium (ATROVENT) 0.02 % nebulizer solution Take 2.5 mLs by nebulization 4 times daily As needed. 2.5 mL 3    lidocaine (LIDODERM) 5 % apply 3 patches to the affected area daily. Leave on for 12 hours and then off for 12 hours. 90 patch 3    albuterol sulfate  (90 Base) MCG/ACT inhaler Inhale 2 puffs into the lungs 4 times daily as needed for Wheezing 3 Inhaler 1    cyanocobalamin 1000 MCG/ML injection INJECT 1 MILLILITER INTO MUSCLE EVERY 7 DAYS 12 vial 3    ezetimibe (ZETIA) 10 MG tablet Take 1 tablet by mouth daily 90 tablet 3    olmesartan (BENICAR) 40 MG tablet take 1 tablet by mouth once daily 90 tablet 3    omeprazole (PRILOSEC) 20 MG delayed release capsule take 1 capsule by mouth once daily 90 capsule 3    tiZANidine (ZANAFLEX) 4 MG tablet Take 1 tablet by mouth every 8 hours as needed (muscle spasm) Take 1 tab by mouth 3 times daily 270 tablet 3    vitamin D (ERGOCALCIFEROL) 1.25 MG (60191 UT) CAPS capsule take 1 capsule by mouth weekly 11 capsule 5    Coenzyme Q10 (CO Q 10 PO) Take by mouth daily      Biotin w/ Vitamins C & E (HAIR/SKIN/NAILS PO) Take by mouth daily VIT C 90MG  VIT E 5,000MCG  ZINC 8MG  COPPER 1MG      Melatonin 1 MG SUBL Place 2 mg under the tongue nightly         Allergies:     Allergies   Allergen Reactions    Codeine Hives and Itching     AND CODEINE DERIVATIVES----sob  Pt stated tolerated Dilaudid    Fentanyl Other (See Comments)     Disoriented, confused  Other reaction(s): Mental Status Change    Adhesive Tape      burns skin, reddens skin, blisters    Amlodipine Swelling    Bupropion      hyper--couldn't sit still--jumpy    Cortisone      throat closed up    Dipyridamole Hives     Persantine-CST--sob, palpitations, stress test stopped    Levaquin [Levofloxacin In D5w] Other (See Comments)     Yeast infection     Loratadine      hyper--couldn't sit still--jumpy    Nortriptyline      for rosacea--caused flare up. stopped    Other      States any steroids make her face swell and flush    Prednisone Other (See Comments)    Tenex [Guanfacine Hcl]      dizziness    Tramadol Hives     sob    Varenicline        Problem List:    Patient Active Problem List   Diagnosis Code    Essential hypertension I10    Skin cancer C44.90    Lung cancer ([facility] [address] C34.90    Spinal stenosis M48.00    Former smoker Z87.891    Hx of hysterectomy for benign disease Z90.710    Thyroid nodule E04.1    Diverticulosis K57.90    At risk for colon cancer Z91.89    Somatic dysfunction of lumbar region M99.03    Somatic dysfunction of lower extremities M99.06    Somatic dysfunction of pelvis region M99.05    Chest wall pain following surgery R07.89, G89.18    Pure hypercholesterolemia E78.00    Chronic obstructive pulmonary disease (HCC) J44.9    Chronic pain syndrome G89.4    Lumbar radiculopathy M54.16    Seizure (HCC) R56.9    Hypothyroidism E03.9    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  Not surgical candidate    Asthma     Cancer [facility])     lung cancer- L- 2008, stage 1A, [id number] Stage 1A 2013    Chronic back pain     COPD (chronic obstructive pulmonary disease) (HCC)     Emphysema of lung ([facility] [address]     GERD (gastroesophageal reflux disease)     Hashimoto's disease 2008    Hyperlipidemia     Hypertension     Hyperthyroidism     Incisional hernia        Past Surgical History:        Procedure Laterality Date    APPENDECTOMY  1981    CATARACT REMOVAL WITH IMPLANT Bilateral     COLONOSCOPY  6/23/15    polyp and diverticulosis    COLONOSCOPY  6/23/15    colonoscopy    HYSTERECTOMY  1981    LUNG CANCER SURGERY Right     X 2    US BREAST NEEDLE BIOPSY RIGHT  10/19/2020    US BREAST NEEDLE BIOPSY RIGHT 10/19/2020 [facility]       Social History:    Social History     Tobacco Use  Smoking status: Former Smoker     Packs/day: 1.00     Years: 30.00     Pack years: 30.00     Types: Cigarettes     Quit date: 2007     Years since quittin.8    Smokeless tobacco: Never Used   Substance Use Topics    Alcohol use: Yes     Alcohol/week: 0.0 standard drinks     Types: 4 - 6 Glasses of wine per week     Comment: SOCIALLY                                Counseling given: Not Answered      Vital Signs (Current):   Vitals:    21 1424   Weight: 170 lb (77.1 kg)   Height: 5' 10\" (1.778 m)                                              BP Readings from Last 3 Encounters:   21 124/60   21 (!) 146/78   21 (!) 142/82       NPO Status:                                                                                 BMI:   Wt Readings from Last 3 Encounters:   21 179 lb 3.2 oz (81.3 kg)   21 178 lb (80.7 kg)   21 180 lb 6.4 oz (81.8 kg)     Body mass index is 24.39 kg/m². CBC:   Lab Results   Component Value Date    WBC 5.4 2021    RBC 4.28 2021    HGB 12.7 2021    HCT 40.6 2021    MCV 94.9 2021    RDW 13.3 2021     2021       CMP:   Lab Results   Component Value Date     06/15/2021    K 4.6 06/15/2021    CL 99 06/15/2021    CO2 23 06/15/2021    BUN 15 06/15/2021    CREATININE 1.0 06/15/2021    GFRAA >60 06/15/2021    LABGLOM 55 06/15/2021    GLUCOSE 101 06/15/2021    PROT 7.0 2021    CALCIUM 9.7 06/15/2021    BILITOT 0.2 2021    ALKPHOS 66 2021    AST 31 2021    ALT 42 2021       POC Tests: No results for input(s): POCGLU, POCNA, POCK, POCCL, POCBUN, POCHEMO, POCHCT in the last 72 hours.     Coags: No results found for: PROTIME, INR, APTT    HCG (If Applicable): No results found for: PREGTESTUR, PREGSERUM, HCG, HCGQUANT     ABGs: No results found for: PHART, PO2ART, FYM6LWC, XIS3EAW, BEART, N9LUCESN     Type & Screen (If Applicable):  No results found for: LABABO, [address]    Drug/Infectious Status (If Applicable):  No results found for: HIV, HEPCAB    COVID-19 Screening (If Applicable): No results found for: COVID19        Anesthesia Evaluation  Patient summary reviewed no history of anesthetic complications:   Airway: Mallampati: III  TM distance: >3 FB   Neck ROM: full  Mouth opening: > = 3 FB Dental:          Pulmonary: breath sounds clear to auscultation  (+) COPD:  asthma:     (-) not a current smoker                          ROS comment: S/P right middle and lower lobes resected. Cardiovascular:    (+) hypertension:,       ECG reviewed  Rhythm: regular  Rate: normal  Echocardiogram reviewed  Stress test reviewed                Neuro/Psych:   (+) seizures:,              ROS comment: Lumbar radiculopathy. Statin induced myositis. GI/Hepatic/Renal:   (+) GERD: well controlled,           Endo/Other:    (+) hypothyroidism::., malignancy/cancer (Lung). Abdominal:             Vascular: negative vascular ROS. Other Findings:           Anesthesia Plan      general     ASA 3     (Pt agrees to General anesthesia--IV induction and ETTube. Pt is able to tolerate Fentanyl patches. She is not sure what caused her confusion after lung surgery in 2008.)  Induction: intravenous. Anesthetic plan and risks discussed with patient.                   Terry Morales MD   7/13/2021

## 2021-07-14 ENCOUNTER — ANESTHESIA (OUTPATIENT)
Dept: OPERATING ROOM | Age: 71
End: 2021-07-14
Payer: MEDICARE

## 2021-07-14 ENCOUNTER — HOSPITAL ENCOUNTER (OUTPATIENT)
Age: 71
Setting detail: OUTPATIENT SURGERY
Discharge: HOME | End: 2021-07-14
Attending: SURGERY | Admitting: SURGERY
Payer: MEDICARE

## 2021-07-14 VITALS
TEMPERATURE: 97.2 F | SYSTOLIC BLOOD PRESSURE: 115 MMHG | BODY MASS INDEX: 24.34 KG/M2 | HEART RATE: 98 BPM | WEIGHT: 170 LBS | RESPIRATION RATE: 14 BRPM | OXYGEN SATURATION: 94 % | DIASTOLIC BLOOD PRESSURE: 74 MMHG | HEIGHT: 70 IN

## 2021-07-14 VITALS — DIASTOLIC BLOOD PRESSURE: 80 MMHG | TEMPERATURE: 93.9 F | OXYGEN SATURATION: 100 % | SYSTOLIC BLOOD PRESSURE: 181 MMHG

## 2021-07-14 DIAGNOSIS — G89.18 POSTOPERATIVE PAIN: Primary | ICD-10-CM

## 2021-07-14 PROCEDURE — 6370000000 HC RX 637 (ALT 250 FOR IP)

## 2021-07-14 PROCEDURE — 7100000000 HC PACU RECOVERY - FIRST 15 MIN: Performed by: SURGERY

## 2021-07-14 PROCEDURE — 2709999900 HC NON-CHARGEABLE SUPPLY: Performed by: SURGERY

## 2021-07-14 PROCEDURE — 2580000003 HC RX 258

## 2021-07-14 PROCEDURE — 3700000001 HC ADD 15 MINUTES (ANESTHESIA): Performed by: SURGERY

## 2021-07-14 PROCEDURE — 7100000011 HC PHASE II RECOVERY - ADDTL 15 MIN: Performed by: SURGERY

## 2021-07-14 PROCEDURE — 6360000002 HC RX W HCPCS: Performed by: ANESTHESIOLOGY

## 2021-07-14 PROCEDURE — 7100000001 HC PACU RECOVERY - ADDTL 15 MIN: Performed by: SURGERY

## 2021-07-14 PROCEDURE — 3600000019 HC SURGERY ROBOT ADDTL 15MIN: Performed by: SURGERY

## 2021-07-14 PROCEDURE — C1781 MESH (IMPLANTABLE): HCPCS | Performed by: SURGERY

## 2021-07-14 PROCEDURE — 6360000002 HC RX W HCPCS: Performed by: SURGERY

## 2021-07-14 PROCEDURE — 7100000010 HC PHASE II RECOVERY - FIRST 15 MIN: Performed by: SURGERY

## 2021-07-14 PROCEDURE — S2900 ROBOTIC SURGICAL SYSTEM: HCPCS | Performed by: SURGERY

## 2021-07-14 PROCEDURE — 49654 PR LAP, INCISIONAL HERNIA REPAIR,REDUCIBLE: CPT | Performed by: SURGERY

## 2021-07-14 PROCEDURE — 2500000003 HC RX 250 WO HCPCS

## 2021-07-14 PROCEDURE — 3600000009 HC SURGERY ROBOT BASE: Performed by: SURGERY

## 2021-07-14 PROCEDURE — 3700000000 HC ANESTHESIA ATTENDED CARE: Performed by: SURGERY

## 2021-07-14 PROCEDURE — 6360000002 HC RX W HCPCS

## 2021-07-14 DEVICE — MESH SURG DIA4.5IN CIR SEPRA TECHNOLOGY VENTRALIGHT: Type: IMPLANTABLE DEVICE | Site: ABDOMEN | Status: FUNCTIONAL

## 2021-07-14 RX ORDER — SODIUM CHLORIDE 0.9 % (FLUSH) 0.9 %
10 SYRINGE (ML) INJECTION PRN
Status: DISCONTINUED | OUTPATIENT
Start: 2021-07-14 | End: 2021-07-14 | Stop reason: HOSPADM

## 2021-07-14 RX ORDER — FENTANYL CITRATE 50 UG/ML
INJECTION, SOLUTION INTRAMUSCULAR; INTRAVENOUS PRN
Status: DISCONTINUED | OUTPATIENT
Start: 2021-07-14 | End: 2021-07-14 | Stop reason: SDUPTHER

## 2021-07-14 RX ORDER — ONDANSETRON 2 MG/ML
INJECTION INTRAMUSCULAR; INTRAVENOUS PRN
Status: DISCONTINUED | OUTPATIENT
Start: 2021-07-14 | End: 2021-07-14 | Stop reason: SDUPTHER

## 2021-07-14 RX ORDER — FENTANYL CITRATE 50 UG/ML
25 INJECTION, SOLUTION INTRAMUSCULAR; INTRAVENOUS EVERY 5 MIN PRN
Status: DISCONTINUED | OUTPATIENT
Start: 2021-07-14 | End: 2021-07-14 | Stop reason: HOSPADM

## 2021-07-14 RX ORDER — ALBUTEROL SULFATE 90 UG/1
AEROSOL, METERED RESPIRATORY (INHALATION) PRN
Status: DISCONTINUED | OUTPATIENT
Start: 2021-07-14 | End: 2021-07-14 | Stop reason: SDUPTHER

## 2021-07-14 RX ORDER — SODIUM CHLORIDE 0.9 % (FLUSH) 0.9 %
10 SYRINGE (ML) INJECTION EVERY 12 HOURS SCHEDULED
Status: DISCONTINUED | OUTPATIENT
Start: 2021-07-14 | End: 2021-07-14 | Stop reason: HOSPADM

## 2021-07-14 RX ORDER — OXYCODONE HYDROCHLORIDE AND ACETAMINOPHEN 5; 325 MG/1; MG/1
1 TABLET ORAL
Status: CANCELLED | OUTPATIENT
Start: 2021-07-14 | End: 2021-07-14

## 2021-07-14 RX ORDER — SODIUM CHLORIDE 9 MG/ML
INJECTION, SOLUTION INTRAVENOUS CONTINUOUS PRN
Status: DISCONTINUED | OUTPATIENT
Start: 2021-07-14 | End: 2021-07-14 | Stop reason: SDUPTHER

## 2021-07-14 RX ORDER — OXYCODONE HYDROCHLORIDE AND ACETAMINOPHEN 5; 325 MG/1; MG/1
1 TABLET ORAL EVERY 6 HOURS PRN
Qty: 28 TABLET | Refills: 0 | Status: SHIPPED | OUTPATIENT
Start: 2021-07-14 | End: 2021-07-14 | Stop reason: SDUPTHER

## 2021-07-14 RX ORDER — SODIUM CHLORIDE 9 MG/ML
25 INJECTION, SOLUTION INTRAVENOUS PRN
Status: DISCONTINUED | OUTPATIENT
Start: 2021-07-14 | End: 2021-07-14 | Stop reason: HOSPADM

## 2021-07-14 RX ORDER — ROCURONIUM BROMIDE 10 MG/ML
INJECTION, SOLUTION INTRAVENOUS PRN
Status: DISCONTINUED | OUTPATIENT
Start: 2021-07-14 | End: 2021-07-14 | Stop reason: SDUPTHER

## 2021-07-14 RX ORDER — OXYCODONE HYDROCHLORIDE AND ACETAMINOPHEN 5; 325 MG/1; MG/1
1 TABLET ORAL EVERY 6 HOURS PRN
Qty: 28 TABLET | Refills: 0 | Status: SHIPPED | OUTPATIENT
Start: 2021-07-14 | End: 2021-07-19 | Stop reason: SDUPTHER

## 2021-07-14 RX ORDER — GLYCOPYRROLATE 1 MG/5 ML
SYRINGE (ML) INTRAVENOUS PRN
Status: DISCONTINUED | OUTPATIENT
Start: 2021-07-14 | End: 2021-07-14 | Stop reason: SDUPTHER

## 2021-07-14 RX ORDER — OXYCODONE HYDROCHLORIDE AND ACETAMINOPHEN 5; 325 MG/1; MG/1
TABLET ORAL
Status: COMPLETED
Start: 2021-07-14 | End: 2021-07-14

## 2021-07-14 RX ORDER — EPHEDRINE SULFATE/0.9% NACL/PF 50 MG/5 ML
SYRINGE (ML) INTRAVENOUS PRN
Status: DISCONTINUED | OUTPATIENT
Start: 2021-07-14 | End: 2021-07-14 | Stop reason: SDUPTHER

## 2021-07-14 RX ORDER — PROPOFOL 10 MG/ML
INJECTION, EMULSION INTRAVENOUS PRN
Status: DISCONTINUED | OUTPATIENT
Start: 2021-07-14 | End: 2021-07-14 | Stop reason: SDUPTHER

## 2021-07-14 RX ADMIN — OXYCODONE HYDROCHLORIDE AND ACETAMINOPHEN 1 TABLET: 5; 325 TABLET ORAL at 13:07

## 2021-07-14 RX ADMIN — PROPOFOL 200 MG: 10 INJECTION, EMULSION INTRAVENOUS at 09:53

## 2021-07-14 RX ADMIN — ONDANSETRON 4 MG: 2 INJECTION INTRAMUSCULAR; INTRAVENOUS at 11:01

## 2021-07-14 RX ADMIN — HYDROMORPHONE HYDROCHLORIDE 0.25 MG: 1 INJECTION, SOLUTION INTRAMUSCULAR; INTRAVENOUS; SUBCUTANEOUS at 11:52

## 2021-07-14 RX ADMIN — FENTANYL CITRATE 50 MCG: 50 INJECTION, SOLUTION INTRAMUSCULAR; INTRAVENOUS at 10:18

## 2021-07-14 RX ADMIN — SUGAMMADEX 300 MG: 100 INJECTION, SOLUTION INTRAVENOUS at 11:12

## 2021-07-14 RX ADMIN — HYDROMORPHONE HYDROCHLORIDE 0.25 MG: 1 INJECTION, SOLUTION INTRAMUSCULAR; INTRAVENOUS; SUBCUTANEOUS at 11:57

## 2021-07-14 RX ADMIN — ROCURONIUM BROMIDE 10 MG: 10 INJECTION, SOLUTION INTRAVENOUS at 10:33

## 2021-07-14 RX ADMIN — ALBUTEROL SULFATE 4 PUFF: 108 AEROSOL, METERED RESPIRATORY (INHALATION) at 10:43

## 2021-07-14 RX ADMIN — Medication 2000 MG: at 09:45

## 2021-07-14 RX ADMIN — SODIUM CHLORIDE: 9 INJECTION, SOLUTION INTRAVENOUS at 09:45

## 2021-07-14 RX ADMIN — Medication 0.2 MG: at 10:25

## 2021-07-14 RX ADMIN — HYDROMORPHONE HYDROCHLORIDE 0.25 MG: 1 INJECTION, SOLUTION INTRAMUSCULAR; INTRAVENOUS; SUBCUTANEOUS at 11:36

## 2021-07-14 RX ADMIN — ROCURONIUM BROMIDE 10 MG: 10 INJECTION, SOLUTION INTRAVENOUS at 10:42

## 2021-07-14 RX ADMIN — FENTANYL CITRATE 50 MCG: 50 INJECTION, SOLUTION INTRAMUSCULAR; INTRAVENOUS at 09:53

## 2021-07-14 RX ADMIN — HYDROMORPHONE HYDROCHLORIDE 0.25 MG: 1 INJECTION, SOLUTION INTRAMUSCULAR; INTRAVENOUS; SUBCUTANEOUS at 11:41

## 2021-07-14 RX ADMIN — FENTANYL CITRATE 50 MCG: 50 INJECTION, SOLUTION INTRAMUSCULAR; INTRAVENOUS at 10:51

## 2021-07-14 RX ADMIN — Medication 15 MG: at 10:26

## 2021-07-14 RX ADMIN — FENTANYL CITRATE 50 MCG: 50 INJECTION, SOLUTION INTRAMUSCULAR; INTRAVENOUS at 11:27

## 2021-07-14 RX ADMIN — ROCURONIUM BROMIDE 40 MG: 10 INJECTION, SOLUTION INTRAVENOUS at 09:56

## 2021-07-14 ASSESSMENT — PULMONARY FUNCTION TESTS
PIF_VALUE: 30
PIF_VALUE: 19
PIF_VALUE: 4
PIF_VALUE: 30
PIF_VALUE: 3
PIF_VALUE: 30
PIF_VALUE: 15
PIF_VALUE: 3
PIF_VALUE: 30
PIF_VALUE: 30
PIF_VALUE: 0
PIF_VALUE: 30
PIF_VALUE: 30
PIF_VALUE: 20
PIF_VALUE: 30
PIF_VALUE: 18
PIF_VALUE: 19
PIF_VALUE: 30
PIF_VALUE: 24
PIF_VALUE: 30
PIF_VALUE: 20
PIF_VALUE: 17
PIF_VALUE: 4
PIF_VALUE: 21
PIF_VALUE: 30
PIF_VALUE: 30
PIF_VALUE: 18
PIF_VALUE: 21
PIF_VALUE: 4
PIF_VALUE: 18
PIF_VALUE: 30
PIF_VALUE: 20
PIF_VALUE: 30
PIF_VALUE: 2
PIF_VALUE: 30
PIF_VALUE: 41
PIF_VALUE: 1
PIF_VALUE: 19
PIF_VALUE: 30
PIF_VALUE: 4
PIF_VALUE: 30
PIF_VALUE: 30
PIF_VALUE: 1
PIF_VALUE: 19
PIF_VALUE: 30
PIF_VALUE: 19
PIF_VALUE: 8
PIF_VALUE: 18
PIF_VALUE: 30
PIF_VALUE: 24
PIF_VALUE: 18
PIF_VALUE: 18
PIF_VALUE: 25
PIF_VALUE: 19
PIF_VALUE: 30
PIF_VALUE: 1
PIF_VALUE: 30
PIF_VALUE: 19
PIF_VALUE: 30
PIF_VALUE: 20
PIF_VALUE: 30
PIF_VALUE: 1
PIF_VALUE: 30
PIF_VALUE: 30
PIF_VALUE: 3
PIF_VALUE: 30
PIF_VALUE: 4
PIF_VALUE: 2
PIF_VALUE: 30
PIF_VALUE: 18
PIF_VALUE: 30
PIF_VALUE: 18
PIF_VALUE: 22
PIF_VALUE: 30

## 2021-07-14 ASSESSMENT — PAIN - FUNCTIONAL ASSESSMENT: PAIN_FUNCTIONAL_ASSESSMENT: 0-10

## 2021-07-14 ASSESSMENT — PAIN DESCRIPTION - FREQUENCY
FREQUENCY: CONTINUOUS
FREQUENCY: CONTINUOUS
FREQUENCY: INTERMITTENT
FREQUENCY: CONTINUOUS
FREQUENCY: CONTINUOUS

## 2021-07-14 ASSESSMENT — PAIN DESCRIPTION - PAIN TYPE
TYPE: SURGICAL PAIN

## 2021-07-14 ASSESSMENT — PAIN SCALES - GENERAL
PAINLEVEL_OUTOF10: 8
PAINLEVEL_OUTOF10: 4
PAINLEVEL_OUTOF10: 6
PAINLEVEL_OUTOF10: 6
PAINLEVEL_OUTOF10: 7
PAINLEVEL_OUTOF10: 8

## 2021-07-14 ASSESSMENT — PAIN DESCRIPTION - LOCATION
LOCATION: ABDOMEN

## 2021-07-14 ASSESSMENT — PAIN DESCRIPTION - DESCRIPTORS
DESCRIPTORS: DISCOMFORT
DESCRIPTORS: SHARP
DESCRIPTORS: SHARP
DESCRIPTORS: ACHING
DESCRIPTORS: DISCOMFORT
DESCRIPTORS: DISCOMFORT

## 2021-07-14 ASSESSMENT — PAIN DESCRIPTION - PROGRESSION
CLINICAL_PROGRESSION: GRADUALLY IMPROVING
CLINICAL_PROGRESSION: GRADUALLY IMPROVING

## 2021-07-14 ASSESSMENT — PAIN DESCRIPTION - ORIENTATION
ORIENTATION: ANTERIOR

## 2021-07-14 ASSESSMENT — PAIN DESCRIPTION - ONSET
ONSET: GRADUAL

## 2021-07-14 NOTE — PROGRESS NOTES
Was able to get anther resident to write script. Pt would like filled here. Pt comfortable, resting with eyes closed.

## 2021-07-14 NOTE — PROGRESS NOTES
Pt has met discharge criteria. Dr did not sign script, asked if resident on case could sign, resident is in surgery that is due out after 1500. Will ask pt what she would like to do.

## 2021-07-14 NOTE — H&P
Patient's office history and physical was reviewed. Patient examined. There has been no change in the patient's history and physical.      Physician Signature: Electronically signed by Dr. Radha Page    History and Physical - General Surgery    Patient's Name/Date of Birth: Jake Raymond / 1950    Date: 6/24/2021    PCP: Brent Pan MD    Referring Physician:   No ref. provider found  N/A      CHIEF COMPLAINT:    No chief complaint on file. HISTORY OF PRESENT ILLNESS:    Jake Raymond is an 73 y.o. female who presents with an abdominal wall bulge. She said it has been there a few months. It bothers her a little. Nothing gets stuck, it does reduce. No n/v/d/c. She said her son had hernia repair recently. She has had surgery and had an incision in that area in the past.       Past Medical History:   Past Medical History:   Diagnosis Date    Arthritis     Symptoms respond to myofascial release.  Not surgical candidate    Asthma     Cancer [facility])     lung cancer- L- 2008, stage 1A, -2202 Stage 1A 2013    Chronic back pain     COPD (chronic obstructive pulmonary disease) (HCC)     [facility]physema [facility] lung ([facility] [address]     GERD (gastroesophageal reflux disease)     Hashimoto's disease 2008    Hyperlipidemia     Hypertension     Hyperthyroidism     Incisional hernia         Past Surgical History:   Past Surgical History:   Procedure Laterality Date    APPENDECTOMY  1981    CATARACT REMOVAL WITH IMPLANT Bilateral     COLONOSCOPY  6/23/15    polyp and diverticulosis    COLONOSCOPY  6/23/15    colonoscopy    HYSTERECTOMY  1981    LUNG CANCER SURGERY Right     X 2    US BREAST NEEDLE BIOPSY RIGHT  10/19/2020    US BREAST NEEDLE BIOPSY RIGHT 10/19/2020 [facility]        Allergies: Codeine, Fentanyl, Adhesive tape, Amlodipine, Bupropion, Cortisone, Dipyridamole, Levaquin [levofloxacin in d5w], Loratadine, Nortriptyline, Other, Prednisone, Tenex [guanfacine hcl], Tramadol, and Varenicline     Medications:   Current Facility-Administered Medications   Medication Dose Route Frequency Provider Last Rate Last Admin    0.9 % sodium chloride infusion  25 mL Intravenous PRN Ra Perez MD        ceFAZolin (ANCEF) 2000 mg in sterile water 20 mL IV syringe  2,000 mg Intravenous On Call to Cooper Mcdermott MD        sodium chloride flush 0.9 % injection 10 mL  10 mL Intravenous 2 times per day Ra Perez MD        sodium chloride flush 0.9 % injection 10 mL  10 mL Intravenous PRN Ra Perez MD             Social History:   Social History     Tobacco Use    Smoking status: Former Smoker     Packs/day: 1.00     Years: 30.00     Pack years: 30.00     Types: Cigarettes     Quit date: 2007     Years since quittin.8    Smokeless tobacco: Never Used   Substance Use Topics    Alcohol use:  Yes     Alcohol/week: 0.0 standard drinks     Types: 4 - 6 Glasses of wine per week     Comment: SOCIALLY        Family History:   Family History   Problem Relation Age of Onset    Arthritis Mother     Diabetes Mother     Heart Disease Mother     High Blood Pressure Mother     High Cholesterol Mother     Stroke Mother     Asthma Sister     Cancer Sister 61        lung cancer    High Blood Pressure Sister     High Cholesterol Sister     Substance Abuse Sister     Heart Attack Father         massiv MI    Early Death Brother         car accident    Cancer Sister 50        breast cancer    Other Sister         GERD, diverticulosis, rotator cuff disease, spinal stenosis    Heart Disease Brother         MI    Diabetes Maternal Grandmother     Heart Disease Maternal Grandmother     High Blood Pressure Maternal Grandmother     High Cholesterol Maternal Grandmother     Stroke Maternal Grandmother     Breast Cancer Maternal Aunt 48        breast    Ovarian Cancer Maternal Aunt     Cancer Maternal Aunt 40        ovarian       REVIEW OF SYSTEMS:    Constitutional: negative  Eyes: negative  Ears, nose, mouth, throat, and face: negative  Respiratory: negative  Cardiovascular: negative  Gastrointestinal: as in HPI  Genitourinary:negative  Integument/breast: as in hpi  Hematologic/lymphatic: negative  Musculoskeletal:negative  Neurological: negative  Allergic/Immunologic: negative    PHYSICAL EXAM   BP (!) 161/73   Pulse 61   Temp 97.7 °F (36.5 °C) (Temporal)   Resp 16   Ht 5' 10\" (1.778 m)   Wt 170 lb (77.1 kg)   SpO2 98%   BMI 24.39 kg/m²     General appearance: alert, cooperative and in no acute distress. Eyes: Grossly normal   Lungs: Normal work of breathing  Heart: regular rate  Abdomen: reducible incisional hernia, soft, mildly tender, non distended  Skin: No skin abnormalities  Neurologic: Alert and oriented x 3. Grossly normal  Musculoskeletal: No edema. ASSESSMENT AND PLAN:     Amina Méndez is an 73 y.o. female who presents with an incisional hernia    Robotic assisted laparoscopic incisional hernia repair  I explained the risks, benefits, alternatives, and potential complications associated with the above procedure to be performed and transfusions when applicable with the patient/responsible person prior to the procedure. All of the patient's questions were answered. The patient understands and agrees to surgery. Physician Signature: Electronically signed by Quinton Lozoya MD, General Surgery    Send copy of H&P to PCP, Amira Mann MD and referring physician, No ref.  provider found

## 2021-07-15 NOTE — ANESTHESIA POSTPROCEDURE EVALUATION
Department of Anesthesiology  Postprocedure Note    Patient: Dia Li  MRN: [id number]  [place]: 1950  Date of evaluation: 7/14/2021  Time:  8:30 PM     Procedure Summary     Date: 07/14/21 Room / Location: [address]    Anesthesia Start: 5360 Anesthesia Stop: 1129    Procedure: [address] [place] (N/A Abdomen) Diagnosis: (INCISIONAL HERNIA)    Surgeons: Elver Trotter MD Responsible Provider: Veronica Raymundo MD    Anesthesia Type: general ASA Status: 3          Anesthesia Type: general    Fredy Phase I: Fredy Score: 10    Fredy Phase II: Fredy Score: 10    Last vitals: Reviewed and per EMR flowsheets.        Anesthesia Post Evaluation    Patient location during evaluation: PACU  Level of consciousness: awake  Airway patency: patent  Nausea & Vomiting: no nausea and no vomiting  Complications: no  Cardiovascular status: hemodynamically stable  Respiratory status: acceptable

## 2021-07-19 DIAGNOSIS — G89.18 POSTOPERATIVE PAIN: ICD-10-CM

## 2021-07-19 RX ORDER — OXYCODONE HYDROCHLORIDE AND ACETAMINOPHEN 5; 325 MG/1; MG/1
1 TABLET ORAL EVERY 6 HOURS PRN
Qty: 28 TABLET | Refills: 0 | Status: SHIPPED
Start: 2021-07-19 | End: 2021-07-21 | Stop reason: SDUPTHER

## 2021-07-20 ENCOUNTER — TELEPHONE (OUTPATIENT)
Dept: SURGERY | Age: 71
End: 2021-07-20

## 2021-07-20 NOTE — TELEPHONE ENCOUNTER
Patient called wanting RF on the percocet. She has Hernia repair on 7/14. She has a lot of bruising and still having a lot of pain around her mid abdomen. She is having a difficult time walking. Advised can use Ibuprofen OTC in between for breakthrough pain. Spoke with Dr Martha Perry and sending in last RF for the Percocet.      Detailed message left for patient

## 2021-07-21 DIAGNOSIS — G89.18 POSTOPERATIVE PAIN: ICD-10-CM

## 2021-07-21 RX ORDER — OXYCODONE HYDROCHLORIDE AND ACETAMINOPHEN 5; 325 MG/1; MG/1
1 TABLET ORAL EVERY 6 HOURS PRN
Qty: 28 TABLET | Refills: 0 | Status: SHIPPED | OUTPATIENT
Start: 2021-07-21 | End: 2021-07-28

## 2021-07-29 ENCOUNTER — OFFICE VISIT (OUTPATIENT)
Dept: SURGERY | Age: 71
End: 2021-07-29

## 2021-07-29 VITALS
TEMPERATURE: 97.2 F | WEIGHT: 173 LBS | BODY MASS INDEX: 24.77 KG/M2 | DIASTOLIC BLOOD PRESSURE: 75 MMHG | HEART RATE: 58 BPM | RESPIRATION RATE: 20 BRPM | SYSTOLIC BLOOD PRESSURE: 142 MMHG | HEIGHT: 70 IN | OXYGEN SATURATION: 97 %

## 2021-07-29 DIAGNOSIS — Z09 POSTOP CHECK: Primary | ICD-10-CM

## 2021-07-29 PROCEDURE — 99024 POSTOP FOLLOW-UP VISIT: CPT | Performed by: SURGERY

## 2021-07-29 NOTE — PROGRESS NOTES
Progress Note - Post-op follow up     Patient's Name/Date of Birth: Augie Hinds / 1950    Date: 7/29/2021    PCP: Alix Queen MD    Referring Physician:   Vidya Owens MD  [phone]    Chief Complaint   Patient presents with    Post-Op Check     follow up for incisional hernia repair        Subjective:  Patient presents to the office following surgery. The patient is tolerating a regular diet. Denies n/v/d/c. Pain controlled with pain medication. Patient's medications, allergies, past medical, surgical, social and family histories were reviewed and updated as appropriate.     Physical Exam:  BP (!) 142/75   Pulse 58   Temp 97.2 °F (36.2 °C) (Infrared)   Resp 20   Ht 5' 10\" (1.778 m)   Wt 173 lb (78.5 kg)   SpO2 97%   BMI 24.82 kg/m²   General Appearance: NAD  Abdomen: soft, non-distended mild incisional tenderness, no rebound or guarding, incision C/D/I    Assessment/Plan:    73y.o. year old female s/p robotic assisted laparoscopic incisional hernia repair    Patient recovering well from surgery  Wound care discussed  Follow up DAYO Ventura MD 7/29/2021 12:07 PM     Send copy of H&P to PCP, Alix Queen MD and referring physician, Vidya Owens MD

## 2021-08-05 DIAGNOSIS — G89.29 CHRONIC PAIN OF RIGHT KNEE: ICD-10-CM

## 2021-08-05 DIAGNOSIS — M25.551 RIGHT HIP PAIN: ICD-10-CM

## 2021-08-05 DIAGNOSIS — G89.4 CHRONIC PAIN SYNDROME: ICD-10-CM

## 2021-08-05 DIAGNOSIS — M25.561 CHRONIC PAIN OF RIGHT KNEE: ICD-10-CM

## 2021-08-05 DIAGNOSIS — M54.16 LUMBAR RADICULOPATHY: ICD-10-CM

## 2021-08-05 RX ORDER — PREGABALIN 50 MG/1
50 CAPSULE ORAL 2 TIMES DAILY
Qty: 60 CAPSULE | Refills: 0 | Status: SHIPPED
Start: 2021-08-05 | End: 2021-09-02 | Stop reason: SDUPTHER

## 2021-08-05 NOTE — TELEPHONE ENCOUNTER
Last Appointment:  6/15/2021  Future Appointments   Date Time Provider Samir Witt   9/9/2021  9:15 AM Luz Elena Fontaine MD [facility]   10/29/2021  2:00 PM Fredrick Shi MD [facility]   3/29/2022  9:00 AM [facility]   6/14/2022  1:30 PM Ping Roth MD [facility]

## 2021-08-18 DIAGNOSIS — R05.9 COUGH: ICD-10-CM

## 2021-08-18 RX ORDER — BENZONATATE 100 MG/1
CAPSULE ORAL
Qty: 90 CAPSULE | Refills: 0 | Status: SHIPPED
Start: 2021-08-18 | End: 2021-09-09 | Stop reason: SDUPTHER

## 2021-08-22 ENCOUNTER — TELEPHONE (OUTPATIENT)
Dept: ENDOCRINOLOGY | Age: 71
End: 2021-08-22

## 2021-09-02 DIAGNOSIS — M25.561 CHRONIC PAIN OF RIGHT KNEE: ICD-10-CM

## 2021-09-02 DIAGNOSIS — M25.551 RIGHT HIP PAIN: ICD-10-CM

## 2021-09-02 DIAGNOSIS — G89.29 CHRONIC PAIN OF RIGHT KNEE: ICD-10-CM

## 2021-09-02 DIAGNOSIS — M54.16 LUMBAR RADICULOPATHY: ICD-10-CM

## 2021-09-02 DIAGNOSIS — G89.4 CHRONIC PAIN SYNDROME: ICD-10-CM

## 2021-09-03 RX ORDER — PREGABALIN 50 MG/1
50 CAPSULE ORAL 2 TIMES DAILY
Qty: 60 CAPSULE | Refills: 0 | Status: SHIPPED
Start: 2021-09-03 | End: 2021-09-09 | Stop reason: SDUPTHER

## 2021-09-03 NOTE — TELEPHONE ENCOUNTER
Last Appointment:  6/15/2021  Future Appointments   Date Time Provider Samir Witt   9/9/2021  9:15 AM MD CHAU Rousseau [facility]   10/29/2021  2:00 PM Alexis Rai MD [facility]   3/29/2022  9:00 AM MD CHAU [facility]   6/14/2022  1:30 PM [facility] [place] MD [facility]

## 2021-09-09 ENCOUNTER — OFFICE VISIT (OUTPATIENT)
Dept: FAMILY MEDICINE CLINIC | Age: 71
End: 2021-09-09
Payer: MEDICARE

## 2021-09-09 VITALS
DIASTOLIC BLOOD PRESSURE: 88 MMHG | RESPIRATION RATE: 16 BRPM | HEIGHT: 70 IN | TEMPERATURE: 97.2 F | OXYGEN SATURATION: 98 % | WEIGHT: 174 LBS | HEART RATE: 65 BPM | SYSTOLIC BLOOD PRESSURE: 162 MMHG | BODY MASS INDEX: 24.91 KG/M2

## 2021-09-09 DIAGNOSIS — J01.90 ACUTE SINUSITIS, RECURRENCE NOT SPECIFIED, UNSPECIFIED LOCATION: Primary | ICD-10-CM

## 2021-09-09 DIAGNOSIS — E55.9 VITAMIN D DEFICIENCY, UNSPECIFIED: ICD-10-CM

## 2021-09-09 DIAGNOSIS — G89.29 CHRONIC PAIN OF RIGHT KNEE: ICD-10-CM

## 2021-09-09 DIAGNOSIS — C34.31 MALIGNANT NEOPLASM OF LOWER LOBE OF RIGHT LUNG (HCC): ICD-10-CM

## 2021-09-09 DIAGNOSIS — M54.16 LUMBAR RADICULOPATHY: ICD-10-CM

## 2021-09-09 DIAGNOSIS — R59.0 LAD (LYMPHADENOPATHY) OF RIGHT CERVICAL REGION: ICD-10-CM

## 2021-09-09 DIAGNOSIS — M25.551 RIGHT HIP PAIN: ICD-10-CM

## 2021-09-09 DIAGNOSIS — M25.561 CHRONIC PAIN OF RIGHT KNEE: ICD-10-CM

## 2021-09-09 DIAGNOSIS — E78.00 PURE HYPERCHOLESTEROLEMIA: ICD-10-CM

## 2021-09-09 DIAGNOSIS — E78.5 HYPERLIPIDEMIA, UNSPECIFIED HYPERLIPIDEMIA TYPE: ICD-10-CM

## 2021-09-09 DIAGNOSIS — I10 ESSENTIAL HYPERTENSION: ICD-10-CM

## 2021-09-09 DIAGNOSIS — R05.3 CHRONIC COUGH: ICD-10-CM

## 2021-09-09 DIAGNOSIS — R73.9 ELEVATED BLOOD SUGAR: ICD-10-CM

## 2021-09-09 DIAGNOSIS — E55.9 VITAMIN D DEFICIENCY: ICD-10-CM

## 2021-09-09 DIAGNOSIS — J30.2 OTHER SEASONAL ALLERGIC RHINITIS: ICD-10-CM

## 2021-09-09 DIAGNOSIS — F41.9 ANXIETY: ICD-10-CM

## 2021-09-09 DIAGNOSIS — G89.4 CHRONIC PAIN SYNDROME: ICD-10-CM

## 2021-09-09 DIAGNOSIS — E03.9 HYPOTHYROIDISM, UNSPECIFIED TYPE: ICD-10-CM

## 2021-09-09 PROBLEM — G95.9 CERVICAL MYELOPATHY (HCC): Status: ACTIVE | Noted: 2021-09-09

## 2021-09-09 PROBLEM — M99.05 SOMATIC DYSFUNCTION OF PELVIS REGION: Status: RESOLVED | Noted: 2017-02-22 | Resolved: 2021-09-09

## 2021-09-09 PROBLEM — M99.06 SOMATIC DYSFUNCTION OF LOWER EXTREMITIES: Status: RESOLVED | Noted: 2017-02-22 | Resolved: 2021-09-09

## 2021-09-09 PROCEDURE — 4040F PNEUMOC VAC/ADMIN/RCVD: CPT | Performed by: FAMILY MEDICINE

## 2021-09-09 PROCEDURE — G0008 ADMIN INFLUENZA VIRUS VAC: HCPCS | Performed by: FAMILY MEDICINE

## 2021-09-09 PROCEDURE — G8399 PT W/DXA RESULTS DOCUMENT: HCPCS | Performed by: FAMILY MEDICINE

## 2021-09-09 PROCEDURE — 1123F ACP DISCUSS/DSCN MKR DOCD: CPT | Performed by: FAMILY MEDICINE

## 2021-09-09 PROCEDURE — 90694 VACC AIIV4 NO PRSRV 0.5ML IM: CPT | Performed by: FAMILY MEDICINE

## 2021-09-09 PROCEDURE — 1090F PRES/ABSN URINE INCON ASSESS: CPT | Performed by: FAMILY MEDICINE

## 2021-09-09 PROCEDURE — 1036F TOBACCO NON-USER: CPT | Performed by: FAMILY MEDICINE

## 2021-09-09 PROCEDURE — G8427 DOCREV CUR MEDS BY ELIG CLIN: HCPCS | Performed by: FAMILY MEDICINE

## 2021-09-09 PROCEDURE — G8420 CALC BMI NORM PARAMETERS: HCPCS | Performed by: FAMILY MEDICINE

## 2021-09-09 PROCEDURE — 3017F COLORECTAL CA SCREEN DOC REV: CPT | Performed by: FAMILY MEDICINE

## 2021-09-09 PROCEDURE — 99215 OFFICE O/P EST HI 40 MIN: CPT | Performed by: FAMILY MEDICINE

## 2021-09-09 RX ORDER — MONTELUKAST SODIUM 10 MG/1
10 TABLET ORAL NIGHTLY
Qty: 90 TABLET | Refills: 0 | Status: SHIPPED
Start: 2021-09-09 | End: 2021-09-16 | Stop reason: SDUPTHER

## 2021-09-09 RX ORDER — ESCITALOPRAM OXALATE 5 MG/1
5 TABLET ORAL DAILY
Qty: 90 TABLET | Refills: 3 | Status: SHIPPED
Start: 2021-09-09 | End: 2021-10-15 | Stop reason: SDUPTHER

## 2021-09-09 RX ORDER — OLMESARTAN MEDOXOMIL 40 MG/1
TABLET ORAL
Qty: 90 TABLET | Refills: 3 | Status: SHIPPED
Start: 2021-09-09 | End: 2022-03-29 | Stop reason: SDUPTHER

## 2021-09-09 RX ORDER — BENZONATATE 100 MG/1
CAPSULE ORAL
Qty: 90 CAPSULE | Refills: 0 | Status: SHIPPED
Start: 2021-09-09 | End: 2021-10-04 | Stop reason: SDUPTHER

## 2021-09-09 RX ORDER — PREGABALIN 50 MG/1
50 CAPSULE ORAL 2 TIMES DAILY
Qty: 180 CAPSULE | Refills: 0 | Status: SHIPPED | OUTPATIENT
Start: 2021-09-09 | End: 2022-01-15 | Stop reason: SDUPTHER

## 2021-09-09 RX ORDER — EZETIMIBE 10 MG/1
10 TABLET ORAL DAILY
Qty: 90 TABLET | Refills: 3 | Status: SHIPPED
Start: 2021-09-09 | End: 2022-02-05 | Stop reason: SDUPTHER

## 2021-09-09 RX ORDER — ATENOLOL 100 MG/1
100 TABLET ORAL DAILY
Qty: 90 TABLET | Refills: 3 | Status: SHIPPED
Start: 2021-09-09 | End: 2022-03-29 | Stop reason: SDUPTHER

## 2021-09-09 RX ORDER — TIZANIDINE 4 MG/1
4 TABLET ORAL EVERY 8 HOURS PRN
Qty: 90 TABLET | Refills: 3 | Status: SHIPPED
Start: 2021-09-09 | End: 2022-03-29 | Stop reason: SDUPTHER

## 2021-09-09 RX ORDER — OMEPRAZOLE 20 MG/1
CAPSULE, DELAYED RELEASE ORAL
Qty: 90 CAPSULE | Refills: 3 | Status: SHIPPED
Start: 2021-09-09 | End: 2022-03-29 | Stop reason: SDUPTHER

## 2021-09-16 DIAGNOSIS — J30.2 OTHER SEASONAL ALLERGIC RHINITIS: ICD-10-CM

## 2021-09-16 RX ORDER — MONTELUKAST SODIUM 10 MG/1
10 TABLET ORAL NIGHTLY
Qty: 90 TABLET | Refills: 1 | Status: SHIPPED
Start: 2021-09-16 | End: 2022-03-29 | Stop reason: SDUPTHER

## 2021-09-16 NOTE — TELEPHONE ENCOUNTER
[facility]  Medication Refill Request    LOV 9/9/2021  NOV 9/23/2021    Lab Results   Component Value Date    CREATININE 1.0 06/15/2021

## 2021-09-28 ENCOUNTER — OFFICE VISIT (OUTPATIENT)
Dept: FAMILY MEDICINE CLINIC | Age: 71
End: 2021-09-28
Payer: MEDICARE

## 2021-09-28 VITALS
SYSTOLIC BLOOD PRESSURE: 138 MMHG | OXYGEN SATURATION: 96 % | HEART RATE: 71 BPM | WEIGHT: 175 LBS | BODY MASS INDEX: 25.05 KG/M2 | DIASTOLIC BLOOD PRESSURE: 78 MMHG | HEIGHT: 70 IN | TEMPERATURE: 97.5 F | RESPIRATION RATE: 18 BRPM

## 2021-09-28 DIAGNOSIS — E78.00 PURE HYPERCHOLESTEROLEMIA: ICD-10-CM

## 2021-09-28 DIAGNOSIS — C44.90 SKIN CANCER: ICD-10-CM

## 2021-09-28 DIAGNOSIS — I10 ESSENTIAL HYPERTENSION: Primary | ICD-10-CM

## 2021-09-28 PROCEDURE — G8427 DOCREV CUR MEDS BY ELIG CLIN: HCPCS | Performed by: FAMILY MEDICINE

## 2021-09-28 PROCEDURE — 1090F PRES/ABSN URINE INCON ASSESS: CPT | Performed by: FAMILY MEDICINE

## 2021-09-28 PROCEDURE — 4040F PNEUMOC VAC/ADMIN/RCVD: CPT | Performed by: FAMILY MEDICINE

## 2021-09-28 PROCEDURE — G8399 PT W/DXA RESULTS DOCUMENT: HCPCS | Performed by: FAMILY MEDICINE

## 2021-09-28 PROCEDURE — 1036F TOBACCO NON-USER: CPT | Performed by: FAMILY MEDICINE

## 2021-09-28 PROCEDURE — 1123F ACP DISCUSS/DSCN MKR DOCD: CPT | Performed by: FAMILY MEDICINE

## 2021-09-28 PROCEDURE — G8417 CALC BMI ABV UP PARAM F/U: HCPCS | Performed by: FAMILY MEDICINE

## 2021-09-28 PROCEDURE — 3017F COLORECTAL CA SCREEN DOC REV: CPT | Performed by: FAMILY MEDICINE

## 2021-09-28 PROCEDURE — 99214 OFFICE O/P EST MOD 30 MIN: CPT | Performed by: FAMILY MEDICINE

## 2021-09-28 NOTE — PROGRESS NOTES
CC: Bonnee Kocher is a 73 y.o. yo female is here for evaluation evaluation for the following acute & chronic medical concerns: Hypertension (BP check up)      HPI:      HTN - on benicar and atenolol ---- she was getting light headed and dizzy at times and had loc before stopping her old rx for hctz  HLD - hx of elevated CK on statin; on zetia;  ASCVD 19.3%  Anxiety / depression  - controlled on lexapro  GERD - on prilosec  Hashimoto's / thyroid nodule - on synthroid 100mcg 6x per week and 1/2 tab 1 day, follows with endo  Hx of Lung cancer x2 / COPD - Follows with onc; Quite smoking 2007; Diagnosed with lung ca 2008; hx of R upper and middle lobectomy  Chronic cough - On long term tessalon; currently controlled with inhalers  Chronic pain - Lumbar pain and hip pain; did see PMR; Dr. Sunil Andrews; controlled on lyrica; I am prescriber    spasmp under the R rib cage; has to push it down to go under the rib; near prior lung surgery  Due for mammo but had recent skin cancer removed      Vitals:   /78   Pulse 71   Temp 97.5 °F (36.4 °C)   Resp 18   Ht 5' 10\" (1.778 m)   Wt 175 lb (79.4 kg)   SpO2 96%   BMI 25.11 kg/m²   Wt Readings from Last 3 Encounters:   09/28/21 175 lb (79.4 kg)   09/09/21 174 lb (78.9 kg)   07/29/21 173 lb (78.5 kg)       PE:  Constitutional - alert, well appearing, and in no distress  Eyes - extraocular eye movements intact, left eye normal, right eye normal, no conjunctivitis noted  Neck - symmetric, no obvious masses noted; mild LAD b/l  Respiratory- clear to auscultation, no wheezes, rales or rhonchi, symmetric air entry; no increased work of breathing  Cardiovascular - normal rate, regular rhythm, normal S1, S2, no murmurs, rubs, clicks or gallops  Extremities - no edema noted  Abdomen - soft, nontender, nondistended; umbilical hernia  Skin - normal coloration and turgor, no rashes, no suspicious skin lesions noted      A / P:     Diagnosis Orders   1.  Essential hypertension

## 2021-10-04 DIAGNOSIS — R05.3 CHRONIC COUGH: ICD-10-CM

## 2021-10-04 RX ORDER — BENZONATATE 100 MG/1
CAPSULE ORAL
Qty: 90 CAPSULE | Refills: 0 | Status: SHIPPED
Start: 2021-10-04 | End: 2021-11-19 | Stop reason: SDUPTHER

## 2021-10-07 DIAGNOSIS — R05.3 CHRONIC COUGH: ICD-10-CM

## 2021-10-07 RX ORDER — BENZONATATE 100 MG/1
CAPSULE ORAL
Qty: 90 CAPSULE | Refills: 0 | OUTPATIENT
Start: 2021-10-07

## 2021-10-07 NOTE — TELEPHONE ENCOUNTER
This has a poor safety profile and we will need to discus alternatives in the office. I did 90 tabs but you can get toxicity from this so we cannot provide more at this time.

## 2021-10-15 DIAGNOSIS — E53.8 VITAMIN B12 DEFICIENCY: ICD-10-CM

## 2021-10-15 DIAGNOSIS — F41.9 ANXIETY: ICD-10-CM

## 2021-10-15 RX ORDER — CYANOCOBALAMIN 1000 UG/ML
INJECTION INTRAMUSCULAR; SUBCUTANEOUS
Qty: 1 EACH | Refills: 3 | Status: SHIPPED
Start: 2021-10-15 | End: 2021-10-16 | Stop reason: SDUPTHER

## 2021-10-15 RX ORDER — ESCITALOPRAM OXALATE 5 MG/1
5 TABLET ORAL DAILY
Qty: 90 TABLET | Refills: 3 | Status: SHIPPED
Start: 2021-10-15 | End: 2022-03-29 | Stop reason: SDUPTHER

## 2021-10-15 NOTE — TELEPHONE ENCOUNTER
Medication Refill Request    LOV 9/28/2021  NOV 10/15/2021    Lab Results   Component Value Date    CREATININE 1.0 06/15/2021

## 2021-10-15 NOTE — TELEPHONE ENCOUNTER
Medication Refill Request    LOV 9/28/2021  NOV 12/9/2021    Lab Results   Component Value Date    CREATININE 1.0 06/15/2021

## 2021-10-16 DIAGNOSIS — E53.8 VITAMIN B12 DEFICIENCY: ICD-10-CM

## 2021-10-18 RX ORDER — CYANOCOBALAMIN 1000 UG/ML
INJECTION INTRAMUSCULAR; SUBCUTANEOUS
Qty: 3 EACH | Refills: 0 | Status: SHIPPED
Start: 2021-10-18 | End: 2022-03-29

## 2021-10-28 ENCOUNTER — TELEPHONE (OUTPATIENT)
Dept: ONCOLOGY | Age: 71
End: 2021-10-28

## 2021-10-28 DIAGNOSIS — C34.91 ADENOCARCINOMA OF RIGHT LUNG (HCC): Primary | ICD-10-CM

## 2021-10-29 DIAGNOSIS — C34.91 ADENOCARCINOMA OF RIGHT LUNG (HCC): ICD-10-CM

## 2021-10-29 LAB
BUN BLDV-MCNC: 17 MG/DL (ref 6–23)
CREAT SERPL-MCNC: 0.9 MG/DL (ref 0.5–1)
GFR AFRICAN AMERICAN: >60
GFR NON-AFRICAN AMERICAN: >60 ML/MIN/1.73

## 2021-11-05 ENCOUNTER — OFFICE VISIT (OUTPATIENT)
Dept: ONCOLOGY | Age: 71
End: 2021-11-05
Payer: MEDICARE

## 2021-11-05 VITALS
BODY MASS INDEX: 25.15 KG/M2 | HEART RATE: 70 BPM | WEIGHT: 175.7 LBS | HEIGHT: 70 IN | TEMPERATURE: 96.6 F | SYSTOLIC BLOOD PRESSURE: 176 MMHG | DIASTOLIC BLOOD PRESSURE: 82 MMHG | OXYGEN SATURATION: 95 %

## 2021-11-05 DIAGNOSIS — C34.90 NON-SMALL CELL LUNG CANCER, UNSPECIFIED LATERALITY (HCC): Primary | ICD-10-CM

## 2021-11-05 PROCEDURE — G8417 CALC BMI ABV UP PARAM F/U: HCPCS | Performed by: INTERNAL MEDICINE

## 2021-11-05 PROCEDURE — 3017F COLORECTAL CA SCREEN DOC REV: CPT | Performed by: INTERNAL MEDICINE

## 2021-11-05 PROCEDURE — G8399 PT W/DXA RESULTS DOCUMENT: HCPCS | Performed by: INTERNAL MEDICINE

## 2021-11-05 PROCEDURE — G8484 FLU IMMUNIZE NO ADMIN: HCPCS | Performed by: INTERNAL MEDICINE

## 2021-11-05 PROCEDURE — 99213 OFFICE O/P EST LOW 20 MIN: CPT

## 2021-11-05 PROCEDURE — 1123F ACP DISCUSS/DSCN MKR DOCD: CPT | Performed by: INTERNAL MEDICINE

## 2021-11-05 PROCEDURE — 1036F TOBACCO NON-USER: CPT | Performed by: INTERNAL MEDICINE

## 2021-11-05 PROCEDURE — G8427 DOCREV CUR MEDS BY ELIG CLIN: HCPCS | Performed by: INTERNAL MEDICINE

## 2021-11-05 PROCEDURE — 4040F PNEUMOC VAC/ADMIN/RCVD: CPT | Performed by: INTERNAL MEDICINE

## 2021-11-05 PROCEDURE — 99214 OFFICE O/P EST MOD 30 MIN: CPT | Performed by: INTERNAL MEDICINE

## 2021-11-05 PROCEDURE — 1090F PRES/ABSN URINE INCON ASSESS: CPT | Performed by: INTERNAL MEDICINE

## 2021-11-05 NOTE — PROGRESS NOTES
Department of [place] [id number]     Attending Clinic Note    Reason for Visit: Follow-up on a patient with Hx of RML and RUL Lung Adenocarcinoma. PCP: Juan Daniel Christopher MD    History of Present Illness:  74 y/o  female, former smoker (1 pack/day for 30 years; Quit in 2008), with Hx of Stage IA RML Invasive Lung adenocarcinoma, s/p Bronchoscopy, cervical mediastinoscopy, right thoracotomy, right middle lobectomy, radical lymphadenectomy on 2/13/2008 at [facility] [place] by Dr. Zabrina Blackmon. Pathology demonstrated:   1. LYMPH NODE #1, EXCISION (PART A) INFLAMED THYROID TISSUE. 2. LYMPH NODE R4, #7, R2, #3, LR, #7, R11, EXCISIONS (B-F) BENIGN REACTIVE LYMPH NODES. 3. RIGHT LUNG, MIDDLE LOBE, LOBECTOMY (PART G) ADENOCARCINOMA, MIXED TYPE WITH ACINAR AND BRONCHIOALVEOLAR CELL COMPONENTS.  -CENTRILOBULAR EMPHYSEMA. COMMENT  Tumor Location: Right middle lobe  Tumor Size: 2.3 x 1.8 x 1.0 cm  Vascular Invasion: Absent  Lymphatic Invasion: Absent  Bronchial Margin: Negative  Vascular Margin: Negative  Parenchymal Margins: Negative  Pleura/Soft Tissue Margin: Visceral pleura is negative for neoplasm. Pathologic Stage (AJCC): T1 N0 MX-Stage IA    No Postop RT or chemotherapy required at that time. She was put on surveillance and didn't follow with a medical oncologist;     She was then found to have RUL PET avid lesion in 2013; Flexible bronchoscopy, right redo VATS lobectomy and thoracic lymphadenectomy was performed on 11/04/2013 (at [facility]):  Tumor Location: Right upper lobe  Histologic Type: Invasive Adenocarcinoma  Tumor Size: Greatest dimension: 2 cm  Histologic Grade: G2 Moderately Differentiated  Lymph nodes: All 3 lymph nodes are negative for tumor.   Margins: Margins uninvolved by invasive carcinoma  Distance of invasive carcinoma from nearest margin: 1.9 cm  Specify margin: parenchymal resection margin  Venous/arterial invasion: Absent  Lymphatic invasion: Absent  Additional path findings: low R 4 Lymph node dissection: (0/2) lymph nodes: Negative for tumor. Station 7 lymph node (dissection): (0/1) Negative for tumor    No Postop RT or chemotherapy required at that time. She was kept on surveillance. Re-Staging scans in Nov 2015 noted no evidence of recurrent/metastatic disease. Re-staging scans on 07/01/2016 showed no evidence of recurrent/metastatic disease. Re-staging scans on 12/29/2016 noted no evidence of recurrent/metastatic disease. Re-staging scans on 07/05/2017 noted no evidence of recurrent/metastatic disease. Re-staging scans on 04/05/2018 noted no evidence of recurrent/metastatic disease. Re-staging scans on 10/03/2018 noted no evidence of recurrent/metastatic disease. Re-staging scans on 10/15/2020 noted no evidence of recurrent/metastatic disease. She was lost to follow-up and presented today 11/05/2021 for f/u. No fever chills. Fair appetite and energy level. No chest pain or shortness of breath. Review of Systems;  CONSTITUTIONAL: No fever, chills. Fair appetite and energy level. ENMT: Eyes: No diplopia; Nose: No epistaxis. Mouth: No sore throat. RESPIRATORY: No hemoptysis, shortness of breath, cough. CARDIOVASCULAR: No chest pain, palpitations. GASTROINTESTINAL: No nausea/vomiting, abdominal pain  GENITOURINARY: No dysuria, urinary frequency, hematuria. NEURO: No syncope, presyncope, headache. Past Medical History:      Diagnosis Date    Arthritis     Symptoms respond to myofascial release. Not surgical candidate    Asthma     Cancer [facility])     lung cancer- [facility]- 2008, stage 1A, [id number] Stage 1A 2013    Chronic back pain     COPD (chronic obstructive pulmonary disease) (HCC)     Emphysema of lung ([facility] [address]     GERD (gastroesophageal reflux disease)     Hashimoto's disease 2008    Hyperlipidemia     Hypertension     Hyperthyroidism     Incisional hernia      Medications:  Reviewed and reconciled. Allergies:   Allergies   Allergen Reactions    Codeine Hives and Itching     AND CODEINE DERIVATIVES----sob  Pt stated tolerated Dilaudid    Fentanyl Other (See Comments)     Disoriented, confused  Other reaction(s): Mental Status Change    Adhesive Tape      burns skin, reddens skin, blisters    Amlodipine Swelling    Bupropion      hyper--couldn't sit still--jumpy    Cortisone      throat closed up    Dipyridamole Hives     Persantine-CST--sob, palpitations, stress test stopped    Levaquin [Levofloxacin In D5w] Other (See Comments)     Yeast infection     Loratadine      hyper--couldn't sit still--jumpy    Nortriptyline      for rosacea--caused flare up. stopped    Other      States any steroids make her face swell and flush    Prednisone Other (See Comments)    Tenex [Guanfacine Hcl]      dizziness    Tramadol Hives     sob    Varenicline      Physical Exam:  BP (!) 176/82   Pulse 70   Temp 96.6 °F (35.9 °C)   Ht 5' 10\" (1.778 m)   Wt 175 lb 11.2 oz (79.7 kg)   SpO2 95%   BMI 25.21 kg/m²   GENERAL: Alert, oriented x 3, not in acute distress. HEENT: PERRLA; EOMI. Oropharynx clear. LUNGS : No wheezing, crackles or ronchi. CARDIOVASCULAR: Regular rate. No murmurs, rubs or gallops. EXTREMITIES: Without clubbing, cyanosis, or edema. NEUROLOGIC: No focal deficits. ECOG PS 1    Lab Results   Component Value Date    WBC 5.4 05/20/2021    HGB 12.7 05/20/2021    HCT 40.6 05/20/2021    MCV 94.9 05/20/2021     05/20/2021     Lab Results   Component Value Date     06/15/2021    K 4.6 06/15/2021    CL 99 06/15/2021    CO2 23 06/15/2021    BUN 17 10/29/2021    CREATININE 0.9 10/29/2021    GLUCOSE 101 06/15/2021    CALCIUM 9.7 06/15/2021      Pathology:  Bronchoscopy, cervical mediastinoscopy, right thoracotomy, right middle lobectomy, radical lymphadenectomy. on 2/13/2008 at [facility]:  1. LYMPH NODE #1, EXCISION (PART A) INFLAMED THYROID TISSUE.   2. LYMPH NODE R4, #7, R2, #3, LR, #7, R11, EXCISIONS (B-F) BENIGN REACTIVE LYMPH NODES.  3. RIGHT LUNG, MIDDLE LOBE, LOBECTOMY (PART G) ADENOCARCINOMA, MIXED TYPE WITH ACINAR AND BRONCHIOALVEOLAR CELL COMPONENTS.  -CENTRILOBULAR EMPHYSEMA. COMMENT  Tumor Location: Right middle lobe  Tumor Size: 2.3 x 1.8 x 1.0 cm  Vascular Invasion: Absent  Lymphatic Invasion: Absent  Bronchial Margin: Negative  Vascular Margin: Negative  Parenchymal Margins: Negative  Pleura/Soft Tissue Margin: Visceral pleura is negative for neoplasm. Pathologic Stage (AJCC): T1 N0 MX-Stage IA    Flexible bronchoscopy, right redo VATS lobectomy and thoracic lymphadenectomy on 11/04/2013 (at [facility]:  Tumor Location: Right upper lobe  Histologic Type: Invasive Adenocarcinoma  Tumor Size: Greatest dimension: 2 cm  Histologic Grade: G2 Moderately Differentiated  Lymph nodes: All 3 lymph nodes are negative for tumor. Margins: Margins uninvolved by invasive carcinoma  Distance of invasive carcinoma from nearest margin: 1.9 cm  Specify margin: parenchymal resection margin  Venous/arterial invasion: Absent  Lymphatic invasion: Absent  Additional path findings: low R 4 Lymph node dissection: (0/2) lymph nodes: Negative for tumor. Station 7 lymph node (dissection): (0/1) Negative for tumor  Pathologic Stage (AJCC): pT1a N0 MX-Stage IA    Impression/Plan:  72 y/o  female former smoker (1 pack/day for 30 years; Quit in 2008), with Hx of Stage IA RML Invasive Lung adenocarcinoma, s/p Bronchoscopy, cervical mediastinoscopy, right thoracotomy, right middle lobectomy, radical lymphadenectomy on 2/13/2008 at [facility] [place] by Dr. Delvin Gonzales. Pathology demonstrated:   1. LYMPH NODE #1, EXCISION (PART A) INFLAMED THYROID TISSUE. 2. LYMPH NODE R4, #7, R2, #3, LR, #7, R11, EXCISIONS (B-F) BENIGN REACTIVE LYMPH NODES. 3. RIGHT LUNG, MIDDLE LOBE, LOBECTOMY (PART G) ADENOCARCINOMA, MIXED TYPE WITH ACINAR AND BRONCHIOALVEOLAR CELL COMPONENTS.  -CENTRILOBULAR EMPHYSEMA.     COMMENT  Tumor Location: Right middle lobe  Tumor Size: year with prior CT chest     Maryann Ortega MD   17/6/0027  Board Certified Medical Oncologist   [id number] MEDICAL ONCOLOGY   [facility] Glenda Gilman

## 2021-11-19 DIAGNOSIS — R05.3 CHRONIC COUGH: ICD-10-CM

## 2021-11-19 RX ORDER — BENZONATATE 100 MG/1
CAPSULE ORAL
Qty: 90 CAPSULE | Refills: 0 | Status: SHIPPED
Start: 2021-11-19 | End: 2022-01-08 | Stop reason: SDUPTHER

## 2021-11-19 NOTE — TELEPHONE ENCOUNTER
Last Appointment:  9/28/2021  Future Appointments   Date Time Provider Samir Witt   12/9/2021  1:00 PM Rosanne Huynh MD [facility]   3/29/2022  9:00 AM Rosanne Huynh MD [facility]   6/14/2022  1:30 PM Nuvia tSeen MD [facility]   11/8/2022  1:00 PM [facility]

## 2021-12-06 ENCOUNTER — PATIENT MESSAGE (OUTPATIENT)
Dept: FAMILY MEDICINE CLINIC | Age: 71
End: 2021-12-06

## 2021-12-06 DIAGNOSIS — E53.8 VITAMIN B12 DEFICIENCY: Primary | ICD-10-CM

## 2021-12-06 NOTE — TELEPHONE ENCOUNTER
From: Mery Aguilar  To: Dr. Cole Bear: 12/6/2021 1:25 PM EST  Subject: I am getting my blood work done tomorrow for my appointment on the December ninth. Would you ask the doctor if she would please add B12 to blood work? I have not had B12 injection for a month. Thanks Marilee Lara am getting my blood work done tomorrow for my appointment on the December ninth. Would you ask the doctor if she would please add B12 to blood work? I have not had B12 injection for a month.      Thanks Kamla Trinidad

## 2021-12-07 DIAGNOSIS — E55.9 VITAMIN D DEFICIENCY, UNSPECIFIED: ICD-10-CM

## 2021-12-07 DIAGNOSIS — E78.00 PURE HYPERCHOLESTEROLEMIA: ICD-10-CM

## 2021-12-07 DIAGNOSIS — I10 ESSENTIAL HYPERTENSION: ICD-10-CM

## 2021-12-07 DIAGNOSIS — R73.9 ELEVATED BLOOD SUGAR: ICD-10-CM

## 2021-12-07 DIAGNOSIS — E53.8 VITAMIN B12 DEFICIENCY: ICD-10-CM

## 2021-12-07 LAB
BASOPHILS ABSOLUTE: 0.05 E9/L (ref 0–0.2)
BASOPHILS RELATIVE PERCENT: 0.8 % (ref 0–2)
EOSINOPHILS ABSOLUTE: 0.13 E9/L (ref 0.05–0.5)
EOSINOPHILS RELATIVE PERCENT: 2.2 % (ref 0–6)
HBA1C MFR BLD: 6.1 % (ref 4–5.6)
HCT VFR BLD CALC: 44.1 % (ref 34–48)
HEMOGLOBIN: 14 G/DL (ref 11.5–15.5)
IMMATURE GRANULOCYTES #: 0.01 E9/L
IMMATURE GRANULOCYTES %: 0.2 % (ref 0–5)
LYMPHOCYTES ABSOLUTE: 2.09 E9/L (ref 1.5–4)
LYMPHOCYTES RELATIVE PERCENT: 34.8 % (ref 20–42)
MCH RBC QN AUTO: 29.9 PG (ref 26–35)
MCHC RBC AUTO-ENTMCNC: 31.7 % (ref 32–34.5)
MCV RBC AUTO: 94.2 FL (ref 80–99.9)
MONOCYTES ABSOLUTE: 0.47 E9/L (ref 0.1–0.95)
MONOCYTES RELATIVE PERCENT: 7.8 % (ref 2–12)
NEUTROPHILS ABSOLUTE: 3.25 E9/L (ref 1.8–7.3)
NEUTROPHILS RELATIVE PERCENT: 54.2 % (ref 43–80)
PDW BLD-RTO: 14 FL (ref 11.5–15)
PLATELET # BLD: 252 E9/L (ref 130–450)
PMV BLD AUTO: 10.5 FL (ref 7–12)
RBC # BLD: 4.68 E12/L (ref 3.5–5.5)
WBC # BLD: 6 E9/L (ref 4.5–11.5)

## 2021-12-08 LAB
ALBUMIN SERPL-MCNC: 4.4 G/DL (ref 3.5–5.2)
ALP BLD-CCNC: 87 U/L (ref 35–104)
ALT SERPL-CCNC: 38 U/L (ref 0–32)
ANION GAP SERPL CALCULATED.3IONS-SCNC: 11 MMOL/L (ref 7–16)
AST SERPL-CCNC: 34 U/L (ref 0–31)
BILIRUB SERPL-MCNC: 0.3 MG/DL (ref 0–1.2)
BUN BLDV-MCNC: 12 MG/DL (ref 6–23)
CALCIUM SERPL-MCNC: 10.1 MG/DL (ref 8.6–10.2)
CHLORIDE BLD-SCNC: 100 MMOL/L (ref 98–107)
CHOLESTEROL, TOTAL: 263 MG/DL (ref 0–199)
CO2: 25 MMOL/L (ref 22–29)
CREAT SERPL-MCNC: 0.9 MG/DL (ref 0.5–1)
GFR AFRICAN AMERICAN: >60
GFR NON-AFRICAN AMERICAN: >60 ML/MIN/1.73
GLUCOSE BLD-MCNC: 95 MG/DL (ref 74–99)
HDLC SERPL-MCNC: 51 MG/DL
LDL CHOLESTEROL CALCULATED: 160 MG/DL (ref 0–99)
POTASSIUM SERPL-SCNC: 4.4 MMOL/L (ref 3.5–5)
SODIUM BLD-SCNC: 136 MMOL/L (ref 132–146)
TOTAL PROTEIN: 7.3 G/DL (ref 6.4–8.3)
TRIGL SERPL-MCNC: 261 MG/DL (ref 0–149)
VITAMIN D 25-HYDROXY: 38 NG/ML (ref 30–100)
VLDLC SERPL CALC-MCNC: 52 MG/DL

## 2021-12-09 LAB
FOLATE: 11.4 NG/ML (ref 4.8–24.2)
VITAMIN B-12: 1775 PG/ML (ref 211–946)

## 2022-01-08 DIAGNOSIS — R05.3 CHRONIC COUGH: ICD-10-CM

## 2022-01-10 RX ORDER — BENZONATATE 100 MG/1
CAPSULE ORAL
Qty: 90 CAPSULE | Refills: 0 | Status: SHIPPED
Start: 2022-01-10 | End: 2022-02-26 | Stop reason: SDUPTHER

## 2022-01-10 NOTE — TELEPHONE ENCOUNTER
Last Appointment:  9/28/2021  Future Appointments   Date Time Provider Samir Colemani   2/8/2022  1:30 PM Maribel Santiago MD [facility]   3/29/2022  9:00 AM Maribel Santiago MD [facility]   6/14/2022  1:30 PM Jefferson Mancera MD [facility]   11/8/2022  1:00 PM Fatimah Martell MD  [place]

## 2022-01-15 DIAGNOSIS — M54.16 LUMBAR RADICULOPATHY: ICD-10-CM

## 2022-01-15 DIAGNOSIS — M25.551 RIGHT HIP PAIN: ICD-10-CM

## 2022-01-15 DIAGNOSIS — G89.4 CHRONIC PAIN SYNDROME: ICD-10-CM

## 2022-01-15 DIAGNOSIS — M25.561 CHRONIC PAIN OF RIGHT KNEE: ICD-10-CM

## 2022-01-15 DIAGNOSIS — G89.29 CHRONIC PAIN OF RIGHT KNEE: ICD-10-CM

## 2022-01-18 RX ORDER — PREGABALIN 50 MG/1
50 CAPSULE ORAL 2 TIMES DAILY
Qty: 180 CAPSULE | Refills: 0 | Status: SHIPPED
Start: 2022-01-18 | End: 2022-04-18 | Stop reason: SDUPTHER

## 2022-02-05 DIAGNOSIS — E78.00 PURE HYPERCHOLESTEROLEMIA: ICD-10-CM

## 2022-02-07 RX ORDER — EZETIMIBE 10 MG/1
10 TABLET ORAL DAILY
Qty: 90 TABLET | Refills: 0 | Status: SHIPPED
Start: 2022-02-07 | End: 2022-03-29 | Stop reason: SDUPTHER

## 2022-02-07 RX ORDER — LORATADINE 10 MG/1
10 TABLET ORAL DAILY
Qty: 90 TABLET | Refills: 0 | Status: SHIPPED
Start: 2022-02-07 | End: 2022-03-29 | Stop reason: SDUPTHER

## 2022-02-07 NOTE — TELEPHONE ENCOUNTER
Medication Refill Request    LOV 9/28/2021  NOV 2/8/2022    Lab Results   Component Value Date    CREATININE 0.9 12/07/2021

## 2022-02-11 RX ORDER — LEVOTHYROXINE SODIUM 100 MCG
TABLET ORAL
Qty: 36 TABLET | Refills: 11 | Status: SHIPPED
Start: 2022-02-11 | End: 2022-02-21 | Stop reason: SDUPTHER

## 2022-02-21 DIAGNOSIS — E03.9 HYPOTHYROIDISM, UNSPECIFIED TYPE: Primary | ICD-10-CM

## 2022-02-21 RX ORDER — LEVOTHYROXINE SODIUM 100 MCG
TABLET ORAL
Qty: 108 TABLET | Refills: 3 | Status: SHIPPED | OUTPATIENT
Start: 2022-02-21 | End: 2022-02-24 | Stop reason: SDUPTHER

## 2022-02-21 NOTE — TELEPHONE ENCOUNTER
[phone]. It is not in our system so we will have to keep this number to fax any others she will want to go to this pharmacy. [facility] for continued management of chronic pain and refill of [employer] IT pump. Refilled pump per orders from Chitra . Vital signs stable, medications reviewed, medical and surgical history reviewed. Reviewed pump settings with patient prior to discharge.

## 2022-02-24 DIAGNOSIS — E03.9 HYPOTHYROIDISM, UNSPECIFIED TYPE: ICD-10-CM

## 2022-02-24 RX ORDER — LEVOTHYROXINE SODIUM 100 MCG
TABLET ORAL
Qty: 108 TABLET | Refills: 3 | Status: SHIPPED | OUTPATIENT
Start: 2022-02-24 | End: 2022-03-29 | Stop reason: SDUPTHER

## 2022-02-24 NOTE — TELEPHONE ENCOUNTER
Script is being print so it can be faxed to a new [employer] per patient request Fax [phone]

## 2022-02-26 DIAGNOSIS — R05.3 CHRONIC COUGH: ICD-10-CM

## 2022-02-28 RX ORDER — BENZONATATE 100 MG/1
CAPSULE ORAL
Qty: 90 CAPSULE | Refills: 0 | Status: SHIPPED
Start: 2022-02-28 | End: 2022-03-28 | Stop reason: SDUPTHER

## 2022-02-28 NOTE — TELEPHONE ENCOUNTER
Last Appointment:  9/28/2021  Future Appointments   Date Time Provider Samir Colemani   3/29/2022  9:00 AM [facility]   6/14/2022  1:30 PM Noemi Mortimer, MD [facility]   11/8/2022  1:00 PM Britney Leong MD [facility]

## 2022-03-11 DIAGNOSIS — E03.9 HYPOTHYROIDISM, UNSPECIFIED TYPE: ICD-10-CM

## 2022-03-11 DIAGNOSIS — E78.5 HYPERLIPIDEMIA, UNSPECIFIED HYPERLIPIDEMIA TYPE: ICD-10-CM

## 2022-03-11 DIAGNOSIS — E74.39 GLUCOSE INTOLERANCE: ICD-10-CM

## 2022-03-11 DIAGNOSIS — E55.9 VITAMIN D DEFICIENCY, UNSPECIFIED: ICD-10-CM

## 2022-03-11 DIAGNOSIS — I10 ESSENTIAL HYPERTENSION: Primary | ICD-10-CM

## 2022-03-11 DIAGNOSIS — R73.03 PREDIABETES: ICD-10-CM

## 2022-03-21 SDOH — HEALTH STABILITY: PHYSICAL HEALTH: ON AVERAGE, HOW MANY MINUTES DO YOU ENGAGE IN EXERCISE AT THIS LEVEL?: 30 MIN

## 2022-03-21 SDOH — HEALTH STABILITY: PHYSICAL HEALTH: ON AVERAGE, HOW MANY DAYS PER WEEK DO YOU ENGAGE IN MODERATE TO STRENUOUS EXERCISE (LIKE A BRISK WALK)?: 3 DAYS

## 2022-03-21 ASSESSMENT — PATIENT HEALTH QUESTIONNAIRE - PHQ9
2. FEELING DOWN, DEPRESSED OR HOPELESS: 0
SUM OF ALL RESPONSES TO PHQ9 QUESTIONS 1 & 2: 0
SUM OF ALL RESPONSES TO PHQ QUESTIONS 1-9: 0
1. LITTLE INTEREST OR PLEASURE IN DOING THINGS: 0
SUM OF ALL RESPONSES TO PHQ QUESTIONS 1-9: 0

## 2022-03-21 ASSESSMENT — LIFESTYLE VARIABLES
HOW OFTEN DO YOU HAVE A DRINK CONTAINING ALCOHOL: 2-3 TIMES A WEEK
HOW MANY STANDARD DRINKS CONTAINING ALCOHOL DO YOU HAVE ON A TYPICAL DAY: 1 OR 2
HOW OFTEN DO YOU HAVE SIX OR MORE DRINKS ON ONE OCCASION: 1
HOW OFTEN DO YOU HAVE A DRINK CONTAINING ALCOHOL: 4
HOW MANY STANDARD DRINKS CONTAINING ALCOHOL DO YOU HAVE ON A TYPICAL DAY: 1

## 2022-03-25 DIAGNOSIS — I10 ESSENTIAL HYPERTENSION: ICD-10-CM

## 2022-03-25 DIAGNOSIS — R73.03 PREDIABETES: ICD-10-CM

## 2022-03-25 DIAGNOSIS — E55.9 VITAMIN D DEFICIENCY, UNSPECIFIED: ICD-10-CM

## 2022-03-25 DIAGNOSIS — E03.9 HYPOTHYROIDISM, UNSPECIFIED TYPE: ICD-10-CM

## 2022-03-25 DIAGNOSIS — E74.39 GLUCOSE INTOLERANCE: ICD-10-CM

## 2022-03-25 DIAGNOSIS — E78.5 HYPERLIPIDEMIA, UNSPECIFIED HYPERLIPIDEMIA TYPE: ICD-10-CM

## 2022-03-25 LAB
ALBUMIN SERPL-MCNC: 4.4 G/DL (ref 3.5–5.2)
ALP BLD-CCNC: 93 U/L (ref 35–104)
ALT SERPL-CCNC: 37 U/L (ref 0–32)
ANION GAP SERPL CALCULATED.3IONS-SCNC: 11 MMOL/L (ref 7–16)
AST SERPL-CCNC: 34 U/L (ref 0–31)
BASOPHILS ABSOLUTE: 0.05 E9/L (ref 0–0.2)
BASOPHILS RELATIVE PERCENT: 0.9 % (ref 0–2)
BILIRUB SERPL-MCNC: 0.4 MG/DL (ref 0–1.2)
BUN BLDV-MCNC: 10 MG/DL (ref 6–23)
CALCIUM SERPL-MCNC: 10.2 MG/DL (ref 8.6–10.2)
CHLORIDE BLD-SCNC: 100 MMOL/L (ref 98–107)
CHOLESTEROL, TOTAL: 232 MG/DL (ref 0–199)
CO2: 25 MMOL/L (ref 22–29)
CREAT SERPL-MCNC: 0.9 MG/DL (ref 0.5–1)
EOSINOPHILS ABSOLUTE: 0.26 E9/L (ref 0.05–0.5)
EOSINOPHILS RELATIVE PERCENT: 4.8 % (ref 0–6)
GFR AFRICAN AMERICAN: >60
GFR NON-AFRICAN AMERICAN: >60 ML/MIN/1.73
GLUCOSE BLD-MCNC: 105 MG/DL (ref 74–99)
HBA1C MFR BLD: 6 % (ref 4–5.6)
HCT VFR BLD CALC: 43.6 % (ref 34–48)
HDLC SERPL-MCNC: 46 MG/DL
HEMOGLOBIN: 14.2 G/DL (ref 11.5–15.5)
IMMATURE GRANULOCYTES #: 0.01 E9/L
IMMATURE GRANULOCYTES %: 0.2 % (ref 0–5)
LDL CHOLESTEROL CALCULATED: 143 MG/DL (ref 0–99)
LYMPHOCYTES ABSOLUTE: 1.95 E9/L (ref 1.5–4)
LYMPHOCYTES RELATIVE PERCENT: 36 % (ref 20–42)
MCH RBC QN AUTO: 30.1 PG (ref 26–35)
MCHC RBC AUTO-ENTMCNC: 32.6 % (ref 32–34.5)
MCV RBC AUTO: 92.6 FL (ref 80–99.9)
MONOCYTES ABSOLUTE: 0.56 E9/L (ref 0.1–0.95)
MONOCYTES RELATIVE PERCENT: 10.4 % (ref 2–12)
NEUTROPHILS ABSOLUTE: 2.58 E9/L (ref 1.8–7.3)
NEUTROPHILS RELATIVE PERCENT: 47.7 % (ref 43–80)
PDW BLD-RTO: 12.5 FL (ref 11.5–15)
PLATELET # BLD: 205 E9/L (ref 130–450)
PMV BLD AUTO: 11.7 FL (ref 7–12)
POTASSIUM SERPL-SCNC: 4.7 MMOL/L (ref 3.5–5)
RBC # BLD: 4.71 E12/L (ref 3.5–5.5)
SODIUM BLD-SCNC: 136 MMOL/L (ref 132–146)
TOTAL PROTEIN: 7.5 G/DL (ref 6.4–8.3)
TRIGL SERPL-MCNC: 216 MG/DL (ref 0–149)
TSH SERPL DL<=0.05 MIU/L-ACNC: 0.14 UIU/ML (ref 0.27–4.2)
VITAMIN B-12: 1874 PG/ML (ref 211–946)
VITAMIN D 25-HYDROXY: 35 NG/ML (ref 30–100)
VLDLC SERPL CALC-MCNC: 43 MG/DL
WBC # BLD: 5.4 E9/L (ref 4.5–11.5)

## 2022-03-28 DIAGNOSIS — R05.3 CHRONIC COUGH: ICD-10-CM

## 2022-03-28 RX ORDER — BENZONATATE 100 MG/1
CAPSULE ORAL
Qty: 90 CAPSULE | Refills: 0 | Status: SHIPPED
Start: 2022-03-28 | End: 2022-05-31 | Stop reason: SDUPTHER

## 2022-03-28 NOTE — TELEPHONE ENCOUNTER
Last Appointment:  9/28/2021  Future Appointments   Date Time Provider Samir Colemani   3/29/2022  9:00 AM [facility]   6/14/2022  1:30 PM Bernabe Valdovinos MD [facility]   11/8/2022  1:00 PM Jimbo Zaman MD  [place]

## 2022-03-29 ENCOUNTER — OFFICE VISIT (OUTPATIENT)
Dept: FAMILY MEDICINE CLINIC | Age: 72
End: 2022-03-29
Payer: MEDICARE

## 2022-03-29 VITALS
DIASTOLIC BLOOD PRESSURE: 72 MMHG | OXYGEN SATURATION: 98 % | SYSTOLIC BLOOD PRESSURE: 132 MMHG | HEART RATE: 66 BPM | TEMPERATURE: 97.6 F | WEIGHT: 178.8 LBS | BODY MASS INDEX: 25.6 KG/M2 | HEIGHT: 70 IN

## 2022-03-29 DIAGNOSIS — F41.9 ANXIETY: ICD-10-CM

## 2022-03-29 DIAGNOSIS — J44.9 CHRONIC OBSTRUCTIVE PULMONARY DISEASE, UNSPECIFIED COPD TYPE (HCC): ICD-10-CM

## 2022-03-29 DIAGNOSIS — I10 ESSENTIAL HYPERTENSION: ICD-10-CM

## 2022-03-29 DIAGNOSIS — Z12.31 ENCOUNTER FOR SCREENING MAMMOGRAM FOR BREAST CANCER: ICD-10-CM

## 2022-03-29 DIAGNOSIS — E03.9 HYPOTHYROIDISM, UNSPECIFIED TYPE: ICD-10-CM

## 2022-03-29 DIAGNOSIS — Z23 NEED FOR VACCINATION: ICD-10-CM

## 2022-03-29 DIAGNOSIS — C34.90 NON-SMALL CELL LUNG CANCER, UNSPECIFIED LATERALITY (HCC): ICD-10-CM

## 2022-03-29 DIAGNOSIS — G95.9 CERVICAL MYELOPATHY (HCC): ICD-10-CM

## 2022-03-29 DIAGNOSIS — Z79.899 ENCOUNTER FOR LONG-TERM (CURRENT) DRUG USE: ICD-10-CM

## 2022-03-29 DIAGNOSIS — R92.2 DENSE BREAST TISSUE: ICD-10-CM

## 2022-03-29 DIAGNOSIS — E78.00 PURE HYPERCHOLESTEROLEMIA: ICD-10-CM

## 2022-03-29 DIAGNOSIS — R56.9 SEIZURE (HCC): ICD-10-CM

## 2022-03-29 DIAGNOSIS — K21.9 GASTROESOPHAGEAL REFLUX DISEASE, UNSPECIFIED WHETHER ESOPHAGITIS PRESENT: ICD-10-CM

## 2022-03-29 DIAGNOSIS — Z00.00 MEDICARE ANNUAL WELLNESS VISIT, SUBSEQUENT: Primary | ICD-10-CM

## 2022-03-29 DIAGNOSIS — J30.2 OTHER SEASONAL ALLERGIC RHINITIS: ICD-10-CM

## 2022-03-29 PROCEDURE — 1036F TOBACCO NON-USER: CPT | Performed by: FAMILY MEDICINE

## 2022-03-29 PROCEDURE — 99214 OFFICE O/P EST MOD 30 MIN: CPT | Performed by: FAMILY MEDICINE

## 2022-03-29 PROCEDURE — G8484 FLU IMMUNIZE NO ADMIN: HCPCS | Performed by: FAMILY MEDICINE

## 2022-03-29 PROCEDURE — G0439 PPPS, SUBSEQ VISIT: HCPCS | Performed by: FAMILY MEDICINE

## 2022-03-29 PROCEDURE — G8417 CALC BMI ABV UP PARAM F/U: HCPCS | Performed by: FAMILY MEDICINE

## 2022-03-29 PROCEDURE — 90732 PPSV23 VACC 2 YRS+ SUBQ/IM: CPT | Performed by: FAMILY MEDICINE

## 2022-03-29 PROCEDURE — 3017F COLORECTAL CA SCREEN DOC REV: CPT | Performed by: FAMILY MEDICINE

## 2022-03-29 PROCEDURE — 1090F PRES/ABSN URINE INCON ASSESS: CPT | Performed by: FAMILY MEDICINE

## 2022-03-29 PROCEDURE — G0009 ADMIN PNEUMOCOCCAL VACCINE: HCPCS | Performed by: FAMILY MEDICINE

## 2022-03-29 PROCEDURE — G8399 PT W/DXA RESULTS DOCUMENT: HCPCS | Performed by: FAMILY MEDICINE

## 2022-03-29 PROCEDURE — 4040F PNEUMOC VAC/ADMIN/RCVD: CPT | Performed by: FAMILY MEDICINE

## 2022-03-29 PROCEDURE — G8427 DOCREV CUR MEDS BY ELIG CLIN: HCPCS | Performed by: FAMILY MEDICINE

## 2022-03-29 PROCEDURE — 3023F SPIROM DOC REV: CPT | Performed by: FAMILY MEDICINE

## 2022-03-29 PROCEDURE — 1123F ACP DISCUSS/DSCN MKR DOCD: CPT | Performed by: FAMILY MEDICINE

## 2022-03-29 RX ORDER — EZETIMIBE 10 MG/1
10 TABLET ORAL DAILY
Qty: 90 TABLET | Refills: 3 | Status: SHIPPED
Start: 2022-03-29 | End: 2022-05-16 | Stop reason: SDUPTHER

## 2022-03-29 RX ORDER — OMEPRAZOLE 20 MG/1
20 CAPSULE, DELAYED RELEASE ORAL DAILY
Qty: 90 CAPSULE | Refills: 3 | Status: SHIPPED | OUTPATIENT
Start: 2022-03-29 | End: 2022-10-18 | Stop reason: SDUPTHER

## 2022-03-29 RX ORDER — ATENOLOL 100 MG/1
100 TABLET ORAL DAILY
Qty: 90 TABLET | Refills: 3 | Status: ON HOLD | OUTPATIENT
Start: 2022-03-29 | End: 2022-06-06 | Stop reason: HOSPADM

## 2022-03-29 RX ORDER — MONTELUKAST SODIUM 10 MG/1
10 TABLET ORAL NIGHTLY
Qty: 90 TABLET | Refills: 3 | Status: SHIPPED | OUTPATIENT
Start: 2022-03-29 | End: 2022-10-18 | Stop reason: SDUPTHER

## 2022-03-29 RX ORDER — TIZANIDINE 4 MG/1
4 TABLET ORAL EVERY 8 HOURS PRN
Qty: 90 TABLET | Refills: 3 | Status: SHIPPED
Start: 2022-03-29 | End: 2022-10-18 | Stop reason: SDUPTHER

## 2022-03-29 RX ORDER — LORATADINE 10 MG/1
10 TABLET ORAL DAILY
Qty: 90 TABLET | Refills: 3 | Status: SHIPPED
Start: 2022-03-29 | End: 2022-05-10 | Stop reason: SDUPTHER

## 2022-03-29 RX ORDER — LEVOTHYROXINE SODIUM 100 MCG
TABLET ORAL
Qty: 108 TABLET | Refills: 3 | Status: SHIPPED | OUTPATIENT
Start: 2022-03-29

## 2022-03-29 RX ORDER — ESCITALOPRAM OXALATE 5 MG/1
5 TABLET ORAL DAILY
Qty: 90 TABLET | Refills: 3 | Status: SHIPPED | OUTPATIENT
Start: 2022-03-29 | End: 2022-10-18 | Stop reason: SDUPTHER

## 2022-03-29 RX ORDER — ALBUTEROL SULFATE 90 UG/1
2 AEROSOL, METERED RESPIRATORY (INHALATION) 4 TIMES DAILY PRN
Qty: 3 EACH | Refills: 3 | Status: SHIPPED | OUTPATIENT
Start: 2022-03-29

## 2022-03-29 RX ORDER — OLMESARTAN MEDOXOMIL 40 MG/1
40 TABLET ORAL DAILY
Qty: 90 TABLET | Refills: 3 | Status: SHIPPED | OUTPATIENT
Start: 2022-03-29 | End: 2022-07-06

## 2022-03-29 NOTE — PROGRESS NOTES
Medicare Annual Wellness Visit    Britany Shen is here for Medicare AWV    Assessment & Plan   Medicare annual wellness visit, subsequent  Pure hypercholesterolemia  -     ezetimibe (ZETIA) 10 MG tablet; Take 1 tablet by mouth daily, Disp-90 tablet, R-3Normal  Essential hypertension  -     atenolol (TENORMIN) 100 MG tablet; Take 1 tablet by mouth daily, Disp-90 tablet, R-3Print  -     olmesartan (BENICAR) 40 MG tablet; Take 1 tablet by mouth daily take 1 tablet by mouth once daily, Disp-90 tablet, R-3Print  Hypothyroidism, unspecified type  -     SYNTHROID 100 MCG tablet; Take 1 tablet 5 days a week and 1.5 tab on Saturday and Sunday, Disp-108 tablet, R-3, DAWNormal  Anxiety  -     escitalopram (LEXAPRO) 5 MG tablet; Take 1 tablet by mouth daily, Disp-90 tablet, R-3Print  Gastroesophageal reflux disease, unspecified whether esophagitis present  -     omeprazole (PRILOSEC) 20 MG delayed release capsule; Take 1 capsule by mouth Daily take 1 capsule by mouth once daily, Disp-90 capsule, R-3Print  Cervical myelopathy (HCC)  -     URINE DRUG SCREEN; Future  Non-small cell lung cancer, unspecified laterality (HCC)  Chronic obstructive pulmonary disease, unspecified COPD type ([address]  -     Full PFT Study With Bronchodilator; Future  Seizure ([address]  Encounter for long-term (current) drug use  -     URINE DRUG SCREEN; Future  Other seasonal allergic rhinitis  -     montelukast (SINGULAIR) 10 MG tablet; Take 1 tablet by mouth nightly take 1 tablet by mouth every evening, Disp-90 tablet, R-3Print  Dense breast tissue  -     US BREAST COMPLETE LEFT; Future  -     US BREAST COMPLETE RIGHT; Future  Encounter for screening mammogram for breast cancer  -     HAIDER DIGITAL SCREEN BILATERAL PER PROTOCOL;  Future  Need for vaccination  -     Pneumococcal polysaccharide vaccine 23-valent greater than or equal to 3yo subcutaneous/IM      Recommendations for Preventive Services Due: see orders and patient instructions/AVS.  Recommended screening schedule for the next 5-10 years is provided to the patient in written form: see Patient Instructions/AVS.     Return for Medicare Annual Wellness Visit in 1 year. Subjective   See alternate note    Patient's complete Health Risk Assessment and screening values have been reviewed and are found in Flowsheets. The following problems were reviewed today and where indicated follow up appointments were made and/or referrals ordered. Positive Risk Factor Screenings with Interventions:        Alcohol Screening:       A score of 8 or more is associated with harmful or hazardous drinking. A score of 13 or more in women, and 15 or more in men, is likely to indicate alcohol dependence. Substance Abuse - Alcohol Interventions:  na          General Health and ACP:  General  In general, how would you say your health is?: Good  In the past 7 days, have you experienced any of the following: New or Increased Pain, New or Increased Fatigue, Loneliness, Social Isolation, Stress or Anger?: No  Do you get the social and emotional support that you need?: Yes  Do you have a Living Will?: (!) No    Advance Directives     Power of  Living Will ACP-Advance Directive ACP-Power of     Not on File Filed on 02/09/17 Filed Not on File      General Health Risk Interventions:  · No Living Will: Advance Care Planning addressed with patient today              Objective   Vitals:    03/29/22 0916   BP: 132/72   Pulse: 66   Temp: 97.6 °F (36.4 °C)   TempSrc: Temporal   SpO2: 98%   Weight: 178 lb 12.8 oz (81.1 kg)   Height: 5' 10\" (1.778 m)      Body mass index is 25.66 kg/m².               Allergies   Allergen Reactions    Codeine Hives and Itching     AND CODEINE DERIVATIVES----sob  Pt stated tolerated Dilaudid    Fentanyl Other (See Comments)     Disoriented, confused  Other reaction(s): Mental Status Change    Adhesive Tape      burns skin, reddens skin, blisters    Amlodipine Swelling    Bupropion hyper--couldn't sit still--jumpy    Cortisone      throat closed up    Dipyridamole Hives     Persantine-CST--sob, palpitations, stress test stopped    Levaquin [Levofloxacin In D5w] Other (See Comments)     Yeast infection     Nortriptyline      for rosacea--caused flare up. stopped    Other      States any steroids make her face swell and flush    Prednisone Other (See Comments)    Tenex [Guanfacine Hcl]      dizziness    Tramadol Hives     sob    Varenicline      Prior to Visit Medications    Medication Sig Taking?  Authorizing Provider   escitalopram (LEXAPRO) 5 MG tablet Take 1 tablet by mouth daily Yes Maximus Wilkins MD   montelukast (SINGULAIR) 10 MG tablet Take 1 tablet by mouth nightly take 1 tablet by mouth every evening Yes Maximus Wilkins MD   atenolol (TENORMIN) 100 MG tablet Take 1 tablet by mouth daily Yes Maximus Wilkins MD   olmesartan (BENICAR) 40 MG tablet Take 1 tablet by mouth daily take 1 tablet by mouth once daily Yes Maximus Wilkins MD   omeprazole (PRILOSEC) 20 MG delayed release capsule Take 1 capsule by mouth Daily take 1 capsule by mouth once daily Yes Maximus Wiklins MD   SYNTHROID 100 MCG tablet Take 1 tablet 5 days a week and 1.5 tab on Saturday and Sunday Yes Maximus Wilkins MD   loratadine (CLARITIN) 10 MG tablet Take 1 tablet by mouth daily Yes Maximus Wilkins MD   ezetimibe (ZETIA) 10 MG tablet Take 1 tablet by mouth daily Yes Maximus Wilkins MD   tiZANidine (ZANAFLEX) 4 MG tablet Take 1 tablet by mouth every 8 hours as needed (muscle spasm) Take 1 tab by mouth 3 times daily Yes Maximus Wilkins MD   albuterol sulfate  (90 Base) MCG/ACT inhaler Inhale 2 puffs into the lungs 4 times daily as needed for Wheezing Yes Maximus Wilkins MD   benzonatate (TESSALON) 100 MG capsule take 1 capsule by mouth three times a day if needed for cough Yes Maximus Wilkins MD   pregabalin (LYRICA) 50 MG capsule Take 1 capsule by mouth 2 times daily for 90 days. Yes Binu Brooks MD   lidocaine (LIDODERM) 5 % apply 3 patches to the affected area daily. Leave on for 12 hours and then off for 12 hours.  Yes Binu Brooks MD   Coenzyme Q10 (CO Q 10 PO) Take by mouth daily Yes Historical Provider, MD   Biotin w/ Vitamins C & E (HAIR/SKIN/NAILS PO) Take by mouth daily VIT C 90MG  VIT E 5,000MCG  ZINC 8MG  COPPER 1MG Yes Historical Provider, MD   Melatonin 1 MG SUBL Place 2 mg under the tongue nightly Yes Historical Provider, MD       CareTeam (Including outside providers/suppliers regularly involved in providing care):   Patient Care Team:  Binu Brooks MD as PCP - General (Family Medicine)  Binu Brooks MD as PCP - [facility] Empaneled Provider  Gamaliel Brown MD as Consulting Physician (Nephrology)  Gildardo Smith MD as Consulting Physician (Cardiology)    Reviewed and updated this visit:  Tobacco  Allergies  Meds  Med Hx  Surg Hx  Soc Hx  Fam Hx

## 2022-03-29 NOTE — PROGRESS NOTES
CC: Esthela Varma is a 73 y.o. yo female is here for evaluation evaluation for the following acute & chronic medical concerns: Medicare AWV      HPI:    HTN - se to hctz (loc, dizzy); currently on benicar and atenolol  HLD - hx of elevated CK on statin; currently on zetia;  ASCVD 13.3%  Anxiety / depression  - controlled on lexapro  GERD - on prilosec  Hashimoto's / thyroid nodule - on synthroid 100mcg 5x per week and 150mcg sat / sun, follows with endo; last TSH is slightly low  Hx of Lung cancer x2 s/p R upper and middle lobectomy: Follows with Dr. Carmelo Pimentel; Quite smoking 2007; Diagnosed with lung PJ 6161  COPD - on home maint inhaler prn from prior pulm and albuterol PRN; she has chronic coughing and uses tessalon (from 3x daily down to 2x daily for this)  Chronic cough - On long term tessalon, see above; currently controlled with inhalers  Chronic pain - Lumbar pain and hip pain; did see PMR; Dr. Bisi Merida; controlled on lyrica; I am prescriber  Preventive: Garoer Alishazick score of 6.3%; Heterogeneously dense  Inquiring about weight loss    PDMP Monitoring:    Last PDMP Shashank as Reviewed [facility]):  Review User Review Instant Review Result   Denis Dooley 3/29/2022  9:46 AM Reviewed PDMP [1]     Last Controlled Substance Monitoring Documentation      Virtual Visit from 10/21/2020 in [address]   Periodic Controlled Substance Monitoring No signs of potential drug abuse or diversion identified. , Assessed functional status.  filed at 10/21/2020 1546        Urine Drug Screenings (1 yr)     URINE DRUG SCREEN  Collected: 11/3/2020  7:45 AM (Final result)    Complete Results          Opiate, quantitative, urine  Collected: 1/21/2020 10:00 AM (Final result)   Narrative: Pain Management Panel;  Screen w/Reflex to Quantitation (ARUP Code [id number])     Complete Results          Benzodiazepine, Quantitative, Urine  Collected: 1/21/2020 10:00 AM (Final result)   Narrative: Pain Management Panel;  Screen w/Reflex to Quantitation ([facility] Code [id number])     Complete Results              Medication Contract and Consent for Opioid Use Documents Filed     Patient Documents     Type of Document Status Date Received Received By Description    Medication Contract Received 1/21/2020  8:16 AM AYSHA ALVARADO contract                  Vitals:   /72   Pulse 66   Temp 97.6 °F (36.4 °C) (Temporal)   Ht 5' 10\" (1.778 m)   Wt 178 lb 12.8 oz (81.1 kg)   SpO2 98%   BMI 25.66 kg/m²   Wt Readings from Last 3 Encounters:   03/29/22 178 lb 12.8 oz (81.1 kg)   11/05/21 175 lb 11.2 oz (79.7 kg)   09/28/21 175 lb (79.4 kg)       PE:  Constitutional - alert, well appearing, and in no distress  Eyes - extraocular eye movements intact, left eye normal, right eye normal, no conjunctivitis noted  Neck - symmetric, no obvious masses noted  Respiratory- clear to auscultation, no wheezes, rales or rhonchi, symmetric air entry; no increased work of breathing  Cardiovascular - normal rate, regular rhythm, normal S1, S2, no murmurs, rubs, clicks or gallops  Extremities - no edema noted  Abdomen - soft, nontender, nondistended; umbilical hernia  Skin - normal coloration and turgor, no rashes, no suspicious skin lesions noted      A / P:     Diagnosis Orders   1. Medicare annual wellness visit, subsequent     2. Pure hypercholesterolemia  ezetimibe (ZETIA) 10 MG tablet   3. Essential hypertension  atenolol (TENORMIN) 100 MG tablet    olmesartan (BENICAR) 40 MG tablet   4. Hypothyroidism, unspecified type  SYNTHROID 100 MCG tablet   5. Anxiety  escitalopram (LEXAPRO) 5 MG tablet   6. Gastroesophageal reflux disease, unspecified whether esophagitis present  omeprazole (PRILOSEC) 20 MG delayed release capsule   7. Cervical myelopathy (HCC)  URINE DRUG SCREEN   8. Non-small cell lung cancer, unspecified laterality ([address]     9. Chronic obstructive pulmonary disease, unspecified COPD type ([address]  Full PFT Study With Bronchodilator   10.  Seizure ([facility]     11. Encounter for long-term (current) drug use  URINE DRUG SCREEN   12. Other seasonal allergic rhinitis  montelukast (SINGULAIR) 10 MG tablet   13. Dense breast tissue  US BREAST COMPLETE LEFT    US BREAST COMPLETE RIGHT   14. Encounter for screening mammogram for breast cancer  HAIDER DIGITAL SCREEN BILATERAL PER PROTOCOL   15. Need for vaccination  Pneumococcal polysaccharide vaccine 23-valent greater than or equal to 3yo subcutaneous/IM       I will provide 3 month lyrica refills so she can [facility]  Wean the tessalon and re-establish with pulm to control the coughing  I suspect she is uncontrolled from her COPD which is causing her chronic coughing  She is agreeable  HM as ordered  Would like to discuss weight loss options; she is gaining weight as of lately without dietary changes; She has f/u with endocrinology scheduled for her thyroid as well      RTO: Return for Medicare Annual Wellness Visit in 1 year + 4 month routine; 1 month weight loss.         An electronic signature was used to authenticate this note.  ---- Laverne Robertson MD on 3/29/2022 at 12:43 PM

## 2022-03-29 NOTE — PATIENT INSTRUCTIONS
Personalized Preventive Plan for Marc Cronin - 3/29/2022  Medicare offers a range of preventive health benefits. Some of the tests and screenings are paid in full while other may be subject to a deductible, co-insurance, and/or copay. Some of these benefits include a comprehensive review of your medical history including lifestyle, illnesses that may run in your family, and various assessments and screenings as appropriate. After reviewing your medical record and screening and assessments performed today your provider may have ordered immunizations, labs, imaging, and/or referrals for you. A list of these orders (if applicable) as well as your Preventive Care list are included within your After Visit Summary for your review. Other Preventive Recommendations:    · A preventive eye exam performed by an eye specialist is recommended every 1-2 years to screen for glaucoma; cataracts, macular degeneration, and other eye disorders. · A preventive dental visit is recommended every 6 months. · Try to get at least 150 minutes of exercise per week or 10,000 steps per day on a pedometer . · Order or download the FREE \"Exercise & Physical Activity: Your Everyday Guide\" from [employer]. Call 5-196.704.7120 or search [employer] online. · You need 3712-9807 mg of calcium and 5408-4802 IU of vitamin D per day. It is possible to meet your calcium requirement with diet alone, but a vitamin D supplement is usually necessary to meet this goal.  · When exposed to the sun, use a sunscreen that protects against both UVA and UVB radiation with an SPF of 30 or greater. Reapply every 2 to 3 hours or after sweating, drying off with a towel, or swimming. · Always wear a seat belt when traveling in a car. Always wear a helmet when riding a bicycle or motorcycle.

## 2022-03-31 ENCOUNTER — TELEPHONE (OUTPATIENT)
Dept: ENDOCRINOLOGY | Age: 72
End: 2022-03-31

## 2022-03-31 DIAGNOSIS — E03.9 HYPOTHYROIDISM, UNSPECIFIED TYPE: Primary | ICD-10-CM

## 2022-04-01 NOTE — TELEPHONE ENCOUNTER
----- Message from Candie Higuera MD sent at 3/29/2022 12:34 PM EDT -----  See TSH on mutual pt. I did NOT make adjustment today in case you wanted to repeat this prior to your upcoming visit with her.  She was complaining of weight gain.    ----- Message -----  From: Philly Segundo Incoming Results From [facility]  Sent: 3/25/2022   3:32 PM EDT  To: Candie Higuera MD

## 2022-04-18 DIAGNOSIS — M25.561 CHRONIC PAIN OF RIGHT KNEE: ICD-10-CM

## 2022-04-18 DIAGNOSIS — G89.29 CHRONIC PAIN OF RIGHT KNEE: ICD-10-CM

## 2022-04-18 DIAGNOSIS — G89.4 CHRONIC PAIN SYNDROME: ICD-10-CM

## 2022-04-18 DIAGNOSIS — M25.551 RIGHT HIP PAIN: ICD-10-CM

## 2022-04-18 DIAGNOSIS — M54.16 LUMBAR RADICULOPATHY: ICD-10-CM

## 2022-04-18 RX ORDER — PREGABALIN 50 MG/1
50 CAPSULE ORAL 2 TIMES DAILY
Qty: 180 CAPSULE | Refills: 0 | Status: SHIPPED
Start: 2022-04-18 | End: 2022-07-18 | Stop reason: SDUPTHER

## 2022-04-18 NOTE — TELEPHONE ENCOUNTER
Medication Refill Request    LOV 3/29/2022  NOV 4/19/2022    Lab Results   Component Value Date    CREATININE 0.9 03/25/2022

## 2022-04-19 ENCOUNTER — OFFICE VISIT (OUTPATIENT)
Dept: FAMILY MEDICINE CLINIC | Age: 72
End: 2022-04-19
Payer: MEDICARE

## 2022-04-19 VITALS
DIASTOLIC BLOOD PRESSURE: 80 MMHG | SYSTOLIC BLOOD PRESSURE: 136 MMHG | HEIGHT: 70 IN | TEMPERATURE: 97.6 F | WEIGHT: 178.4 LBS | RESPIRATION RATE: 16 BRPM | HEART RATE: 57 BPM | BODY MASS INDEX: 25.54 KG/M2 | OXYGEN SATURATION: 98 %

## 2022-04-19 DIAGNOSIS — R63.5 WEIGHT GAIN: Primary | ICD-10-CM

## 2022-04-19 PROCEDURE — 4040F PNEUMOC VAC/ADMIN/RCVD: CPT | Performed by: FAMILY MEDICINE

## 2022-04-19 PROCEDURE — 1036F TOBACCO NON-USER: CPT | Performed by: FAMILY MEDICINE

## 2022-04-19 PROCEDURE — G8427 DOCREV CUR MEDS BY ELIG CLIN: HCPCS | Performed by: FAMILY MEDICINE

## 2022-04-19 PROCEDURE — 1123F ACP DISCUSS/DSCN MKR DOCD: CPT | Performed by: FAMILY MEDICINE

## 2022-04-19 PROCEDURE — 99214 OFFICE O/P EST MOD 30 MIN: CPT | Performed by: FAMILY MEDICINE

## 2022-04-19 PROCEDURE — 1090F PRES/ABSN URINE INCON ASSESS: CPT | Performed by: FAMILY MEDICINE

## 2022-04-19 PROCEDURE — 3017F COLORECTAL CA SCREEN DOC REV: CPT | Performed by: FAMILY MEDICINE

## 2022-04-19 PROCEDURE — G8417 CALC BMI ABV UP PARAM F/U: HCPCS | Performed by: FAMILY MEDICINE

## 2022-04-19 PROCEDURE — G8399 PT W/DXA RESULTS DOCUMENT: HCPCS | Performed by: FAMILY MEDICINE

## 2022-04-19 NOTE — PROGRESS NOTES
CC: Lavanda Ormond is a 73 y.o. yo female is here for evaluation evaluation for the following acute medical concerns: Weight Gain (3-week follow-up for weight gain), Hypertension, and Hypothyroidism      HPI:      Here to discuss weight gain:  No recent thyroid medication changes; follows with endo; last TSH mildly below normal  AM: Toast with avacoado tomato with coffee (creamer)  Lunch: Started to making shake; protein powder, blue berries this past week  Dinner: Veggie, potatos, starch; lean meat; rare red meat  She feels this weight is from a thyroid issue as she has gained more weight recently      Vitals 4/19/2022 3/29/2022 11/5/2021 9/28/2021 9/9/2021   Weight 178 lb 6.4 oz 178 lb 12.8 oz 175 lb 11.2 oz 175 lb 174 lb     Vitals 7/29/2021   Weight 173 lb         Vitals:   /80 (Site: Right Upper Arm, Position: Sitting, Cuff Size: Medium Adult)   Pulse 57   Temp 97.6 °F (36.4 °C) (Temporal)   Resp 16   Ht 5' 10\" (1.778 m)   Wt 178 lb 6.4 oz (80.9 kg)   SpO2 98%   BMI 25.60 kg/m²   Wt Readings from Last 3 Encounters:   04/19/22 178 lb 6.4 oz (80.9 kg)   03/29/22 178 lb 12.8 oz (81.1 kg)   11/05/21 175 lb 11.2 oz (79.7 kg)       PE:  Constitutional - alert, well appearing, and in no distress  Eyes - extraocular eye movements intact, left eye normal, right eye normal, no conjunctivitis noted  Neck - symmetric, no obvious masses noted  Respiratory- clear to auscultation, no wheezes, rales or rhonchi, symmetric air entry; no increased work of breathing  Cardiovascular - normal rate, regular rhythm, normal S1, S2, no murmurs, rubs, clicks or gallops  Extremities - no edema noted  Abdomen - soft, nontender, nondistended; umbilical hernia  Skin - normal coloration and turgor, no rashes, no suspicious skin lesions noted      A / P:     Diagnosis Orders   1.  Weight gain         Discuss thyroid with Dr. Juan Garland; planned f/u in June  Continue with dietary changes  Short 3 weeks of calorie counting to monitor calorie consuption      RTO: Return in about 2 months (around 6/19/2022). An electronic signature was used to authenticate this note.  ---- Lala Duncan MD on 4/19/2022 at 2:34 PM       On this date 4/19/2022 I have spent 34 minutes reviewing previous notes, test results and face to face with the patient discussing the diagnosis and importance of compliance with the treatment plan as well as documenting on the day of the visit.

## 2022-05-09 ENCOUNTER — HOSPITAL ENCOUNTER (OUTPATIENT)
Dept: GENERAL RADIOLOGY | Age: 72
Discharge: HOME | End: 2022-05-11
Payer: MEDICARE

## 2022-05-09 DIAGNOSIS — R92.2 DENSE BREAST TISSUE: ICD-10-CM

## 2022-05-09 DIAGNOSIS — Z12.31 ENCOUNTER FOR SCREENING MAMMOGRAM FOR BREAST CANCER: ICD-10-CM

## 2022-05-09 PROCEDURE — 77063 BREAST TOMOSYNTHESIS BI: CPT

## 2022-05-09 PROCEDURE — 76641 ULTRASOUND BREAST COMPLETE: CPT

## 2022-05-10 RX ORDER — LORATADINE 10 MG/1
10 TABLET ORAL DAILY
Qty: 90 TABLET | Refills: 0 | Status: SHIPPED
Start: 2022-05-10 | End: 2022-08-11 | Stop reason: SDUPTHER

## 2022-05-10 NOTE — TELEPHONE ENCOUNTER
Medication Refill Request    LOV 4/19/2022  NOV 5/31/2022    Lab Results   Component Value Date    CREATININE 0.9 03/25/2022

## 2022-05-16 DIAGNOSIS — E78.00 PURE HYPERCHOLESTEROLEMIA: ICD-10-CM

## 2022-05-16 RX ORDER — EZETIMIBE 10 MG/1
10 TABLET ORAL DAILY
Qty: 90 TABLET | Refills: 0 | Status: SHIPPED
Start: 2022-05-16 | End: 2022-08-25 | Stop reason: SDUPTHER

## 2022-05-31 ENCOUNTER — OFFICE VISIT (OUTPATIENT)
Dept: FAMILY MEDICINE CLINIC | Age: 72
End: 2022-05-31
Payer: MEDICARE

## 2022-05-31 VITALS
RESPIRATION RATE: 16 BRPM | HEART RATE: 58 BPM | DIASTOLIC BLOOD PRESSURE: 62 MMHG | OXYGEN SATURATION: 97 % | WEIGHT: 176.6 LBS | BODY MASS INDEX: 25.28 KG/M2 | SYSTOLIC BLOOD PRESSURE: 136 MMHG | TEMPERATURE: 97.4 F | HEIGHT: 70 IN

## 2022-05-31 DIAGNOSIS — F32.A DEPRESSION, UNSPECIFIED DEPRESSION TYPE: ICD-10-CM

## 2022-05-31 DIAGNOSIS — J44.1 COPD EXACERBATION (HCC): ICD-10-CM

## 2022-05-31 DIAGNOSIS — J01.90 ACUTE SINUSITIS, RECURRENCE NOT SPECIFIED, UNSPECIFIED LOCATION: Primary | ICD-10-CM

## 2022-05-31 DIAGNOSIS — R05.3 CHRONIC COUGH: ICD-10-CM

## 2022-05-31 LAB
Lab: NORMAL
PERFORMING INSTRUMENT: NORMAL
QC PASS/FAIL: NORMAL
SARS-COV-2, POC: NORMAL

## 2022-05-31 PROCEDURE — G8417 CALC BMI ABV UP PARAM F/U: HCPCS | Performed by: FAMILY MEDICINE

## 2022-05-31 PROCEDURE — 1123F ACP DISCUSS/DSCN MKR DOCD: CPT | Performed by: FAMILY MEDICINE

## 2022-05-31 PROCEDURE — 1036F TOBACCO NON-USER: CPT | Performed by: FAMILY MEDICINE

## 2022-05-31 PROCEDURE — G8427 DOCREV CUR MEDS BY ELIG CLIN: HCPCS | Performed by: FAMILY MEDICINE

## 2022-05-31 PROCEDURE — G8399 PT W/DXA RESULTS DOCUMENT: HCPCS | Performed by: FAMILY MEDICINE

## 2022-05-31 PROCEDURE — 87426 SARSCOV CORONAVIRUS AG IA: CPT | Performed by: FAMILY MEDICINE

## 2022-05-31 PROCEDURE — 3023F SPIROM DOC REV: CPT | Performed by: FAMILY MEDICINE

## 2022-05-31 PROCEDURE — 3017F COLORECTAL CA SCREEN DOC REV: CPT | Performed by: FAMILY MEDICINE

## 2022-05-31 PROCEDURE — 1090F PRES/ABSN URINE INCON ASSESS: CPT | Performed by: FAMILY MEDICINE

## 2022-05-31 PROCEDURE — 99214 OFFICE O/P EST MOD 30 MIN: CPT | Performed by: FAMILY MEDICINE

## 2022-05-31 RX ORDER — AZITHROMYCIN 250 MG/1
TABLET, FILM COATED ORAL
Qty: 6 TABLET | Refills: 0 | Status: ON HOLD
Start: 2022-05-31 | End: 2022-06-06 | Stop reason: HOSPADM

## 2022-05-31 RX ORDER — BENZONATATE 100 MG/1
CAPSULE ORAL
Qty: 60 CAPSULE | Refills: 0 | Status: SHIPPED
Start: 2022-05-31 | End: 2022-07-01 | Stop reason: SDUPTHER

## 2022-05-31 NOTE — PROGRESS NOTES
CC: Deon Garcia is a 73 y.o. yo female is here for evaluation evaluation for the following acute & chronic medical concerns: Weight Gain (6-week follow-up), Hypertension, Hypothyroidism, and Pharyngitis (Negative at-home COVID tests x 2 days; c/o congestion, right ear fullness)      HPI:      Congestion: ongoing for about 1 week; congestion; sore throat; +cough, +mild sputum production; crackling in the R ear; feels fatigued and drained    Weight gain: did lose 2 lbs with dietary changes (smoothies); stress eating    Vitals:   /62 (Site: Left Upper Arm, Position: Sitting, Cuff Size: Large Adult)   Pulse 58   Temp 97.4 °F (36.3 °C) (Temporal)   Resp 16   Ht 5' 10\" (1.778 m)   Wt 176 lb 9.6 oz (80.1 kg)   SpO2 97%   BMI 25.34 kg/m²   Wt Readings from Last 3 Encounters:   05/31/22 176 lb 9.6 oz (80.1 kg)   04/19/22 178 lb 6.4 oz (80.9 kg)   03/29/22 178 lb 12.8 oz (81.1 kg)       PE:  Constitutional - alert, well appearing, and in no distress  Eyes - extraocular eye movements intact, left eye normal, right eye normal, no conjunctivitis noted  ENT: slight L tonsillar swelling; no purulence; ears with clear fluid behind membrane b/l  Neck - symmetric, no obvious masses noted  Respiratory- clear to auscultation, no wheezes, rales or rhonchi, symmetric air entry; no increased work of breathing  Cardiovascular - normal rate, regular rhythm, normal S1, S2, no murmurs, rubs, clicks or gallops  Extremities - no edema noted  Abdomen - soft, nontender, nondistended; umbilical hernia  Skin - normal coloration and turgor, no rashes, no suspicious skin lesions noted      A / P:     Diagnosis Orders   1. Acute sinusitis, recurrence not specified, unspecified location  POCT COVID-19, Antigen   2. COPD exacerbation (HCC)  azithromycin (ZITHROMAX) 250 MG tablet   3.  Depression, unspecified depression type  [facility], [facility], [facility], [facility]       Treat as mild copd ex with antonella  She is allergic to prednisone  Referral to Sonya Warren to help with services available to autistic adults  Continue with dietary changes      RTO: Return in about 3 months (around 8/31/2022) for chronic disease / routine f/u.         An electronic signature was used to authenticate this note.  ---- Ariel Tarango MD on 5/31/2022 at 4:34 PM

## 2022-05-31 NOTE — TELEPHONE ENCOUNTER
Last Appointment:  4/19/2022  Future Appointments   Date Time Provider Samir Witt   5/31/2022  1:15 PM [facility]   6/14/2022  1:30 PM Austin Fierro MD [facility]   11/8/2022  1:00 PM Sahil Otto MD  [facility] [address]   4/4/2023  1:00 PM Hector Diaz MD [facility]

## 2022-06-01 ENCOUNTER — HOSPITAL ENCOUNTER (INPATIENT)
Age: 72
LOS: 5 days | Discharge: HOME | DRG: 315 | End: 2022-06-06
Attending: EMERGENCY MEDICINE | Admitting: FAMILY MEDICINE
Payer: MEDICARE

## 2022-06-01 ENCOUNTER — APPOINTMENT (OUTPATIENT)
Dept: GENERAL RADIOLOGY | Age: 72
DRG: 315 | End: 2022-06-01
Payer: MEDICARE

## 2022-06-01 ENCOUNTER — APPOINTMENT (OUTPATIENT)
Dept: CT IMAGING | Age: 72
DRG: 315 | End: 2022-06-01
Payer: MEDICARE

## 2022-06-01 DIAGNOSIS — N17.9 ACUTE KIDNEY INJURY (HCC): ICD-10-CM

## 2022-06-01 DIAGNOSIS — I95.9 HYPOTENSION, UNSPECIFIED HYPOTENSION TYPE: ICD-10-CM

## 2022-06-01 DIAGNOSIS — R55 SYNCOPE AND COLLAPSE: ICD-10-CM

## 2022-06-01 DIAGNOSIS — R41.82 ALTERED MENTAL STATUS, UNSPECIFIED ALTERED MENTAL STATUS TYPE: Primary | ICD-10-CM

## 2022-06-01 LAB
ACETAMINOPHEN LEVEL: <5 MCG/ML (ref 10–30)
ALBUMIN SERPL-MCNC: 3.8 G/DL (ref 3.5–5.2)
ALP BLD-CCNC: 72 U/L (ref 35–104)
ALT SERPL-CCNC: 34 U/L (ref 0–32)
ANION GAP SERPL CALCULATED.3IONS-SCNC: 14 MMOL/L (ref 7–16)
AST SERPL-CCNC: 29 U/L (ref 0–31)
BACTERIA: ABNORMAL /HPF
BASOPHILS ABSOLUTE: 0.03 E9/L (ref 0–0.2)
BASOPHILS RELATIVE PERCENT: 0.5 % (ref 0–2)
BILIRUB SERPL-MCNC: <0.2 MG/DL (ref 0–1.2)
BILIRUBIN URINE: NEGATIVE
BLOOD, URINE: NEGATIVE
BUN BLDV-MCNC: 21 MG/DL (ref 6–23)
CALCIUM SERPL-MCNC: 8.2 MG/DL (ref 8.6–10.2)
CHLORIDE BLD-SCNC: 103 MMOL/L (ref 98–107)
CLARITY: CLEAR
CO2: 20 MMOL/L (ref 22–29)
COLOR: YELLOW
CREAT SERPL-MCNC: 1.8 MG/DL (ref 0.5–1)
EOSINOPHILS ABSOLUTE: 0.11 E9/L (ref 0.05–0.5)
EOSINOPHILS RELATIVE PERCENT: 1.8 % (ref 0–6)
ETHANOL: 95 MG/DL (ref 0–0.08)
GFR AFRICAN AMERICAN: 34
GFR NON-AFRICAN AMERICAN: 28 ML/MIN/1.73
GLUCOSE BLD-MCNC: 138 MG/DL (ref 74–99)
GLUCOSE URINE: NEGATIVE MG/DL
HCT VFR BLD CALC: 34.8 % (ref 34–48)
HEMOGLOBIN: 11.1 G/DL (ref 11.5–15.5)
IMMATURE GRANULOCYTES #: 0.02 E9/L
IMMATURE GRANULOCYTES %: 0.3 % (ref 0–5)
KETONES, URINE: NEGATIVE MG/DL
LACTIC ACID: 1.2 MMOL/L (ref 0.5–2.2)
LACTIC ACID: 2 MMOL/L (ref 0.5–2.2)
LACTIC ACID: 2.9 MMOL/L (ref 0.5–2.2)
LEUKOCYTE ESTERASE, URINE: ABNORMAL
LYMPHOCYTES ABSOLUTE: 1.44 E9/L (ref 1.5–4)
LYMPHOCYTES RELATIVE PERCENT: 23.8 % (ref 20–42)
MCH RBC QN AUTO: 30.2 PG (ref 26–35)
MCHC RBC AUTO-ENTMCNC: 31.9 % (ref 32–34.5)
MCV RBC AUTO: 94.8 FL (ref 80–99.9)
MONOCYTES ABSOLUTE: 0.54 E9/L (ref 0.1–0.95)
MONOCYTES RELATIVE PERCENT: 8.9 % (ref 2–12)
NEUTROPHILS ABSOLUTE: 3.9 E9/L (ref 1.8–7.3)
NEUTROPHILS RELATIVE PERCENT: 64.7 % (ref 43–80)
NITRITE, URINE: NEGATIVE
PDW BLD-RTO: 12.9 FL (ref 11.5–15)
PH UA: 5.5 (ref 5–9)
PLATELET # BLD: 181 E9/L (ref 130–450)
PMV BLD AUTO: 10.5 FL (ref 7–12)
POTASSIUM SERPL-SCNC: 4 MMOL/L (ref 3.5–5)
PROCALCITONIN: 0.15 NG/ML (ref 0–0.08)
PROTEIN UA: NEGATIVE MG/DL
RBC # BLD: 3.67 E12/L (ref 3.5–5.5)
RBC UA: ABNORMAL /HPF (ref 0–2)
SALICYLATE, SERUM: 0.3 MG/DL (ref 0–30)
SODIUM BLD-SCNC: 137 MMOL/L (ref 132–146)
SPECIFIC GRAVITY UA: 1.01 (ref 1–1.03)
TOTAL PROTEIN: 5.6 G/DL (ref 6.4–8.3)
TRICYCLIC ANTIDEPRESSANTS SCREEN SERUM: NEGATIVE NG/ML
TROPONIN, HIGH SENSITIVITY: 11 NG/L (ref 0–9)
UROBILINOGEN, URINE: 0.2 E.U./DL
WBC # BLD: 6 E9/L (ref 4.5–11.5)
WBC UA: ABNORMAL /HPF (ref 0–5)

## 2022-06-01 PROCEDURE — 96360 HYDRATION IV INFUSION INIT: CPT

## 2022-06-01 PROCEDURE — 96361 HYDRATE IV INFUSION ADD-ON: CPT

## 2022-06-01 PROCEDURE — 84145 PROCALCITONIN (PCT): CPT

## 2022-06-01 PROCEDURE — 2580000003 HC RX 258: Performed by: FAMILY MEDICINE

## 2022-06-01 PROCEDURE — 84484 ASSAY OF TROPONIN QUANT: CPT

## 2022-06-01 PROCEDURE — 70450 CT HEAD/BRAIN W/O DYE: CPT

## 2022-06-01 PROCEDURE — 87040 BLOOD CULTURE FOR BACTERIA: CPT

## 2022-06-01 PROCEDURE — 2140000000 HC CCU INTERMEDIATE R&B

## 2022-06-01 PROCEDURE — 80179 DRUG ASSAY SALICYLATE: CPT

## 2022-06-01 PROCEDURE — 2580000003 HC RX 258: Performed by: EMERGENCY MEDICINE

## 2022-06-01 PROCEDURE — 80053 COMPREHEN METABOLIC PANEL: CPT

## 2022-06-01 PROCEDURE — 80307 DRUG TEST PRSMV CHEM ANLYZR: CPT

## 2022-06-01 PROCEDURE — 93005 ELECTROCARDIOGRAM TRACING: CPT | Performed by: EMERGENCY MEDICINE

## 2022-06-01 PROCEDURE — 6360000002 HC RX W HCPCS: Performed by: FAMILY MEDICINE

## 2022-06-01 PROCEDURE — 99285 EMERGENCY DEPT VISIT HI MDM: CPT

## 2022-06-01 PROCEDURE — 85025 COMPLETE CBC W/AUTO DIFF WBC: CPT

## 2022-06-01 PROCEDURE — 6370000000 HC RX 637 (ALT 250 FOR IP): Performed by: INTERNAL MEDICINE

## 2022-06-01 PROCEDURE — 83605 ASSAY OF LACTIC ACID: CPT

## 2022-06-01 PROCEDURE — 80143 DRUG ASSAY ACETAMINOPHEN: CPT

## 2022-06-01 PROCEDURE — 36415 COLL VENOUS BLD VENIPUNCTURE: CPT

## 2022-06-01 PROCEDURE — 71045 X-RAY EXAM CHEST 1 VIEW: CPT

## 2022-06-01 PROCEDURE — 82077 ASSAY SPEC XCP UR&BREATH IA: CPT

## 2022-06-01 PROCEDURE — 81001 URINALYSIS AUTO W/SCOPE: CPT

## 2022-06-01 RX ORDER — LOSARTAN POTASSIUM 50 MG/1
100 TABLET ORAL DAILY
Refills: 3 | Status: DISCONTINUED | OUTPATIENT
Start: 2022-06-01 | End: 2022-06-06 | Stop reason: HOSPADM

## 2022-06-01 RX ORDER — PROMETHAZINE HYDROCHLORIDE 12.5 MG/1
12.5 TABLET ORAL EVERY 6 HOURS PRN
Status: DISCONTINUED | OUTPATIENT
Start: 2022-06-01 | End: 2022-06-06 | Stop reason: HOSPADM

## 2022-06-01 RX ORDER — ACETAMINOPHEN 325 MG/1
650 TABLET ORAL EVERY 6 HOURS PRN
Status: DISCONTINUED | OUTPATIENT
Start: 2022-06-01 | End: 2022-06-06 | Stop reason: HOSPADM

## 2022-06-01 RX ORDER — POLYETHYLENE GLYCOL 3350 17 G/17G
17 POWDER, FOR SOLUTION ORAL DAILY PRN
Status: DISCONTINUED | OUTPATIENT
Start: 2022-06-01 | End: 2022-06-06 | Stop reason: HOSPADM

## 2022-06-01 RX ORDER — SODIUM CHLORIDE 0.9 % (FLUSH) 0.9 %
10 SYRINGE (ML) INJECTION EVERY 12 HOURS SCHEDULED
Status: DISCONTINUED | OUTPATIENT
Start: 2022-06-01 | End: 2022-06-06 | Stop reason: HOSPADM

## 2022-06-01 RX ORDER — ACETAMINOPHEN 650 MG/1
650 SUPPOSITORY RECTAL EVERY 6 HOURS PRN
Status: DISCONTINUED | OUTPATIENT
Start: 2022-06-01 | End: 2022-06-06 | Stop reason: HOSPADM

## 2022-06-01 RX ORDER — ONDANSETRON 2 MG/ML
4 INJECTION INTRAMUSCULAR; INTRAVENOUS EVERY 6 HOURS PRN
Status: DISCONTINUED | OUTPATIENT
Start: 2022-06-01 | End: 2022-06-06 | Stop reason: HOSPADM

## 2022-06-01 RX ORDER — SODIUM CHLORIDE 9 MG/ML
INJECTION, SOLUTION INTRAVENOUS PRN
Status: DISCONTINUED | OUTPATIENT
Start: 2022-06-01 | End: 2022-06-06 | Stop reason: HOSPADM

## 2022-06-01 RX ORDER — 0.9 % SODIUM CHLORIDE 0.9 %
1000 INTRAVENOUS SOLUTION INTRAVENOUS ONCE
Status: COMPLETED | OUTPATIENT
Start: 2022-06-01 | End: 2022-06-01

## 2022-06-01 RX ORDER — SODIUM CHLORIDE 9 MG/ML
INJECTION, SOLUTION INTRAVENOUS CONTINUOUS
Status: DISCONTINUED | OUTPATIENT
Start: 2022-06-01 | End: 2022-06-02

## 2022-06-01 RX ORDER — ATENOLOL 50 MG/1
50 TABLET ORAL DAILY
Status: DISCONTINUED | OUTPATIENT
Start: 2022-06-01 | End: 2022-06-02

## 2022-06-01 RX ORDER — SODIUM CHLORIDE 0.9 % (FLUSH) 0.9 %
10 SYRINGE (ML) INJECTION PRN
Status: DISCONTINUED | OUTPATIENT
Start: 2022-06-01 | End: 2022-06-06 | Stop reason: HOSPADM

## 2022-06-01 RX ORDER — ENOXAPARIN SODIUM 100 MG/ML
40 INJECTION SUBCUTANEOUS DAILY
Status: DISCONTINUED | OUTPATIENT
Start: 2022-06-01 | End: 2022-06-06 | Stop reason: HOSPADM

## 2022-06-01 RX ADMIN — Medication 10 ML: at 11:07

## 2022-06-01 RX ADMIN — SODIUM CHLORIDE: 9 INJECTION, SOLUTION INTRAVENOUS at 22:35

## 2022-06-01 RX ADMIN — SODIUM CHLORIDE 1000 ML: 9 INJECTION, SOLUTION INTRAVENOUS at 03:32

## 2022-06-01 RX ADMIN — SODIUM CHLORIDE: 9 INJECTION, SOLUTION INTRAVENOUS at 02:51

## 2022-06-01 RX ADMIN — ATENOLOL 50 MG: 50 TABLET ORAL at 21:36

## 2022-06-01 RX ADMIN — LOSARTAN POTASSIUM 100 MG: 50 TABLET, FILM COATED ORAL at 21:36

## 2022-06-01 RX ADMIN — ENOXAPARIN SODIUM 40 MG: 100 INJECTION SUBCUTANEOUS at 13:27

## 2022-06-01 RX ADMIN — Medication 10 ML: at 21:36

## 2022-06-01 RX ADMIN — SODIUM CHLORIDE: 9 INJECTION, SOLUTION INTRAVENOUS at 11:06

## 2022-06-01 ASSESSMENT — PAIN - FUNCTIONAL ASSESSMENT
PAIN_FUNCTIONAL_ASSESSMENT: NONE - DENIES PAIN

## 2022-06-01 ASSESSMENT — LIFESTYLE VARIABLES
HOW MANY STANDARD DRINKS CONTAINING ALCOHOL DO YOU HAVE ON A TYPICAL DAY: 1 OR 2
HOW OFTEN DO YOU HAVE A DRINK CONTAINING ALCOHOL: 4 OR MORE TIMES A WEEK

## 2022-06-01 NOTE — ED PROVIDER NOTES
HPI:  6/1/22, Time: 2:12 AM EDT         Leigh Shipman is a 73 y.o. female presenting to the ED for history of altered mental status, beginning over 1 hour ago. The complaint has been persistent, moderate in severity, and worsened by nothing. Patient presenting here because of altered mental status. Patient reportedly was difficult to arouse per family and when EMS arrived they said that she was flaccid and had a facial droop. Patient was also incontinent. There is no history of seizure disorder there is no history of seizure activity. Patient per report became more awake and alert when being loaded into ambulance. Patient reporting no chest pain no difficulty breathing she does report some mild hip pain for last 2 days she reports no leg pain or swelling there is no numbness. Patient reporting no headache or photophobia. Patient reporting no fever or chills. ROS:   Pertinent positives and negatives are stated within HPI, all other systems reviewed and are negative.  --------------------------------------------- PAST HISTORY ---------------------------------------------  Past Medical History:  has a past medical history of Arthritis, Asthma, Cancer ([facility] [address] Chronic back pain, COPD (chronic obstructive pulmonary disease) ([facility] Emphysema of lung ([facility] GERD (gastroesophageal reflux disease), Hashimoto's disease, Hyperlipidemia, Hypertension, Hyperthyroidism, and Incisional hernia. Past Surgical History:  has a past surgical history that includes Appendectomy (1981); Hysterectomy (1981); Lung cancer surgery (Right); Colonoscopy (6/23/15); Colonoscopy (6/23/15); US BREAST BIOPSY W LOC DEVICE 1ST LESION RIGHT (10/19/2020); Cataract removal with implant (Bilateral); and hernia repair (N/A, 7/14/2021). Social History:  reports that she quit smoking about 14 years ago. Her smoking use included cigarettes. She has a 30.00 pack-year smoking history.  She has never used smokeless tobacco. She reports current alcohol use. She reports that she does not use drugs. Family History: family history includes Arthritis in her mother; Asthma in her sister; Breast Cancer (age of onset: 50) in her maternal aunt; Cancer (age of onset: 36) in her maternal aunt; Cancer (age of onset: 50) in her sister; Cancer (age of onset: 61) in her sister; Diabetes in her maternal grandmother and mother; Early Death in her brother; Heart Attack in her father; Heart Disease in her brother, maternal grandmother, and mother; High Blood Pressure in her maternal grandmother, mother, and sister; High Cholesterol in her maternal grandmother, mother, and sister; Other in her sister; Ovarian Cancer in her maternal aunt; Stroke in her maternal grandmother and mother; Substance Abuse in her sister. The patients home medications have been reviewed. Allergies: Codeine, Fentanyl, Adhesive tape, Amlodipine, Bupropion, Cortisone, Dipyridamole, Levaquin [levofloxacin in d5w], Nortriptyline, Other, Prednisone, Tenex [guanfacine hcl], Tramadol, and Varenicline    ---------------------------------------------------PHYSICAL EXAM--------------------------------------    Constitutional/General: Alert and oriented x3, well appearing, non toxic in NAD  Head: Normocephalic and atraumatic  Eyes: PERRL, EOMI  Mouth: Oropharynx clear, handling secretions, no trismus  Neck: Supple, full ROM, non tender to palpation in the midline, no stridor, no crepitus, no meningeal signs  Pulmonary: Lungs clear to auscultation bilaterally, no wheezes, rales, or rhonchi. Not in respiratory distress  Cardiovascular:  Regular rate. Regular rhythm. No murmurs, gallops, or rubs. 2+ distal pulses  Chest: no chest wall tenderness  Abdomen: Soft. Non tender. Non distended. +BS. No rebound, guarding, or rigidity. No pulsatile masses appreciated. Musculoskeletal: Moves all extremities x 4. Warm and well perfused, no clubbing, cyanosis, or edema.  Capillary refill <3 seconds  Skin: warm and dry. No rashes. Neurologic: GCS 15, CN 2-12 grossly intact, no focal deficits, symmetric strength 5/5 in the upper and lower extremities bilaterally  Psych: Normal Affect    -------------------------------------------------- RESULTS -------------------------------------------------  I have personally reviewed all laboratory and imaging results for this patient. Results are listed below.      LABS:  Results for orders placed or performed during the hospital encounter of 06/01/22   CBC with Auto Differential   Result Value Ref Range    WBC 6.0 4.5 - 11.5 E9/L    RBC 3.67 3.50 - 5.50 E12/L    Hemoglobin 11.1 (L) 11.5 - 15.5 g/dL    Hematocrit 34.8 34.0 - 48.0 %    MCV 94.8 80.0 - 99.9 fL    MCH 30.2 26.0 - 35.0 pg    MCHC 31.9 (L) 32.0 - 34.5 %    RDW 12.9 11.5 - 15.0 fL    Platelets 654 997 - 285 E9/L    MPV 10.5 7.0 - 12.0 fL    Neutrophils % 64.7 43.0 - 80.0 %    Immature Granulocytes % 0.3 0.0 - 5.0 %    Lymphocytes % 23.8 20.0 - 42.0 %    Monocytes % 8.9 2.0 - 12.0 %    Eosinophils % 1.8 0.0 - 6.0 %    Basophils % 0.5 0.0 - 2.0 %    Neutrophils Absolute 3.90 1.80 - 7.30 E9/L    Immature Granulocytes # 0.02 E9/L    Lymphocytes Absolute 1.44 (L) 1.50 - 4.00 E9/L    Monocytes Absolute 0.54 0.10 - 0.95 E9/L    Eosinophils Absolute 0.11 0.05 - 0.50 E9/L    Basophils Absolute 0.03 0.00 - 0.20 E9/L   Comprehensive Metabolic Panel   Result Value Ref Range    Sodium 137 132 - 146 mmol/L    Potassium 4.0 3.5 - 5.0 mmol/L    Chloride 103 98 - 107 mmol/L    CO2 20 (L) 22 - 29 mmol/L    Anion Gap 14 7 - 16 mmol/L    Glucose 138 (H) 74 - 99 mg/dL    BUN 21 6 - 23 mg/dL    CREATININE 1.8 (H) 0.5 - 1.0 mg/dL    GFR Non-African American 28 >=60 mL/min/1.73    GFR African American 34     Calcium 8.2 (L) 8.6 - 10.2 mg/dL    Total Protein 5.6 (L) 6.4 - 8.3 g/dL    Albumin 3.8 3.5 - 5.2 g/dL    Total Bilirubin <0.2 0.0 - 1.2 mg/dL    Alkaline Phosphatase 72 35 - 104 U/L    ALT 34 (H) 0 - 32 U/L    AST 29 0 - 31 U/L   Troponin   Result Value Ref Range    Troponin, High Sensitivity 11 (H) 0 - 9 ng/L   Serum Drug Screen   Result Value Ref Range    Ethanol Lvl 95 mg/dL    Acetaminophen Level <5.0 (L) 10.0 - 98.9 mcg/mL    Salicylate, Serum 0.3 0.0 - 30.0 mg/dL    TCA Scrn NEGATIVE Cutoff:300 ng/mL   Lactic Acid   Result Value Ref Range    Lactic Acid 2.9 (H) 0.5 - 2.2 mmol/L       RADIOLOGY:  Interpreted by Radiologist.  CT HEAD WO CONTRAST   Final Result   No acute intracranial abnormality. Mild fluid within the left maxillary sinus, please correlate for acute   sinusitis in the appropriate setting. XR CHEST PORTABLE   Final Result   No acute findings. Possible chronic changes involving the lower lungs. EKG:  This EKG is signed and interpreted by me. Rate: 67  Rhythm: Sinus  Interpretation: T wave inversion depression V3 through V6 QT is prolonged  Comparison: changes compared to previous EKG          ------------------------- NURSING NOTES AND VITALS REVIEWED ---------------------------   The nursing notes within the ED encounter and vital signs as below have been reviewed by myself. /60   Pulse 68   Temp 97.6 °F (36.4 °C) (Oral)   Resp 16   Ht 5' 10\" (1.778 m)   Wt 160 lb (72.6 kg)   SpO2 92%   BMI 22.96 kg/m²   Oxygen Saturation Interpretation: Normal    The patients available past medical records and past encounters were reviewed. ------------------------------ ED COURSE/MEDICAL DECISION MAKING----------------------  Medications   0.9 % sodium chloride infusion (0 mL/hr IntraVENous Stopped 6/1/22 0500)   0.9 % sodium chloride bolus (0 mLs IntraVENous Stopped 6/1/22 0401)             Medical Decision Making:    Presenting here from home. Patient reportedly was not responding and was flaccid. EMS reported that that she had a facial droop there is no history of stroke or seizure. Patient was incontinent of urine at home.   Patient on arrival here is awake alert [place] x3 and EMS reports seeing improvement from when they initially found her. Patient reporting no physical planes of chest pain shortness of breath or abdominal pain. She reports no headache. Patient stroke scale is 0. She has no facial droop. .  Labs will be obtained as well as CT head. Patient will be admitted. Re-Evaluations:             Patient reeval multiple times here in the emergency room. Patient blood pressure did drop into the 60s. Patient was given 2 L IV fluids. Patient having some improvement of her pressure was in the mid 80s little afterwards but then dropped in the 70s she was given another liter of fluid. Patient reporting no chest pain no abdominal pain she is neurologically intact oriented x3. Patient not hypoxic. Patient was rebolus blood pressure did improve. CT head was noted as well. Patient was made aware of findings and plan. Patient will be admitted to intermediate bed. Consultations:             Internal medicine    Critical Care:       Please note that the withdrawal or failure to initiate urgent interventions for this patient would likely result in a life threatening deterioration or permanent disability. Accordingly this patient received 30 minutes of critical care time, excluding separately billable procedures. This patient's ED course included: a personal history and physicial eaxmination    This patient has been closely monitored during their ED course. Counseling: The emergency provider has spoken with the patient and discussed todays results, in addition to providing specific details for the plan of care and counseling regarding the diagnosis and prognosis. Questions are answered at this time and they are agreeable with the plan.       --------------------------------- IMPRESSION AND DISPOSITION ---------------------------------    IMPRESSION  1. Altered mental status, unspecified altered mental status type    2.  Hypotension, unspecified hypotension type    3. Acute kidney injury ([facility] [address] [address]        DISPOSITION  Disposition: Admit to monitored bed  Patient condition is stable        NOTE: This report was transcribed using voice recognition software.  Every effort was made to ensure accuracy; however, inadvertent computerized transcription errors may be present          Hunter Osorio MD  06/01/22 0214       Hunter Osorio MD  06/01/22 [address] Tamara Sullivan MD  06/01/22 5740

## 2022-06-01 NOTE — H&P
Hospitalist History & Physical      PCP: Estefanía Mccullough MD    Date of Service: Pt seen/examined on 6/1/2022     Chief Complaint:  had concerns including Altered Mental Status (Pt watching TV with  around midnight and went unresponsive, pt admits to alcohol earlier in evening. Upon EMS arrival pt unresponsive, inc of urine, facial droop right side. Pt became respoonsive as transferred to ambulance. Pt now A&O x 4,  no facial droop). History Of Present Illness:    Ms. Yesy Rubin, a 73y.o. year old female  who  has a past medical history of Arthritis, Asthma, Cancer ([address] Chronic back pain, COPD (chronic obstructive pulmonary disease) ([address] Emphysema of lung ([address] GERD (gastroesophageal reflux disease), Hashimoto's disease, Hyperlipidemia, Hypertension, Hyperthyroidism, and Incisional hernia. Patient presented to the emergency department with altered mental status. Family reports she was difficult to arouse. When EMS arrived they said she was flaccid and had facial droop. Patient was also incontinent to urine. On arrival she is alert and oriented x3. Denies fever, chills, nausea, vomiting, headache, photophobia, chest pain, shortness of breath. Does report some mild hip pain for the last 2 days. Vital signs reveal the patient to be hypotensive. Initial pressures 92/52. She went as low as 65/39. He is currently 76/44. Receiving fluid resuscitation. No source of infection identified. Laboratory studies demonstrate creatinine 1.8, lactic acid 2.9, glucose 138, troponin 11. Chest x-ray is unremarkable. CT head is still pending. Urinalysis has not yet been able to be obtained. Past Medical History:   Diagnosis Date    Arthritis     Symptoms respond to myofascial release.  Not surgical candidate    Asthma     Cancer [facility])     lung cancer- L- 2008, stage 1A, [id number] Stage 1A 2013    Chronic back pain     COPD (chronic obstructive pulmonary disease) ([address]     Emphysema of lung (HCC)     GERD (gastroesophageal reflux disease)     Hashimoto's disease 2008    Hyperlipidemia     Hypertension     Hyperthyroidism     Incisional hernia        Past Surgical History:   Procedure Laterality Date    APPENDECTOMY  1981    CATARACT REMOVAL WITH IMPLANT Bilateral     COLONOSCOPY  6/23/15    polyp and diverticulosis    COLONOSCOPY  6/23/15    colonoscopy    HERNIA REPAIR N/A 7/14/2021    LAPAROSCOPIC ROBOTIC XI ASSISTED INCISIONAL HERNIA REPAIR WITH MESH performed by Serafin Holly MD at [address] [facility] Right     X 2    US BREAST NEEDLE BIOPSY RIGHT  10/19/2020    US BREAST NEEDLE BIOPSY RIGHT 10/19/2020 [facility]       Prior to Admission medications    Medication Sig Start Date End Date Taking? Authorizing Provider   benzonatate (TESSALON) 100 MG capsule take 1 capsule by mouth three times a day if needed for cough 5/31/22   Elyse Varma MD   azithromycin (ZITHROMAX) 250 MG tablet 500mg on day 1 followed by 250mg on days 2 - 5 5/31/22   Elyse Varma MD   ezetimibe (ZETIA) 10 MG tablet Take 1 tablet by mouth daily 5/16/22   Elyse Varma MD   loratadine (CLARITIN) 10 MG tablet Take 1 tablet by mouth daily 5/10/22   Nathalie Gross PA-C   pregabalin (LYRICA) 50 MG capsule Take 1 capsule by mouth 2 times daily for 90 days.  4/18/22 7/17/22  Elyse Varma MD   escitalopram (LEXAPRO) 5 MG tablet Take 1 tablet by mouth daily 3/29/22   Elyse Varma MD   montelukast (SINGULAIR) 10 MG tablet Take 1 tablet by mouth nightly take 1 tablet by mouth every evening 3/29/22   Elyse Varma MD   atenolol (TENORMIN) 100 MG tablet Take 1 tablet by mouth daily 3/29/22   Elyse Varma MD   olmesartan (BENICAR) 40 MG tablet Take 1 tablet by mouth daily take 1 tablet by mouth once daily 3/29/22   Elyse Varma MD   omeprazole (PRILOSEC) 20 MG delayed release capsule Take 1 capsule by mouth Daily take 1 capsule by mouth once daily 3/29/22   Sterling Cosme MD   SYNTHROID 100 MCG tablet Take 1 tablet 5 days a week and 1.5 tab on Saturday and Fernando 3/29/22   Sterling Cosme MD   tiZANidine (ZANAFLEX) 4 MG tablet Take 1 tablet by mouth every 8 hours as needed (muscle spasm) Take 1 tab by mouth 3 times daily 3/29/22   Sterling Cosme MD   albuterol sulfate  (90 Base) MCG/ACT inhaler Inhale 2 puffs into the lungs 4 times daily as needed for Wheezing 3/29/22   Sterling Cosme MD   lidocaine (LIDODERM) 5 % apply 3 patches to the affected area daily. Leave on for 12 hours and then off for 12 hours. 1/26/21   Sterling Cosme MD   Coenzyme Q10 (CO Q 10 PO) Take by mouth daily    Historical Provider, MD   Biotin w/ Vitamins C & E (HAIR/SKIN/NAILS PO) Take by mouth daily VIT C 90MG  VIT E 5,000MCG  ZINC 8MG  COPPER 1MG    Historical Provider, MD   Melatonin 1 MG SUBL Place 2 mg under the tongue nightly    Historical Provider, MD         Allergies:  Codeine, Fentanyl, Adhesive tape, Amlodipine, Bupropion, Cortisone, Dipyridamole, Levaquin [levofloxacin in d5w], Nortriptyline, Other, Prednisone, Tenex [guanfacine hcl], Tramadol, and Varenicline    Social History:    TOBACCO:   reports that she quit smoking about 14 years ago. Her smoking use included cigarettes. She has a 30.00 pack-year smoking history. She has never used smokeless tobacco.  ETOH:   reports current alcohol use. Family History:    Reviewed in detail and negative for DM, CAD, Cancer, CVA.  Positive as follows\"      Problem Relation Age of Onset    Arthritis Mother     Diabetes Mother     Heart Disease Mother     High Blood Pressure Mother     High Cholesterol Mother     Stroke Mother     Asthma Sister     Cancer Sister 61        lung cancer    High Blood Pressure Sister     High Cholesterol Sister     Substance Abuse Sister     Heart Attack Father         massiv MI    Early Death Brother         car accident   [place] Cancer Sister 50        breast cancer    Other Sister         GERD, diverticulosis, rotator cuff disease, spinal stenosis    Heart Disease Brother         MI    Diabetes Maternal Grandmother     Heart Disease Maternal Grandmother     High Blood Pressure Maternal Grandmother     High Cholesterol Maternal Grandmother     Stroke Maternal Grandmother     Breast Cancer Maternal Aunt 48        breast    Ovarian Cancer Maternal Aunt     Cancer Maternal Aunt 40        ovarian       REVIEW OF SYSTEMS:   Pertinent positives as noted in the HPI. All other systems reviewed and negative. PHYSICAL EXAM:  BP (!) 76/44   Pulse 68   Temp 97.6 °F (36.4 °C) (Oral)   Resp 18   Ht 5' 10\" (1.778 m)   Wt 160 lb (72.6 kg)   SpO2 94%   BMI 22.96 kg/m²   General appearance: No apparent distress, appears stated age and cooperative. HEENT: Normal cephalic, atraumatic without obvious deformity. Pupils equal, round, and reactive to light. Extra ocular muscles intact. Conjunctivae/corneas clear. Neck: Supple, with full range of motion. No jugular venous distention. Trachea midline. Respiratory: CTA  Cardiovascular: RRR  Abdomen: S/NT/ND  Musculoskeletal: No clubbing, cyanosis, edema of bilateral lower extremities. Brisk capillary refill. Skin: Normal skin color. No rashes or lesions. Neurologic:  Neurovascularly intact without any focal sensory/motor deficits. Cranial nerves: II-XII intact, grossly non-focal.  Psychiatric: Alert and oriented, thought content appropriate, normal insight    Reviewed EKG and CXR personally      CBC:   Recent Labs     06/01/22 0327   WBC 6.0   RBC 3.67   HGB 11.1*   HCT 34.8   MCV 94.8   RDW 12.9        BMP:   Recent Labs     06/01/22 0327      K 4.0      CO2 20*   BUN 21   CREATININE 1.8*     LFT:  Recent Labs     06/01/22 0327   PROT 5.6*   ALKPHOS 72   ALT 34*   AST 29   BILITOT <0.2     CE:  No results for input(s): Binu Negrete in the last 72 hours.   PT/INR: No results for input(s): INR, APTT in the last 72 hours. BNP: No results for input(s): BNP in the last 72 hours. ESR:   Lab Results   Component Value Date    SEDRATE 7 02/20/2017     CRP:   Lab Results   Component Value Date    CRP 2.9 (H) 12/09/2017     D Dimer: No results found for: DDIMER   Folate and B12:   Lab Results   Component Value Date    RZEVUPTN53 1874 (H) 03/25/2022   ,   Lab Results   Component Value Date    FOLATE 11.4 12/07/2021     Lactic Acid:   Lab Results   Component Value Date    LACTA 2.9 (H) 06/01/2022     Thyroid Studies:   Lab Results   Component Value Date    TSH 0.138 (L) 03/25/2022       Oupatient labs:  Lab Results   Component Value Date    CHOL 232 (H) 03/25/2022    TRIG 216 (H) 03/25/2022    HDL 46 03/25/2022    LDLCALC 143 (H) 03/25/2022    TSH 0.138 (L) 03/25/2022    LABA1C 6.0 (H) 03/25/2022       Urinalysis:    Lab Results   Component Value Date    SPECGRAV 1.020 01/21/2020       Imaging:  US BREAST COMPLETE LEFT    Result Date: 5/9/2022  EXAMINATION: Complete bilateral breast ultrasound 5/9/2022 COMPARISON: Multiple prior studies, the most recent dated October 19, 2020. HISTORY: ORDERING SYSTEM PROVIDED HISTORY: Dense breast tissue FINDINGS: High-resolution real-time ultrasound scanning of both breasts was performed. Examination included imaging and evaluation of all 4 quadrants, the retroareolar region, and axilla of both breasts. No suspicious cystic or solid mass identified. There is no axillary lymphadenopathy. No sonographic evidence of malignancy in either breast. RECOMMENDATION: Annual bilateral mammogram is advised with consideration for annual bilateral screening ultrasound given the patient's breast density. BIRADS: BIRADS - CATEGORY 1 Negative. OVERALL ASSESSMENT - NEGATIVE A letter of notification will be sent to the patient regarding the results.  RISK ASSESSMENT: During this patient's visit, information obtained was used to generate a lifetime risk assessment using the Tyrer-Cuzick model (also called the SHEILA International Breast Cancer Intervention study Breast Cancer Risk Evaluation Tool). LIFETIME RISK: Patient has a Tyrer-Cuzick score of: 5.9% BREAST TISSUE DENSITY Heterogeneously Dense (grade C) AVERAGE RISK ( < 15% Lifetime Risk) Yearly screening mammograms starting at age 36 and older. Regardless of breast density, tomosynthesis is suggested. Monthly self-breast exams are recommended for women age 27 and older. Note: Mammography is not 100% accurate in the detection of breast cancer. Accuracy decreases as mammographic density of the breast increases. A negative mammogram should not deter further evaluation (including biopsy) of suspicious physical findings. Recommendations are based on NCCN ([address]) and ACR [employer] [facility] [employer]) guidelines. Thank you for sending your patient to this ACR and FDA approved facility. If there are physician concerns regarding this report, a Radiologist can be reached by calling the following number [phone]. US BREAST COMPLETE RIGHT    Result Date: 5/9/2022  EXAMINATION: Complete bilateral breast ultrasound 5/9/2022 COMPARISON: Multiple prior studies, the most recent dated October 19, 2020. HISTORY: ORDERING SYSTEM PROVIDED HISTORY: Dense breast tissue FINDINGS: High-resolution real-time ultrasound scanning of both breasts was performed. Examination included imaging and evaluation of all 4 quadrants, the retroareolar region, and axilla of both breasts. No suspicious cystic or solid mass identified. There is no axillary lymphadenopathy. No sonographic evidence of malignancy in either breast. RECOMMENDATION: Annual bilateral mammogram is advised with consideration for annual bilateral screening ultrasound given the patient's breast density. BIRADS: BIRADS - CATEGORY 1 Negative. OVERALL ASSESSMENT - NEGATIVE A letter of notification will be sent to the patient regarding the results.  RISK ASSESSMENT: During this patient's visit, information obtained was used to generate a lifetime risk assessment using the Tyrer-Cuzick model (also called the SHEILA International Breast Cancer Intervention study Breast Cancer Risk Evaluation Tool). LIFETIME RISK: Patient has a Tyrer-Cuzick score of: 5.9% BREAST TISSUE DENSITY Heterogeneously Dense (grade C) AVERAGE RISK ( < 15% Lifetime Risk) Yearly screening mammograms starting at age 36 and older. Regardless of breast density, tomosynthesis is suggested. Monthly self-breast exams are recommended for women age 27 and older. Note: Mammography is not 100% accurate in the detection of breast cancer. Accuracy decreases as mammographic density of the breast increases. A negative mammogram should not deter further evaluation (including biopsy) of suspicious physical findings. Recommendations are based on NCCN ([address] [place] [address]) and ACR [employer] [facility]) guidelines. Thank you for sending your patient to this ACR and FDA approved facility. If there are physician concerns regarding this report, a Radiologist can be reached by calling the following number [phone]. XR CHEST PORTABLE    Result Date: 6/1/2022  EXAMINATION: ONE XRAY VIEW OF THE CHEST 6/1/2022 2:51 am COMPARISON: Chest CT performed November 4, 2021. HISTORY: ORDERING SYSTEM PROVIDED HISTORY: ams TECHNOLOGIST PROVIDED HISTORY: Reason for exam:->ams What reading provider will be dictating this exam?->CRC FINDINGS: Cardiomediastinal contours and pulmonary vasculature are within normal limits. Hazy parenchymal disease involving the lower lung zones bilaterally, which may suggest chronic changes. No effusion, focal consolidation or pneumothorax is seen. No acute osseous abnormalities are seen. No acute findings. Possible chronic changes involving the lower lungs.      [facility] ANGIE DIGITAL SCREEN BILATERAL    Result Date: 5/9/2022  EXAMINATION: SCREENING DIGITAL BILATERAL MAMMOGRAM WITH TOMOSYNTHESIS, 5/9/2022 TECHNIQUE: Screening mammography of the bilateral breasts was performed with tomosynthesis. 2D standard and 3D tomosynthesis combination imaging performed through both breasts in the MLO and CC projection. Computer aided detection was utilized in the interpretation of this exam. COMPARISON: Multiple prior studies, the most recent dated October 19, 2020 HISTORY: Screening. No personal history of breast cancer. Family history includes breast cancer diagnosed in the patient's sister and maternal aunt. TC score of 5.9%. FINDINGS: Breast tissue is heterogeneously dense which may obscure small masses. No suspicious mass, microcalcifications, or architectural distortion identified in either breast.     No mammographic evidence of malignancy in either breast. RECOMMENDATION: Annual bilateral mammogram is advised with consideration for annual bilateral screening ultrasound given the patient's breast density. BIRADS: BIRADS - CATEGORY 1 Negative. OVERALL ASSESSMENT - NEGATIVE A letter of notification will be sent to the patient regarding the results. RISK ASSESSMENT: During this patient's visit, information obtained was used to generate a lifetime risk assessment using the Tyrer-Cuzick model (also called the SHEILA International Breast Cancer Intervention study Breast Cancer Risk Evaluation Tool). LIFETIME RISK: Patient has a Tyrer-Cuzick score of: 5.9% BREAST TISSUE DENSITY Heterogeneously Dense (grade C) AVERAGE RISK ( < 15% Lifetime Risk) Yearly screening mammograms starting at age 36 and older. Regardless of breast density, tomosynthesis is suggested. Monthly self-breast exams are recommended for women age 27 and older. Note: Mammography is not 100% accurate in the detection of breast cancer. Accuracy decreases as mammographic density of the breast increases. A negative mammogram should not deter further evaluation (including biopsy) of suspicious physical findings. Recommendations are based on NCCN ( [facility]) and ACR [employer] [facility]) guidelines. Thank you for sending your patient to this ACR and FDA approved facility. If there are physician concerns regarding this report, a Radiologist can be reached by calling the following number [phone]. ASSESSMENT:  -Altered mental status  -Strokelike symptoms versus seizure  -Lactic acidosis  -Hypotension  -Acute renal failure      PLAN:  -Admit to medicine  -CT head pending  -Waiting for urinalysis  -Normal saline 100 mL/h  -Recheck lactic acid level  -PT/OT  -Monitor renal function avoid nephrotoxic medications  -If blood pressure does not normalize will need to start pressors and admit to the ICU        Diet: No diet orders on file  Code Status: Prior  Surrogate decision maker confirmed with patient:   Extended Emergency Contact Information  Primary Emergency Contact: McfarlandAntony  Address: [address] [facility]           Residence [place] S[address]chez [address] Phone: [phone]  Mobile Phone: [phone]  Relation: Spouse  Secondary Emergency Contact: Shaka Jones  Address: [facility] [address] S[address] Phone: [phone]  Mobile Phone: [phone]  Relation: Step Child    DVT Prophylaxis: []Lovenox []Heparin []PCD [] [address] []Encouraged ambulation  Disposition: []Med/Surg [] Intermediate [] ICU/CCU  Admit status: [] Observation [] Inpatient     +++++++++++++++++++++++++++++++++++++++++++++++++  Sha Spain, DO  +++++++++++++++++++++++++++++++++++++++++++++++++  NOTE: This report was transcribed using voice recognition software. Every effort was made to ensure accuracy; however, inadvertent computerized transcription errors may be present.

## 2022-06-01 NOTE — ED NOTES
[facility] faxed, report called to [facility], patient placed in transport     Everett Cochran RN  06/01/22 1493

## 2022-06-01 NOTE — CARE COORDINATION
Peer Recovery Support Note    Name: Freddie Macias  Date: 6/1/2022    Chief Complaint   Patient presents with    Altered Mental Status     Pt watching TV with  around midnight and went unresponsive, pt admits to alcohol earlier in evening. Upon EMS arrival pt unresponsive, inc of urine, facial droop right side. Pt became respoonsive as transferred to ambulance. Pt now A&O x 4,  no facial droop       Peer Support met with patient.   [] Support and education provided  [] Resources provided   [] Treatment referral:   [] Other:   [x] Patient declined peer recovery services     Referred By:     Notes:     Kandy Bush, 6/1/2022

## 2022-06-01 NOTE — PROGRESS NOTES
Hospitalist Progress Note      Synopsis: Patient is a 60-year-old female who presented to the ER with altered mental status. Per family, the patient was difficult to arouse, EMS was called and when they arrived the patient was flaccid, had a facial droop, and was incontinent of urine. When patient arrived to the ER she was alert and oriented x3 with only complaints of mild hip pain. On Initial presentation, patient was hypotensive but did respond to fluids. Neurology was consulted and patient was admitted for further observation. Hospital day 0     Subjective:  Seen and examined at bedside in ER with  present. Patient complaining of neck pain. Currently waiting for bed placement. Patient seen and examined  Records reviewed. Temp (24hrs), Av.5 °F (36.4 °C), Min:97.4 °F (36.3 °C), Max:97.6 °F (36.4 °C)    DIET: No diet orders on file  CODE: Prior  No intake or output data in the 24 hours ending 22 0858    Review of Systems: All bolded are positive; please see HPI  General:  Fever, chills, diaphoresis, fatigue, malaise, night sweats, weight loss  Psychological:  Anxiety, disorientation, hallucinations. ENT:  Epistaxis, headaches, vertigo, visual changes. Cardiovascular:  Chest pain, irregular heartbeats, palpitations, paroxysmal nocturnal dyspnea. Respiratory:  Shortness of breath, coughing, sputum production, hemoptysis, wheezing, orthopnea.   Gastrointestinal:  Nausea, vomiting, diarrhea, heartburn, constipation, abdominal pain, hematemesis, hematochezia, melena, acholic stools  Genito-Urinary:  Dysuria, urgency, frequency, hematuria  Musculoskeletal:  Joint pain, joint stiffness, joint swelling, muscle pain  Neurology:  Headache, focal neurological deficits, weakness, numbness, paresthesia  Derm:  Rashes, ulcers, excoriations, bruising  Extremities:  Decreased ROM, peripheral edema, mottling    Objective:    /60   Pulse 68   Temp 97.6 °F (36.4 °C) (Oral)   Resp 16 the left maxillary sinus. · X-ray shows no acute findings, but possible chronic changes involving the lower lungs  · Waiting for urinalysis  · Normal saline 100 mL/h  · PT/OT  · Monitor renal function avoid nephrotoxic medications  · If blood pressure does not normalize will need to start pressors and admit to the ICU  · Procalcitonin 0.15  · Obtain blood cultures      DVT Prophylaxis [x] Lovenox  []  Heparin [] DOAC [] PCDs [] Ambulation    GI Prophylaxis [] PPI  [] H2 Blocker   [] Carafate  [x] Diet/Tube Feeds   Level of care [] Med/Surg  [x] Intermediate  []  ICU   Diet No diet orders on file    Family contact [x]  N/A    [] At bedside  [] Phone call   Disposition Patient requires continued admission further evaluation of altered mental status and hypotension   MDM [x] Low    [] Moderate  []   High       Discharge Plan: Discharged to home pending clinical improvement    +++++++++++++++++++++++++++++++++++++++++++++++++  Carito Eason [address]  +++++++++++++++++++++++++++++++++++++++++++++++++  NOTE: This report was transcribed using voice recognition software. Every effort was made to ensure accuracy; however, inadvertent computerized transcription errors may be present.

## 2022-06-02 ENCOUNTER — APPOINTMENT (OUTPATIENT)
Dept: GENERAL RADIOLOGY | Age: 72
DRG: 315 | End: 2022-06-02
Payer: MEDICARE

## 2022-06-02 ENCOUNTER — APPOINTMENT (OUTPATIENT)
Dept: MRI IMAGING | Age: 72
DRG: 315 | End: 2022-06-02
Payer: MEDICARE

## 2022-06-02 LAB
ALBUMIN SERPL-MCNC: 3.5 G/DL (ref 3.5–5.2)
ALP BLD-CCNC: 90 U/L (ref 35–104)
ALT SERPL-CCNC: 40 U/L (ref 0–32)
ANION GAP SERPL CALCULATED.3IONS-SCNC: 14 MMOL/L (ref 7–16)
AST SERPL-CCNC: 35 U/L (ref 0–31)
BASOPHILS ABSOLUTE: 0.04 E9/L (ref 0–0.2)
BASOPHILS RELATIVE PERCENT: 0.4 % (ref 0–2)
BILIRUB SERPL-MCNC: 0.4 MG/DL (ref 0–1.2)
BUN BLDV-MCNC: 11 MG/DL (ref 6–23)
CALCIUM SERPL-MCNC: 9 MG/DL (ref 8.6–10.2)
CHLORIDE BLD-SCNC: 104 MMOL/L (ref 98–107)
CO2: 14 MMOL/L (ref 22–29)
CREAT SERPL-MCNC: 0.8 MG/DL (ref 0.5–1)
EKG ATRIAL RATE: 67 BPM
EKG P AXIS: 69 DEGREES
EKG P-R INTERVAL: 194 MS
EKG Q-T INTERVAL: 480 MS
EKG QRS DURATION: 88 MS
EKG QTC CALCULATION (BAZETT): 507 MS
EKG R AXIS: 57 DEGREES
EKG T AXIS: 129 DEGREES
EKG VENTRICULAR RATE: 67 BPM
EOSINOPHILS ABSOLUTE: 0.03 E9/L (ref 0.05–0.5)
EOSINOPHILS RELATIVE PERCENT: 0.3 % (ref 0–6)
GFR AFRICAN AMERICAN: >60
GFR NON-AFRICAN AMERICAN: >60 ML/MIN/1.73
GLUCOSE BLD-MCNC: 143 MG/DL (ref 74–99)
HCT VFR BLD CALC: 40.8 % (ref 34–48)
HEMOGLOBIN: 12.9 G/DL (ref 11.5–15.5)
IMMATURE GRANULOCYTES #: 0.05 E9/L
IMMATURE GRANULOCYTES %: 0.5 % (ref 0–5)
LYMPHOCYTES ABSOLUTE: 0.86 E9/L (ref 1.5–4)
LYMPHOCYTES RELATIVE PERCENT: 7.8 % (ref 20–42)
MCH RBC QN AUTO: 30 PG (ref 26–35)
MCHC RBC AUTO-ENTMCNC: 31.6 % (ref 32–34.5)
MCV RBC AUTO: 94.9 FL (ref 80–99.9)
MONOCYTES ABSOLUTE: 0.56 E9/L (ref 0.1–0.95)
MONOCYTES RELATIVE PERCENT: 5.1 % (ref 2–12)
NEUTROPHILS ABSOLUTE: 9.43 E9/L (ref 1.8–7.3)
NEUTROPHILS RELATIVE PERCENT: 85.9 % (ref 43–80)
PDW BLD-RTO: 13.1 FL (ref 11.5–15)
PLATELET # BLD: 194 E9/L (ref 130–450)
PMV BLD AUTO: 11 FL (ref 7–12)
POTASSIUM REFLEX MAGNESIUM: 4.7 MMOL/L (ref 3.5–5)
RBC # BLD: 4.3 E12/L (ref 3.5–5.5)
SODIUM BLD-SCNC: 132 MMOL/L (ref 132–146)
TOTAL PROTEIN: 6.8 G/DL (ref 6.4–8.3)
WBC # BLD: 11 E9/L (ref 4.5–11.5)

## 2022-06-02 PROCEDURE — 97165 OT EVAL LOW COMPLEX 30 MIN: CPT

## 2022-06-02 PROCEDURE — 80053 COMPREHEN METABOLIC PANEL: CPT

## 2022-06-02 PROCEDURE — 6370000000 HC RX 637 (ALT 250 FOR IP): Performed by: INTERNAL MEDICINE

## 2022-06-02 PROCEDURE — 2580000003 HC RX 258: Performed by: FAMILY MEDICINE

## 2022-06-02 PROCEDURE — 97535 SELF CARE MNGMENT TRAINING: CPT

## 2022-06-02 PROCEDURE — 94664 DEMO&/EVAL PT USE INHALER: CPT

## 2022-06-02 PROCEDURE — 85025 COMPLETE CBC W/AUTO DIFF WBC: CPT

## 2022-06-02 PROCEDURE — 70552 MRI BRAIN STEM W/DYE: CPT

## 2022-06-02 PROCEDURE — 2580000003 HC RX 258: Performed by: INTERNAL MEDICINE

## 2022-06-02 PROCEDURE — 6360000002 HC RX W HCPCS: Performed by: INTERNAL MEDICINE

## 2022-06-02 PROCEDURE — 71046 X-RAY EXAM CHEST 2 VIEWS: CPT

## 2022-06-02 PROCEDURE — 6370000000 HC RX 637 (ALT 250 FOR IP): Performed by: PSYCHIATRY & NEUROLOGY

## 2022-06-02 PROCEDURE — 36415 COLL VENOUS BLD VENIPUNCTURE: CPT

## 2022-06-02 PROCEDURE — A9579 GAD-BASE MR CONTRAST NOS,1ML: HCPCS | Performed by: RADIOLOGY

## 2022-06-02 PROCEDURE — 6370000000 HC RX 637 (ALT 250 FOR IP): Performed by: FAMILY MEDICINE

## 2022-06-02 PROCEDURE — 6360000004 HC RX CONTRAST MEDICATION: Performed by: RADIOLOGY

## 2022-06-02 PROCEDURE — 6360000002 HC RX W HCPCS: Performed by: FAMILY MEDICINE

## 2022-06-02 PROCEDURE — 70551 MRI BRAIN STEM W/O DYE: CPT

## 2022-06-02 PROCEDURE — 2140000000 HC CCU INTERMEDIATE R&B

## 2022-06-02 PROCEDURE — 99222 1ST HOSP IP/OBS MODERATE 55: CPT | Performed by: PSYCHIATRY & NEUROLOGY

## 2022-06-02 PROCEDURE — 71045 X-RAY EXAM CHEST 1 VIEW: CPT

## 2022-06-02 RX ORDER — BENZONATATE 100 MG/1
100 CAPSULE ORAL 3 TIMES DAILY PRN
Status: DISCONTINUED | OUTPATIENT
Start: 2022-06-02 | End: 2022-06-06 | Stop reason: HOSPADM

## 2022-06-02 RX ORDER — DOXYCYCLINE HYCLATE 100 MG/1
100 CAPSULE ORAL EVERY 12 HOURS SCHEDULED
Status: DISCONTINUED | OUTPATIENT
Start: 2022-06-02 | End: 2022-06-06 | Stop reason: HOSPADM

## 2022-06-02 RX ORDER — HYDRALAZINE HYDROCHLORIDE 20 MG/ML
10 INJECTION INTRAMUSCULAR; INTRAVENOUS EVERY 4 HOURS PRN
Status: DISCONTINUED | OUTPATIENT
Start: 2022-06-02 | End: 2022-06-06 | Stop reason: HOSPADM

## 2022-06-02 RX ORDER — LORAZEPAM 2 MG/ML
1 INJECTION INTRAMUSCULAR ONCE
Status: COMPLETED | OUTPATIENT
Start: 2022-06-02 | End: 2022-06-02

## 2022-06-02 RX ORDER — ALPRAZOLAM 0.25 MG/1
0.5 TABLET ORAL 2 TIMES DAILY PRN
Status: DISCONTINUED | OUTPATIENT
Start: 2022-06-02 | End: 2022-06-06 | Stop reason: HOSPADM

## 2022-06-02 RX ORDER — MULTIVITAMIN WITH IRON
1 TABLET ORAL DAILY
Status: DISCONTINUED | OUTPATIENT
Start: 2022-06-02 | End: 2022-06-06 | Stop reason: HOSPADM

## 2022-06-02 RX ORDER — GAUZE BANDAGE 2" X 2"
100 BANDAGE TOPICAL DAILY
Status: DISCONTINUED | OUTPATIENT
Start: 2022-06-02 | End: 2022-06-06 | Stop reason: HOSPADM

## 2022-06-02 RX ORDER — FOLIC ACID 1 MG/1
1 TABLET ORAL DAILY
Status: DISCONTINUED | OUTPATIENT
Start: 2022-06-02 | End: 2022-06-06 | Stop reason: HOSPADM

## 2022-06-02 RX ORDER — IPRATROPIUM BROMIDE AND ALBUTEROL SULFATE 2.5; .5 MG/3ML; MG/3ML
1 SOLUTION RESPIRATORY (INHALATION)
Status: DISCONTINUED | OUTPATIENT
Start: 2022-06-02 | End: 2022-06-06 | Stop reason: HOSPADM

## 2022-06-02 RX ADMIN — ALPRAZOLAM 0.5 MG: 0.25 TABLET ORAL at 21:23

## 2022-06-02 RX ADMIN — Medication 10 ML: at 08:19

## 2022-06-02 RX ADMIN — HYDRALAZINE HYDROCHLORIDE 10 MG: 20 INJECTION INTRAMUSCULAR; INTRAVENOUS at 12:41

## 2022-06-02 RX ADMIN — FOLIC ACID 1 MG: 1 TABLET ORAL at 21:23

## 2022-06-02 RX ADMIN — HYDRALAZINE HYDROCHLORIDE 10 MG: 20 INJECTION INTRAMUSCULAR; INTRAVENOUS at 03:00

## 2022-06-02 RX ADMIN — PROMETHAZINE HYDROCHLORIDE 12.5 MG: 12.5 TABLET ORAL at 03:56

## 2022-06-02 RX ADMIN — HYDRALAZINE HYDROCHLORIDE 10 MG: 20 INJECTION INTRAMUSCULAR; INTRAVENOUS at 19:59

## 2022-06-02 RX ADMIN — ENOXAPARIN SODIUM 40 MG: 100 INJECTION SUBCUTANEOUS at 08:15

## 2022-06-02 RX ADMIN — BENZONATATE 100 MG: 100 CAPSULE ORAL at 22:38

## 2022-06-02 RX ADMIN — DOXYCYCLINE HYCLATE 100 MG: 100 CAPSULE ORAL at 22:38

## 2022-06-02 RX ADMIN — GADOTERIDOL 15 ML: 279.3 INJECTION, SOLUTION INTRAVENOUS at 20:20

## 2022-06-02 RX ADMIN — ACETAMINOPHEN 325MG 650 MG: 325 TABLET ORAL at 21:22

## 2022-06-02 RX ADMIN — PROMETHAZINE HYDROCHLORIDE 12.5 MG: 12.5 TABLET ORAL at 21:23

## 2022-06-02 RX ADMIN — IPRATROPIUM BROMIDE AND ALBUTEROL SULFATE 1 AMPULE: 2.5; .5 SOLUTION RESPIRATORY (INHALATION) at 21:55

## 2022-06-02 RX ADMIN — LORAZEPAM 1 MG: 2 INJECTION INTRAMUSCULAR; INTRAVENOUS at 04:34

## 2022-06-02 RX ADMIN — THIAMINE HCL TAB 100 MG 100 MG: 100 TAB at 12:41

## 2022-06-02 RX ADMIN — Medication 1 TABLET: at 22:28

## 2022-06-02 RX ADMIN — LOSARTAN POTASSIUM 100 MG: 50 TABLET, FILM COATED ORAL at 08:15

## 2022-06-02 RX ADMIN — METOPROLOL TARTRATE 25 MG: 25 TABLET, FILM COATED ORAL at 21:23

## 2022-06-02 RX ADMIN — Medication 10 ML: at 22:35

## 2022-06-02 RX ADMIN — WATER 1000 MG: 1 INJECTION INTRAMUSCULAR; INTRAVENOUS; SUBCUTANEOUS at 22:27

## 2022-06-02 RX ADMIN — ATENOLOL 50 MG: 50 TABLET ORAL at 08:15

## 2022-06-02 ASSESSMENT — PAIN SCALES - GENERAL: PAINLEVEL_OUTOF10: 4

## 2022-06-02 ASSESSMENT — PAIN DESCRIPTION - LOCATION: LOCATION: CHEST

## 2022-06-02 NOTE — PATIENT CARE CONFERENCE
[facility] Quality Flow/Interdisciplinary Rounds Progress Note        Quality Flow Rounds held on June 2, 2022    Disciplines Attending:  Bedside Nurse, ,  and Nursing Unit Jose Benedict was admitted on 6/1/2022  2:15 AM    Anticipated Discharge Date:       Disposition:    Bulmaro Score:  Bulmaro Scale Score: 22    Readmission Risk              Risk of Unplanned Readmission:  16           Discussed patient goal for the day, patient clinical progression, and barriers to discharge.   The following Goal(s) of the Day/Commitment(s) have been identified:  Diagnostics - Report Results and Labs - Report Results      Bess Bucio RN  June 2, 2022

## 2022-06-02 NOTE — CONSULTS
[facility] [address]  Neurology Consult    Date:  6/2/2022  Patient Name:  Freddie Macias  YOB: 1950  MRN: [id number]     PCP:  Loreta Hutson MD   Referring:  No ref. provider found      Chief Complaint: passed out    History obtained from: patient, spouse    Sangeeta Ash is a 73 y.o. female who appears to be having significant fluctuations in her blood pressure. MRI brain fairly unremarkable with no stroke noted. Still with some mild impairment in short term memory when seen today, possibly related to continued swing in BP. Plan  · MRI brain with contrast        History of Present Illness:  Freddie Macias is a 73 y.o. right handed male presenting for evaluation of episode of altered mental status. The patient's spouse provides most of the history. The patient had gotten up from the couch and got dizzy and passed out for several minutes. She was noted to have SBP around 60. She got fluid resuscitation and has since been noted to have significant elevation of her BP up to 210/90. She has a history of lung cancer in 2008 and 2013 s/p resection. Review of Systems:  Constitutional  · Weight loss: No  · Fever: No    Eyes  · Double Vision: No  · Visions loss: No    Ears, Nose, Mouth, and Throat  · Difficulty swallowing: No    Cardiovascular  · Chest Pain: No    Respiratory  · Shortness of Breath: Yes    Gastrointestinal  · Abdominal Pain: No    Genitourinary  · Difficulty with Urination: No    Integumentary  · Rash: No    Musculoskeletal  · Back Pain: No    Neurological  · Headaches: No  · Weakness: Yes  · Numbness: Yes  · Seizures: No  · Further symptoms noted in HPI    Psychiatric  · Anxiety: No  · Depression: No    Complete 10-point review of systems is negative except as noted above in my HPI      Medical History:   Past Medical History:   Diagnosis Date    Arthritis     Symptoms respond to myofascial release.  Not surgical candidate    Asthma     [facility])     lung cancer- RML- 2008, stage 1A, UAS-2634 Stage 1A 2013    Chronic back pain     [facility] disease) [facility]     Emphysema of lung ([facility]     GERD (gastroesophageal reflux disease)     Hashimoto's disease 2008    History of blood transfusion     Hyperlipidemia     Hypertension     Hyperthyroidism     Incisional hernia         Surgical History:   Past Surgical History:   Procedure Laterality Date    ABDOMEN SURGERY      APPENDECTOMY  1981    CATARACT REMOVAL WITH IMPLANT Bilateral     COLONOSCOPY  6/23/15    polyp and diverticulosis    COLONOSCOPY  6/23/15    colonoscopy    ENDOSCOPY, COLON, DIAGNOSTIC      EYE SURGERY      HERNIA REPAIR N/A 7/14/2021    LAPAROSCOPIC ROBOTIC XI ASSISTED [address] WITH MESH performed by Mindi Coyle MD at [address] Right     X 2    US BREAST NEEDLE BIOPSY RIGHT  10/19/2020    US BREAST NEEDLE BIOPSY RIGHT 10/19/2020 [facility]        Family History:   Family History   Problem Relation Age of Onset    Arthritis Mother     Diabetes Mother     Heart Disease Mother     High Blood Pressure Mother     High Cholesterol Mother     Stroke Mother     Asthma Sister     Cancer Sister 61        lung cancer    High Blood Pressure Sister     High Cholesterol Sister     Substance Abuse Sister     Heart Attack Father         massiv MI    Early Death Brother         car accident    Cancer Sister 50        breast cancer    Other Sister         GERD, diverticulosis, rotator cuff disease, spinal stenosis    Heart Disease Brother         MI    Diabetes Maternal Grandmother     Heart Disease Maternal Grandmother     High Blood Pressure Maternal Grandmother     High Cholesterol Maternal Grandmother     Stroke Maternal Grandmother     Breast Cancer Maternal Aunt 48        breast    Ovarian Cancer Maternal Aunt     Cancer Maternal Aunt 44 ovarian       Social History:  Social History     Tobacco Use    Smoking status: Former Smoker     Packs/day: 1.00     Years: 30.00     Pack years: 30.00     Types: Cigarettes     Quit date: 2007     Years since quittin.7    Smokeless tobacco: Never Used   Vaping Use    Vaping Use: Never used   Substance Use Topics    Alcohol use:  Yes     Alcohol/week: 0.0 standard drinks     Types: 4 - 6 Glasses of wine per week     Comment: SOCIALLY    Drug use: No        Current Medications:      Current Facility-Administered Medications   Medication Dose Route Frequency Provider Last Rate Last Admin    hydrALAZINE (APRESOLINE) injection 10 mg  10 mg IntraVENous Q4H PRN Beaulieu Mings, DO   10 mg at 22 1241    thiamine mononitrate tablet 100 mg  100 mg Oral Daily Delfino Schwab, DO   100 mg at 22 1241    0.9 % sodium chloride infusion   IntraVENous Continuous Beaulieu Mings,  mL/hr at 22 2235 New Bag at 22 223    sodium chloride flush 0.9 % injection 10 mL  10 mL IntraVENous 2 times per day Beaulieu Mings, DO   10 mL at 22 0819    sodium chloride flush 0.9 % injection 10 mL  10 mL IntraVENous PRN Beauliue Mings, DO        0.9 % sodium chloride infusion   IntraVENous PRN Beaulieu Mings, DO        enoxaparin (LOVENOX) injection 40 mg  40 mg SubCUTAneous Daily Beaulieu Mings, DO   40 mg at 22 0815    promethazine (PHENERGAN) tablet 12.5 mg  12.5 mg Oral Q6H PRN Beaulieu Mings, DO   12.5 mg at 22 0356    Or    ondansetron (ZOFRAN) injection 4 mg  4 mg IntraVENous Q6H PRN Beaulieu Mings, DO        polyethylene glycol (GLYCOLAX) packet 17 g  17 g Oral Daily PRN Beaulieu Mings, DO        acetaminophen (TYLENOL) tablet 650 mg  650 mg Oral Q6H PRN Beaulieu Mings, DO        Or    acetaminophen (TYLENOL) suppository 650 mg  650 mg Rectal Q6H PRN Beaulieu Mings, DO        atenolol (TENORMIN) tablet 50 mg  50 mg Oral Daily Kerri Glass MD   50 mg at 22 0815    losartan (COZAAR) tablet 100 mg  100 mg Oral Daily Kerri Glass MD   100 mg at 06/02/22 0815        Allergies: Allergies   Allergen Reactions    Codeine Hives and Itching     AND CODEINE DERIVATIVES----sob  Pt stated tolerated Dilaudid    Fentanyl Other (See Comments)     Disoriented, confused  Other reaction(s): Mental Status Change    Adhesive Tape      burns skin, reddens skin, blisters    Amlodipine Swelling    Bupropion      hyper--couldn't sit still--jumpy    Cortisone      throat closed up    Dipyridamole Hives     Persantine-CST--sob, palpitations, stress test stopped    Levaquin [Levofloxacin In D5w] Other (See Comments)     Yeast infection     Nortriptyline      for rosacea--caused flare up. stopped    Other      States any steroids make her face swell and flush    Prednisone Other (See Comments)    Tenex [Guanfacine Hcl]      dizziness    Tramadol Hives     sob    Varenicline         Physical Examination  Vitals   Vitals:    06/02/22 1013 06/02/22 1036 06/02/22 1205 06/02/22 1433   BP: (!) 192/104 (!) 154/82 (!) 174/85 (!) 168/77   Pulse: 80   78   Resp: 20   20   Temp: 98 °F (36.7 °C)   98.2 °F (36.8 °C)   TempSrc:    Oral   SpO2: 100%      Weight:       Height:            General: Patient appears in no acute distress. Awake and oriented x 3. HEENT: Normocephalic, atraumatic  Chest: Clear to auscultation bilaterally  Heart: No murmurs appreciated  Extremities/Peripheral vascular: No edema/swelling noted. No cold limbs noted. Neurologic Examination    Mental Status  Alert, and oriented to person, place and time. Speech is fluent with intact comprehension. Registration 3/3, recall 1/3. Spells WORLD backwards as \"DLORW. \" Able to repeat, intact ability to interpret similes. Cranial Nerves  II. Visual fields full to confrontation bilaterally. Fundoscopic exam: Discs not well visualized. III, IV, VI: Pupils equally round and reactive to light, 3 to 2 mm bilaterally.    EOMs: full, no nystagmus. V. Facial sensation: mildly decreased over right lower face  VII: Facial movements symmetric and strong  VIII: Hearing intact to voice  IX,X: Palate elevates symmetrically. No dysarthria  XI: Sternocleidomastoid and trapezius 5/5 bilaterally   XII: Tongue is midline    Motor     Right Left   Right Left   Deltoid 5 5  Hip Flexion 5 5   Biceps      5  5  Knee Extension 5 5   Triceps 5 5  Knee Flexion 5 5   Handgrip 5 5  Ankle Dorsiflexion 5 5       Ankle Plantarflexion 5 5     Tone: Normal in all four limbs    Bulk: Normal in all four limbs with no evidence of atrophy    Pronator drift: absent bilaterally    Sensation  · Light Touch: Intact distally in all four limbs  · Pinprick: Intact distally in all four limbs  · Vibration: Intact distally in all four limbs  · Proprioception: Intact distally in all four limbs    Reflexes     Right Left   Biceps 2 2   Brachioradialis 2 2   Triceps 2 2   Patellar 2 2   Achilles 2 2   ankle clonus none none   Babinski absent absent     Coordination  Rapid alternating movements normal in bilateral upper extremities  Finger to nose testing normal bilaterally  Heel to shin testing normal bilaterally    Gait  Normal base, stride, and arm swing with casual gait. 2-3 steps to turn. Labs  Recent Labs     06/01/22  0327 06/01/22 2037 06/02/22  0556   NA   < >  --  132   K  --   --  4.7   CL   < >  --  104   CO2   < >  --  14*   BUN   < >  --  11   CREATININE   < >  --  0.8   GLUCOSE   < >  --  143*   CALCIUM   < >  --  9.0   PROT   < >  --  6.8   LABALBU   < >  --  3.5   BILITOT   < >  --  0.4   ALKPHOS   < >  --  90   AST   < >  --  35*   ALT   < >  --  40*   WBC   < >  --  11.0   RBC   < >  --  4.30   HGB   < >  --  12.9   HCT   < >  --  40.8   MCV   < >  --  94.9   MCH   < >  --  30.0   MCHC   < >  --  31.6*   RDW   < >  --  13.1   PLT   < >  --  194   MPV   < >  --  11.0   LACTA  --  1.2  --     < > = values in this interval not displayed.        Imaging  MRI BRAIN WO CONTRAST   Final Result   No acute infarct or other acute intracranial process. XR CHEST (2 VW)   Final Result   Probable chronic lung changes at bases the         CT HEAD WO CONTRAST   Final Result   No acute intracranial abnormality. Mild fluid within the left maxillary sinus, please correlate for acute   sinusitis in the appropriate setting. XR CHEST PORTABLE   Final Result   No acute findings. Possible chronic changes involving the lower lungs.                  Electronically signed by Caryle Grief, DO on 6/2/2022 at 7:27 PM

## 2022-06-02 NOTE — PROGRESS NOTES
Occupational Therapy  OCCUPATIONAL THERAPY INITIAL EVALUATION    [address] [address] [address]      Date:2022                                                Patient Name: Dutch Novak  MRN: [id number]  : 1950  Room: [address] [place], [place] [id number]    Evaluating OT: Meg Groves OTR/L #[id number]     Referring Provider: Rubén Cavanaugh DO  Specific Provider Orders/Date: OT eval and treat 22    Diagnosis: Acute kidney injury ([facility] [address] [N17.9]  Hypotension, unspecified hypotension type [I95.9]  Altered mental status, unspecified altered mental status type [R41.82]  AMS (altered mental status) [R41.82]   Pt admitted to hospital following unresponsive episode at home; + AMS and facial droop; resolved     Pertinent Medical History:  has a past medical history of Arthritis, Asthma, Cancer ([facility] [address] Chronic back pain, COPD (chronic obstructive pulmonary disease) ([facility] [address] Emphysema of lung ([facility] [address] GERD (gastroesophageal reflux disease), Hashimoto's disease, History of blood transfusion, Hyperlipidemia, Hypertension, Hyperthyroidism, and Incisional hernia.        Precautions:  Fall Risk, Monitor BP    Assessment of current deficits    [x] Functional mobility  [x]ADLs  [x] Strength               []Cognition    [x] Functional transfers   [x] IADLs         [x] Safety Awareness   [x]Endurance    [] Fine Coordination              [x] Balance      [] Vision/perception   []Sensation     []Gross Motor Coordination  [] ROM  [] Delirium                   [] Motor Control     OT PLAN OF CARE   OT POC based on physician orders, patient diagnosis and results of clinical assessment    Frequency/Duration 1-3 days/wk for 2 weeks PRN   Specific OT Treatment Interventions to include:   * Instruction/training on adapted ADL techniques and AE recommendations to increase functional independence within precautions       * Training on energy conservation strategies, correct breathing pattern and techniques to improve independence/tolerance for self-care routine  * Functional transfer/mobility training/DME recommendations for increased independence, safety, and fall prevention  * Patient/Family education to increase follow through with safety techniques and functional independence  * Recommendation of environmental modifications for increased safety with functional transfers/mobility and ADLs  * Therapeutic exercise to improve motor endurance, ROM, and functional strength for ADLs/functional transfers  * Therapeutic activities to facilitate/challenge dynamic balance, stand tolerance for increased safety and independence with ADLs      Recommended Adaptive Equipment:  TBD     Home Living: Pt lives with  in a 1 story home with 1 ROSAMARIA    Bathroom setup: walk-in shower    Equipment owned: none    Prior Level of Function: Independent with ADLs , Independent with IADLs; ambulated without AD   Driving: yes   Occupation: na    Pain Level: Pt denies pain this session  Cognition: A&O: 4/4; Follows 2 step directions   Memory:  good   Sequencing:  good   Problem solving:  fair   Judgement/safety:  fair     Functional Assessment:  AM-PAC Daily Activity Raw Score: 19/24   Initial Eval Status  Date: 6/2/22 Treatment Status  Date: STGs = LTGs  Time frame: 10-14 days   Feeding Independent      Grooming Stand by Assist     Standing   Independent    UB Dressing Supervision   Independent    LB Dressing Stand by Assist   Independent    Bathing Stand by Assist  Independent    Toileting Stand by Assist   Independent    Bed Mobility  Supine to sit: NT  Sit to supine: Supervision   Supine to sit:  Independent   Sit to supine: Independent    Functional Transfers Stand by Assist   Independent    Functional Mobility Stand by Assist     Ambulated in room without AD  Independent    Balance Sitting:     Static:  indep    Dynamic:sup  Standing: SBA     Activity Tolerance F    Limited due to c/o fatigue and dizziness  G Visual/  Perceptual Glasses: reading  wfl                  Vitals:   Seated: 217/93, 77, 96%  Standin/104, 75, 96%  Nursing notified; pt back in bed    Hand Dominance right   Strength ROM Additional Info:    RUE   4/5 wfl good  and wfl FMC/dexterity noted during ADL tasks     LUE 4/5 wfl good  and wfl FMC/dexterity noted during ADL tasks   B UE finger to nose coordination intact bilaterally   Hearing: wfl  Sensation:wfl  Tone: wfl  Edema:none noted     Comments: Upon arrival patient in bathroom and agreeable to OT Session. Therapist educated pt on role of OT. At end of session, patient supine in bed with call light and phone within reach. Overall patient demonstrated decreased independence and safety during completion of ADL/functional transfer/mobility tasks. Pt would benefit from continued skilled OT to increase safety and independence with completion of ADL/IADL tasks for functional independence and quality of life. Treatment: OT treatment provided this date includes: Facilitation of functional transfers (various surfaces: toilet, bed), standing tolerance tasks (addressing posture, balance and activity tolerance while incorporating light functional reaching; impacting ADLs and functional activity), functional ambulation tasks without AD (including from bathroom and in preparation for item retrieval tasks) and bed mobility - skilled cuing on hand placement, posture, body mechanics, energy conservation techniques and safety. Therapist facilitated self-care retraining: UB/LB self-care tasks (gown, socks), simulated toileting task and standing grooming tasks while educating pt on modified techniques, posture, safety and energy conservation techniques.  Skilled monitoring of BP, HR, O2 sats and pts response to treatment (see above; pt reports dizziness; nursing notified)    Rehab Potential: Good for established goals     Patient / Family Goal: return home      Patient and/or family were instructed on functional diagnosis, prognosis/goals and OT plan of care. Demonstrated fair understanding. Eval Complexity: Low    Time In: 915  Time Out: 940  Total Treatment Time: 10 minutes    Min Units   OT Eval Low 97165  x  1   OT Eval Medium 40617      OT Eval High 54460      OT Re-Eval [id number]       Therapeutic Ex 75619       Therapeutic Activities 08119  2     ADL/Self Care 19139  8  1   Orthotic Management 90305       Manual [id number]     Neuro Re-Ed [id number]       Non-Billable Time          Evaluation Time additionally includes thorough review of current medical information, gathering information on past medical history/social history and prior level of function, interpretation of standardized testing/informal observation of tasks, assessment of data and development of plan of care and goals.           Ankita Pinzon OTR/L #[id number]

## 2022-06-02 NOTE — CARE COORDINATION
Met with pt and spouse at bedside. Pt was up in chair and had been up earlier walking in hallway. Pt independent, active , and has a nebulizer at home. Dr. Humberto Hill is PCP and pt [facility]. Plan is home at discharge, spouse to transport. Libertad Hernandez, MSW, LSW

## 2022-06-03 LAB
ALBUMIN SERPL-MCNC: 4.1 G/DL (ref 3.5–5.2)
ALP BLD-CCNC: 86 U/L (ref 35–104)
ALT SERPL-CCNC: 42 U/L (ref 0–32)
ANION GAP SERPL CALCULATED.3IONS-SCNC: 16 MMOL/L (ref 7–16)
AST SERPL-CCNC: 46 U/L (ref 0–31)
BASOPHILS ABSOLUTE: 0.01 E9/L (ref 0–0.2)
BASOPHILS RELATIVE PERCENT: 0.1 % (ref 0–2)
BILIRUB SERPL-MCNC: 0.6 MG/DL (ref 0–1.2)
BUN BLDV-MCNC: 11 MG/DL (ref 6–23)
CALCIUM SERPL-MCNC: 9.3 MG/DL (ref 8.6–10.2)
CHLORIDE BLD-SCNC: 97 MMOL/L (ref 98–107)
CO2: 20 MMOL/L (ref 22–29)
CREAT SERPL-MCNC: 0.7 MG/DL (ref 0.5–1)
EKG ATRIAL RATE: 84 BPM
EKG P AXIS: 80 DEGREES
EKG P-R INTERVAL: 104 MS
EKG Q-T INTERVAL: 404 MS
EKG QRS DURATION: 80 MS
EKG QTC CALCULATION (BAZETT): 477 MS
EKG R AXIS: 62 DEGREES
EKG T AXIS: 64 DEGREES
EKG VENTRICULAR RATE: 84 BPM
EOSINOPHILS ABSOLUTE: 0 E9/L (ref 0.05–0.5)
EOSINOPHILS RELATIVE PERCENT: 0 % (ref 0–6)
GFR AFRICAN AMERICAN: >60
GFR NON-AFRICAN AMERICAN: >60 ML/MIN/1.73
GLUCOSE BLD-MCNC: 136 MG/DL (ref 74–99)
HBA1C MFR BLD: 5.8 % (ref 4–5.6)
HCT VFR BLD CALC: 35.7 % (ref 34–48)
HEMOGLOBIN: 11.9 G/DL (ref 11.5–15.5)
IMMATURE GRANULOCYTES #: 0.04 E9/L
IMMATURE GRANULOCYTES %: 0.4 % (ref 0–5)
LYMPHOCYTES ABSOLUTE: 0.8 E9/L (ref 1.5–4)
LYMPHOCYTES RELATIVE PERCENT: 8 % (ref 20–42)
MAGNESIUM: 1.5 MG/DL (ref 1.6–2.6)
MCH RBC QN AUTO: 30.1 PG (ref 26–35)
MCHC RBC AUTO-ENTMCNC: 33.3 % (ref 32–34.5)
MCV RBC AUTO: 90.2 FL (ref 80–99.9)
MONOCYTES ABSOLUTE: 0.83 E9/L (ref 0.1–0.95)
MONOCYTES RELATIVE PERCENT: 8.3 % (ref 2–12)
NEUTROPHILS ABSOLUTE: 8.37 E9/L (ref 1.8–7.3)
NEUTROPHILS RELATIVE PERCENT: 83.2 % (ref 43–80)
PDW BLD-RTO: 13 FL (ref 11.5–15)
PHOSPHORUS: 2.5 MG/DL (ref 2.5–4.5)
PLATELET # BLD: 188 E9/L (ref 130–450)
PMV BLD AUTO: 10.6 FL (ref 7–12)
POTASSIUM SERPL-SCNC: 3.6 MMOL/L (ref 3.5–5)
RBC # BLD: 3.96 E12/L (ref 3.5–5.5)
SODIUM BLD-SCNC: 133 MMOL/L (ref 132–146)
T4 FREE: 1.16 NG/DL (ref 0.93–1.7)
TOTAL PROTEIN: 6.7 G/DL (ref 6.4–8.3)
TSH SERPL DL<=0.05 MIU/L-ACNC: 0.22 UIU/ML (ref 0.27–4.2)
VITAMIN B-12: 882 PG/ML (ref 211–946)
WBC # BLD: 10.1 E9/L (ref 4.5–11.5)

## 2022-06-03 PROCEDURE — 6360000002 HC RX W HCPCS: Performed by: FAMILY MEDICINE

## 2022-06-03 PROCEDURE — 83036 HEMOGLOBIN GLYCOSYLATED A1C: CPT

## 2022-06-03 PROCEDURE — APPSS60 APP SPLIT SHARED TIME 46-60 MINUTES

## 2022-06-03 PROCEDURE — 94640 AIRWAY INHALATION TREATMENT: CPT

## 2022-06-03 PROCEDURE — 84443 ASSAY THYROID STIM HORMONE: CPT

## 2022-06-03 PROCEDURE — 2580000003 HC RX 258: Performed by: FAMILY MEDICINE

## 2022-06-03 PROCEDURE — 6370000000 HC RX 637 (ALT 250 FOR IP): Performed by: INTERNAL MEDICINE

## 2022-06-03 PROCEDURE — 6370000000 HC RX 637 (ALT 250 FOR IP): Performed by: PSYCHIATRY & NEUROLOGY

## 2022-06-03 PROCEDURE — 83735 ASSAY OF MAGNESIUM: CPT

## 2022-06-03 PROCEDURE — 93005 ELECTROCARDIOGRAM TRACING: CPT | Performed by: INTERNAL MEDICINE

## 2022-06-03 PROCEDURE — 2140000000 HC CCU INTERMEDIATE R&B

## 2022-06-03 PROCEDURE — 6360000002 HC RX W HCPCS: Performed by: INTERNAL MEDICINE

## 2022-06-03 PROCEDURE — 36415 COLL VENOUS BLD VENIPUNCTURE: CPT

## 2022-06-03 PROCEDURE — 99222 1ST HOSP IP/OBS MODERATE 55: CPT | Performed by: INTERNAL MEDICINE

## 2022-06-03 PROCEDURE — 80053 COMPREHEN METABOLIC PANEL: CPT

## 2022-06-03 PROCEDURE — 2580000003 HC RX 258: Performed by: INTERNAL MEDICINE

## 2022-06-03 PROCEDURE — 84100 ASSAY OF PHOSPHORUS: CPT

## 2022-06-03 PROCEDURE — 84439 ASSAY OF FREE THYROXINE: CPT

## 2022-06-03 PROCEDURE — 85025 COMPLETE CBC W/AUTO DIFF WBC: CPT

## 2022-06-03 PROCEDURE — 6370000000 HC RX 637 (ALT 250 FOR IP): Performed by: FAMILY MEDICINE

## 2022-06-03 PROCEDURE — 2500000003 HC RX 250 WO HCPCS: Performed by: FAMILY MEDICINE

## 2022-06-03 PROCEDURE — 82607 VITAMIN B-12: CPT

## 2022-06-03 PROCEDURE — 99233 SBSQ HOSP IP/OBS HIGH 50: CPT | Performed by: NURSE PRACTITIONER

## 2022-06-03 RX ORDER — METOPROLOL TARTRATE 50 MG/1
50 TABLET, FILM COATED ORAL 2 TIMES DAILY
Status: DISCONTINUED | OUTPATIENT
Start: 2022-06-03 | End: 2022-06-06 | Stop reason: HOSPADM

## 2022-06-03 RX ORDER — LABETALOL HYDROCHLORIDE 5 MG/ML
10 INJECTION, SOLUTION INTRAVENOUS EVERY 4 HOURS PRN
Status: DISCONTINUED | OUTPATIENT
Start: 2022-06-03 | End: 2022-06-06 | Stop reason: HOSPADM

## 2022-06-03 RX ORDER — MAGNESIUM SULFATE IN WATER 40 MG/ML
2000 INJECTION, SOLUTION INTRAVENOUS ONCE
Status: COMPLETED | OUTPATIENT
Start: 2022-06-03 | End: 2022-06-03

## 2022-06-03 RX ORDER — LACTOBACILLUS RHAMNOSUS GG 10B CELL
1 CAPSULE ORAL DAILY
Status: DISCONTINUED | OUTPATIENT
Start: 2022-06-03 | End: 2022-06-06 | Stop reason: HOSPADM

## 2022-06-03 RX ADMIN — SODIUM CHLORIDE, PRESERVATIVE FREE 10 ML: 5 INJECTION INTRAVENOUS at 23:33

## 2022-06-03 RX ADMIN — LABETALOL HYDROCHLORIDE 10 MG: 5 INJECTION INTRAVENOUS at 05:08

## 2022-06-03 RX ADMIN — METOPROLOL TARTRATE 50 MG: 50 TABLET, FILM COATED ORAL at 09:16

## 2022-06-03 RX ADMIN — Medication 10 ML: at 09:14

## 2022-06-03 RX ADMIN — ENOXAPARIN SODIUM 40 MG: 100 INJECTION SUBCUTANEOUS at 09:15

## 2022-06-03 RX ADMIN — THIAMINE HCL TAB 100 MG 100 MG: 100 TAB at 09:15

## 2022-06-03 RX ADMIN — ACETAMINOPHEN 325MG 650 MG: 325 TABLET ORAL at 09:18

## 2022-06-03 RX ADMIN — Medication 1 CAPSULE: at 09:17

## 2022-06-03 RX ADMIN — METOPROLOL TARTRATE 50 MG: 50 TABLET, FILM COATED ORAL at 21:06

## 2022-06-03 RX ADMIN — FOLIC ACID 1 MG: 1 TABLET ORAL at 09:16

## 2022-06-03 RX ADMIN — WATER 1000 MG: 1 INJECTION INTRAMUSCULAR; INTRAVENOUS; SUBCUTANEOUS at 23:31

## 2022-06-03 RX ADMIN — HYDRALAZINE HYDROCHLORIDE 10 MG: 20 INJECTION INTRAMUSCULAR; INTRAVENOUS at 09:16

## 2022-06-03 RX ADMIN — LABETALOL HYDROCHLORIDE 10 MG: 5 INJECTION INTRAVENOUS at 14:22

## 2022-06-03 RX ADMIN — HYDRALAZINE HYDROCHLORIDE 10 MG: 20 INJECTION INTRAMUSCULAR; INTRAVENOUS at 18:08

## 2022-06-03 RX ADMIN — DOXYCYCLINE HYCLATE 100 MG: 100 CAPSULE ORAL at 21:06

## 2022-06-03 RX ADMIN — IPRATROPIUM BROMIDE AND ALBUTEROL SULFATE 1 AMPULE: 2.5; .5 SOLUTION RESPIRATORY (INHALATION) at 20:30

## 2022-06-03 RX ADMIN — BENZONATATE 100 MG: 100 CAPSULE ORAL at 06:07

## 2022-06-03 RX ADMIN — Medication 1 TABLET: at 09:14

## 2022-06-03 RX ADMIN — DOXYCYCLINE HYCLATE 100 MG: 100 CAPSULE ORAL at 09:14

## 2022-06-03 RX ADMIN — Medication 10 ML: at 21:06

## 2022-06-03 RX ADMIN — IPRATROPIUM BROMIDE AND ALBUTEROL SULFATE 1 AMPULE: 2.5; .5 SOLUTION RESPIRATORY (INHALATION) at 08:58

## 2022-06-03 RX ADMIN — MAGNESIUM SULFATE 2000 MG: 2 INJECTION INTRAVENOUS at 09:29

## 2022-06-03 RX ADMIN — BENZONATATE 100 MG: 100 CAPSULE ORAL at 21:08

## 2022-06-03 RX ADMIN — LABETALOL HYDROCHLORIDE 10 MG: 5 INJECTION INTRAVENOUS at 23:32

## 2022-06-03 RX ADMIN — HYDRALAZINE HYDROCHLORIDE 10 MG: 20 INJECTION INTRAMUSCULAR; INTRAVENOUS at 03:09

## 2022-06-03 RX ADMIN — ALPRAZOLAM 0.5 MG: 0.25 TABLET ORAL at 23:32

## 2022-06-03 RX ADMIN — LOSARTAN POTASSIUM 100 MG: 50 TABLET, FILM COATED ORAL at 09:15

## 2022-06-03 RX ADMIN — ALPRAZOLAM 0.5 MG: 0.25 TABLET ORAL at 09:16

## 2022-06-03 RX ADMIN — ACETAMINOPHEN 325MG 650 MG: 325 TABLET ORAL at 15:05

## 2022-06-03 ASSESSMENT — PAIN DESCRIPTION - LOCATION
LOCATION: HEAD
LOCATION: HEAD

## 2022-06-03 ASSESSMENT — PAIN DESCRIPTION - DESCRIPTORS
DESCRIPTORS: ACHING
DESCRIPTORS: ACHING

## 2022-06-03 ASSESSMENT — PAIN SCALES - GENERAL
PAINLEVEL_OUTOF10: 5
PAINLEVEL_OUTOF10: 7

## 2022-06-03 NOTE — PROGRESS NOTES
Hospitalist Progress Note      SYNOPSIS: Patient admitted on 2022 for AMS. SUBJECTIVE:    Patient seen and examined. She Complains of SOB. Cough productive of yellow phlegm. She states that she has CP that she describes as a pressure sensation at times. EKG yesterday: NSR with QT prolongation 507. She does admit to some anxiety at this time. Temp (24hrs), Av.1 °F (36.7 °C), Min:97.8 °F (36.6 °C), Max:98.4 °F (36.9 °C)    DIET: ADULT DIET; Regular  CODE: Full Code    Intake/Output Summary (Last 24 hours) at 2022  Last data filed at 2022 1422  Gross per 24 hour   Intake 960 ml   Output --   Net 960 ml       OBJECTIVE:    BP (!) 205/95   Pulse 77   Temp 97.8 °F (36.6 °C) (Temporal)   Resp 20   Ht 5' 10\" (1.778 m)   Wt 160 lb (72.6 kg)   SpO2 94%   Breastfeeding No   BMI 22.96 kg/m²     General appearance: No apparent distress, appears stated age and cooperative. HEENT:  Conjunctivae/corneas clear. Neck: Supple. No jugular venous distention. Respiratory: Diminished breath sounds (b/l)   Cardiovascular: Regular rate rhythm, normal S1-S2  Abdomen: Soft, nontender, nondistended  Musculoskeletal: No clubbing, cyanosis, no bilateral lower extremity edema. Brisk capillary refill. Skin:  No rashes  on visible skin  Neurologic: awake, alert and following commands     ASSESSMENT:  73y.o. year old female  who  has a past medical history of Arthritis, Asthma, Cancer ([address] Chronic back pain, COPD (chronic obstructive pulmonary disease) ([address] Emphysema of lung ([address] GERD (gastroesophageal reflux disease), Hashimoto's disease, Hyperlipidemia, Hypertension, Hyperthyroidism, and Incisional hernia.      Patient presented to the emergency department with altered mental status. Family reports she was difficult to arouse. When EMS arrived they said she was flaccid and had facial droop. Patient was also incontinent to urine. On arrival she is alert and oriented x3.   Denies fever, chills, nausea, vomiting, headache, photophobia, chest pain, shortness of breath. Does report some mild hip pain for the last 2 days. Vital signs reveal the patient to be hypotensive. Initial pressures 92/52. She went as low as 65/39. He is currently 76/44. Receiving fluid resuscitation. No source of infection identified. Laboratory studies demonstrate creatinine 1.8, lactic acid 2.9, glucose 138, troponin 11. Chest x-ray is unremarkable. CT head is still pending. Urinalysis has not yet been able to be obtained       PLAN:  1.) Altered mental status/syncope: patient is AAOx3 at this time. Patient was noted to be hypotensive in the ER- she did receive fluids and they her BP has been extremely elevated Since. CT head: No acute intracranial abnormality, mild fluid within the left maxillary sinus. MRI head w/o: NO acute infarct or other acute intracranial process. MRI with contrast is pending. Neurology following. TSH/B12 is following.       2.) Strokelike symptoms versus seizure: F/u neurology recs. UA was relatively negative. 3.) Lactic acidosis: resolved. 4.) Acute renal failure: resolved. 5.) HTN Urgency: Patient was initially hypotensive but now Hypertensive: Started on losartan, and atenolol; Atenolol d/c and switched to lopressor BID; Not controlled. Hydralazine PRN. IVF D/c. Will consult cardiology if still not controlled. Consider transfer to ICU and cardene drip if still not controlled. 6.) COPD/Asthma exacerbation: Duonebs ordered. Rocephin/Doxycycline instead of the Zithromax for QT prolongation on EKG for noted sinus infection. tessalon pearles PRN. 7.) Sinus infection: C/w patient on Abx- since that was ordered for her OP.   8.) ETOH use: C/w thiamine/folic acid/MVI.   9.) DVT proph: Lovenox   10. ) Anxiety: Xanax BID PRN.   11.) Elevated BS: Check a HbA1c.   12.) Mild Lfts elevation: C/w monitoring.  May need to check US of the liver; patient with H/o ETOH use.   13) QT prolongation: May be related to age. Will recheck an EKG in the am.     Addendum: Patient did vomit after returning from MRI  She was having a headache. Nursing gave her tylenol/Xanax/Zofran and metoprolol. Blood pressure is staring to go down from 200's to the 180's. Patient is starting to feel better. Patient to get her Duonebs   Chest Xray:   .  Compared to recent radiographs, similar distribution and appearance of   left greater than right mid and lower lung opacities.  Tenting of the right   hemidiaphragm.       2.  No pleural effusion or pneumothorax           DISPOSITION: TBD    Medications:  REVIEWED DAILY      Infusion Medications    sodium chloride 100 mL/hr at 06/01/22 2235    sodium chloride       Scheduled Medications    thiamine mononitrate  100 mg Oral Daily    ipratropium-albuterol  1 ampule Inhalation Q6H WA    azithromycin  500 mg IntraVENous Q24H    sodium chloride flush  10 mL IntraVENous 2 times per day    enoxaparin  40 mg SubCUTAneous Daily    atenolol  50 mg Oral Daily    losartan  100 mg Oral Daily     PRN Meds: hydrALAZINE, sodium chloride flush, sodium chloride, promethazine **OR** ondansetron, polyethylene glycol, acetaminophen **OR** acetaminophen        Labs:     Recent Labs     06/01/22 0327 06/02/22  0556   WBC 6.0 11.0   HGB 11.1* 12.9   HCT 34.8 40.8    194       Recent Labs     06/01/22  0327 06/02/22  0556    132   K 4.0 4.7    104   CO2 20* 14*   BUN 21 11   CREATININE 1.8* 0.8   CALCIUM 8.2* 9.0       Recent Labs     06/01/22 0327 06/02/22  0556   PROT 5.6* 6.8   ALKPHOS 72 90   ALT 34* 40*   AST 29 35*   BILITOT <0.2 0.4       No results for input(s): INR in the last 72 hours. No results for input(s): Ivana Angelo in the last 72 hours.     Chronic labs:    Lab Results   Component Value Date    CHOL 232 (H) 03/25/2022    TRIG 216 (H) 03/25/2022    HDL 46 03/25/2022    LDLCALC 143 (H) 03/25/2022    TSH 0.138 (L) 03/25/2022    LABA1C 6.0 (H) 03/25/2022 Radiology: REVIEWED DAILY    +++++++++++++++++++++++++++++++++++++++++++++++++  Antelmo Jennings MD  [facility] - [address] [place] [address]  +++++++++++++++++++++++++++++++++++++++++++++++++  NOTE: This report was transcribed using voice recognition software. Every effort was made to ensure accuracy; however, inadvertent computerized transcription errors may be present.

## 2022-06-03 NOTE — PROGRESS NOTES
[facility]  Neurology Follow Up    Date:  6/3/2022  Patient Name:  Javier Hector  YOB: 1950  MRN: [id number]     Chief Complaint: passed out    Patient remains severely hypertensive otherwise vitals are stable on room air    PMH: Asthma, lung cancer, COPD, emphysema, GERD, Hashimoto's disease, hypertension, hyperlipidemia, hypothyroidism, arthritis    Assessment  Javier Hector is a 73 y.o. female presented for dizziness and syncope. MRI brain was unremarkable, MRI with contrast was also unrevealing. Patient's blood pressures have been as low as 65/39 and as high as 210/90. Patient still has some mild impairments in short-term memory    Hypotensive syncope now with hypertensive urgency most likely cause of syncope, dizziness and memory issues. COPD/asthma exacerbation   Breathing treatments and antibiotics per PCP    Hyperthyroidism   TSH 0.224--- management per PCP    Elevated LFTs    At present time patient is awake, alert and oriented x4 sitting in her chair. Patient reports some shortness of breath this morning and patient's  endorses that her memory is not the best the last few days. MRI findings reviewed with them. All questions and concerns addressed today. Plan  · Continue supportive care  · Better blood pressure control  · Await cardiology recommendations  · A1c 5.8  · Continue telemetry    Neurology will sign off. Please call with additional questions or concerns. History of Present Illness:  Javier Hector is a 73 y.o. right handed female presenting for evaluation of episode of altered mental status. The patient's spouse provided most of the history. The patient had gotten up from the couch, became dizzy and passed out for several minutes. She was noted to have SBP around 60. She got fluid resuscitation and has since been noted to have significant elevation of her BP up to 210/90.      She has a history of lung cancer in 2008 and 2013 s/p resection.      Current Medications:      Current Facility-Administered Medications   Medication Dose Route Frequency Provider Last Rate Last Admin    lactobacillus (CULTURELLE) capsule 1 capsule  1 capsule Oral Daily Karina Slade MD        labetalol (NORMODYNE;TRANDATE) injection 10 mg  10 mg IntraVENous Q4H PRN Mitchel Person, DO   10 mg at 06/03/22 1374    metoprolol tartrate (LOPRESSOR) tablet 50 mg  50 mg Oral BID Karina Slade MD        magnesium sulfate 2000 mg in 50 mL IVPB premix  2,000 mg IntraVENous Once Karina Slade MD        hydrALAZINE (APRESOLINE) injection 10 mg  10 mg IntraVENous Q4H PRN Mitchel Person, DO   10 mg at 06/03/22 0309    thiamine mononitrate tablet 100 mg  100 mg Oral Daily Alphonsa Renata, DO   100 mg at 06/02/22 1241    ipratropium-albuterol (DUONEB) nebulizer solution 1 ampule  1 ampule Inhalation Q6H WA Karina Slade MD   1 ampule at 06/03/22 0858    ALPRAZolam (XANAX) tablet 0.5 mg  0.5 mg Oral BID PRN Karina Slade MD   0.5 mg at 09/24/40 7777    folic acid (FOLVITE) tablet 1 mg  1 mg Oral Daily Karina Slade MD   1 mg at 06/02/22 2123    multivitamin 1 tablet  1 tablet Oral Daily Karina Slade MD   1 tablet at 06/02/22 2228    benzonatate (TESSALON) capsule 100 mg  100 mg Oral TID PRN Karina Slade MD   100 mg at 06/03/22 0607    cefTRIAXone (ROCEPHIN) 1,000 mg in sterile water 10 mL IV syringe  1,000 mg IntraVENous Q24H Karina Slade MD   1,000 mg at 06/02/22 2227    doxycycline hyclate (VIBRAMYCIN) capsule 100 mg  100 mg Oral 2 times per day Karina Slade MD   100 mg at 06/02/22 2238    sodium chloride flush 0.9 % injection 10 mL  10 mL IntraVENous 2 times per day Mitchel Person, DO   10 mL at 06/02/22 2235    sodium chloride flush 0.9 % injection 10 mL  10 mL IntraVENous PRN Mitchel Person, DO        0.9 % sodium chloride infusion   IntraVENous PRN Mitchel Person, DO        enoxaparin (LOVENOX) injection 40 mg  40 mg SubCUTAneous Daily Sinda Rainsville, DO   40 mg at 06/02/22 0815    promethazine (PHENERGAN) tablet 12.5 mg  12.5 mg Oral Q6H PRN Sinda [place]ains[place], DO   12.5 mg at 06/02/22 2123    Or    ondansetron (ZOFRAN) injection 4 mg  4 mg IntraVENous Q6H PRN Sinda Rainsville, DO        polyethylene glycol (GLYCOLAX) packet 17 g  17 g Oral Daily PRN Sinda Rainsville, DO        acetaminophen (TYLENOL) tablet 650 mg  650 mg Oral Q6H PRN Sinda Rainsville, DO   650 mg at 06/02/22 2122    Or    acetaminophen (TYLENOL) suppository 650 mg  650 mg Rectal Q6H PRN Sinda Rainsville, DO        losartan (COZAAR) tablet 100 mg  100 mg Oral Daily Kerri Glass MD   100 mg at 06/02/22 0815        Allergies:       Allergies   Allergen Reactions    Codeine Hives and Itching     AND CODEINE DERIVATIVES----sob  Pt stated tolerated Dilaudid    Fentanyl Other (See Comments)     Disoriented, confused  Other reaction(s): Mental Status Change    Adhesive Tape      burns skin, reddens skin, blisters    Amlodipine Swelling    Bupropion      hyper--couldn't sit still--jumpy    Cortisone      throat closed up    Dipyridamole Hives     Persantine-CST--sob, palpitations, stress test stopped    Levaquin [Levofloxacin In D5w] Other (See Comments)     Yeast infection     Nortriptyline      for rosacea--caused flare up. stopped    Other      States any steroids make her face swell and flush    Prednisone Other (See Comments)    Tenex [Guanfacine Hcl]      dizziness    Tramadol Hives     sob    Varenicline         Physical Examination  Vitals   Vitals:    06/03/22 0607 06/03/22 0611 06/03/22 0858 06/03/22 0900   BP: (!) 188/84  (!) 177/81    Pulse: 85  83    Resp:   18    Temp:   97.9 °F (36.6 °C)    TempSrc:       SpO2: 93%  94% 100%   Weight:  183 lb 9.6 oz (83.3 kg)     Height:            General: Awake, alert, appears stated age, cooperative  HEENT: Normocephalic, atraumatic  Lungs: Mildly labored breaths on room air; no coughs  Heart: No murmurs appreciated  Extremities/Peripheral vascular: No edema/swelling noted. No cold limbs noted. Neurologic Examination    Mental Status  Awake, alert and oriented x4. Speech is fluent with intact comprehension. Poor recall. Follows commands. Does well with object identification, reading and repetition    Cranial Nerves  II. Visual fields full to confrontation bilaterally. Fundoscopic exam: Discs not well visualized. III, IV, VI: Pupils equally round and reactive to light, 3 to 2 mm bilaterally. EOMI without nystagmus  V. Facial sensation: mildly decreased over right lower face  VII: Facial movements symmetric and strong  VIII: Hearing intact to voice  IX,X: Palate elevates symmetrically.  No dysarthria  XI: Sternocleidomastoid and trapezius 5/5 bilaterally   XII: Tongue is midline    Motor     Right Left   Right Left   Deltoid 5 5  Hip Flexion 5 5   Biceps      5  5  Knee Extension 5 5   Triceps 5 5  Knee Flexion 5 5   Handgrip 5 5  Ankle Dorsiflexion 5 5       Ankle Plantarflexion 5 5     Tone: Normal in all four limbs    Bulk: Normal in all four limbs with no evidence of atrophy    Pronator drift: absent bilaterally    Sensation  · Light Touch: Intact distally in all four limbs      Reflexes     Right Left   Biceps 2 2   Brachioradialis 2 2   Triceps 2 2   Patellar 2 2   Achilles 2 2   ankle clonus none none   Babinski absent absent     Coordination  FN, FFM and ABDOULAYE intact  HKS intact    Gait  Not tested      Labs  Recent Labs     06/01/22 2037 06/02/22  0556 06/03/22  0523   NA  --    < > 133   K  --    < > 3.6   CL  --    < > 97*   CO2  --    < > 20*   BUN  --    < > 11   CREATININE  --    < > 0.7   GLUCOSE  --    < > 136*   CALCIUM  --    < > 9.3   PROT  --    < > 6.7   LABALBU  --    < > 4.1   BILITOT  --    < > 0.6   ALKPHOS  --    < > 86   AST  --    < > 46*   ALT  --    < > 42*   WBC  --    < > 10.1   RBC  --    < > 3.96   HGB  --    < > 11.9   HCT  --    < > 35.7   MCV  --    < > 90.2   MCH  --    < > 30.1   MCHC  --    < > 33.3   RDW  --    < > 13.0   PLT  --    < > 188   MPV  --    < > 10.6   LACTA 1.2  --   --    LABA1C  --   --  5.8*    < > = values in this interval not displayed. Imaging  XR CHEST PORTABLE   Final Result   1. Compared to recent radiographs, similar distribution and appearance of   left greater than right mid and lower lung opacities. Tenting of the right   hemidiaphragm. 2.  No pleural effusion or pneumothorax. RECOMMENDATION:   Continued radiographic follow-up recommended. MRI BRAIN W CONTRAST   Final Result   No abnormal enhancement. No evidence of intracranial metastatic disease. MRI BRAIN WO CONTRAST   Final Result   No acute infarct or other acute intracranial process. XR CHEST (2 VW)   Final Result   Probable chronic lung changes at bases the         CT HEAD WO CONTRAST   Final Result   No acute intracranial abnormality. Mild fluid within the left maxillary sinus, please correlate for acute   sinusitis in the appropriate setting. XR CHEST PORTABLE   Final Result   No acute findings. Possible chronic changes involving the lower lungs. On personal review scattered white matter changes in addition to remote infarcts    I independently reviewed all labs and neuroimaging completed this admission.     Electronically signed by KANWAL Limno NP on 6/3/2022 at 9:12 AM

## 2022-06-03 NOTE — PATIENT CARE CONFERENCE
[facility] Quality Flow/Interdisciplinary Rounds Progress Note        Quality Flow Rounds held on Ladonna 3, 2022    Disciplines Attending:  Bedside Nurse, ,  and Nursing Unit Jose Benedict was admitted on 6/1/2022  2:15 AM    Anticipated Discharge Date:  Expected Discharge Date: 06/03/22    Disposition:    Bulmaro Score:  Bulmaro Scale Score: 22    Readmission Risk              Risk of Unplanned Readmission:  15           Discussed patient goal for the day, patient clinical progression, and barriers to discharge.   The following Goal(s) of the Day/Commitment(s) have been identified:  Diagnostics - Report Results and Labs - Report Results      Napoleon Mcclelland RN  Ladonna 3, 2022

## 2022-06-03 NOTE — PROGRESS NOTES
Hospitalist Progress Note      SYNOPSIS: Patient admitted on 2022 for AMS. SUBJECTIVE:    Patient seen and examined with her ; the patient is doing better today. Is able to sleep and SOB has decreased. She was     Temp (24hrs), Av.7 °F (36.5 °C), Min:97.2 °F (36.2 °C), Max:98.2 °F (36.8 °C)    DIET: ADULT DIET; Regular; Low Fat/Low Chol/High Fiber/2 gm Na  CODE: Full Code    Intake/Output Summary (Last 24 hours) at 6/3/2022 1324  Last data filed at 2022 2254  Gross per 24 hour   Intake 120 ml   Output 0 ml   Net 120 ml       OBJECTIVE:    BP (!) 152/63   Pulse 83   Temp 97.9 °F (36.6 °C)   Resp 18   Ht 5' 10\" (1.778 m)   Wt 183 lb 9.6 oz (83.3 kg)   SpO2 100%   Breastfeeding No   BMI 26.34 kg/m²     General appearance: No apparent distress, appears stated age and cooperative. HEENT:  Conjunctivae/corneas clear. Neck: Supple. No jugular venous distention. Respiratory: Diminished breath sounds (b/l) with occasional wheeze  Cardiovascular: Regular rate rhythm, normal S1-S2  Abdomen: Soft, nontender, nondistended  Musculoskeletal: No clubbing, cyanosis, no bilateral lower extremity edema. Brisk capillary refill. Skin:  No rashes  on visible skin  Neurologic: awake, alert and following commands     ASSESSMENT:  73y.o. year old female  who  has a past medical history of Arthritis, Asthma, Cancer ([address] Chronic back pain, COPD (chronic obstructive pulmonary disease) ([address] Emphysema of lung ([address] GERD (gastroesophageal reflux disease), Hashimoto's disease, Hyperlipidemia, Hypertension, Hyperthyroidism, and Incisional hernia.      Patient presented to the emergency department with altered mental status. Family reports she was difficult to arouse. When EMS arrived they said she was flaccid and had facial droop. Patient was also incontinent to urine. On arrival she is alert and oriented x3.   Denies fever, chills, nausea, vomiting, headache, photophobia, chest pain, shortness of breath. Does report some mild hip pain for the last 2 days. Vital signs reveal the patient to be hypotensive. Initial pressures 92/52. She went as low as 65/39. He is currently 76/44. Receiving fluid resuscitation. No source of infection identified. Laboratory studies demonstrate creatinine 1.8, lactic acid 2.9, glucose 138, troponin 11. Chest x-ray is unremarkable. CT head is still pending. Urinalysis has not yet been able to be obtained       PLAN:  1.) Altered mental status/syncope: patient is AAOx3 at this time. Patient was noted to be hypotensive in the ER- she did receive fluids and they her BP has been extremely elevated Since. CT head: No acute intracranial abnormality, mild fluid within the left maxillary sinus. MRI head w/o: NO acute infarct or other acute intracranial process. MRI with contrast  is negative. Neurology following. TSH: 0.224; free T4 pending /B12 is pending.     2.) Strokelike symptoms versus seizure: F/u neurology recs. UA was relatively negative. 3.) Lactic acidosis: resolved. 4.) Acute renal failure: resolved. 5.) HTN Urgency: Patient was initially hypotensive but now Hypertensive: Started on losartan, and atenolol; Atenolol d/c and switched to lopressor BID- increased dose to 50 mg BID today ; Not controlled. Hydralazine PRN. IVF D/c. Cards consult pending.     6.) COPD/Asthma exacerbation: Duonebs ordered. Rocephin/Doxycycline instead of the Zithromax for QT prolongation on EKG for noted sinus infection and probable bronchitis. tessalon pearles PRN. Patient with noted allergy to prednisone- \"steroids make face swell and flush. \"     7.) Sinus infection: C/w patient on Abx- since that was ordered for her OP.     8.) ETOH use: C/w thiamine/folic acid/MVI.     9.) DVT proph: Lovenox     10. ) Anxiety: Xanax BID PRN.     11.) Elevated BS: Check a hbA1c: 5.8- prediabetic.      12.) Mild Lfts elevation: C/w monitoring.  May need to check US of the liver; patient with H/o ETOH use.   13) QT prolongation: May be related to age. Will recheck an EKG in the am- resolved. .     14.) Hypomag: supplemented. DISPOSITION: TBD    Medications:  REVIEWED DAILY      Infusion Medications    sodium chloride       Scheduled Medications    lactobacillus  1 capsule Oral Daily    metoprolol tartrate  50 mg Oral BID    thiamine mononitrate  100 mg Oral Daily    ipratropium-albuterol  1 ampule Inhalation D9S WA    folic acid  1 mg Oral Daily    multivitamin  1 tablet Oral Daily    cefTRIAXone (ROCEPHIN) IV  1,000 mg IntraVENous Q24H    doxycycline hyclate  100 mg Oral 2 times per day    sodium chloride flush  10 mL IntraVENous 2 times per day    enoxaparin  40 mg SubCUTAneous Daily    losartan  100 mg Oral Daily     PRN Meds: labetalol, hydrALAZINE, ALPRAZolam, benzonatate, sodium chloride flush, sodium chloride, promethazine **OR** ondansetron, polyethylene glycol, acetaminophen **OR** acetaminophen        Labs:     Recent Labs     06/01/22 0327 06/02/22  0556 06/03/22  0523   WBC 6.0 11.0 10.1   HGB 11.1* 12.9 11.9   HCT 34.8 40.8 35.7    194 188       Recent Labs     06/01/22 0327 06/02/22  0556 06/03/22  0523    132 133   K 4.0 4.7 3.6    104 97*   CO2 20* 14* 20*   BUN 21 11 11   CREATININE 1.8* 0.8 0.7   CALCIUM 8.2* 9.0 9.3   PHOS  --   --  2.5       Recent Labs     06/01/22 0327 06/02/22  0556 06/03/22  0523   PROT 5.6* 6.8 6.7   ALKPHOS 72 90 86   ALT 34* 40* 42*   AST 29 35* 46*   BILITOT <0.2 0.4 0.6       No results for input(s): INR in the last 72 hours. No results for input(s): Jadene Moh in the last 72 hours.     Chronic labs:    Lab Results   Component Value Date    CHOL 232 (H) 03/25/2022    TRIG 216 (H) 03/25/2022    HDL 46 03/25/2022    LDLCALC 143 (H) 03/25/2022    TSH 0.224 (L) 06/03/2022    LABA1C 5.8 (H) 06/03/2022       Radiology: REVIEWED DAILY    +++++++++++++++++++++++++++++++++++++++++++++++++  Juan Jose Conn MD  [employer] - [address] [facility], [place]  +++++++++++++++++++++++++++++++++++++++++++++++++  NOTE: This report was transcribed using voice recognition software. Every effort was made to ensure accuracy; however, inadvertent computerized transcription errors may be present.

## 2022-06-03 NOTE — CONSULTS
Inpatient Cardiology Consultation      Reason for Consult:  HTN urgency     Consulting Physician: Dr Kimmy Martínez     Requesting Physician:  Lamonte Aguilar MD    Date of Consultation: 6/3/2022    HISTORY OF PRESENT ILLNESS:   Patient is a 82-year-old female formerly known to [facility] cardiology through Dr. Holly Goldberg, last seen 11/9/2018 for office visit for transient chest pain referred to by PCP. No cardiac testing or medications were started patient was to follow-up on a as needed basis. PMHx: COPD, lung cancer with right lower lobe resection, hypertension, hyperlipidemia with Hashimoto's, hypothyroidism, arthritis, asthma, fibrocystic breast, GERD, H. pylori, hemorrhoids, appendectomy, hysterectomy, eye surgery. HPI:   · Patient presents to ER 6/1/2022 for AMS with report from EMS of facial droop flaccidity. Patient did have incontinence, and returned to baseline sometime after incident. No known seizure disorder. Patient was hypotensive in ER, shown to have an MIGEL. The patient was admitted for AMS, MIGEL, and hypotension. VS at time of arrival in ER 97.6, 20 respirations, 60 heart rate, 92/52, 98% on room air. · Labs: Potassium 3.6, chloride 97, CO2 20, BUN 21>11, creatinine 1.8>0.7, magnesium 1.5, glucose 136,HScTNT 11, lactic acid 2.9 > 1.2, procalcitonin 0.15, albumin 4.1, ALT 42, AST 46, HgbA1c 5.8, TSH 0.224, T4 free 1.16, ethanol 95 on day of arrival.  · CXR: Compared to recent similar left greater than right mid and lower lung opacities, tenting of the right hemidiaphragm. No pleural effusion or pneumothorax. · CT head: No acute intracranial abnormality. · MRI brain: No acute abnormality/infarct. · Upon assessment today 6/3/2022 patient is sitting in chair next to . Patient is alert and oriented, speaking full sentences with mild tachypnea on room air, and reports being very tired.   Per  the patient had a syncopal episode while at home he reports she was unconscious for about 2 ascending aorta. No evidence of pericardial effusion. No intracardiac mass. Medications Prior to admit:  Prior to Admission medications    Medication Sig Start Date End Date Taking? Authorizing Provider   benzonatate (TESSALON) 100 MG capsule take 1 capsule by mouth three times a day if needed for cough 5/31/22   Loreta Hutson MD   azithromycin (ZITHROMAX) 250 MG tablet 500mg on day 1 followed by 250mg on days 2 - 5 5/31/22   Loreta Hutson MD   ezetimibe (ZETIA) 10 MG tablet Take 1 tablet by mouth daily 5/16/22   Loreta Hutson MD   loratadine (CLARITIN) 10 MG tablet Take 1 tablet by mouth daily 5/10/22   Nathalie Gross PA-C   pregabalin (LYRICA) 50 MG capsule Take 1 capsule by mouth 2 times daily for 90 days. 4/18/22 7/17/22  Loreta Hutson MD   escitalopram (LEXAPRO) 5 MG tablet Take 1 tablet by mouth daily 3/29/22   Loreta Hutson MD   montelukast (SINGULAIR) 10 MG tablet Take 1 tablet by mouth nightly take 1 tablet by mouth every evening 3/29/22   Loreta Hutson MD   atenolol (TENORMIN) 100 MG tablet Take 1 tablet by mouth daily 3/29/22   Loreta Hutson MD   olmesartan (BENICAR) 40 MG tablet Take 1 tablet by mouth daily take 1 tablet by mouth once daily 3/29/22   Loreta Hutson MD   omeprazole (PRILOSEC) 20 MG delayed release capsule Take 1 capsule by mouth Daily take 1 capsule by mouth once daily 3/29/22   Loreta Hutson MD   SYNTHROID 100 MCG tablet Take 1 tablet 5 days a week and 1.5 tab on Saturday and Fernando 3/29/22   Loreta Hutson MD   tiZANidine (ZANAFLEX) 4 MG tablet Take 1 tablet by mouth every 8 hours as needed (muscle spasm) Take 1 tab by mouth 3 times daily 3/29/22   Loreta Hutson MD   albuterol sulfate  (90 Base) MCG/ACT inhaler Inhale 2 puffs into the lungs 4 times daily as needed for Wheezing 3/29/22   Loreta Hutson MD   lidocaine (LIDODERM) 5 % apply 3 patches to the affected area daily.  Leave on for 12 hours and then off for 12 hours. 1/26/21   Logan Sykes MD   Coenzyme Q10 (CO Q 10 PO) Take by mouth daily    Historical Provider, MD   Biotin w/ Vitamins C & E (HAIR/SKIN/NAILS PO) Take by mouth daily VIT C 90MG  VIT E 5,000MCG  ZINC 8MG  COPPER 1MG    Historical Provider, MD   Melatonin 1 MG SUBL Place 2 mg under the tongue nightly    Historical Provider, MD       Current Medications:    Current Facility-Administered Medications: lactobacillus (CULTURELLE) capsule 1 capsule, 1 capsule, Oral, Daily  labetalol (NORMODYNE;TRANDATE) injection 10 mg, 10 mg, IntraVENous, Q4H PRN  metoprolol tartrate (LOPRESSOR) tablet 50 mg, 50 mg, Oral, BID  magnesium sulfate 2000 mg in 50 mL IVPB premix, 2,000 mg, IntraVENous, Once  hydrALAZINE (APRESOLINE) injection 10 mg, 10 mg, IntraVENous, Q4H PRN  thiamine mononitrate tablet 100 mg, 100 mg, Oral, Daily  ipratropium-albuterol (DUONEB) nebulizer solution 1 ampule, 1 ampule, Inhalation, Q6H WA  ALPRAZolam (XANAX) tablet 0.5 mg, 0.5 mg, Oral, BID PRN  folic acid (FOLVITE) tablet 1 mg, 1 mg, Oral, Daily  multivitamin 1 tablet, 1 tablet, Oral, Daily  benzonatate (TESSALON) capsule 100 mg, 100 mg, Oral, TID PRN  cefTRIAXone (ROCEPHIN) 1,000 mg in sterile water 10 mL IV syringe, 1,000 mg, IntraVENous, Q24H  doxycycline hyclate (VIBRAMYCIN) capsule 100 mg, 100 mg, Oral, 2 times per day  sodium chloride flush 0.9 % injection 10 mL, 10 mL, IntraVENous, 2 times per day  sodium chloride flush 0.9 % injection 10 mL, 10 mL, IntraVENous, PRN  0.9 % sodium chloride infusion, , IntraVENous, PRN  enoxaparin (LOVENOX) injection 40 mg, 40 mg, SubCUTAneous, Daily  promethazine (PHENERGAN) tablet 12.5 mg, 12.5 mg, Oral, Q6H PRN **OR** ondansetron (ZOFRAN) injection 4 mg, 4 mg, IntraVENous, Q6H PRN  polyethylene glycol (GLYCOLAX) packet 17 g, 17 g, Oral, Daily PRN  acetaminophen (TYLENOL) tablet 650 mg, 650 mg, Oral, Q6H PRN **OR** acetaminophen (TYLENOL) suppository 650 mg, 650 mg, Rectal, Q6H PRN  losartan (COZAAR) tablet 100 mg, 100 mg, Oral, Daily    Allergies:  Codeine, Fentanyl, Adhesive tape, Amlodipine, Bupropion, Cortisone, Dipyridamole, Levaquin [levofloxacin in d5w], Nortriptyline, Other, Prednisone, Tenex [guanfacine hcl], Tramadol, and Varenicline    Social History:    Housing: Lives in [facility] with . Tobacco: Quit 15 years ago prior to that 25 to 30 years pack a day smoking. EtOH: Socially  Drugs: Denies  Oxygen: Room air at home  Ambulation: Does not use ambulatory aids    Family History:   Father:  from [place] at age 37  Brother: from [place] at age 37  Son: from [place] at age 37    REVIEW OF SYSTEMS:     · Constitutional: Denies fevers, chills, night sweats. Reports continued fatigue since syncopal episode. · HEENT: Denies headaches, nose bleeds, and blurred vision,oral pain, abscess or lesion. · Musculoskeletal: Denies pain to BLE with ambulation and edema to BLE. Admits to recent fall during syncopal episode. · Neurological: Denies dizziness and lightheadedness, numbness and tingling  · Cardiovascular: Denies chest pain, palpitations, and feelings of heart racing. · Respiratory: Denies orthopnea and PND. Admits to MANRIQUE acute on chronic. · Gastrointestinal: Denies heartburn, nausea/vomiting, diarrhea and constipation, black/bloody, and tarry stools. · Genitourinary: Denies dysuria and hematuria  · Hematologic: Denies excessive bruising or bleeding  · Lymphatic: Denies lumps and bumps to neck, axilla, breast, and groin  · Endocrine: Denies excessive thirst. Denies intolerance to hot and cold  · GYN: Postmenopausal state; Denies vaginal bleeding. · Psychiatric: Denies anxiety and depression. PHYSICAL EXAM:   BP (!) 152/63   Pulse 83   Temp 97.9 °F (36.6 °C)   Resp 18   Ht 5' 10\" (1.778 m)   Wt 183 lb 9.6 oz (83.3 kg)   SpO2 100%   Breastfeeding No   BMI 26.34 kg/m²   CONST:  Well developed, 59-year-old  female well nourished who appears stated age.  Awake, alert, cooperative, no apparent distress  HEENT:   Head- Normocephalic, atraumatic   Eyes- Conjunctivae pink, anicteric  Throat- Oral mucosa pink and moist  Neck-  No stridor, trachea midline, no jugular venous distention. No adenopathy   CHEST: Chest symmetrical and non-tender to palpation. No accessory muscle use or intercostal retractions  RESPIRATORY: Lung sounds - clear throughout fields   CARDIOVASCULAR:     No carotid bruit  Heart Inspection- shows no noted pulsations  Heart Palpation- no heaves or thrills; PMI is non-displaced   Heart Ausculation- Regular rate and rhythm, no murmur. No s3, s4 or rub   PV: No lower extremity edema. No varicosities. Pedal pulses palpable, no clubbing or cyanosis   ABDOMEN: Soft, non-tender to light palpation. Bowel sounds present. No palpable masses no organomegaly; no abdominal bruit  MS: Good muscle strength and tone. No atrophy or abnormal movements. : Deferred  SKIN: Warm and dry no statis dermatitis or ulcers   NEURO / PSYCH: Oriented to person, place and time. Speech clear and appropriate. Follows all commands. Pleasant affect with poor short-term memory recall.     DATA:    ECG: Sinus rhythm with short MI, vent rate 84, MI interval 104, QTc 477  Tele strips: Normal sinus rhythm, 80s  Diagnostic:    Intake/Output Summary (Last 24 hours) at 6/3/2022 1124  Last data filed at 6/2/2022 2254  Gross per 24 hour   Intake 120 ml   Output 0 ml   Net 120 ml       Labs:   CBC:   Recent Labs     06/02/22  0556 06/03/22 0523   WBC 11.0 10.1   HGB 12.9 11.9   HCT 40.8 35.7    188     BMP:   Recent Labs     06/02/22  0556 06/03/22 0523    133   K 4.7 3.6   CO2 14* 20*   BUN 11 11   CREATININE 0.8 0.7   LABGLOM >60 >60   CALCIUM 9.0 9.3     Mag:   Recent Labs     06/03/22 0523   MG 1.5*     Phos:   Recent Labs     06/03/22 0523   PHOS 2.5     TSH:   Recent Labs     06/03/22 0523   TSH 0.224*     HgA1c:   Lab Results   Component Value Date    LABA1C 5.8 (H) 06/03/2022 FASTING LIPID PANEL:  Lab Results   Component Value Date    CHOL 232 03/25/2022    HDL 46 03/25/2022    LDLCALC 143 03/25/2022    TRIG 216 03/25/2022     LIVER PROFILE:  Recent Labs     06/02/22  0556 06/03/22  0523   AST 35* 46*   ALT 40* 42*   LABALBU 3.5 4.1     Results for Ny Young (MRN [id number]) as of 6/3/2022 14:52   Ref. Range 6/3/2022 05:23   Albumin Latest Ref Range: 3.5 - 5.2 g/dL 4.1   Alk Phos Latest Ref Range: 35 - 104 U/L 86   ALT Latest Ref Range: 0 - 32 U/L 42 (H)   AST Latest Ref Range: 0 - 31 U/L 46 (H)   Bilirubin Latest Ref Range: 0.0 - 1.2 mg/dL 0.6   Total Protein Latest Ref Range: 6.4 - 8.3 g/dL 6.7     Results for Ny Young (MRN [id number]) as of 6/3/2022 14:52   Ref. Range 6/3/2022 05:23 6/3/2022 07:57   TSH Latest Ref Range: 0.270 - 4.200 uIU/mL 0.224 (L)    T4 Free Latest Ref Range: 0.93 - 1.70 ng/dL 1.16      CXR:  Impression   1.  Compared to recent radiographs, similar distribution and appearance of   left greater than right mid and lower lung opacities.  Tenting of the right   hemidiaphragm.       2.  No pleural effusion or pneumothorax.         Assessment:  1. Syncopal episode--likely vasovagal syncope secondary to hypovolemia as patient was acutely hypotensive upon arrival in emergency room with MIGEL. 2. Hypotensive initially, responded well to fluid resuscitation, now hypertensive likely refractory to 3 L NaCl given in ER with continuous 100 mL an hour NaCl after. 3. MIGEL--resolved. 4. Hypomagnesemia--supplemented with IV. 5. Lactic acidosis--resolved. 6. Mild transaminitis--history EtOH use. 7. COPD exacerbation--currently being treated with ATB  8. Hyperlipidemia, on Zetia. 9. Hypothyroidism, on Synthroid. Plan:  1. Echocardiogram to evaluate LVEF/VHD. 2. Consider starting Lipitor 40 mg daily and hydralazine 25 mg p.o. every 8 hours. 3. Continue losartan, Lopressor. 4. Continue telemetry, monitor for arrhythmias.   5. Monitor electrolyte: Keep potassium> 4 and magnesium> 2.  6. 7-day ZIO AT at discharge. 7. Educate and reinforce on slow transition from lying to sitting or sitting to standing. 8. Encourage cessation of EtOH use. 9. Rest per primary service and other consultants. 10. Case discussed with Dr. Parag Jenkins, further recommendations to follow as per above.       Electronically signed by KANWAL Ramirez CNP on 6/3/2022 at 11:24 AM

## 2022-06-04 LAB
ADENOVIRUS BY PCR: NOT DETECTED
ALBUMIN SERPL-MCNC: 3.8 G/DL (ref 3.5–5.2)
ALP BLD-CCNC: 90 U/L (ref 35–104)
ALT SERPL-CCNC: 42 U/L (ref 0–32)
ANION GAP SERPL CALCULATED.3IONS-SCNC: 15 MMOL/L (ref 7–16)
AST SERPL-CCNC: 45 U/L (ref 0–31)
BASOPHILS ABSOLUTE: 0.02 E9/L (ref 0–0.2)
BASOPHILS RELATIVE PERCENT: 0.2 % (ref 0–2)
BILIRUB SERPL-MCNC: 0.8 MG/DL (ref 0–1.2)
BORDETELLA PARAPERTUSSIS BY PCR: NOT DETECTED
BORDETELLA PERTUSSIS BY PCR: NOT DETECTED
BUN BLDV-MCNC: 11 MG/DL (ref 6–23)
CALCIUM SERPL-MCNC: 9 MG/DL (ref 8.6–10.2)
CHLAMYDOPHILIA PNEUMONIAE BY PCR: NOT DETECTED
CHLORIDE BLD-SCNC: 91 MMOL/L (ref 98–107)
CO2: 20 MMOL/L (ref 22–29)
CORONAVIRUS 229E BY PCR: NOT DETECTED
CORONAVIRUS HKU1 BY PCR: NOT DETECTED
CORONAVIRUS NL63 BY PCR: NOT DETECTED
CORONAVIRUS OC43 BY PCR: NOT DETECTED
CREAT SERPL-MCNC: 0.7 MG/DL (ref 0.5–1)
EOSINOPHILS ABSOLUTE: 0.01 E9/L (ref 0.05–0.5)
EOSINOPHILS RELATIVE PERCENT: 0.1 % (ref 0–6)
GFR AFRICAN AMERICAN: >60
GFR NON-AFRICAN AMERICAN: >60 ML/MIN/1.73
GLUCOSE BLD-MCNC: 118 MG/DL (ref 74–99)
HCT VFR BLD CALC: 35.2 % (ref 34–48)
HEMOGLOBIN: 12 G/DL (ref 11.5–15.5)
HUMAN METAPNEUMOVIRUS BY PCR: NOT DETECTED
HUMAN RHINOVIRUS/ENTEROVIRUS BY PCR: DETECTED
IMMATURE GRANULOCYTES #: 0.04 E9/L
IMMATURE GRANULOCYTES %: 0.4 % (ref 0–5)
INFLUENZA A BY PCR: NOT DETECTED
INFLUENZA B BY PCR: NOT DETECTED
LYMPHOCYTES ABSOLUTE: 0.96 E9/L (ref 1.5–4)
LYMPHOCYTES RELATIVE PERCENT: 10.5 % (ref 20–42)
MAGNESIUM: 1.9 MG/DL (ref 1.6–2.6)
MCH RBC QN AUTO: 30 PG (ref 26–35)
MCHC RBC AUTO-ENTMCNC: 34.1 % (ref 32–34.5)
MCV RBC AUTO: 88 FL (ref 80–99.9)
MONOCYTES ABSOLUTE: 0.68 E9/L (ref 0.1–0.95)
MONOCYTES RELATIVE PERCENT: 7.4 % (ref 2–12)
MYCOPLASMA PNEUMONIAE BY PCR: NOT DETECTED
NEUTROPHILS ABSOLUTE: 7.43 E9/L (ref 1.8–7.3)
NEUTROPHILS RELATIVE PERCENT: 81.4 % (ref 43–80)
PARAINFLUENZA VIRUS 1 BY PCR: NOT DETECTED
PARAINFLUENZA VIRUS 2 BY PCR: NOT DETECTED
PARAINFLUENZA VIRUS 3 BY PCR: NOT DETECTED
PARAINFLUENZA VIRUS 4 BY PCR: NOT DETECTED
PDW BLD-RTO: 13 FL (ref 11.5–15)
PHOSPHORUS: 2.6 MG/DL (ref 2.5–4.5)
PLATELET # BLD: 193 E9/L (ref 130–450)
PMV BLD AUTO: 10.9 FL (ref 7–12)
POTASSIUM SERPL-SCNC: 3.4 MMOL/L (ref 3.5–5)
RBC # BLD: 4 E12/L (ref 3.5–5.5)
RESPIRATORY SYNCYTIAL VIRUS BY PCR: NOT DETECTED
SARS-COV-2, PCR: NOT DETECTED
SODIUM BLD-SCNC: 126 MMOL/L (ref 132–146)
TOTAL PROTEIN: 6.8 G/DL (ref 6.4–8.3)
WBC # BLD: 9.1 E9/L (ref 4.5–11.5)

## 2022-06-04 PROCEDURE — 0202U NFCT DS 22 TRGT SARS-COV-2: CPT

## 2022-06-04 PROCEDURE — 6370000000 HC RX 637 (ALT 250 FOR IP): Performed by: FAMILY MEDICINE

## 2022-06-04 PROCEDURE — 2580000003 HC RX 258: Performed by: INTERNAL MEDICINE

## 2022-06-04 PROCEDURE — 2060000000 HC ICU INTERMEDIATE R&B

## 2022-06-04 PROCEDURE — 6370000000 HC RX 637 (ALT 250 FOR IP): Performed by: INTERNAL MEDICINE

## 2022-06-04 PROCEDURE — 80053 COMPREHEN METABOLIC PANEL: CPT

## 2022-06-04 PROCEDURE — 6360000002 HC RX W HCPCS: Performed by: INTERNAL MEDICINE

## 2022-06-04 PROCEDURE — 36415 COLL VENOUS BLD VENIPUNCTURE: CPT

## 2022-06-04 PROCEDURE — 2580000003 HC RX 258: Performed by: FAMILY MEDICINE

## 2022-06-04 PROCEDURE — 84100 ASSAY OF PHOSPHORUS: CPT

## 2022-06-04 PROCEDURE — 6370000000 HC RX 637 (ALT 250 FOR IP): Performed by: PSYCHIATRY & NEUROLOGY

## 2022-06-04 PROCEDURE — 6360000002 HC RX W HCPCS: Performed by: FAMILY MEDICINE

## 2022-06-04 PROCEDURE — 85025 COMPLETE CBC W/AUTO DIFF WBC: CPT

## 2022-06-04 PROCEDURE — 94640 AIRWAY INHALATION TREATMENT: CPT

## 2022-06-04 PROCEDURE — 83735 ASSAY OF MAGNESIUM: CPT

## 2022-06-04 RX ORDER — POTASSIUM CHLORIDE 20 MEQ/1
40 TABLET, EXTENDED RELEASE ORAL ONCE
Status: COMPLETED | OUTPATIENT
Start: 2022-06-04 | End: 2022-06-04

## 2022-06-04 RX ADMIN — THIAMINE HCL TAB 100 MG 100 MG: 100 TAB at 09:21

## 2022-06-04 RX ADMIN — SODIUM CHLORIDE, PRESERVATIVE FREE 10 ML: 5 INJECTION INTRAVENOUS at 04:22

## 2022-06-04 RX ADMIN — BENZONATATE 100 MG: 100 CAPSULE ORAL at 06:03

## 2022-06-04 RX ADMIN — IPRATROPIUM BROMIDE AND ALBUTEROL SULFATE 1 AMPULE: 2.5; .5 SOLUTION RESPIRATORY (INHALATION) at 09:20

## 2022-06-04 RX ADMIN — Medication 1 TABLET: at 09:34

## 2022-06-04 RX ADMIN — Medication 10 ML: at 09:34

## 2022-06-04 RX ADMIN — FOLIC ACID 1 MG: 1 TABLET ORAL at 09:25

## 2022-06-04 RX ADMIN — IPRATROPIUM BROMIDE AND ALBUTEROL SULFATE 1 AMPULE: 2.5; .5 SOLUTION RESPIRATORY (INHALATION) at 19:24

## 2022-06-04 RX ADMIN — DOXYCYCLINE HYCLATE 100 MG: 100 CAPSULE ORAL at 09:28

## 2022-06-04 RX ADMIN — WATER 1000 MG: 1 INJECTION INTRAMUSCULAR; INTRAVENOUS; SUBCUTANEOUS at 20:51

## 2022-06-04 RX ADMIN — METOPROLOL TARTRATE 50 MG: 50 TABLET, FILM COATED ORAL at 09:25

## 2022-06-04 RX ADMIN — LOSARTAN POTASSIUM 100 MG: 50 TABLET, FILM COATED ORAL at 09:26

## 2022-06-04 RX ADMIN — METOPROLOL TARTRATE 50 MG: 50 TABLET, FILM COATED ORAL at 20:51

## 2022-06-04 RX ADMIN — ENOXAPARIN SODIUM 40 MG: 100 INJECTION SUBCUTANEOUS at 09:21

## 2022-06-04 RX ADMIN — Medication 1 CAPSULE: at 09:28

## 2022-06-04 RX ADMIN — Medication 10 ML: at 20:53

## 2022-06-04 RX ADMIN — ALPRAZOLAM 0.5 MG: 0.25 TABLET ORAL at 09:21

## 2022-06-04 RX ADMIN — ACETAMINOPHEN 325MG 650 MG: 325 TABLET ORAL at 16:22

## 2022-06-04 RX ADMIN — POTASSIUM CHLORIDE 40 MEQ: 20 TABLET, EXTENDED RELEASE ORAL at 11:23

## 2022-06-04 RX ADMIN — IPRATROPIUM BROMIDE AND ALBUTEROL SULFATE 1 AMPULE: 2.5; .5 SOLUTION RESPIRATORY (INHALATION) at 14:01

## 2022-06-04 RX ADMIN — HYDRALAZINE HYDROCHLORIDE 10 MG: 20 INJECTION INTRAMUSCULAR; INTRAVENOUS at 09:26

## 2022-06-04 RX ADMIN — BENZONATATE 100 MG: 100 CAPSULE ORAL at 21:08

## 2022-06-04 RX ADMIN — ACETAMINOPHEN 325MG 650 MG: 325 TABLET ORAL at 06:03

## 2022-06-04 RX ADMIN — HYDRALAZINE HYDROCHLORIDE 10 MG: 20 INJECTION INTRAMUSCULAR; INTRAVENOUS at 04:22

## 2022-06-04 RX ADMIN — ALPRAZOLAM 0.5 MG: 0.25 TABLET ORAL at 20:52

## 2022-06-04 ASSESSMENT — PAIN DESCRIPTION - DESCRIPTORS
DESCRIPTORS: ACHING
DESCRIPTORS: ACHING;DISCOMFORT;SORE
DESCRIPTORS: ACHING
DESCRIPTORS: ACHING

## 2022-06-04 ASSESSMENT — PAIN DESCRIPTION - ORIENTATION
ORIENTATION: RIGHT;LEFT
ORIENTATION: RIGHT;LEFT
ORIENTATION: RIGHT
ORIENTATION: LEFT

## 2022-06-04 ASSESSMENT — PAIN DESCRIPTION - FREQUENCY
FREQUENCY: INTERMITTENT
FREQUENCY: INTERMITTENT

## 2022-06-04 ASSESSMENT — PAIN SCALES - GENERAL
PAINLEVEL_OUTOF10: 5
PAINLEVEL_OUTOF10: 7
PAINLEVEL_OUTOF10: 5
PAINLEVEL_OUTOF10: 0
PAINLEVEL_OUTOF10: 7

## 2022-06-04 ASSESSMENT — PAIN DESCRIPTION - LOCATION
LOCATION: HEAD
LOCATION: CHEST
LOCATION: CHEST;RIB CAGE
LOCATION: CHEST;RIB CAGE

## 2022-06-04 ASSESSMENT — PAIN - FUNCTIONAL ASSESSMENT
PAIN_FUNCTIONAL_ASSESSMENT: ACTIVITIES ARE NOT PREVENTED

## 2022-06-04 ASSESSMENT — PAIN DESCRIPTION - ONSET: ONSET: ON-GOING

## 2022-06-04 ASSESSMENT — PAIN DESCRIPTION - PAIN TYPE
TYPE: ACUTE PAIN
TYPE: ACUTE PAIN

## 2022-06-04 NOTE — PROGRESS NOTES
Hospitalist Progress Note      SYNOPSIS: Patient admitted on 2022 for AMS. SUBJECTIVE:  Patient was seen and examined this morning, he was found to have rhinovirus, she was transferred to another room. She continues to have fatigue and tiredness. Also feels congested. Denies any fever. Having some cough. No other issues overnight. Temp (24hrs), Av °F (36.7 °C), Min:97.6 °F (36.4 °C), Max:98.4 °F (36.9 °C)    DIET: ADULT DIET; Regular; Low Fat/Low Chol/High Fiber/2 gm Na  CODE: Full Code    Intake/Output Summary (Last 24 hours) at 2022 1546  Last data filed at 6/3/2022 1724  Gross per 24 hour   Intake 290 ml   Output --   Net 290 ml       OBJECTIVE:    BP (!) 152/62   Pulse 75   Temp 97.6 °F (36.4 °C) (Oral)   Resp 19   Ht 5' 10\" (1.778 m)   Wt 184 lb 9.6 oz (83.7 kg)   SpO2 94%   Breastfeeding No   BMI 26.49 kg/m²     General appearance: No apparent distress, appears stated age and cooperative. HEENT:  Conjunctivae/corneas clear. Neck: Supple. No jugular venous distention. Respiratory: Diminished breath sounds (b/l) with occasional wheeze  Cardiovascular: Regular rate rhythm, normal S1-S2  Abdomen: Soft, nontender, nondistended  Musculoskeletal: No clubbing, cyanosis, no bilateral lower extremity edema. Brisk capillary refill. Skin:  No rashes  on visible skin  Neurologic: awake, alert and following commands     ASSESSMENT:  73y.o. year old female  who  has a past medical history of Arthritis, Asthma, Cancer ([address] Chronic back pain, COPD (chronic obstructive pulmonary disease) ([address] Emphysema of lung ([address] GERD (gastroesophageal reflux disease), Hashimoto's disease, Hyperlipidemia, Hypertension, Hyperthyroidism, and Incisional hernia.      Patient presented to the emergency department with altered mental status. Family reports she was difficult to arouse. When EMS arrived they said she was flaccid and had facial droop. Patient was also incontinent to urine.   On arrival she is alert and oriented x3. Denies fever, chills, nausea, vomiting, headache, photophobia, chest pain, shortness of breath. Does report some mild hip pain for the last 2 days. Vital signs reveal the patient to be hypotensive. Initial pressures 92/52. She went as low as 65/39. He is currently 76/44. Receiving fluid resuscitation. No source of infection identified. Laboratory studies demonstrate creatinine 1.8, lactic acid 2.9, glucose 138, troponin 11. Chest x-ray is unremarkable. CT head is still pending. Urinalysis has not yet been able to be obtained       PLAN:  1.) Altered mental status/syncope: Resolved, patient is AAOx3 at this time. Patient was noted to be hypotensive in the ER- she did receive fluids and they her BP has been extremely elevated Since. CT head: No acute intracranial abnormality, mild fluid within the left maxillary sinus. MRI head w/o: NO acute infarct or other acute intracranial process. MRI with contrast  is negative. Neurology following. TSH: 0.224; free T4 pending /B12 is normal.  Follow orthostatics     2.) Strokelike symptoms versus seizure: F/u neurology recs. UA was relatively negative. 3.) Lactic acidosis: resolved. 4.) Acute renal failure: resolved. 5.) HTN Urgency: Patient was initially hypotensive but now Hypertensive: Started on losartan, and atenolol; Atenolol d/c and switched to lopressor BID- increased dose to 50 mg BID today ; Not controlled. Hydralazine PRN. IVF D/c.     6.) COPD/Asthma exacerbation: Rhinovirus positive, on isolation now. Duonebs ordered. Rocephin/Doxycycline instead of the Zithromax for QT prolongation on EKG for noted sinus infection and probable bronchitis. tessalon pearles PRN. Patient with noted allergy to prednisone- \"steroids make face swell and flush. \"     7.) Sinus infection: C/w patient on Abx as above    8.) ETOH use: C/w thiamine/folic acid/MVI.     9.) DVT proph: Lovenox     10. ) Anxiety: Xanax BID PRN.     11.) Elevated BS: Check a hbA1c: 5.8- prediabetic.      12.) Mild Lfts elevation: C/w monitoring. May need to check US of the liver; patient with H/o ETOH use. DISPOSITION: TBD    Medications:  REVIEWED DAILY      Infusion Medications    sodium chloride       Scheduled Medications    lactobacillus  1 capsule Oral Daily    metoprolol tartrate  50 mg Oral BID    thiamine mononitrate  100 mg Oral Daily    ipratropium-albuterol  1 ampule Inhalation B6S WA    folic acid  1 mg Oral Daily    multivitamin  1 tablet Oral Daily    cefTRIAXone (ROCEPHIN) IV  1,000 mg IntraVENous Q24H    doxycycline hyclate  100 mg Oral 2 times per day    sodium chloride flush  10 mL IntraVENous 2 times per day    enoxaparin  40 mg SubCUTAneous Daily    losartan  100 mg Oral Daily     PRN Meds: labetalol, perflutren lipid microspheres, hydrALAZINE, ALPRAZolam, benzonatate, sodium chloride flush, sodium chloride, promethazine **OR** ondansetron, polyethylene glycol, acetaminophen **OR** acetaminophen        Labs:     Recent Labs     06/02/22  0556 06/03/22 0523 06/04/22 0626   WBC 11.0 10.1 9.1   HGB 12.9 11.9 12.0   HCT 40.8 35.7 35.2    188 193       Recent Labs     06/02/22  0556 06/03/22  0523 06/04/22  0626    133 126*   K 4.7 3.6 3.4*    97* 91*   CO2 14* 20* 20*   BUN 11 11 11   CREATININE 0.8 0.7 0.7   CALCIUM 9.0 9.3 9.0   PHOS  --  2.5 2.6       Recent Labs     06/02/22  0556 06/03/22  0523 06/04/22  0626   PROT 6.8 6.7 6.8   ALKPHOS 90 86 90   ALT 40* 42* 42*   AST 35* 46* 45*   BILITOT 0.4 0.6 0.8       No results for input(s): INR in the last 72 hours. No results for input(s): Maxim Olga in the last 72 hours.     Chronic labs:    Lab Results   Component Value Date    CHOL 232 (H) 03/25/2022    TRIG 216 (H) 03/25/2022    HDL 46 03/25/2022    LDLCALC 143 (H) 03/25/2022    TSH 0.224 (L) 06/03/2022    LABA1C 5.8 (H) 06/03/2022       Radiology: REVIEWED

## 2022-06-04 NOTE — PROGRESS NOTES
Pt transfer report called to Shane Raman. Spouse at bedside, collecting all belongings. Vital signs assessed prior to tranfer, all within normal limits. Neither patient or spouse reports any concerns at this time.

## 2022-06-04 NOTE — PROGRESS NOTES
P Quality Flow/Interdisciplinary Rounds Progress Note        Quality Flow Rounds held on June 4, 2022    Disciplines Attending:  Bedside Nurse, ,  and Nursing Unit Jose Benedict was admitted on 6/1/2022  2:15 AM    Anticipated Discharge Date:  Expected Discharge Date: 06/03/22    Disposition:    Bulmaro Score:  Bulmaro Scale Score: 20    Readmission Risk              Risk of Unplanned Readmission:  15           Discussed patient goal for the day, patient clinical progression, and barriers to discharge.   The following Goal(s) of the Day/Commitment(s) have been identified:  Report labs/diagnostics     Maxi Wallace RN  June 4, 2022

## 2022-06-05 LAB
ALBUMIN SERPL-MCNC: 3.3 G/DL (ref 3.5–5.2)
ALP BLD-CCNC: 82 U/L (ref 35–104)
ALT SERPL-CCNC: 39 U/L (ref 0–32)
ANION GAP SERPL CALCULATED.3IONS-SCNC: 14 MMOL/L (ref 7–16)
AST SERPL-CCNC: 38 U/L (ref 0–31)
BASOPHILS ABSOLUTE: 0.03 E9/L (ref 0–0.2)
BASOPHILS RELATIVE PERCENT: 0.5 % (ref 0–2)
BILIRUB SERPL-MCNC: 0.4 MG/DL (ref 0–1.2)
BUN BLDV-MCNC: 10 MG/DL (ref 6–23)
CALCIUM SERPL-MCNC: 8.7 MG/DL (ref 8.6–10.2)
CHLORIDE BLD-SCNC: 98 MMOL/L (ref 98–107)
CO2: 21 MMOL/L (ref 22–29)
CREAT SERPL-MCNC: 0.8 MG/DL (ref 0.5–1)
EOSINOPHILS ABSOLUTE: 0.09 E9/L (ref 0.05–0.5)
EOSINOPHILS RELATIVE PERCENT: 1.6 % (ref 0–6)
GFR AFRICAN AMERICAN: >60
GFR NON-AFRICAN AMERICAN: >60 ML/MIN/1.73
GLUCOSE BLD-MCNC: 93 MG/DL (ref 74–99)
HCT VFR BLD CALC: 32.6 % (ref 34–48)
HEMOGLOBIN: 10.9 G/DL (ref 11.5–15.5)
IMMATURE GRANULOCYTES #: 0.02 E9/L
IMMATURE GRANULOCYTES %: 0.4 % (ref 0–5)
LV EF: 55 %
LVEF MODALITY: NORMAL
LYMPHOCYTES ABSOLUTE: 1.08 E9/L (ref 1.5–4)
LYMPHOCYTES RELATIVE PERCENT: 19.6 % (ref 20–42)
MCH RBC QN AUTO: 30 PG (ref 26–35)
MCHC RBC AUTO-ENTMCNC: 33.4 % (ref 32–34.5)
MCV RBC AUTO: 89.8 FL (ref 80–99.9)
MONOCYTES ABSOLUTE: 0.62 E9/L (ref 0.1–0.95)
MONOCYTES RELATIVE PERCENT: 11.2 % (ref 2–12)
NEUTROPHILS ABSOLUTE: 3.68 E9/L (ref 1.8–7.3)
NEUTROPHILS RELATIVE PERCENT: 66.7 % (ref 43–80)
PDW BLD-RTO: 12.9 FL (ref 11.5–15)
PLATELET # BLD: 183 E9/L (ref 130–450)
PMV BLD AUTO: 10.3 FL (ref 7–12)
POTASSIUM SERPL-SCNC: 3.6 MMOL/L (ref 3.5–5)
RBC # BLD: 3.63 E12/L (ref 3.5–5.5)
SODIUM BLD-SCNC: 133 MMOL/L (ref 132–146)
TOTAL PROTEIN: 6 G/DL (ref 6.4–8.3)
WBC # BLD: 5.5 E9/L (ref 4.5–11.5)

## 2022-06-05 PROCEDURE — 2580000003 HC RX 258: Performed by: INTERNAL MEDICINE

## 2022-06-05 PROCEDURE — 6360000002 HC RX W HCPCS: Performed by: INTERNAL MEDICINE

## 2022-06-05 PROCEDURE — 6370000000 HC RX 637 (ALT 250 FOR IP): Performed by: INTERNAL MEDICINE

## 2022-06-05 PROCEDURE — 2580000003 HC RX 258: Performed by: FAMILY MEDICINE

## 2022-06-05 PROCEDURE — 85025 COMPLETE CBC W/AUTO DIFF WBC: CPT

## 2022-06-05 PROCEDURE — 6370000000 HC RX 637 (ALT 250 FOR IP): Performed by: FAMILY MEDICINE

## 2022-06-05 PROCEDURE — 6370000000 HC RX 637 (ALT 250 FOR IP): Performed by: PSYCHIATRY & NEUROLOGY

## 2022-06-05 PROCEDURE — 2060000000 HC ICU INTERMEDIATE R&B

## 2022-06-05 PROCEDURE — 94640 AIRWAY INHALATION TREATMENT: CPT

## 2022-06-05 PROCEDURE — 36415 COLL VENOUS BLD VENIPUNCTURE: CPT

## 2022-06-05 PROCEDURE — 6360000002 HC RX W HCPCS: Performed by: FAMILY MEDICINE

## 2022-06-05 PROCEDURE — 93306 TTE W/DOPPLER COMPLETE: CPT

## 2022-06-05 PROCEDURE — 80053 COMPREHEN METABOLIC PANEL: CPT

## 2022-06-05 RX ORDER — GUAIFENESIN/DEXTROMETHORPHAN 100-10MG/5
5 SYRUP ORAL EVERY 4 HOURS PRN
Status: DISCONTINUED | OUTPATIENT
Start: 2022-06-05 | End: 2022-06-06 | Stop reason: HOSPADM

## 2022-06-05 RX ORDER — HYDRALAZINE HYDROCHLORIDE 20 MG/ML
10 INJECTION INTRAMUSCULAR; INTRAVENOUS ONCE
Status: COMPLETED | OUTPATIENT
Start: 2022-06-05 | End: 2022-06-05

## 2022-06-05 RX ADMIN — HYDRALAZINE HYDROCHLORIDE 10 MG: 20 INJECTION INTRAMUSCULAR; INTRAVENOUS at 10:13

## 2022-06-05 RX ADMIN — IPRATROPIUM BROMIDE AND ALBUTEROL SULFATE 1 AMPULE: 2.5; .5 SOLUTION RESPIRATORY (INHALATION) at 20:44

## 2022-06-05 RX ADMIN — GUAIFENESIN SYRUP AND DEXTROMETHORPHAN 5 ML: 100; 10 SYRUP ORAL at 22:08

## 2022-06-05 RX ADMIN — GUAIFENESIN SYRUP AND DEXTROMETHORPHAN 5 ML: 100; 10 SYRUP ORAL at 15:10

## 2022-06-05 RX ADMIN — DOXYCYCLINE HYCLATE 100 MG: 100 CAPSULE ORAL at 20:36

## 2022-06-05 RX ADMIN — METOPROLOL TARTRATE 50 MG: 50 TABLET, FILM COATED ORAL at 20:36

## 2022-06-05 RX ADMIN — BENZONATATE 100 MG: 100 CAPSULE ORAL at 22:05

## 2022-06-05 RX ADMIN — FOLIC ACID 1 MG: 1 TABLET ORAL at 08:35

## 2022-06-05 RX ADMIN — LOSARTAN POTASSIUM 100 MG: 50 TABLET, FILM COATED ORAL at 08:34

## 2022-06-05 RX ADMIN — Medication 10 ML: at 08:34

## 2022-06-05 RX ADMIN — IPRATROPIUM BROMIDE AND ALBUTEROL SULFATE 1 AMPULE: 2.5; .5 SOLUTION RESPIRATORY (INHALATION) at 13:22

## 2022-06-05 RX ADMIN — ACETAMINOPHEN 325MG 650 MG: 325 TABLET ORAL at 15:11

## 2022-06-05 RX ADMIN — ENOXAPARIN SODIUM 40 MG: 100 INJECTION SUBCUTANEOUS at 08:34

## 2022-06-05 RX ADMIN — ALPRAZOLAM 0.5 MG: 0.25 TABLET ORAL at 12:09

## 2022-06-05 RX ADMIN — METOPROLOL TARTRATE 50 MG: 50 TABLET, FILM COATED ORAL at 08:35

## 2022-06-05 RX ADMIN — DOXYCYCLINE HYCLATE 100 MG: 100 CAPSULE ORAL at 08:35

## 2022-06-05 RX ADMIN — DOXYCYCLINE HYCLATE 100 MG: 100 CAPSULE ORAL at 01:31

## 2022-06-05 RX ADMIN — THIAMINE HCL TAB 100 MG 100 MG: 100 TAB at 08:35

## 2022-06-05 RX ADMIN — GUAIFENESIN SYRUP AND DEXTROMETHORPHAN 5 ML: 100; 10 SYRUP ORAL at 10:11

## 2022-06-05 RX ADMIN — Medication 1 CAPSULE: at 08:35

## 2022-06-05 RX ADMIN — ALPRAZOLAM 0.5 MG: 0.25 TABLET ORAL at 22:05

## 2022-06-05 RX ADMIN — Medication 10 ML: at 21:15

## 2022-06-05 RX ADMIN — WATER 1000 MG: 1 INJECTION INTRAMUSCULAR; INTRAVENOUS; SUBCUTANEOUS at 22:05

## 2022-06-05 RX ADMIN — ACETAMINOPHEN 325MG 650 MG: 325 TABLET ORAL at 20:36

## 2022-06-05 RX ADMIN — HYDRALAZINE HYDROCHLORIDE 10 MG: 20 INJECTION INTRAMUSCULAR; INTRAVENOUS at 15:11

## 2022-06-05 RX ADMIN — Medication 1 TABLET: at 08:36

## 2022-06-05 RX ADMIN — BENZONATATE 100 MG: 100 CAPSULE ORAL at 08:35

## 2022-06-05 ASSESSMENT — PAIN DESCRIPTION - DESCRIPTORS: DESCRIPTORS: DISCOMFORT;ACHING

## 2022-06-05 ASSESSMENT — PAIN DESCRIPTION - ORIENTATION: ORIENTATION: RIGHT;LEFT

## 2022-06-05 ASSESSMENT — PAIN SCALES - GENERAL
PAINLEVEL_OUTOF10: 5
PAINLEVEL_OUTOF10: 5

## 2022-06-05 ASSESSMENT — PAIN DESCRIPTION - LOCATION: LOCATION: CHEST;RIB CAGE

## 2022-06-05 NOTE — PROGRESS NOTES
Hospitalist Progress Note      SYNOPSIS: Patient admitted on 2022 for AMS. SUBJECTIVE:  Patient seen and examined this morning, she reports that he is feeling better compared to yesterday. She is still fatigued a little bit, was undergoing echocardiogram this morning. Reports some cough, not improving with using Tessalon pearls      Temp (24hrs), Av.6 °F (36.4 °C), Min:97.1 °F (36.2 °C), Max:98.4 °F (36.9 °C)    DIET: ADULT DIET; Regular; Low Fat/Low Chol/High Fiber/2 gm Na  CODE: Full Code    Intake/Output Summary (Last 24 hours) at 2022 1556  Last data filed at 2022  Gross per 24 hour   Intake 250 ml   Output --   Net 250 ml       OBJECTIVE:    BP (!) 180/79   Pulse 76   Temp 98.4 °F (36.9 °C) (Temporal)   Resp 20   Ht 5' 10\" (1.778 m)   Wt 184 lb 9.6 oz (83.7 kg)   SpO2 94%   Breastfeeding No   BMI 26.49 kg/m²     General appearance: No apparent distress, appears stated age and cooperative. HEENT:  Conjunctivae/corneas clear. Neck: Supple. No jugular venous distention. Respiratory: Diminished breath sounds (b/l) with occasional wheeze  Cardiovascular: Regular rate rhythm, normal S1-S2  Abdomen: Soft, nontender, nondistended  Musculoskeletal: No clubbing, cyanosis, no bilateral lower extremity edema. Brisk capillary refill. Skin:  No rashes  on visible skin  Neurologic: awake, alert and following commands     ASSESSMENT:  73y.o. year old female  who  has a past medical history of Arthritis, Asthma, Cancer ([address] Chronic back pain, COPD (chronic obstructive pulmonary disease) ([address] Emphysema of lung ([address] GERD (gastroesophageal reflux disease), Hashimoto's disease, Hyperlipidemia, Hypertension, Hyperthyroidism, and Incisional hernia.      Patient presented to the emergency department with altered mental status. Family reports she was difficult to arouse. When EMS arrived they said she was flaccid and had facial droop. Patient was also incontinent to urine.   On arrival she is alert and oriented x3. Denies fever, chills, nausea, vomiting, headache, photophobia, chest pain, shortness of breath. Does report some mild hip pain for the last 2 days. Vital signs reveal the patient to be hypotensive. Initial pressures 92/52. She went as low as 65/39. He is currently 76/44. Receiving fluid resuscitation. No source of infection identified. Laboratory studies demonstrate creatinine 1.8, lactic acid 2.9, glucose 138, troponin 11. Chest x-ray is unremarkable. CT head is still pending. Urinalysis has not yet been able to be obtained       PLAN:  1.) Altered mental status/syncope: Resolved, patient is AAOx3 at this time. Patient was noted to be hypotensive in the ER- she did receive fluids and they her BP has been extremely elevated Since. CT head: No acute intracranial abnormality, mild fluid within the left maxillary sinus. MRI head w/o: NO acute infarct or other acute intracranial process. MRI with contrast  is negative. Neurology following. TSH: 0.224; free T4 pending /B12 is normal.  Negative orthostatics, appreciate cardiology recommendations, echo pending    2.) Strokelike symptoms versus seizure: F/u neurology recs. UA was relatively negative. 3.) Lactic acidosis: resolved. 4.) Acute renal failure: resolved. 5.) HTN Urgency: Patient was initially hypotensive but now Hypertensive: Started on losartan, and atenolol; Atenolol d/c and switched to lopressor BID- increased dose to 50 mg BID today ; Not controlled. Will give hydralazine as needed, monitor and escalate p.o. doses as needed    6.) COPD/Asthma exacerbation: Rhinovirus positive, on isolation now. Duonebs ordered. Rocephin/Doxycycline day number 4/7, transition to p.o. at discharge  tessalon pearles PRN. Patient with noted allergy to prednisone- \"steroids make face swell and flush. \"     7.) Sinus infection: C/w patient on Abx as above    8.) ETOH use: C/w thiamine/folic acid/MVI.     9.) DVT proph: Lovenox     10. ) Anxiety: Xanax BID PRN.     11.) Elevated BS: Check a hbA1c: 5.8- prediabetic.      12.) Mild Lfts elevation: C/w monitoring. May need to check US of the liver; patient with H/o ETOH use. DISPOSITION: TBD    Medications:  REVIEWED DAILY      Infusion Medications    sodium chloride       Scheduled Medications    lactobacillus  1 capsule Oral Daily    metoprolol tartrate  50 mg Oral BID    thiamine mononitrate  100 mg Oral Daily    ipratropium-albuterol  1 ampule Inhalation H2J WA    folic acid  1 mg Oral Daily    multivitamin  1 tablet Oral Daily    cefTRIAXone (ROCEPHIN) IV  1,000 mg IntraVENous Q24H    doxycycline hyclate  100 mg Oral 2 times per day    sodium chloride flush  10 mL IntraVENous 2 times per day    enoxaparin  40 mg SubCUTAneous Daily    losartan  100 mg Oral Daily     PRN Meds: guaiFENesin-dextromethorphan, labetalol, perflutren lipid microspheres, hydrALAZINE, ALPRAZolam, benzonatate, sodium chloride flush, sodium chloride, promethazine **OR** ondansetron, polyethylene glycol, acetaminophen **OR** acetaminophen        Labs:     Recent Labs     06/03/22 0523 06/04/22 0626 06/05/22  0541   WBC 10.1 9.1 5.5   HGB 11.9 12.0 10.9*   HCT 35.7 35.2 32.6*    193 183       Recent Labs     06/03/22 0523 06/04/22 0626 06/05/22  0541    126* 133   K 3.6 3.4* 3.6   CL 97* 91* 98   CO2 20* 20* 21*   BUN 11 11 10   CREATININE 0.7 0.7 0.8   CALCIUM 9.3 9.0 8.7   PHOS 2.5 2.6  --        Recent Labs     06/03/22 0523 06/04/22  0626 06/05/22  0541   PROT 6.7 6.8 6.0*   ALKPHOS 86 90 82   ALT 42* 42* 39*   AST 46* 45* 38*   BILITOT 0.6 0.8 0.4       No results for input(s): INR in the last 72 hours. No results for input(s): Ada Breeze in the last 72 hours.     Chronic labs:    Lab Results   Component Value Date    CHOL 232 (H) 03/25/2022    TRIG 216 (H) 03/25/2022    HDL 46 03/25/2022    LDLCALC 143 (H) 03/25/2022    TSH 0.224 (L) 06/03/2022    LABA1C 5.8 (H) 06/03/2022       Radiology: REVIEWED DAILY    +++++++++++++++++++++++++++++++++++++++++++++++++  Ricky Glynn MD  [facility] Physician - [address] [place] [address]  +++++++++++++++++++++++++++++++++++++++++++++++++  NOTE: This report was transcribed using voice recognition software. Every effort was made to ensure accuracy; however, inadvertent computerized transcription errors may be present.

## 2022-06-06 VITALS
HEART RATE: 67 BPM | TEMPERATURE: 97.8 F | SYSTOLIC BLOOD PRESSURE: 138 MMHG | BODY MASS INDEX: 26.43 KG/M2 | RESPIRATION RATE: 16 BRPM | HEIGHT: 70 IN | WEIGHT: 184.6 LBS | DIASTOLIC BLOOD PRESSURE: 82 MMHG | OXYGEN SATURATION: 96 %

## 2022-06-06 LAB — CULTURE, BLOOD 2: NORMAL

## 2022-06-06 PROCEDURE — 6360000002 HC RX W HCPCS: Performed by: FAMILY MEDICINE

## 2022-06-06 PROCEDURE — 6370000000 HC RX 637 (ALT 250 FOR IP): Performed by: INTERNAL MEDICINE

## 2022-06-06 PROCEDURE — 93246 EXT ECG>7D<15D RECORDING: CPT

## 2022-06-06 PROCEDURE — 94640 AIRWAY INHALATION TREATMENT: CPT

## 2022-06-06 PROCEDURE — 6360000002 HC RX W HCPCS: Performed by: INTERNAL MEDICINE

## 2022-06-06 PROCEDURE — 6370000000 HC RX 637 (ALT 250 FOR IP): Performed by: FAMILY MEDICINE

## 2022-06-06 PROCEDURE — 6370000000 HC RX 637 (ALT 250 FOR IP): Performed by: PSYCHIATRY & NEUROLOGY

## 2022-06-06 RX ORDER — HYDRALAZINE HYDROCHLORIDE 25 MG/1
25 TABLET, FILM COATED ORAL EVERY 8 HOURS SCHEDULED
Status: DISCONTINUED | OUTPATIENT
Start: 2022-06-06 | End: 2022-06-06 | Stop reason: HOSPADM

## 2022-06-06 RX ORDER — METOPROLOL TARTRATE 50 MG/1
50 TABLET, FILM COATED ORAL 2 TIMES DAILY
Qty: 60 TABLET | Refills: 0 | Status: SHIPPED | OUTPATIENT
Start: 2022-06-06 | End: 2022-06-09 | Stop reason: SDUPTHER

## 2022-06-06 RX ORDER — HYDRALAZINE HYDROCHLORIDE 25 MG/1
25 TABLET, FILM COATED ORAL EVERY 8 HOURS SCHEDULED
Qty: 90 TABLET | Refills: 0 | Status: SHIPPED | OUTPATIENT
Start: 2022-06-06 | End: 2022-06-06

## 2022-06-06 RX ORDER — METOPROLOL TARTRATE 50 MG/1
50 TABLET, FILM COATED ORAL 2 TIMES DAILY
Qty: 60 TABLET | Refills: 0 | Status: SHIPPED | OUTPATIENT
Start: 2022-06-06 | End: 2022-06-06

## 2022-06-06 RX ORDER — FUROSEMIDE 10 MG/ML
20 INJECTION INTRAMUSCULAR; INTRAVENOUS ONCE
Status: COMPLETED | OUTPATIENT
Start: 2022-06-06 | End: 2022-06-06

## 2022-06-06 RX ORDER — HYDRALAZINE HYDROCHLORIDE 25 MG/1
25 TABLET, FILM COATED ORAL EVERY 8 HOURS SCHEDULED
Qty: 90 TABLET | Refills: 0 | Status: SHIPPED | OUTPATIENT
Start: 2022-06-06 | End: 2022-06-09 | Stop reason: SDUPTHER

## 2022-06-06 RX ADMIN — FUROSEMIDE 20 MG: 10 INJECTION, SOLUTION INTRAMUSCULAR; INTRAVENOUS at 10:34

## 2022-06-06 RX ADMIN — LOSARTAN POTASSIUM 100 MG: 50 TABLET, FILM COATED ORAL at 08:21

## 2022-06-06 RX ADMIN — ACETAMINOPHEN 325MG 650 MG: 325 TABLET ORAL at 08:21

## 2022-06-06 RX ADMIN — DOXYCYCLINE HYCLATE 100 MG: 100 CAPSULE ORAL at 08:21

## 2022-06-06 RX ADMIN — Medication 1 TABLET: at 08:21

## 2022-06-06 RX ADMIN — METOPROLOL TARTRATE 50 MG: 50 TABLET, FILM COATED ORAL at 08:20

## 2022-06-06 RX ADMIN — IPRATROPIUM BROMIDE AND ALBUTEROL SULFATE 1 AMPULE: 2.5; .5 SOLUTION RESPIRATORY (INHALATION) at 13:30

## 2022-06-06 RX ADMIN — THIAMINE HCL TAB 100 MG 100 MG: 100 TAB at 08:20

## 2022-06-06 RX ADMIN — GUAIFENESIN SYRUP AND DEXTROMETHORPHAN 5 ML: 100; 10 SYRUP ORAL at 08:22

## 2022-06-06 RX ADMIN — FOLIC ACID 1 MG: 1 TABLET ORAL at 08:20

## 2022-06-06 RX ADMIN — ENOXAPARIN SODIUM 40 MG: 100 INJECTION SUBCUTANEOUS at 08:21

## 2022-06-06 RX ADMIN — HYDRALAZINE HYDROCHLORIDE 25 MG: 25 TABLET, FILM COATED ORAL at 12:51

## 2022-06-06 RX ADMIN — IPRATROPIUM BROMIDE AND ALBUTEROL SULFATE 1 AMPULE: 2.5; .5 SOLUTION RESPIRATORY (INHALATION) at 09:55

## 2022-06-06 RX ADMIN — Medication 1 CAPSULE: at 08:21

## 2022-06-06 ASSESSMENT — PAIN DESCRIPTION - LOCATION: LOCATION: GENERALIZED

## 2022-06-06 ASSESSMENT — PAIN DESCRIPTION - DESCRIPTORS: DESCRIPTORS: ACHING;DISCOMFORT;HEAVINESS

## 2022-06-06 ASSESSMENT — PAIN SCALES - GENERAL
PAINLEVEL_OUTOF10: 3
PAINLEVEL_OUTOF10: 3
PAINLEVEL_OUTOF10: 0

## 2022-06-06 NOTE — PROGRESS NOTES
Physician Progress Note      PATIENTChadean Diamond  [facility]:                  [id number]  :                       1950  ADMIT DATE:       2022 2:15 AM  DISCH DATE:  RESPONDING  PROVIDER #:        Quinn Lombardi DO          QUERY TEXT:    Patient admitted with altered mental status/syncope. noted documentation of   stroke-like symptoms, hypotension, hypertensive urgency. If possible, please   document in progress notes and discharge summary if you are evaluating and/or   treating any of the following: The medical record reflects the following:  Risk Factors: AMS, MIGEL, hypotension, hypertension,  Clinical Indicators: BP low:65/39, BP high: 202/95, current BP: 132/82, CT   head: No acute intracranial abnormality. MRI brain: No acute infarct or other   acute intracranial process. Treatment: Losartan, Atenolol, Lopressor, IV Labetalol, IV Hydralazine    Thank you,  García Leal RN, CDS  [phone]  Options provided:  -- Syncope due to hypertensive urgency  -- Syncope due to orthostatic hypotension  -- Syncope due to other hypotension  -- Other - I will add my own diagnosis  -- Disagree - Not applicable / Not valid  -- Disagree - Clinically unable to determine / Unknown  -- Refer to Clinical Documentation Reviewer    PROVIDER RESPONSE TEXT:    The syncope is due to other hypotension.     Query created by: Christine Tapia on 2022 2:55 PM      Electronically signed by:  Quinn Lombardi DO 2022 3:09 PM

## 2022-06-06 NOTE — PLAN OF CARE
Problem: Confusion  Goal: Confusion, delirium, dementia, or psychosis is improved or at baseline  Description: INTERVENTIONS:  1. Assess for possible contributors to thought disturbance, including medications, impaired vision or hearing, underlying metabolic abnormalities, dehydration, psychiatric diagnoses, and notify attending LIP  2. [place] high risk fall precautions, as indicated  3. Provide frequent short contacts to provide reality reorientation, refocusing and direction  4. Decrease environmental stimuli, including noise as appropriate  5. Monitor and intervene to maintain adequate nutrition, hydration, elimination, sleep and activity  6. If unable to ensure safety without constant attention obtain sitter and review sitter guidelines with assigned personnel  7.  Initiate Psychosocial CNS and Spiritual Care consult, as indicated  Outcome: Progressing
Problem: Discharge Planning  Goal: Discharge to home or other facility with appropriate resources  Outcome: Progressing     Problem: ABCDS Injury Assessment  Goal: Absence of physical injury  Outcome: Progressing
Problem: Discharge Planning  Goal: Discharge to home or other facility with appropriate resources  Outcome: Progressing     Problem: ABCDS Injury Assessment  Goal: Absence of physical injury  Outcome: Progressing     Problem: Confusion  Goal: Confusion, delirium, dementia, or psychosis is improved or at baseline  Description: INTERVENTIONS:  1. Assess for possible contributors to thought disturbance, including medications, impaired vision or hearing, underlying metabolic abnormalities, dehydration, psychiatric diagnoses, and notify attending LIP  2. [place] high risk fall precautions, as indicated  3. Provide frequent short contacts to provide reality reorientation, refocusing and direction  4. Decrease environmental stimuli, including noise as appropriate  5. Monitor and intervene to maintain adequate nutrition, hydration, elimination, sleep and activity  6. If unable to ensure safety without constant attention obtain sitter and review sitter guidelines with assigned personnel  7.  Initiate Psychosocial CNS and Spiritual Care consult, as indicated  Outcome: Progressing     Problem: Safety - Adult  Goal: Free from fall injury  Outcome: Progressing     Problem: Pain  Goal: Verbalizes/displays adequate comfort level or baseline comfort level  Outcome: Progressing
Problem: Discharge Planning  Goal: Discharge to home or other facility with appropriate resources  Outcome: Progressing     Problem: ABCDS Injury Assessment  Goal: Absence of physical injury  Outcome: Progressing     Problem: Confusion  Goal: Confusion, delirium, dementia, or psychosis is improved or at baseline  Outcome: Progressing     Problem: Safety - Adult  Goal: Free from fall injury  Outcome: Progressing     Problem: Pain  Goal: Verbalizes/displays adequate comfort level or baseline comfort level  Outcome: Progressing
environmental stimuli, including noise as appropriate   Monitor and intervene to maintain adequate nutrition, hydration, elimination, sleep and activity   Provide frequent short contacts to provide reality reorientation, refocusing and direction   [place] high risk fall precautions, as indicated  6/3/2022 0436 by Teo Hicks RN  Outcome: Progressing     Problem: Safety - Adult  Goal: Free from fall injury  6/3/2022 1730 by Sheryle Pepper, RN  Outcome: Progressing  Flowsheets (Taken 6/3/2022 0800)  Free From Fall Injury: Instruct family/caregiver on patient safety  6/3/2022 0436 by Teo Hicks RN  Outcome: Progressing     Problem: Pain  Goal: Verbalizes/displays adequate comfort level or baseline comfort level  6/3/2022 1730 by Sheryle Pepper, RN  Outcome: Progressing  6/3/2022 0436 by Teo Hicks RN  Outcome: Progressing

## 2022-06-06 NOTE — CARE COORDINATION
Spoke with PT after screening completed. Patient independent in the room with no needs for discharge. Chart reviewed. Per previous SW note, patient plans to return home at discharge and her spouse will provide transportation. No Needs identified for transition of care. Discharge orders noted. Patient to discharge to home.      Nestor Smith RN.  Chantel Manriquez  [phone]

## 2022-06-06 NOTE — DISCHARGE SUMMARY
Discharge Summary    Admit date: 6/1/2022    Discharge date and time: No discharge date for patient encounter. Admitting Physician: Stan Burkett MD     Consultants: Cardiology, neurology    Admission Diagnoses:  AMS/syncope    Discharge Diagnoses AND Hospital Course:  1.) Altered mental status/syncope: Resolved, patient is AAOx4 at this time. Patient was noted to be hypotensive in the ER- she did receive fluids and they her BP has been extremely elevated since. CT head: No acute intracranial abnormality, mild fluid within the left maxillary sinus. MRI head w/o: NO acute infarct or other acute intracranial process. MRI with contrast is negative. Neurology followed. Negative orthostatics, appreciate cardiology recommendations, echo reviewed. Follow up with cardiology OP. BP now improved.      2.) Strokelike symptoms versus seizure vs hypotensive syncope:  MRI negative. Memory issues likely caused by hypotensive syncope alternated with hypertensive urgency     3.) Lactic acidosis: resolved.      4.) Acute renal failure: resolved.      5.) HTN Urgency: Patient was initially hypotensive but now Hypertensive: will dc with metoprolol, hydralazine, ARB. Bp improved now 140/80     6.)  Rhinovirus positive- on isolation now. Abx stopped. Symptomatic treatment     7.) ETOH use      8. ) Mild Lfts elevation: C/w monitoring. May need to check US of the liver OP . Follow with PCP    9.) Low TSH- with normal free T4. Continue to montor    Discharge Exam:  Vitals:    06/06/22 0955   BP:    Pulse:    Resp:    Temp:    SpO2: 96%       General appearance:  awake, alert, and oriented to person, place, time, and purpose; appears stated age and cooperative; no apparent distress no labored breathing  HEENT:  Conjunctivae/corneas clear. Neck: Supple. No jugular venous distention.    Respiratory: symmetrical; clear to auscultation bilaterally; no wheezes; no rhonchi; no rales  Cardiovascular: rhythm regular; rate controlled; no murmurs  Abdomen: Soft, nontender, nondistended  Extremities:  peripheral pulses present; no peripheral edema; no ulcers  Musculoskeletal: No clubbing, cyanosis, no bilateral lower extremity edema. Brisk capillary refill. Skin:  No rashes  on visible skin  Neurologic: awake, alert and following commands     Disposition: home  The patient's condition is fair. At this time the patient is without objective evidence of an acute process requiring continuing hospitalization or inpatient management. They are stable for discharge with outpatient follow-up. I have spoken with the patient and discussed the results of the current hospitalization, in addition to providing specific details for the plan of care and counseling regarding the diagnosis and prognosis. The plan has been discussed in detail and they are aware of the specific conditions for emergent return, as well as the importance of follow-up. Their questions are answered at this time and they are agreeable with the plan for discharge to home     Patient Instructions: Follow up with PCP within 7 days. Follow up with cardiology as directed.    Future Appointments   Date Time Provider Samir Witt   6/14/2022  1:30 PM Ping Roth MD [facility]   8/11/2022  1:15 PM Luz Elena Fontaine MD [facility]   11/8/2022  1:00 PM Fredrick Shi MD [facility]   4/4/2023  1:00 PM Luz Elena Fontaine MD [facility] AND [facility]       Discharge Medications:     Medication List      START taking these medications    hydrALAZINE 25 MG tablet  Commonly known as: APRESOLINE  Take 1 tablet by mouth every 8 hours     metoprolol tartrate 50 MG tablet  Commonly known as: LOPRESSOR  Take 1 tablet by mouth 2 times daily        CONTINUE taking these medications    albuterol sulfate  (90 Base) MCG/ACT inhaler  Inhale 2 puffs into the lungs 4 times daily as needed for Wheezing     benzonatate 100 MG capsule  Commonly known as: TESSALON  take 1 capsule by mouth three times a day if needed for cough     CO Q 10 PO     escitalopram 5 MG tablet  Commonly known as: LEXAPRO  Take 1 tablet by mouth daily     ezetimibe 10 mg tablet  Commonly known as: ZETIA  Take 1 tablet by mouth daily     HAIR/SKIN/NAILS PO     lidocaine 5 %  Commonly known as: LIDODERM  apply 3 patches to the affected area daily. Leave on for 12 hours and then off for 12 hours. loratadine 10 MG tablet  Commonly known as: CLARITIN  Take 1 tablet by mouth daily     Melatonin 1 MG Subl     montelukast 10 MG tablet  Commonly known as: SINGULAIR  Take 1 tablet by mouth nightly take 1 tablet by mouth every evening     olmesartan 40 MG tablet  Commonly known as: BENICAR  Take 1 tablet by mouth daily take 1 tablet by mouth once daily     omeprazole 20 MG delayed release capsule  Commonly known as: PRILOSEC  Take 1 capsule by mouth Daily take 1 capsule by mouth once daily     pregabalin 50 MG capsule  Commonly known as: LYRICA  Take 1 capsule by mouth 2 times daily for 90 days. Synthroid 100 MCG tablet  Generic drug: levothyroxine  Take 1 tablet 5 days a week and 1.5 tab on Saturday and Sunday     tiZANidine 4 MG tablet  Commonly known as: ZANAFLEX  Take 1 tablet by mouth every 8 hours as needed (muscle spasm) Take 1 tab by mouth 3 times daily        STOP taking these medications    atenolol 100 MG tablet  Commonly known as: TENORMIN     azithromycin 250 MG tablet  Commonly known as: ZITHROMAX           Where to Get Your Medications      You can get these medications from any pharmacy    Bring a paper prescription for each of these medications  · hydrALAZINE 25 MG tablet  · metoprolol tartrate 50 MG tablet         Activity: activity as tolerated    Diet: cardiac diet    Wound Care: none needed    Follow-up:     · This patient is instructed to follow-up with her primary care physician. · Patient is instructed to follow-up with the consults listed above as directed by them.   · They are instructed to resume home medications and take new medications as indicated in the list above. · If the patient has a recurrence of symptoms, they are instructed to go to the ED. Preparing for this patient's discharge, including paperwork, orders, instructions, and meeting with patient did require > 30 minutes.     Jarret Morales DO   10:25 AM  6/6/2022

## 2022-06-06 NOTE — PROGRESS NOTES
Date: 2022       Patient Name: Raphael Shea  : 1950      MRN: [id number]    PT order received. Chart has been reviewed. Pt notes she has been walking in room independently, doing exercises and feels she is moving at her baseline. Pt notes she has  at home to help her and does not own or need any DME at this time. Pt reports no mobility needs at this time and is declining evaluation. PT orders to be DC at this time, pt in agreement. Please re-consult if any changes in mobility/status occur. Thank you.      Nidhi Gatica, PT

## 2022-06-07 ENCOUNTER — CARE COORDINATION (OUTPATIENT)
Dept: CARE COORDINATION | Age: 72
End: 2022-06-07

## 2022-06-07 ENCOUNTER — TELEPHONE (OUTPATIENT)
Dept: FAMILY MEDICINE CLINIC | Age: 72
End: 2022-06-07

## 2022-06-07 DIAGNOSIS — I16.0 HYPERTENSIVE URGENCY: ICD-10-CM

## 2022-06-07 DIAGNOSIS — R41.82 ALTERED MENTAL STATUS, UNSPECIFIED ALTERED MENTAL STATUS TYPE: Primary | ICD-10-CM

## 2022-06-07 PROCEDURE — 1111F DSCHRG MED/CURRENT MED MERGE: CPT | Performed by: FAMILY MEDICINE

## 2022-06-07 NOTE — TELEPHONE ENCOUNTER
----- Message from Robyn Frye LPN sent at 4320 10:08 AM EDT -----  Regardin day Hosp F/U needed  Per AVS, Pt needs Hosp F/U appt scheduled in 1 week(s) with PCP. IP -22 for Altered mental Status, Rhinovirus; Hypertensive Urgency. Can you please call Pt to schedule. Thank You! Fadi Acosta 33 / [address] Transition Team  [phone]

## 2022-06-07 NOTE — CARE COORDINATION
[facility] Transitions Initial Follow Up Call    Call within 2 business days of discharge: Yes    Patient: Raphael Morning Patient : 1950   MRN: <[id number]>  Reason for Admission: -22 Altered Mental Status; Rhinovirus; Hypertensive Urgency; MIGEL  Discharge Date: 22 RARS: Readmission Risk Score: 12.4 ( )      Last Discharge [facility]       Complaint Diagnosis Description Type Department Provider    22 Altered Mental Status Altered mental status, unspecified altered mental status type . .. ED to Hosp-Admission (Discharged) (ADMITTED) [facility] [address], DO; Boni Ontiveros. .. Pt returned CTN call. Naveen Ware reports continued productive cough with yellowish sputum, fatigue, and fogginess. Pt denies CP, pressure, palpitations,  SOB, numbness, tingling, lightheadedness, dizziness, syncope, collapse. BP was 168/87 3 hrs after taking morning medications. Advised Pt to monitor/track BP 3 - 4x daily. Discussed when to report BP readings to Cardiologist and when to call 911. Discussed difference between Hypertensive Urgency (asymptomatic) and Hypertensive Emergency (symptomatic). Pt v/u. CTN will route message to Cardiology with BP reading this AM. Pt advised to keep BP log and take readings to PCP and Cardiology F/U appts. Pt v/u. Pt wearing Zio Patch. Pt has PCP, Cardiology, and Endoctrinology F/U's scheduled. Medication Review completed, 1111F order placed. Pt denies Transportation, Home, or Medication needs. CTN information given, will continue to follow. Date/Time:  2022 3:41 PM  Attempted to reach patient by telephone for Initial Care Transition call. Call within 2 business days of discharge: Yes Left HIPPA compliant message requesting a return call. Will attempt to reach patient again. Fadi Acosta / [facility] Transition Team  [phone]    CTN sent Inbox message to PCP and Cardiology  Staff to schedule 7 day Hospital F/U. Facility: [facility]    START taking:  hydrALAZINE (APRESOLINE)  metoprolol tartrate (LOPRESSOR)  STOP taking:  atenolol 100 MG tablet (TENORMIN)  azithromycin 250 MG tablet (ZITHROMAX)    Transitions of Care Initial Call    Was this an external facility discharge? No Discharge Facility:     Challenges to be reviewed by the provider   Additional needs identified to be addressed with provider: No  none         Method of communication with provider : none    Advance Care Planning:   Does patient have an Advance Directive: not addressed. Care Transition Nurse contacted the patient by telephone to perform post hospital discharge assessment. Verified name and  with patient as identifiers. Provided introduction to self, and explanation of the CTN role. CTN reviewed discharge instructions, medical action plan and red flags with patient who verbalized understanding. Patient given an opportunity to ask questions and does not have any further questions or concerns at this time. Were discharge instructions available to patient? Yes. Reviewed appropriate site of care based on symptoms and resources available to patient including: PCP  Specialist  When to call [address] The patient agrees to contact the PCP office for questions related to their healthcare. Medication reconciliation was performed with patient, who verbalizes understanding of administration of home medications. Advised obtaining a 90-day supply of all daily and as-needed medications. Was patient discharged with a pulse oximeter? no    CTN provided contact information. Plan for follow-up call in 3-5 days based on severity of symptoms and risk factors.   Plan for next call: symptom management-fatigue, cough, BP, fogginess  follow up appointment-PCP, Cardiology  medication management-taking as prescribed    Care Transitions 24 Hour Call    Schedule Follow Up Appointment with PCP: Completed  Do you have a copy of your discharge instructions?: Yes  Do you have all of your prescriptions and are they filled?: Yes  Have you been contacted by a [employer] [address]?: No  Have you scheduled your follow up appointment?: Yes  How are you going to get to your appointment?: Car - family or friend to transport  Do you feel like you have everything you need to keep you well at home?: Yes  Care Transitions Interventions         Follow Up  Future Appointments   Date Time Provider Samir Witt   6/9/2022  1:00 PM [facility]   6/16/2022  2:30 PM FRANCI Marquez [facility]   7/6/2022  1:30 PM Doug Padron MD [facility]   7/12/2022 12:15 PM Kylah Hammer MD [facility] ENDO [facility]   8/11/2022  1:15 PM Alix Queen MD [facility]   11/8/2022  1:00 PM Nila Bahena MD  [id number] [facility]   4/4/2023  1:00 PM Alix Queen MD [address] [facility], LPN

## 2022-06-07 NOTE — TELEPHONE ENCOUNTER
Leonor I scheduled appt for Genoveva Soares for 06/09/22. I scheduled for hospital f/up. It looks like care coordinator left messages for her to call back.    Let me know if needs changed to TCM

## 2022-06-09 ENCOUNTER — OFFICE VISIT (OUTPATIENT)
Dept: FAMILY MEDICINE CLINIC | Age: 72
End: 2022-06-09
Payer: MEDICARE

## 2022-06-09 VITALS
WEIGHT: 176 LBS | OXYGEN SATURATION: 97 % | TEMPERATURE: 97.4 F | BODY MASS INDEX: 25.2 KG/M2 | HEIGHT: 70 IN | SYSTOLIC BLOOD PRESSURE: 150 MMHG | HEART RATE: 64 BPM | DIASTOLIC BLOOD PRESSURE: 90 MMHG

## 2022-06-09 DIAGNOSIS — I10 ESSENTIAL HYPERTENSION: ICD-10-CM

## 2022-06-09 DIAGNOSIS — I16.0 HYPERTENSIVE URGENCY: ICD-10-CM

## 2022-06-09 DIAGNOSIS — E78.5 HYPERLIPIDEMIA, UNSPECIFIED HYPERLIPIDEMIA TYPE: ICD-10-CM

## 2022-06-09 DIAGNOSIS — R56.9 SEIZURE (HCC): ICD-10-CM

## 2022-06-09 DIAGNOSIS — R55 SYNCOPE AND COLLAPSE: ICD-10-CM

## 2022-06-09 DIAGNOSIS — Z09 HOSPITAL DISCHARGE FOLLOW-UP: Primary | ICD-10-CM

## 2022-06-09 DIAGNOSIS — E03.9 HYPOTHYROIDISM, UNSPECIFIED TYPE: ICD-10-CM

## 2022-06-09 DIAGNOSIS — I95.9 HYPOTENSION, UNSPECIFIED HYPOTENSION TYPE: ICD-10-CM

## 2022-06-09 DIAGNOSIS — J44.9 CHRONIC OBSTRUCTIVE PULMONARY DISEASE, UNSPECIFIED COPD TYPE (HCC): ICD-10-CM

## 2022-06-09 DIAGNOSIS — R41.82 ALTERED MENTAL STATUS, UNSPECIFIED ALTERED MENTAL STATUS TYPE: ICD-10-CM

## 2022-06-09 LAB
ALBUMIN SERPL-MCNC: 4.3 G/DL (ref 3.5–5.2)
ALP BLD-CCNC: 103 U/L (ref 35–104)
ALT SERPL-CCNC: 56 U/L (ref 0–32)
ANION GAP SERPL CALCULATED.3IONS-SCNC: 11 MMOL/L (ref 7–16)
AST SERPL-CCNC: 41 U/L (ref 0–31)
BASOPHILS ABSOLUTE: 0.05 E9/L (ref 0–0.2)
BASOPHILS RELATIVE PERCENT: 0.5 % (ref 0–2)
BILIRUB SERPL-MCNC: <0.2 MG/DL (ref 0–1.2)
BUN BLDV-MCNC: 11 MG/DL (ref 6–23)
CALCIUM SERPL-MCNC: 9.6 MG/DL (ref 8.6–10.2)
CHLORIDE BLD-SCNC: 98 MMOL/L (ref 98–107)
CO2: 25 MMOL/L (ref 22–29)
CREAT SERPL-MCNC: 1 MG/DL (ref 0.5–1)
EOSINOPHILS ABSOLUTE: 0.13 E9/L (ref 0.05–0.5)
EOSINOPHILS RELATIVE PERCENT: 1.3 % (ref 0–6)
GFR AFRICAN AMERICAN: >60
GFR NON-AFRICAN AMERICAN: 55 ML/MIN/1.73
GLUCOSE BLD-MCNC: 93 MG/DL (ref 74–99)
HCT VFR BLD CALC: 41.2 % (ref 34–48)
HEMOGLOBIN: 13 G/DL (ref 11.5–15.5)
IMMATURE GRANULOCYTES #: 0.05 E9/L
IMMATURE GRANULOCYTES %: 0.5 % (ref 0–5)
LYMPHOCYTES ABSOLUTE: 1.87 E9/L (ref 1.5–4)
LYMPHOCYTES RELATIVE PERCENT: 18.4 % (ref 20–42)
MAGNESIUM: 2.2 MG/DL (ref 1.6–2.6)
MCH RBC QN AUTO: 30 PG (ref 26–35)
MCHC RBC AUTO-ENTMCNC: 31.6 % (ref 32–34.5)
MCV RBC AUTO: 94.9 FL (ref 80–99.9)
MONOCYTES ABSOLUTE: 0.64 E9/L (ref 0.1–0.95)
MONOCYTES RELATIVE PERCENT: 6.3 % (ref 2–12)
NEUTROPHILS ABSOLUTE: 7.41 E9/L (ref 1.8–7.3)
NEUTROPHILS RELATIVE PERCENT: 73 % (ref 43–80)
PDW BLD-RTO: 13.3 FL (ref 11.5–15)
PLATELET # BLD: 351 E9/L (ref 130–450)
PMV BLD AUTO: 10.3 FL (ref 7–12)
POTASSIUM SERPL-SCNC: 5.4 MMOL/L (ref 3.5–5)
RBC # BLD: 4.34 E12/L (ref 3.5–5.5)
SODIUM BLD-SCNC: 134 MMOL/L (ref 132–146)
TOTAL PROTEIN: 7.1 G/DL (ref 6.4–8.3)
WBC # BLD: 10.2 E9/L (ref 4.5–11.5)

## 2022-06-09 PROCEDURE — 1111F DSCHRG MED/CURRENT MED MERGE: CPT | Performed by: FAMILY MEDICINE

## 2022-06-09 PROCEDURE — 99496 TRANSJ CARE MGMT HIGH F2F 7D: CPT | Performed by: FAMILY MEDICINE

## 2022-06-09 RX ORDER — HYDRALAZINE HYDROCHLORIDE 50 MG/1
50 TABLET, FILM COATED ORAL EVERY 8 HOURS SCHEDULED
Qty: 270 TABLET | Refills: 1 | Status: SHIPPED
Start: 2022-06-09 | End: 2022-07-01 | Stop reason: SDUPTHER

## 2022-06-09 RX ORDER — METOPROLOL TARTRATE 50 MG/1
50 TABLET, FILM COATED ORAL 2 TIMES DAILY
Qty: 180 TABLET | Refills: 0 | Status: SHIPPED
Start: 2022-06-09 | End: 2022-07-06

## 2022-06-09 RX ORDER — HYDRALAZINE HYDROCHLORIDE 50 MG/1
50 TABLET, FILM COATED ORAL EVERY 8 HOURS SCHEDULED
Qty: 270 TABLET | Refills: 1 | Status: SHIPPED
Start: 2022-06-09 | End: 2022-06-09 | Stop reason: SDUPTHER

## 2022-06-09 NOTE — PROGRESS NOTES
Post-Discharge Transitional Care  Follow Up      Omar Atwood   YOB: 1950    Date of Office Visit:  6/9/2022  Date of Hospital Admission: 6/1/22  Date of Hospital Discharge: 6/6/22  Risk of hospital readmission (high >=14%. Medium >=10%) :Readmission Risk Score: 12.4 ( )      Care management risk score Rising risk (score 2-5) and Complex Care (Scores >=6): 2     Non face to face  following discharge, date last encounter closed (first attempt may have been earlier): 6/7/2022  3:45 PM    Call initiated 2 business days of discharge: Yes    ASSESSMENT/PLAN:   Hospital discharge follow-up  -     UT DISCHARGE MEDS RECONCILED W/ CURRENT OUTPATIENT MED LIST  Altered mental status, unspecified altered mental status type  Syncope and collapse  Seizure (HCC)  Hypertensive urgency  Hypotension, unspecified hypotension type  Essential hypertension  -     Comprehensive Metabolic Panel; Future  -     CBC with Auto Differential; Future  -     Magnesium; Future  Hyperlipidemia, unspecified hyperlipidemia type  Hypothyroidism, unspecified type  Chronic obstructive pulmonary disease, unspecified COPD type ([facility] [address]      Increase hydralazine to 50mg TID  Discourage alcohol at this time  Continue with ambulotory BP monitoring  F/u with Gabby Major  symtpoms likely related to combination dehydration for working in yard, alcohol use, sedatives and acute illness of rhinovirus  Continue to monitor symptoms      Medical Decision Making: high complexity  Return in about 3 weeks (around 6/30/2022) for BP check ethan Valero; then with me in August as planned. On this date 6/9/2022 I have spent 45 minutes reviewing previous notes, test results and face to face with the patient discussing the diagnosis and importance of compliance with the treatment plan as well as documenting on the day of the visit.        Subjective:   HPI:  Follow up of Hospital problems/diagnosis(es): see below    Inpatient course: Discharge summary reviewed- see chart. Labile BP with hypotension and hypertensive emergency: dc atenolol and added metoprolol and hydralazine  Altered mental status: rhinovirus +; hx of COPD; alcohol use; hypotension  Strokelike symptoms versus seizure vs hypotensive syncope: symptoms resolved; MRI negative  Lactic acidosis: resolved. Acute renal failure: resolved. Rhinovirus positive- symptoms resolved  ETOH use   Mild Lfts elevation:   Low TSH- with normal free T4. Continue to montor  Concern for decreased ability to create new memories since discharge  Worsening depression: scheduled with Armand Arellano  Fatiguemorena    HTN - se to hctz (loc, dizzy); currently on benicar and now metoprolol and hydralazine  HLD - hx of elevated CK on statin; currently on zetia;  ASCVD 13.3%  Anxiety / depression  - on lexapro; scheduling counseling  GERD - on prilosec  Hashimoto's / thyroid nodule - on synthroid 100mcg 5x per week and 150mcg sat / sun, follows with endo  Hx of Lung cancer x2 s/p R upper and middle lobectomy: Follows with Dr. Naomie Post; Quite smoking 2007; Diagnosed with lung DE 2803  COPD - on home maint inhaler prn from prior pulm and albuterol PRN; she has chronic coughing and uses tessalon (from 3x daily down to 2x daily for this)  Chronic cough - On long term tessalon, see above; currently controlled with inhalers  Chronic pain - Lumbar pain and hip pain; did see PMR; Dr. Melissa Gomez; controlled on lyrica; I am prescriber  Preventive: Tyrer Cuzick score of 6.3%;  Heterogeneously dense    Interval history/Current status: see above    Patient Active Problem List   Diagnosis    Essential hypertension    Skin cancer    Lung cancer ([facility] [address]    Spinal stenosis    Former smoker    Hx of hysterectomy for benign disease    Thyroid nodule    Diverticulosis    At risk for colon cancer    Somatic dysfunction of lumbar region    Chest wall pain following surgery    Pure hypercholesterolemia    Chronic obstructive pulmonary disease (HCC)    Chronic pain syndrome    Lumbar radiculopathy    Seizure (HCC)    Hypothyroidism    Statin-induced myositis    Hyperlipidemia    Cervical myelopathy (HCC)    AMS (altered mental status)    Hypertensive urgency    Hypotension       Medications listed as ordered at the time of discharge from hospital     Medication List          Accurate as of June 9, 2022  5:00 PM. If you have any questions, ask your nurse or doctor. CHANGE how you take these medications    hydrALAZINE 50 MG tablet  Commonly known as: APRESOLINE  Take 1 tablet by mouth every 8 hours  What changed:   · medication strength  · how much to take  Changed by: Luz Elena Fontaine MD        CONTINUE taking these medications    albuterol sulfate  (90 Base) MCG/ACT inhaler  Commonly known as: PROVENTIL;VENTOLIN;PROAIR  Inhale 2 puffs into the lungs 4 times daily as needed for Wheezing     benzonatate 100 MG capsule  Commonly known as: TESSALON  take 1 capsule by mouth three times a day if needed for cough     CO Q 10 PO     escitalopram 5 MG tablet  Commonly known as: LEXAPRO  Take 1 tablet by mouth daily     ezetimibe 10 mg tablet  Commonly known as: ZETIA  Take 1 tablet by mouth daily     HAIR/SKIN/NAILS PO     lidocaine 5 %  Commonly known as: LIDODERM  apply 3 patches to the affected area daily. Leave on for 12 hours and then off for 12 hours.      loratadine 10 MG tablet  Commonly known as: CLARITIN  Take 1 tablet by mouth daily     Melatonin 1 MG Subl     metoprolol tartrate 50 MG tablet  Commonly known as: LOPRESSOR  Take 1 tablet by mouth 2 times daily     montelukast 10 MG tablet  Commonly known as: SINGULAIR  Take 1 tablet by mouth nightly take 1 tablet by mouth every evening     olmesartan 40 MG tablet  Commonly known as: BENICAR  Take 1 tablet by mouth daily take 1 tablet by mouth once daily     omeprazole 20 MG delayed release capsule  Commonly known as: PRILOSEC  Take 1 capsule by mouth Daily take 1 capsule by mouth once daily pregabalin 50 MG capsule  Commonly known as: LYRICA  Take 1 capsule by mouth 2 times daily for 90 days. Synthroid 100 MCG tablet  Generic drug: levothyroxine  Take 1 tablet 5 days a week and 1.5 tab on Saturday and Sunday     tiZANidine 4 MG tablet  Commonly known as: ZANAFLEX  Take 1 tablet by mouth every 8 hours as needed (muscle spasm) Take 1 tab by mouth 3 times daily           Where to Get Your Medications      These medications were sent to [facility]  [address] [address]    Phone: [phone]   · hydrALAZINE 50 MG tablet  · metoprolol tartrate 50 MG tablet           Medications marked \"taking\" at this time  Outpatient Medications Marked as Taking for the 6/9/22 encounter (Office Visit) with Loreta Hutson MD   Medication Sig Dispense Refill    [DISCONTINUED] hydrALAZINE (APRESOLINE) 50 MG tablet Take 1 tablet by mouth every 8 hours 270 tablet 1    [DISCONTINUED] metoprolol tartrate (LOPRESSOR) 50 MG tablet Take 1 tablet by mouth 2 times daily 60 tablet 0    benzonatate (TESSALON) 100 MG capsule take 1 capsule by mouth three times a day if needed for cough 60 capsule 0    ezetimibe (ZETIA) 10 MG tablet Take 1 tablet by mouth daily 90 tablet 0    loratadine (CLARITIN) 10 MG tablet Take 1 tablet by mouth daily 90 tablet 0    pregabalin (LYRICA) 50 MG capsule Take 1 capsule by mouth 2 times daily for 90 days.  180 capsule 0    escitalopram (LEXAPRO) 5 MG tablet Take 1 tablet by mouth daily 90 tablet 3    montelukast (SINGULAIR) 10 MG tablet Take 1 tablet by mouth nightly take 1 tablet by mouth every evening 90 tablet 3    olmesartan (BENICAR) 40 MG tablet Take 1 tablet by mouth daily take 1 tablet by mouth once daily 90 tablet 3    omeprazole (PRILOSEC) 20 MG delayed release capsule Take 1 capsule by mouth Daily take 1 capsule by mouth once daily 90 capsule 3    SYNTHROID 100 MCG tablet Take 1 tablet 5 days a week and 1.5 tab on Saturday and Sunday 108 tablet 3    tiZANidine (ZANAFLEX) 4 MG tablet Take 1 tablet by mouth every 8 hours as needed (muscle spasm) Take 1 tab by mouth 3 times daily 90 tablet 3    albuterol sulfate  (90 Base) MCG/ACT inhaler Inhale 2 puffs into the lungs 4 times daily as needed for Wheezing 3 each 3    lidocaine (LIDODERM) 5 % apply 3 patches to the affected area daily. Leave on for 12 hours and then off for 12 hours. 90 patch 3    Coenzyme Q10 (CO Q 10 PO) Take by mouth daily      Biotin w/ Vitamins C & E (HAIR/SKIN/NAILS PO) Take by mouth daily VIT C 90MG  VIT E 5,000MCG  ZINC 8MG  COPPER 1MG      Melatonin 1 MG SUBL Place 2 mg under the tongue nightly          Medications patient taking as of now reconciled against medications ordered at time of hospital discharge: Yes    A comprehensive review of systems was negative except for what was noted in the HPI.     Objective:    BP (!) 150/90   Pulse 64   Temp 97.4 °F (36.3 °C) (Temporal)   Ht 5' 10\" (1.778 m)   Wt 176 lb (79.8 kg)   SpO2 97%   BMI 25.25 kg/m²   General Appearance: alert and oriented to person, place and time, well developed and well- nourished, in no acute distress  Skin: warm and dry, no rash or erythema  Head: normocephalic and atraumatic  Eyes: extraocular eye movements intact, conjunctivae normal  Neck: supple and non-tender without mass, no thyromegaly or thyroid nodules, no cervical lymphadenopathy  Pulmonary/Chest: clear to auscultation bilaterally- no wheezes, rales or rhonchi, normal air movement, no respiratory distress  Cardiovascular: normal rate, regular rhythm, normal S1 and S2, no murmurs, rubs, clicks, or gallops  Abdomen: soft, non-tender, non-distended  Extremities: no cyanosis, clubbing or edema  Musculoskeletal: normal range of motion, no joint swelling, deformity or tenderness  Neurologic: no focal deficits        An electronic signature was used to authenticate this note.   --Guanako Duarte MD

## 2022-06-10 DIAGNOSIS — E87.5 HYPERKALEMIA: Primary | ICD-10-CM

## 2022-06-15 ENCOUNTER — CARE COORDINATION (OUTPATIENT)
Dept: CARE COORDINATION | Age: 72
End: 2022-06-15

## 2022-06-15 NOTE — CARE COORDINATION
[facility] Transitions Follow Up Call    6/15/2022    Patient: Davidson Mcintyre  Patient : 1950   MRN: <[id number]>  Reason for Admission: -22 Altered Mental Status; Rhinovirus; Hypertensive Urgency; MIGEL  Discharge Date: 22 RARS: Readmission Risk Score: 12.4 ( )    Date/Time:  6/15/2022 3:19 PM  Attempted to reach patient by telephone for subsequent Care Transition call. Call within 2 business days of discharge: Yes Left HIPPA compliant message requesting a return call. Will attempt to reach patient again. [address] / [facility] Transition Team  [phone]    PCP Hosp F/U completed 22   - Hydralazine Hcl increased to 50 mg TID    Care Transitions Subsequent and Final Call    Subsequent and Final Calls  Care Transitions Interventions  Other Interventions:            Follow Up  Future Appointments   Date Time Provider Samir Witt   2022  2:30 PM FRANCI Peters [facility]   2022  1:00 PM Alek Gary [facility]   2022  1:30 PM Sanam Sterling MD [facility] CARDIO [facility]   2022 12:15 PM Ping Roth MD [facility] ENDO [facility]   2022  1:15 PM Luz Elena Fontaine MD [facility]   2022  1:00 PM Fredrick Shi MD [facility]   2023  1:00 PM Luz Elena Fontaine MD [facility], LPN

## 2022-06-16 ENCOUNTER — OFFICE VISIT (OUTPATIENT)
Dept: FAMILY MEDICINE CLINIC | Age: 72
End: 2022-06-16
Payer: MEDICARE

## 2022-06-16 DIAGNOSIS — E87.5 HYPERKALEMIA: ICD-10-CM

## 2022-06-16 DIAGNOSIS — F32.A DEPRESSION, UNSPECIFIED DEPRESSION TYPE: Primary | ICD-10-CM

## 2022-06-16 LAB
ANION GAP SERPL CALCULATED.3IONS-SCNC: 13 MMOL/L (ref 7–16)
BUN BLDV-MCNC: 19 MG/DL (ref 6–23)
CALCIUM SERPL-MCNC: 9.6 MG/DL (ref 8.6–10.2)
CHLORIDE BLD-SCNC: 101 MMOL/L (ref 98–107)
CO2: 24 MMOL/L (ref 22–29)
CREAT SERPL-MCNC: 1.3 MG/DL (ref 0.5–1)
GFR AFRICAN AMERICAN: 49
GFR NON-AFRICAN AMERICAN: 40 ML/MIN/1.73
GLUCOSE BLD-MCNC: 68 MG/DL (ref 74–99)
POTASSIUM SERPL-SCNC: 5.5 MMOL/L (ref 3.5–5)
SODIUM BLD-SCNC: 138 MMOL/L (ref 132–146)

## 2022-06-16 PROCEDURE — 90791 PSYCH DIAGNOSTIC EVALUATION: CPT | Performed by: SOCIAL WORKER

## 2022-06-16 PROCEDURE — 1036F TOBACCO NON-USER: CPT | Performed by: SOCIAL WORKER

## 2022-06-16 PROCEDURE — 1123F ACP DISCUSS/DSCN MKR DOCD: CPT | Performed by: SOCIAL WORKER

## 2022-06-17 DIAGNOSIS — N17.9 AKI (ACUTE KIDNEY INJURY) (HCC): Primary | ICD-10-CM

## 2022-06-22 ENCOUNTER — CARE COORDINATION (OUTPATIENT)
Dept: CARE COORDINATION | Age: 72
End: 2022-06-22

## 2022-06-22 NOTE — CARE COORDINATION
[facility] Transitions Follow Up Call    2022    Patient: Deon Garcia  Patient : 1950   MRN: <[id number]>  Reason for Admission: -22 Altered Mental Status; Rhinovirus; Hypertensive Urgency; MIGEL  Discharge Date: 22 RARS: Readmission Risk Score: 12.4 ( )    Date/Time:  2022 11:50 AM  Second attempt made to reach patient by telephone for Subsequent Care Transition call. Call within 2 business days of discharge: Yes Left HIPPA compliant message requesting a return call. CTN will sign off. Fadi Ac[address] / [facility] Transition Team  [phone]    PCP Hosp F/U completed 22              - Hydralazine Hcl increased to 50 mg TID    Care Transitions Subsequent and Final Call    Subsequent and Final Calls  Care Transitions Interventions  Other Interventions:            Follow Up  Future Appointments   Date Time Provider Samir Witt   [facility]   2022  1:30 PM Isaac Aguayo MD [facility] CARDIO [facility]   2022 12:15 PM German Zayas MD [facility] ENDO [facility]   2022  1:15 PM [facility]   2022  1:00 PM Dolly Sargent MD [facility] [address] [place] [address]   2023  1:00 PM Mina Austin MD [facility] [place], LPN

## 2022-06-27 ENCOUNTER — OFFICE VISIT (OUTPATIENT)
Dept: FAMILY MEDICINE CLINIC | Age: 72
End: 2022-06-27
Payer: MEDICARE

## 2022-06-27 VITALS
SYSTOLIC BLOOD PRESSURE: 138 MMHG | WEIGHT: 174.8 LBS | HEART RATE: 72 BPM | BODY MASS INDEX: 25.08 KG/M2 | RESPIRATION RATE: 16 BRPM | TEMPERATURE: 97.3 F | DIASTOLIC BLOOD PRESSURE: 66 MMHG | OXYGEN SATURATION: 97 %

## 2022-06-27 DIAGNOSIS — I10 ESSENTIAL HYPERTENSION: Primary | ICD-10-CM

## 2022-06-27 PROCEDURE — 1111F DSCHRG MED/CURRENT MED MERGE: CPT | Performed by: PHYSICIAN ASSISTANT

## 2022-06-27 PROCEDURE — 1123F ACP DISCUSS/DSCN MKR DOCD: CPT | Performed by: PHYSICIAN ASSISTANT

## 2022-06-27 PROCEDURE — G8417 CALC BMI ABV UP PARAM F/U: HCPCS | Performed by: PHYSICIAN ASSISTANT

## 2022-06-27 PROCEDURE — 99213 OFFICE O/P EST LOW 20 MIN: CPT | Performed by: PHYSICIAN ASSISTANT

## 2022-06-27 PROCEDURE — 1090F PRES/ABSN URINE INCON ASSESS: CPT | Performed by: PHYSICIAN ASSISTANT

## 2022-06-27 PROCEDURE — 1036F TOBACCO NON-USER: CPT | Performed by: PHYSICIAN ASSISTANT

## 2022-06-27 PROCEDURE — 3017F COLORECTAL CA SCREEN DOC REV: CPT | Performed by: PHYSICIAN ASSISTANT

## 2022-06-27 PROCEDURE — G8399 PT W/DXA RESULTS DOCUMENT: HCPCS | Performed by: PHYSICIAN ASSISTANT

## 2022-06-27 PROCEDURE — G8427 DOCREV CUR MEDS BY ELIG CLIN: HCPCS | Performed by: PHYSICIAN ASSISTANT

## 2022-06-27 SDOH — ECONOMIC STABILITY: FOOD INSECURITY: WITHIN THE PAST 12 MONTHS, THE FOOD YOU BOUGHT JUST DIDN'T LAST AND YOU DIDN'T HAVE MONEY TO GET MORE.: NEVER TRUE

## 2022-06-27 SDOH — ECONOMIC STABILITY: FOOD INSECURITY: WITHIN THE PAST 12 MONTHS, YOU WORRIED THAT YOUR FOOD WOULD RUN OUT BEFORE YOU GOT MONEY TO BUY MORE.: NEVER TRUE

## 2022-06-27 ASSESSMENT — SOCIAL DETERMINANTS OF HEALTH (SDOH): HOW HARD IS IT FOR YOU TO PAY FOR THE VERY BASICS LIKE FOOD, HOUSING, MEDICAL CARE, AND HEATING?: NOT VERY HARD

## 2022-06-27 NOTE — PROGRESS NOTES
Chief Complaint:  Hypertension (3 week BP check)    History of Present Illness:  Source of history provided by:  patient. HTN: On Metroprolol 50mg BID, Hydralazine 25mg TID. Recent increase of hydralazine from 25 to 50 but was hypotensive. Has been taking Hydralazine 25mg BID and taking a 3rd if needed throughout the day. Was on Olmesartan but this has been on hold due to elevated Crt on recent labs. Was supposed to have BMP repeated but  was accidentally giving her this the last few days. Monitors BP at home and still having labile readings. Review of Systems: Unless otherwise stated in this report or unable to obtain because of the patient's clinical or mental status as evidenced by the medical record, this patients's positive and negative responses for Review of Systems, constitutional, psych, eyes, ENT, cardiovascular, respiratory, gastrointestinal, neurological, genitourinary, musculoskeletal, integument systems and systems related to the presenting problem are either stated in the preceding or were not pertinent or were negative for the symptoms and/or complaints related to the medical problem. Past Medical History:  has a past medical history of Arthritis, Asthma, Cancer ([address] Chronic back pain, COPD (chronic obstructive pulmonary disease) ([address] Emphysema of lung ([address] GERD (gastroesophageal reflux disease), Hashimoto's disease, History of blood transfusion, Hyperlipidemia, Hypertension, Hyperthyroidism, and Incisional hernia. Past Surgical History:  has a past surgical history that includes Appendectomy (1981); Hysterectomy (1981); Lung cancer surgery (Right); Colonoscopy (6/23/15); Colonoscopy (6/23/15); US BREAST BIOPSY W LOC DEVICE 1ST LESION RIGHT (10/19/2020); Cataract removal with implant (Bilateral); hernia repair (N/A, 7/14/2021); Abdomen surgery; Endoscopy, colon, diagnostic; and eye surgery. Social History:  reports that she quit smoking about 14 years ago.  Her smoking use included cigarettes. She has a 30.00 pack-year smoking history. She has never used smokeless tobacco. She reports current alcohol use. She reports that she does not use drugs. Family History: family history includes Arthritis in her mother; Asthma in her sister; Breast Cancer (age of onset: 50) in her maternal aunt; Cancer (age of onset: 36) in her maternal aunt; Cancer (age of onset: 50) in her sister; Cancer (age of onset: 61) in her sister; Diabetes in her maternal grandmother and mother; Early Death in her brother; Heart Attack in her father; Heart Disease in her brother, maternal grandmother, and mother; High Blood Pressure in her maternal grandmother, mother, and sister; High Cholesterol in her maternal grandmother, mother, and sister; Other in her sister; Ovarian Cancer in her maternal aunt; Stroke in her maternal grandmother and mother; Substance Abuse in her sister. Allergies: Codeine, Fentanyl, Adhesive tape, Amlodipine, Bupropion, Cortisone, Dipyridamole, Levaquin [levofloxacin in d5w], Nortriptyline, Other, Prednisone, Tenex [guanfacine hcl], Tramadol, and Varenicline    Physical Exam:  (Vital signs reviewed) /66   Pulse 72   Temp 97.3 °F (36.3 °C)   Resp 16   Wt 174 lb 12.8 oz (79.3 kg)   SpO2 97%   BMI 25.08 kg/m²   Oxygen Saturation Interpretation: Normal.   Constitutional:  Alert, development consistent with age. Eyes:  PERRL, EOMI, no discharge or conjunctival injection. Ears:  External ears without lesions. Neck:  Normal ROM. Supple. Lungs:  Clear to auscultation and breath sounds equal.  Heart:  Regular rate and rhythm, normal heart sounds, without pathological murmurs, ectopy, gallops, or rubs. Skin:  Normal turgor. Warm, dry, without visible rash, unless noted elsewhere. Neurological:  Alert and oriented.   Motor functions intact.    ------------------------------------------Test Results Section----------------------------------------------  (All laboratory and radiology results have been personally reviewed by myself)  Laboratory:  Results for orders placed or performed in visit on 68/31/05   Basic Metabolic Panel   Result Value Ref Range    Sodium 138 132 - 146 mmol/L    Potassium 5.5 (H) 3.5 - 5.0 mmol/L    Chloride 101 98 - 107 mmol/L    CO2 24 22 - 29 mmol/L    Anion Gap 13 7 - 16 mmol/L    Glucose 68 (L) 74 - 99 mg/dL    BUN 19 6 - 23 mg/dL    CREATININE 1.3 (H) 0.5 - 1.0 mg/dL    GFR Non-African American 40 >=60 mL/min/1.73    GFR African American 49     Calcium 9.6 8.6 - 10.2 mg/dL     Radiology: All Radiology results interpreted by Radiologist unless otherwise noted. -------------------------------------Impression & Disposition Section-----------------------------------  Impression(s):  Shola was seen today for hypertension. Diagnoses and all orders for this visit:    Essential hypertension       -   Continue Metoprolol 50mg bid and Hydralazine 25mg BID with a third dose if needed based on readings. Hold Olmesartan and recheck BMP in 1 week. To call with readings and will make adjustments based on labs.      Keep routine appointment

## 2022-06-29 ENCOUNTER — TELEPHONE (OUTPATIENT)
Dept: CARDIOLOGY CLINIC | Age: 72
End: 2022-06-29

## 2022-06-29 NOTE — TELEPHONE ENCOUNTER
Shaheed Otero       Patient had a min HR of 42 bpm, max HR of 116 bpm, and avg HR of 66  bpm. Predominant underlying rhythm was Sinus Rhythm. Isolated SVEs  were rare (<1.0%), SVE Couplets were rare (<1.0%), and no SVE Triplets  were present. Isolated VEs were rare (<1.0%), and no VE Couplets or VE  Triplets were present.        Monitor  Online for you review       Please advise

## 2022-07-01 DIAGNOSIS — I10 ESSENTIAL HYPERTENSION: ICD-10-CM

## 2022-07-01 DIAGNOSIS — R05.3 CHRONIC COUGH: ICD-10-CM

## 2022-07-01 NOTE — TELEPHONE ENCOUNTER
Medication Refill Request    LOV 6/27/2022  NOV 8/11/2022    Lab Results   Component Value Date    CREATININE 1.3 (H) 06/16/2022

## 2022-07-05 RX ORDER — HYDRALAZINE HYDROCHLORIDE 50 MG/1
50 TABLET, FILM COATED ORAL EVERY 8 HOURS SCHEDULED
Qty: 270 TABLET | Refills: 1 | Status: SHIPPED
Start: 2022-07-05 | End: 2022-10-18 | Stop reason: SDUPTHER

## 2022-07-05 RX ORDER — BENZONATATE 100 MG/1
CAPSULE ORAL
Qty: 60 CAPSULE | Refills: 0 | Status: SHIPPED
Start: 2022-07-05 | End: 2022-08-11

## 2022-07-06 ENCOUNTER — OFFICE VISIT (OUTPATIENT)
Dept: CARDIOLOGY CLINIC | Age: 72
End: 2022-07-06
Payer: MEDICARE

## 2022-07-06 VITALS
DIASTOLIC BLOOD PRESSURE: 68 MMHG | HEART RATE: 106 BPM | HEIGHT: 70 IN | SYSTOLIC BLOOD PRESSURE: 140 MMHG | BODY MASS INDEX: 24.97 KG/M2 | RESPIRATION RATE: 16 BRPM | WEIGHT: 174.4 LBS

## 2022-07-06 DIAGNOSIS — R55 SYNCOPE AND COLLAPSE: Primary | ICD-10-CM

## 2022-07-06 DIAGNOSIS — I16.0 HYPERTENSIVE URGENCY: ICD-10-CM

## 2022-07-06 DIAGNOSIS — I45.5 SINUS PAUSE: ICD-10-CM

## 2022-07-06 DIAGNOSIS — E78.00 PURE HYPERCHOLESTEROLEMIA: ICD-10-CM

## 2022-07-06 PROCEDURE — 1123F ACP DISCUSS/DSCN MKR DOCD: CPT | Performed by: INTERNAL MEDICINE

## 2022-07-06 PROCEDURE — G8399 PT W/DXA RESULTS DOCUMENT: HCPCS | Performed by: INTERNAL MEDICINE

## 2022-07-06 PROCEDURE — 1111F DSCHRG MED/CURRENT MED MERGE: CPT | Performed by: INTERNAL MEDICINE

## 2022-07-06 PROCEDURE — 99214 OFFICE O/P EST MOD 30 MIN: CPT | Performed by: INTERNAL MEDICINE

## 2022-07-06 PROCEDURE — 1036F TOBACCO NON-USER: CPT | Performed by: INTERNAL MEDICINE

## 2022-07-06 PROCEDURE — G8417 CALC BMI ABV UP PARAM F/U: HCPCS | Performed by: INTERNAL MEDICINE

## 2022-07-06 PROCEDURE — 3017F COLORECTAL CA SCREEN DOC REV: CPT | Performed by: INTERNAL MEDICINE

## 2022-07-06 PROCEDURE — 93000 ELECTROCARDIOGRAM COMPLETE: CPT | Performed by: INTERNAL MEDICINE

## 2022-07-06 PROCEDURE — 1090F PRES/ABSN URINE INCON ASSESS: CPT | Performed by: INTERNAL MEDICINE

## 2022-07-06 PROCEDURE — G8427 DOCREV CUR MEDS BY ELIG CLIN: HCPCS | Performed by: INTERNAL MEDICINE

## 2022-07-06 RX ORDER — AMLODIPINE BESYLATE 5 MG
5 TABLET ORAL DAILY
Qty: 30 TABLET | Refills: 1
Start: 2022-07-06 | End: 2022-07-06 | Stop reason: SDUPTHER

## 2022-07-06 NOTE — PROGRESS NOTES
CHIEF COMPLAINT:   Chief Complaint   Patient presents with    Hypertension     post hospital visit, Patient complains of SOB and hot flashes. Also states loss of energy.  Hyperlipidemia        HISTORY OF PRESENT ILLNESS: Patient is a 81 y.o. female who is here for a follow up visit with me. She has a history of COPD, lung cancer with right lower lobe resection, hypertension, hyperlipidemia. She presented to the ER at the beginning of June for altered mental status. Per , the patient had a syncopal episode while at home where she was unconscious for about 2 minutes with no signs of convulsions. She did have urinary incontinence. Prior to that she had had 2 glasses of wine which she drinks usually with dinner. She did not have any symptoms prior to that. She was in her normal state of health otherwise.     On arrival to the ER, she was hypotensive with lactic acidosis and MIGEL. She was given fluid resuscitation. Her numbers improved. She did have labile blood pressures during hospitalization. She was initiated on hydralazine and subsequently discharged. She was discharged with a 2-week Holter monitor that is summarized below. Since then, she has been having labile blood pressures at home. Follow-up BMP revealed mild hyperkalemia and her Benicar was discontinued. She has been compliant with her Lopressor as well as hydralazine. During her hospitalization, neurological work-up was negative. At today's office visit, She  denies any chest pain, shortness of breath, palpitations, dizziness, pedal edema. The patient is capable of activities of daily living. There is dyspnea on more than moderate exertion. There is no orthopnea or PND. She is compliant with medications as well as diet and exercise regimen. Past Medical History:   Diagnosis Date    Arthritis     Symptoms respond to myofascial release.  Not surgical candidate    Asthma     Cancer [facility])     lung cancer- RML- 2008, stage 1A, [id number] Stage 1A 2013    Chronic back pain     COPD (chronic obstructive pulmonary disease) (HCC)     Emphysema of lung (HCC)     GERD (gastroesophageal reflux disease)     Hashimoto's disease 2008    History of blood transfusion     Hyperlipidemia     Hypertension     Hyperthyroidism     Incisional hernia        Allergies   Allergen Reactions    Codeine Hives and Itching     AND CODEINE DERIVATIVES----sob  Pt stated tolerated Dilaudid    Fentanyl Other (See Comments)     Disoriented, confused  Other reaction(s): Mental Status Change    Adhesive Tape      burns skin, reddens skin, blisters    Amlodipine Swelling    Bupropion      hyper--couldn't sit still--jumpy    Cortisone      throat closed up    Dipyridamole Hives     Persantine-CST--sob, palpitations, stress test stopped    Levaquin [Levofloxacin In D5w] Other (See Comments)     Yeast infection     Nortriptyline      for rosacea--caused flare up. stopped    Other      States any steroids make her face swell and flush    Prednisone Other (See Comments)    Tenex [Guanfacine Hcl]      dizziness    Tramadol Hives     sob    Varenicline        Current Outpatient Medications   Medication Sig Dispense Refill    NORVASC 5 MG tablet Take 1 tablet by mouth daily 30 tablet 1    hydrALAZINE (APRESOLINE) 50 MG tablet Take 1 tablet by mouth every 8 hours 270 tablet 1    benzonatate (TESSALON) 100 MG capsule take 1 capsule by mouth three times a day if needed for cough (Patient taking differently: 2 times daily take 1 capsule by mouth three times a day if needed for cough) 60 capsule 0    ezetimibe (ZETIA) 10 MG tablet Take 1 tablet by mouth daily 90 tablet 0    loratadine (CLARITIN) 10 MG tablet Take 1 tablet by mouth daily 90 tablet 0    pregabalin (LYRICA) 50 MG capsule Take 1 capsule by mouth 2 times daily for 90 days.  180 capsule 0    escitalopram (LEXAPRO) 5 MG tablet Take 1 tablet by mouth daily 90 tablet 3    montelukast (SINGULAIR) 10 MG tablet Take 1 tablet by mouth nightly take 1 tablet by mouth every evening 90 tablet 3    omeprazole (PRILOSEC) 20 MG delayed release capsule Take 1 capsule by mouth Daily take 1 capsule by mouth once daily 90 capsule 3    SYNTHROID 100 MCG tablet Take 1 tablet 5 days a week and 1.5 tab on Saturday and  108 tablet 3    tiZANidine (ZANAFLEX) 4 MG tablet Take 1 tablet by mouth every 8 hours as needed (muscle spasm) Take 1 tab by mouth 3 times daily 90 tablet 3    albuterol sulfate  (90 Base) MCG/ACT inhaler Inhale 2 puffs into the lungs 4 times daily as needed for Wheezing 3 each 3    lidocaine (LIDODERM) 5 % apply 3 patches to the affected area daily. Leave on for 12 hours and then off for 12 hours. 90 patch 3    Coenzyme Q10 (CO Q 10 PO) Take by mouth daily      Biotin w/ Vitamins C & E (HAIR/SKIN/NAILS PO) Take by mouth daily VIT C 90MG  VIT E 5,000MCG  ZINC 8MG  COPPER 1MG      Melatonin 1 MG SUBL Place 2 mg under the tongue nightly       Current Facility-Administered Medications   Medication Dose Route Frequency Provider Last Rate Last Admin    betamethasone acetate-betamethasone sodium phosphate (CELESTONE) injection 6 mg  6 mg Intra-artICUlar Once Ciarra Pickens MD        lidocaine 1 % injection 1 mL  1 mL IntraDERmal Once Ciarra Pickens MD           Social History     Socioeconomic History    Marital status:      Spouse name: Not on file    Number of children: 3    Years of education: Not on file    Highest education level: Not on file   Occupational History    Not on file   Tobacco Use    Smoking status: Former Smoker     Packs/day: 1.00     Years: 30.00     Pack years: 30.00     Types: Cigarettes     Quit date: 2007     Years since quittin.8    Smokeless tobacco: Never Used   Vaping Use    Vaping Use: Never used   Substance and Sexual Activity    Alcohol use:  Yes     Alcohol/week: 0.0 standard drinks     Types: 4 - 6 Glasses of wine per week     Comment: SOCIALLY    Drug use: No    Sexual activity: Yes     Partners: Male   Other Topics Concern    Not on file   Social History Narrative    5 children, 3 are her own     Social Determinants of Health     Financial Resource Strain: Low Risk     Difficulty of Paying Living Expenses: Not very hard   Food Insecurity: No Food Insecurity    Worried About Running Out of Food in the Last Year: Never true    [place] of Food in the Last Year: Never true   Transportation Needs:     Lack of Transportation (Medical): Not on file    Lack of Transportation (Non-Medical):  Not on file   Physical Activity: Insufficiently Active    Days of Exercise per Week: 3 days    Minutes of Exercise per Session: 30 min   Stress:     Feeling of Stress : Not on file   Social Connections:     Frequency of Communication with Friends and Family: Not on file    Frequency of Social Gatherings with Friends and Family: Not on file    Attends Scientologist Services: Not on file    Active Member of Clubs or Organizations: Not on file    Attends Club or Organization Meetings: Not on file    Marital Status: Not on file   Intimate Partner Violence:     Fear of Current or Ex-Partner: Not on file    Emotionally Abused: Not on file    Physically Abused: Not on file    Sexually Abused: Not on file   Housing Stability:     Unable to Pay for Housing in the Last Year: Not on file    Number of [place] in the Last Year: Not on file    Unstable Housing in the Last Year: Not on file       Family History   Problem Relation Age of Onset    Arthritis Mother     Diabetes Mother     Heart Disease Mother     High Blood Pressure Mother     High Cholesterol Mother     Stroke Mother     Asthma Sister     Cancer Sister 61        lung cancer    High Blood Pressure Sister     High Cholesterol Sister     Substance Abuse Sister     Heart Attack Father         massiv MI    Early Death Brother         car accident    Cancer Sister 50 breast cancer    Other Sister         GERD, diverticulosis, rotator cuff disease, spinal stenosis    Heart Disease Brother         MI    Diabetes Maternal Grandmother     Heart Disease Maternal Grandmother     High Blood Pressure Maternal Grandmother     High Cholesterol Maternal Grandmother     Stroke Maternal Grandmother     Breast Cancer Maternal Aunt 50        breast    Ovarian Cancer Maternal Aunt     Cancer Maternal Aunt 40        ovarian       Review of Systems  Constitutional: Negative for fever, malaise/fatigue and weight loss. HENT: Negative for sore throat and tinnitus. Eyes: Negative for blurred vision and double vision. Respiratory: Negative for shortness of breath. Negative for cough and wheezing. Cardiovascular: As mentioned in HPI. Gastrointestinal: Negative for abdominal pain, heartburn, nausea and vomiting. Genitourinary: Negative. Musculoskeletal: Negative for back pain, joint pain and myalgias. Neurological: Negative for dizziness, tremors, loss of consciousness and headaches. Endo/Heme/Allergies: Negative. Psychiatric/Behavioral: Negative for depression and suicidal ideas. Physical Exam   BP (!) 140/68   Pulse (!) 106   Resp 16   Ht 5' 10\" (1.778 m)   Wt 174 lb 6.4 oz (79.1 kg)   BMI 25.02 kg/m²   Constitutional: Oriented to person, place, and time. Well-developed and well-nourished. No distress. Head: Normocephalic and atraumatic. Eyes: EOM are normal. Pupils are equal, round, and reactive to light. Neck: Normal range of motion. Neck supple. No hepatojugular reflux and no JVD present. Carotid bruit is not present. No tracheal deviation present. No thyromegaly present. Cardiovascular: Normal rate, regular rhythm, normal heart sounds and intact distal pulses. Exam reveals no gallop and no friction rub. No murmur heard. Pulmonary/Chest: Effort normal and breath sounds normal. No respiratory distress. No wheezes. No rales. No tenderness. Abdominal: Soft. Bowel sounds are normal. No distension and no mass. No tenderness. No rebound and no guarding. Musculoskeletal: Normal range of motion. No edema and no tenderness. Lymphadenopathy:   No cervical adenopathy. Neurological: Alert and oriented to person, place, and time. Skin: Skin is warm and dry. No rash noted. Not diaphoretic. No erythema. Psychiatric: Normal mood and affect. Behavior is normal.     Procedures and Testing  EKG: Done in the office today and reviewed by me. Sinus tachycardia at a heart rate of 106 bpm.  Nonspecific ST-T changes    Prior cardiac work-up:    1. Stress test 11/6/2018: Myocardial perfusion normal after adenosine administration.  Overall LV function normal without regional wall motion abnormalities.  Low risk stress test.  2. TTE 11/30/2016:Left ventricular size and wall motion normal, EF 60%.  Mild LVH. Normal LV diastolic function. Left atrium is of normal size. Interatrial septum not well visualized but appears intact. Normal right ventricle structure and function. Trace to mild mitral regurgitation. No mitral valve prolapse. Normal aortic valve structure and function. There is trace tricuspid regurgitation, RVSP 11mmHg Normal aortic root and ascending aorta. No evidence of pericardial effusion. No intracardiac mass. 3. Echocardiogram from 6/5/2022: LVEF 55%. Mild pulmonary hypertension. Estimated PA pressure is 40 mmHg. Mild to moderate mitral regurgitation. Normal RV function. 4. Holter monitor from 6/3/2022: Average heart rate 66 bpm.  Rare PACs and PVCs. Symptoms associated with sinus arrhythmia/possible sinus pause.       Assessment:  1. Syncopal episode--likely orthostatic syncope versus vasovagal episode. Cannot definitively rule out a cardiac etiology. 2. Hypertension with uncontrolled pressures at home. 3. MIGEL--resolved. 4. Hyperlipidemia, on Zetia. 5. Hashimoto's thyroiditis on Synthroid.     Plan:  1. Holter monitor findings as above. She also had an echocardiogram in the hospital and the results are as above. She did have some symptoms associated with sinus arrhythmia/questionable sinus pauses. I will discontinue her Lopressor and continue to monitor. 2. She is on Zetia 10 mg daily. She will continue with hydralazine 50 every 8. I will start Norvasc 5 mg daily and uptitrate to 10 mg daily if blood pressure is not at goal.  Goal for her is less than 130/80. Salt restriction counseling provided. 3. Her home Benicar is currently being held on account of mild hyperkalemia. If resumed, BMP should be closely monitored. 4. Reassurance provided. Disease process explained. Should she continue to have episodes of dizziness, I will consider further evaluation and even a possible referral to electrophysiology for further management. There were no evidence of high-grade grade AV block on the Holter monitor, however. 5. She does have a follow-up with endocrine next week for further management of Hashimoto thyroiditis and hypothyroidism. Layo Pitts MD  [facility]) Cardiology     NOTE: This report was transcribed using voice recognition software. Every effort was made to ensure accuracy; however, inadvertent computerized transcription errors may be present.

## 2022-07-06 NOTE — PATIENT INSTRUCTIONS
 Continue all your medications at current doses.  Stop taking the lopressor. If you have increased palpitations we can do 25 mg twice daily.  Start taking norvasc 5 mg daily.  Restrict sodium intake to less than 2-2.5 g/day. Restrict fluid intake to less than 2.2 L/day. Goal BP is less than 130/80.  Please try to exercise for 150 minutes a week.  I will see you back in the office in 6 months. Please call the office at ([phone], option 2) if you have any questions.

## 2022-07-07 DIAGNOSIS — E03.9 HYPOTHYROIDISM, UNSPECIFIED TYPE: ICD-10-CM

## 2022-07-07 DIAGNOSIS — N17.9 AKI (ACUTE KIDNEY INJURY) (HCC): ICD-10-CM

## 2022-07-07 LAB
ANION GAP SERPL CALCULATED.3IONS-SCNC: 14 MMOL/L (ref 7–16)
BUN BLDV-MCNC: 10 MG/DL (ref 6–23)
CALCIUM SERPL-MCNC: 9.9 MG/DL (ref 8.6–10.2)
CHLORIDE BLD-SCNC: 100 MMOL/L (ref 98–107)
CO2: 22 MMOL/L (ref 22–29)
CREAT SERPL-MCNC: 0.9 MG/DL (ref 0.5–1)
GFR AFRICAN AMERICAN: >60
GFR NON-AFRICAN AMERICAN: >60 ML/MIN/1.73
GLUCOSE BLD-MCNC: 104 MG/DL (ref 74–99)
POTASSIUM SERPL-SCNC: 4.2 MMOL/L (ref 3.5–5)
SODIUM BLD-SCNC: 136 MMOL/L (ref 132–146)
T4 FREE: 2.14 NG/DL (ref 0.93–1.7)
TSH SERPL DL<=0.05 MIU/L-ACNC: 0.23 UIU/ML (ref 0.27–4.2)

## 2022-07-07 RX ORDER — AMLODIPINE BESYLATE 5 MG
5 TABLET ORAL DAILY
Qty: 30 TABLET | Refills: 1 | Status: SHIPPED
Start: 2022-07-07 | End: 2022-08-22 | Stop reason: SDUPTHER

## 2022-07-12 ENCOUNTER — TELEMEDICINE (OUTPATIENT)
Dept: ENDOCRINOLOGY | Age: 72
End: 2022-07-12
Payer: MEDICARE

## 2022-07-12 DIAGNOSIS — E55.9 VITAMIN D DEFICIENCY: ICD-10-CM

## 2022-07-12 DIAGNOSIS — E03.9 HYPOTHYROIDISM, UNSPECIFIED TYPE: Primary | ICD-10-CM

## 2022-07-12 PROCEDURE — G8399 PT W/DXA RESULTS DOCUMENT: HCPCS | Performed by: INTERNAL MEDICINE

## 2022-07-12 PROCEDURE — 1123F ACP DISCUSS/DSCN MKR DOCD: CPT | Performed by: INTERNAL MEDICINE

## 2022-07-12 PROCEDURE — 1090F PRES/ABSN URINE INCON ASSESS: CPT | Performed by: INTERNAL MEDICINE

## 2022-07-12 PROCEDURE — G8427 DOCREV CUR MEDS BY ELIG CLIN: HCPCS | Performed by: INTERNAL MEDICINE

## 2022-07-12 PROCEDURE — 3017F COLORECTAL CA SCREEN DOC REV: CPT | Performed by: INTERNAL MEDICINE

## 2022-07-12 PROCEDURE — 99214 OFFICE O/P EST MOD 30 MIN: CPT | Performed by: INTERNAL MEDICINE

## 2022-07-12 PROCEDURE — 1036F TOBACCO NON-USER: CPT | Performed by: INTERNAL MEDICINE

## 2022-07-12 PROCEDURE — G8417 CALC BMI ABV UP PARAM F/U: HCPCS | Performed by: INTERNAL MEDICINE

## 2022-07-12 NOTE — PROGRESS NOTES
[address] [place] [address] Department of Endocrinology Diabetes and Metabolism   [address] [facility], [address] [place] [address],[phone] [id number]   Phone: [phone]  Fax: [phone]    Date of Service: 7/12/2022  Primary Care Physician: Jessica Lane MD.  Provider: Jenise Landa MD    Reason for the visit:  Primary Hypothyroidism, Prediabetes vitD deficiency, chronic fatigue     History of Present Illness: The history is provided by the patient. No  was used. Accuracy of the patient data is excellent. Enrique Ingram is a very pleasant 81 y.o. female with past medical h/o lunch cancer seen seen today for follow up visit   The patient was diagnosed with hypothyroidism in 2008 and was told that she has Hashimoto's thyroid disease   She is currently on Levothyroxine 100 mcg 1 tab 5 days a week and 1.5 tab on Saturday and Sunday. Patient takes levothyroxine in the morning at empty stomach, wait one hour before eating , avoid multivitamins containing calcium  or iron with it. Lab Results   Component Value Date/Time    TSH 0.226 (L) 07/07/2022 11:17 AM    T4FREE 2.14 (H) 07/07/2022 11:17 AM    FT3 2.6 03/02/2018 03:00 PM     The patient still c/o unexplained weight gain, fatigue, dry skin, brittle fingernails, and brittle hair    She reported positive FH of thyroid disease in her mother and two sisters      Given tiredness and autoimmune thyroid disease, I ordered AM cortisol and was 9.9   Component 9/27/2018   Cortisol 9.90       Regarding MNG   The patient was also diagnosed with thyroid nodule at the same time of hypothyroidism   Thyroid US 8/2017   The right thyroid lobe measures 3.8 x 1.4 x 1.4 cm in size. The left thyroid lobe measures  3.3 x 1.0 x 1.1 cm in size. The isthmus measures 0.1 cm  in thickness. The thyroid gland is heterogeneous. A 0.6 cm small hypoechoic nodule present in the thyroid isthmus on the right. No other suspicious nodules noted.      Repeated thyroid US 6/25/2019  Rt thyroid lobe measures 4.3 cm x 1.2 cm x 1.3 cm. There is heterogeneous echogenicity. No discrete nodule is identified. Isthmus measures 0.2 cm in thickness at the midline. There is a solid hypoechoic nodule with smooth margins to the right of the isthmus measuring 0.6 cm x 0.4 cm x 0.2 cm  Lt thyroid lobe measures 3.1 cm x 1.1 cm x 1.1 cm. There is heterogeneous echogenicity. No discrete nodule is identified    Vicki Fonseca denies any new lumps, bumps in her neck, change in her voice, or shortness of breath. No family history of thyroid cancer. No prior history of radiation to head or neck region. vitD deficiency    The patient currently taking vitD 50,000 iu/wk and has been on this dose for many years   No fragility fractures     PAST MEDICAL HISTORY   Past Medical History:   Diagnosis Date    Arthritis     Symptoms respond to myofascial release.  Not surgical candidate    Asthma     Cancer [facility])     lung cancer- L- 2008, stage 1A, [id number] Stage 1A 2013    Chronic back pain     COPD (chronic obstructive pulmonary disease) (HCC)     Emphysema of lung ([facility] [address]     GERD (gastroesophageal reflux disease)     Hashimoto's disease 2008    History of blood transfusion     Hyperlipidemia     Hypertension     Hyperthyroidism     Incisional hernia      PAST SURGICAL HISTORY   Past Surgical History:   Procedure Laterality Date    ABDOMEN SURGERY      APPENDECTOMY  1981    CATARACT REMOVAL WITH IMPLANT Bilateral     COLONOSCOPY  6/23/15    polyp and diverticulosis    COLONOSCOPY  6/23/15    colonoscopy    ENDOSCOPY, COLON, DIAGNOSTIC      EYE SURGERY      HERNIA REPAIR N/A 7/14/2021    LAPAROSCOPIC ROBOTIC XI ASSISTED INCISIONAL HERNIA REPAIR WITH MESH performed by Ceci Benitez MD at [address] (CERVIX STATUS UNKNOWN)  [address]     X 2    US BREAST NEEDLE BIOPSY RIGHT  10/19/2020    US BREAST NEEDLE BIOPSY RIGHT 10/19/2020 [facility]     SOCIAL HISTORY   Tobacco:   reports that she quit smoking about 14 years ago. Her smoking use included cigarettes. She has a 30.00 pack-year smoking history. She has never used smokeless tobacco.  Alcol:   reports current alcohol use. Illicit Drugs:   reports no history of drug use.     FAMILY HISTORY   Family History   Problem Relation Age of Onset    Arthritis Mother     Diabetes Mother     Heart Disease Mother     High Blood Pressure Mother     High Cholesterol Mother     Stroke Mother     Asthma Sister     Cancer Sister 61        lung cancer    High Blood Pressure Sister     High Cholesterol Sister     Substance Abuse Sister     Heart Attack Father         massiv MI    Early Death Brother         car accident    Cancer Sister 50        breast cancer    Other Sister         GERD, diverticulosis, rotator cuff disease, spinal stenosis    Heart Disease Brother         MI    Diabetes Maternal Grandmother     Heart Disease Maternal Grandmother     High Blood Pressure Maternal Grandmother     High Cholesterol Maternal Grandmother     Stroke Maternal Grandmother     Breast Cancer Maternal Aunt 48        breast    Ovarian Cancer Maternal Aunt     Cancer Maternal Aunt 40        ovarian     ALLERGIES AND DRUG REACTIONS   Allergies   Allergen Reactions    Codeine Hives and Itching     AND CODEINE DERIVATIVES----sob  Pt stated tolerated Dilaudid    Fentanyl Other (See Comments)     Disoriented, confused  Other reaction(s): Mental Status Change    Adhesive Tape      burns skin, reddens skin, blisters    Amlodipine Swelling    Bupropion      hyper--couldn't sit still--jumpy    Cortisone      throat closed up    Dipyridamole Hives     Persantine-CST--sob, palpitations, stress test stopped    Levaquin [Levofloxacin In D5w] Other (See Comments)     Yeast infection     Nortriptyline      for rosacea--caused flare up. stopped    Other      States any steroids make her face swell and flush  Prednisone Other (See Comments)    Tenex [Guanfacine Hcl]      dizziness    Tramadol Hives     sob    Varenicline        CURRENT MEDICATIONS     Current Outpatient Medications   Medication Sig Dispense Refill    NORVASC 5 MG tablet Take 1 tablet by mouth daily 30 tablet 1    hydrALAZINE (APRESOLINE) 50 MG tablet Take 1 tablet by mouth every 8 hours 270 tablet 1    benzonatate (TESSALON) 100 MG capsule take 1 capsule by mouth three times a day if needed for cough (Patient taking differently: 2 times daily take 1 capsule by mouth three times a day if needed for cough) 60 capsule 0    ezetimibe (ZETIA) 10 MG tablet Take 1 tablet by mouth daily 90 tablet 0    loratadine (CLARITIN) 10 MG tablet Take 1 tablet by mouth daily 90 tablet 0    pregabalin (LYRICA) 50 MG capsule Take 1 capsule by mouth 2 times daily for 90 days. 180 capsule 0    escitalopram (LEXAPRO) 5 MG tablet Take 1 tablet by mouth daily 90 tablet 3    montelukast (SINGULAIR) 10 MG tablet Take 1 tablet by mouth nightly take 1 tablet by mouth every evening 90 tablet 3    omeprazole (PRILOSEC) 20 MG delayed release capsule Take 1 capsule by mouth Daily take 1 capsule by mouth once daily 90 capsule 3    SYNTHROID 100 MCG tablet Take 1 tablet 5 days a week and 1.5 tab on Saturday and Sunday 108 tablet 3    tiZANidine (ZANAFLEX) 4 MG tablet Take 1 tablet by mouth every 8 hours as needed (muscle spasm) Take 1 tab by mouth 3 times daily 90 tablet 3    albuterol sulfate  (90 Base) MCG/ACT inhaler Inhale 2 puffs into the lungs 4 times daily as needed for Wheezing 3 each 3    lidocaine (LIDODERM) 5 % apply 3 patches to the affected area daily. Leave on for 12 hours and then off for 12 hours.  90 patch 3    Coenzyme Q10 (CO Q 10 PO) Take by mouth daily      Biotin w/ Vitamins C & E (HAIR/SKIN/NAILS PO) Take by mouth daily VIT C 90MG  VIT E 5,000MCG  ZINC 8MG  COPPER 1MG      Melatonin 1 MG SUBL Place 2 mg under the tongue nightly Current Facility-Administered Medications   Medication Dose Route Frequency Provider Last Rate Last Admin    betamethasone acetate-betamethasone sodium phosphate (CELESTONE) injection 6 mg  6 mg Intra-artICUlar Once Ciarra Pickens MD        lidocaine 1 % injection 1 mL  1 mL IntraDERmal Once Ciarra Pickens MD           Review of Systems  Constitutional: No fever, no chills, no diaphoresis, no generalized weakness. HEENT: No blurred vision, No sore throat, no ear pain, no hair loss  Neck: denied any neck swelling, difficulty swallowing,   Cadrdiopulomary: No CP, SOB or palpitation, No orthopnea or PND. No cough or wheezing. GI: No N/V/D, no constipation, No abdominal pain, no melena or hematochezia   : Denied any dysuria, hematuria, flank pain, discharge, or incontinence. Skin: denied any rash, ulcer, Hirsute, or hyperpigmentation. MSK: denied any joint deformity, joint pain/swelling, muscle pain, or back pain. Neuro: no numbess, no tingling, no weakness,     OBJECTIVE    There were no vitals taken for this visit. BP Readings from Last 4 Encounters:   07/06/22 (!) 140/68   06/27/22 138/66   06/09/22 (!) 150/90   06/06/22 138/82     Wt Readings from Last 6 Encounters:   07/06/22 174 lb 6.4 oz (79.1 kg)   06/27/22 174 lb 12.8 oz (79.3 kg)   06/09/22 176 lb (79.8 kg)   06/04/22 184 lb 9.6 oz (83.7 kg)   05/31/22 176 lb 9.6 oz (80.1 kg)   04/19/22 178 lb 6.4 oz (80.9 kg)     Physical examination:  Due to this being a TeleHealth encounter, evaluation of the following organ systems is limited: Vitals/Constitutional/EENT/Resp/CV/GI//MS/Neuro/Skin/Heme-Lymph-Imm. Modified physical exam through Telemedicine camera    General: Communicating well via camera   Neck: no obvious neck mass. No obvious neck deformity     CVS: no distress   Chest: no distress.  Chest is moving with respiration    Extremities:  no visible tremor  Skin: No visible rashes as seen from camera   Musculoskeletal: no visible deformity  Neuro: Alert and oriented to person, place, and time. Psychiatric: Normal mood and affect. Behavior is normal    Review of Laboratory Data:  I have reviewed the following:  Lab Results   Component Value Date/Time    WBC 10.2 06/09/2022 02:00 PM    RBC 4.34 06/09/2022 02:00 PM    HGB 13.0 06/09/2022 02:00 PM    HCT 41.2 06/09/2022 02:00 PM    MCV 94.9 06/09/2022 02:00 PM    MCH 30.0 06/09/2022 02:00 PM    MCHC 31.6 (L) 06/09/2022 02:00 PM    RDW 13.3 06/09/2022 02:00 PM     06/09/2022 02:00 PM    MPV 10.3 06/09/2022 02:00 PM      Lab Results   Component Value Date/Time     07/07/2022 11:17 AM    K 4.2 07/07/2022 11:17 AM    K 4.7 06/02/2022 05:56 AM    CO2 22 07/07/2022 11:17 AM    BUN 10 07/07/2022 11:17 AM    CALCIUM 9.9 07/07/2022 11:17 AM      Lab Results   Component Value Date/Time    LABA1C 5.8 06/03/2022 05:23 AM    GLUCOSE 104 07/07/2022 11:17 AM    LABCREA 43 02/24/2017 07:30 AM     Lab Results   Component Value Date/Time    TSH 0.226 (L) 07/07/2022 11:17 AM    T4FREE 2.14 (H) 07/07/2022 11:17 AM    FT3 2.6 03/02/2018 03:00 PM     Lab Results   Component Value Date/Time    PTH 44 02/20/2017 08:38 AM     Lab Results   Component Value Date/Time    VITD25 35 03/25/2022 11:55 AM        ASSESSMENT & RECOMMENDATIONS   Casi Mathias, a 81 y.o.-old female seen in for evaluation of hypothyroidism     Primary hypothyroidism   · Will decrease Levothyroxine dose to 100 mcg 1 tab 6 days a week and 1.5 tab on  Sunday   · Recheck TFT in 6-8 weeks     Multinodular goiter   · Prevalence of thyroid nodule on thyroid ultrasound is 50% and 95 % of these nodules are benign.   · Given nodule size and lack of ultrasound suspicious features which making the nodule less likely to be malignant, I will continue following with periodic US  · Will follow with intermittent US     vitD deficiency   · Continue on VitD supplement     Prediabetes  · Continue watching diet and continue exercise     I personally reviewed external notes from PCP and other patient's care team providers, and personally interpreted labs associated with the above diagnosis. I also ordered labs to further assess and manage the above addressed medical conditions    Return in about 6 months (around 1/12/2023) for hypothyroidism, VitD deficiency. The above issues were reviewed with the patient who understood and agreed with the plan. Thank you for allowing us to participate in the care of this patient. Please do not hesitate to contact us with any additional questions. Diagnosis Orders   1. Hypothyroidism, unspecified type  TSH    T4, Free   2. Vitamin D deficiency  Vitamin D 25 Hydroxy    Basic Metabolic Panel       Gricelda Gonzalez MD  Endocrinologist, [facility])   [address] [facility], [address] [place] [address],[phone] [id number]   Phone: [phone]  Fax: [phone]  ---------------------------------  An electronic signature was used to authenticate this note.  Selene Wagner MD on 7/12/2022 at 11:59 AM

## 2022-07-12 NOTE — PROGRESS NOTES
Bucky Jackson was read the following message We want to confirm that, for purposes of billing, this is a virtual visit with your provider for which we will submit a claim for reimbursement with your insurance company. You will be responsible for any copays, coinsurance amounts or other amounts not covered by your insurance company. If you do not accept this, unfortunately we will not be able to schedule or proceed with a virtual visit with the provider. Do you accept? Noreen Felty responded Yes .

## 2022-07-18 ENCOUNTER — OFFICE VISIT (OUTPATIENT)
Dept: FAMILY MEDICINE CLINIC | Age: 72
End: 2022-07-18
Payer: MEDICARE

## 2022-07-18 VITALS
HEART RATE: 77 BPM | HEIGHT: 70 IN | DIASTOLIC BLOOD PRESSURE: 70 MMHG | WEIGHT: 173 LBS | SYSTOLIC BLOOD PRESSURE: 132 MMHG | TEMPERATURE: 97.2 F | OXYGEN SATURATION: 97 % | BODY MASS INDEX: 24.77 KG/M2

## 2022-07-18 DIAGNOSIS — G89.4 CHRONIC PAIN SYNDROME: ICD-10-CM

## 2022-07-18 DIAGNOSIS — M25.551 RIGHT HIP PAIN: ICD-10-CM

## 2022-07-18 DIAGNOSIS — M25.561 CHRONIC PAIN OF RIGHT KNEE: ICD-10-CM

## 2022-07-18 DIAGNOSIS — G89.29 CHRONIC PAIN OF RIGHT KNEE: ICD-10-CM

## 2022-07-18 DIAGNOSIS — M54.16 LUMBAR RADICULOPATHY: ICD-10-CM

## 2022-07-18 DIAGNOSIS — L98.9 SKIN LESION: Primary | ICD-10-CM

## 2022-07-18 DIAGNOSIS — I10 ESSENTIAL HYPERTENSION: ICD-10-CM

## 2022-07-18 PROCEDURE — 1123F ACP DISCUSS/DSCN MKR DOCD: CPT | Performed by: PHYSICIAN ASSISTANT

## 2022-07-18 PROCEDURE — 99213 OFFICE O/P EST LOW 20 MIN: CPT | Performed by: PHYSICIAN ASSISTANT

## 2022-07-18 RX ORDER — PREGABALIN 50 MG/1
50 CAPSULE ORAL 2 TIMES DAILY
Qty: 180 CAPSULE | Refills: 0 | OUTPATIENT
Start: 2022-07-18 | End: 2022-10-16

## 2022-07-18 RX ORDER — PREGABALIN 50 MG/1
50 CAPSULE ORAL 2 TIMES DAILY
Qty: 180 CAPSULE | Refills: 1 | Status: SHIPPED | OUTPATIENT
Start: 2022-07-18 | End: 2022-10-18

## 2022-07-18 NOTE — PROGRESS NOTES
Chief Complaint:   Rash (White patch on L foot started a couple of weeks ago. Pt states it can be itchy but not all of the time.)    History of Present Illness   Source of history provided by:  patient. Patient presents for skin lesion on side of left foot. Noticed a few weeks ago. Has started to become enlarged, is white and scaly. Intermittently itches but denies pain    Chronic pain - Lumbar pain and hip pain; did see PMR; Dr. Leydi Kumar; controlled on lyrica; VV is prescriber; needs refill today    Saw cardiology and was placed on Norvasc. BP is better but still labile. Calling cardiology with readings next week. ROS    Unless otherwise stated in this report or unable to obtain because of the patient's clinical or mental status as evidenced by the medical record, this patients's positive and negative responses for Review of Systems, constitutional, psych, eyes, ENT, cardiovascular, respiratory, gastrointestinal, neurological, genitourinary, musculoskeletal, integument systems and systems related to the presenting problem are either stated in the preceding or were not pertinent or were negative for the symptoms and/or complaints related to the medical problem. Past Surgical History:  has a past surgical history that includes Appendectomy (1981); Hysterectomy (1981); Lung cancer surgery (Right); Colonoscopy (6/23/15); Colonoscopy (6/23/15); US BREAST BIOPSY W LOC DEVICE 1ST LESION RIGHT (10/19/2020); Cataract removal with implant (Bilateral); hernia repair (N/A, 7/14/2021); Abdomen surgery; Endoscopy, colon, diagnostic; and eye surgery. Social History:  reports that she quit smoking about 14 years ago. Her smoking use included cigarettes. She has a 30.00 pack-year smoking history. She has never used smokeless tobacco. She reports current alcohol use. She reports that she does not use drugs.   Family History: family history includes Arthritis in her mother; Asthma in her sister; Breast Cancer (age of onset: 50) in her maternal aunt; Cancer (age of onset: 36) in her maternal aunt; Cancer (age of onset: 50) in her sister; Cancer (age of onset: 61) in her sister; Diabetes in her maternal grandmother and mother; Early Death in her brother; Heart Attack in her father; Heart Disease in her brother, maternal grandmother, and mother; High Blood Pressure in her maternal grandmother, mother, and sister; High Cholesterol in her maternal grandmother, mother, and sister; Other in her sister; Ovarian Cancer in her maternal aunt; Stroke in her maternal grandmother and mother; Substance Abuse in her sister. Allergies: Codeine, Fentanyl, Adhesive tape, Amlodipine, Bupropion, Cortisone, Dipyridamole, Levaquin [levofloxacin in d5w], Nortriptyline, Other, Prednisone, Tenex [guanfacine hcl], Tramadol, and Varenicline    Physical Exam         VS:  /70   Pulse 77   Temp 97.2 °F (36.2 °C) (Temporal)   Ht 5' 10\" (1.778 m)   Wt 173 lb (78.5 kg)   SpO2 97%   BMI 24.82 kg/m²    Oxygen Saturation Interpretation: Normal.    Constitutional:  Alert, development consistent with age. HEENT:  NC/NT. Airway patent. Eyes:  PERRL, EOMI, no discharge. Lungs:  CTAB, no wheezing, rales, or rhonchi  Heart:  RRR without pathologic murmurs  Skin:  Normal turgor and appropriately dry to touch. Round, white, raised lesion noted to left lateral foot just distal to ankle. No signs of excoriation noted but no signs of secondary infection including TTP, pustules, induration, or fluctuance. No bleeding or discharge noted. No lymphangitic streaking. Neurological:  Orientation age-appropriate unless noted elseware. Motor functions intact. Lab / Imaging Results   (All laboratory and radiology results have been personally reviewed by myself)  Labs:    Imaging: All Radiology results interpreted by Radiologist unless otherwise noted. Assessment / Plan     Impression(s):  1. Skin lesion    2. Chronic pain syndrome    3. Lumbar radiculopathy    4. Right hip pain    5. Chronic pain of right knee      Disposition:  Disposition: Discharge to home. Refilled lyrica. Referral placed to derm. To continue current plan with cardiology for HTN. Pt is in agreement with this care plan. All questions answered.

## 2022-08-10 ENCOUNTER — TELEPHONE (OUTPATIENT)
Dept: CARDIOLOGY CLINIC | Age: 72
End: 2022-08-10

## 2022-08-10 NOTE — TELEPHONE ENCOUNTER
----- Message from Jude Garcia MD sent at 8/10/2022 12:50 PM EDT -----  Do you know if we talked to her about the results? If not, lets ask how she is feeling currently.

## 2022-08-10 NOTE — TELEPHONE ENCOUNTER
Patient was seen in office 7-6  results review     Called patient she is feeling better  B/P is within goal range mostly but will fluctuate at times. She will call with B/P Log       Plan:  Holter monitor findings as above. She also had an echocardiogram in the hospital and the results are as above. She did have some symptoms associated with sinus arrhythmia/questionable sinus pauses. I will discontinue her Lopressor and continue to monitor. She is on Zetia 10 mg daily. She will continue with hydralazine 50 every 8. I will start Norvasc 5 mg daily and uptitrate to 10 mg daily if blood pressure is not at goal.  Goal for her is less than 130/80. Salt restriction counseling provided. Her home Benicar is currently being held on account of mild hyperkalemia. If resumed, BMP should be closely monitored. Reassurance provided. Disease process explained. Should she continue to have episodes of dizziness, I will consider further evaluation and even a possible referral to electrophysiology for further management. There were no evidence of high-grade grade AV block on the Holter monitor, however. She does have a follow-up with endocrine next week for further management of Hashimoto thyroiditis and hypothyroidism.

## 2022-08-10 NOTE — TELEPHONE ENCOUNTER
----- Message from Mulu Wise MD sent at 8/9/2022  4:17 PM EDT -----  Regarding: FW: Missing Documentation  Yo Greco,  Could you please look into this for me? Thank you.  ----- Message -----  From: Robert Carter  Sent: 8/9/2022   3:36 PM EDT  To: Mulu Wise MD  Subject: Dia Lewis,    The above patient had a Holter Monitor on 6/6/22, we do not see documentation for it. Please advise. Thank you.

## 2022-08-11 ENCOUNTER — OFFICE VISIT (OUTPATIENT)
Dept: FAMILY MEDICINE CLINIC | Age: 72
End: 2022-08-11
Payer: MEDICARE

## 2022-08-11 VITALS
OXYGEN SATURATION: 97 % | HEART RATE: 97 BPM | TEMPERATURE: 97.8 F | HEIGHT: 70 IN | BODY MASS INDEX: 24.42 KG/M2 | SYSTOLIC BLOOD PRESSURE: 160 MMHG | WEIGHT: 170.6 LBS | DIASTOLIC BLOOD PRESSURE: 70 MMHG

## 2022-08-11 DIAGNOSIS — L98.9 SKIN LESION: ICD-10-CM

## 2022-08-11 DIAGNOSIS — R55 SYNCOPE AND COLLAPSE: ICD-10-CM

## 2022-08-11 DIAGNOSIS — E78.00 PURE HYPERCHOLESTEROLEMIA: ICD-10-CM

## 2022-08-11 DIAGNOSIS — J44.9 CHRONIC OBSTRUCTIVE PULMONARY DISEASE, UNSPECIFIED COPD TYPE (HCC): ICD-10-CM

## 2022-08-11 DIAGNOSIS — C34.31 MALIGNANT NEOPLASM OF LOWER LOBE OF RIGHT LUNG (HCC): ICD-10-CM

## 2022-08-11 DIAGNOSIS — F33.1 MODERATE EPISODE OF RECURRENT MAJOR DEPRESSIVE DISORDER (HCC): ICD-10-CM

## 2022-08-11 DIAGNOSIS — T14.90XA TRAUMA IN CHILDHOOD: ICD-10-CM

## 2022-08-11 DIAGNOSIS — I10 ESSENTIAL HYPERTENSION: Primary | ICD-10-CM

## 2022-08-11 DIAGNOSIS — R05.3 CHRONIC COUGH: ICD-10-CM

## 2022-08-11 PROBLEM — Z62.819 TRAUMA IN CHILDHOOD: Status: ACTIVE | Noted: 2022-08-11

## 2022-08-11 PROBLEM — R41.82 AMS (ALTERED MENTAL STATUS): Status: RESOLVED | Noted: 2022-06-01 | Resolved: 2022-08-11

## 2022-08-11 PROBLEM — F32.A DEPRESSION: Status: ACTIVE | Noted: 2022-08-11

## 2022-08-11 PROCEDURE — G8420 CALC BMI NORM PARAMETERS: HCPCS | Performed by: FAMILY MEDICINE

## 2022-08-11 PROCEDURE — 1090F PRES/ABSN URINE INCON ASSESS: CPT | Performed by: FAMILY MEDICINE

## 2022-08-11 PROCEDURE — G8427 DOCREV CUR MEDS BY ELIG CLIN: HCPCS | Performed by: FAMILY MEDICINE

## 2022-08-11 PROCEDURE — 1123F ACP DISCUSS/DSCN MKR DOCD: CPT | Performed by: FAMILY MEDICINE

## 2022-08-11 PROCEDURE — G8399 PT W/DXA RESULTS DOCUMENT: HCPCS | Performed by: FAMILY MEDICINE

## 2022-08-11 PROCEDURE — 99215 OFFICE O/P EST HI 40 MIN: CPT | Performed by: FAMILY MEDICINE

## 2022-08-11 PROCEDURE — 3023F SPIROM DOC REV: CPT | Performed by: FAMILY MEDICINE

## 2022-08-11 PROCEDURE — 3017F COLORECTAL CA SCREEN DOC REV: CPT | Performed by: FAMILY MEDICINE

## 2022-08-11 PROCEDURE — 1036F TOBACCO NON-USER: CPT | Performed by: FAMILY MEDICINE

## 2022-08-11 RX ORDER — LORATADINE 10 MG/1
10 TABLET ORAL DAILY
Qty: 90 TABLET | Refills: 0 | Status: SHIPPED | OUTPATIENT
Start: 2022-08-11

## 2022-08-11 RX ORDER — BENZONATATE 100 MG/1
100 CAPSULE ORAL 2 TIMES DAILY PRN
Qty: 60 CAPSULE | Refills: 1
Start: 2022-08-11 | End: 2022-10-06 | Stop reason: SDUPTHER

## 2022-08-11 ASSESSMENT — PATIENT HEALTH QUESTIONNAIRE - PHQ9
8. MOVING OR SPEAKING SO SLOWLY THAT OTHER PEOPLE COULD HAVE NOTICED. OR THE OPPOSITE, BEING SO FIGETY OR RESTLESS THAT YOU HAVE BEEN MOVING AROUND A LOT MORE THAN USUAL: 0
SUM OF ALL RESPONSES TO PHQ9 QUESTIONS 1 & 2: 0
4. FEELING TIRED OR HAVING LITTLE ENERGY: 3
SUM OF ALL RESPONSES TO PHQ QUESTIONS 1-9: 5
SUM OF ALL RESPONSES TO PHQ QUESTIONS 1-9: 5
7. TROUBLE CONCENTRATING ON THINGS, SUCH AS READING THE NEWSPAPER OR WATCHING TELEVISION: 1
9. THOUGHTS THAT YOU WOULD BE BETTER OFF DEAD, OR OF HURTING YOURSELF: 0
3. TROUBLE FALLING OR STAYING ASLEEP: 1
6. FEELING BAD ABOUT YOURSELF - OR THAT YOU ARE A FAILURE OR HAVE LET YOURSELF OR YOUR FAMILY DOWN: 0
2. FEELING DOWN, DEPRESSED OR HOPELESS: 0
SUM OF ALL RESPONSES TO PHQ QUESTIONS 1-9: 5
10. IF YOU CHECKED OFF ANY PROBLEMS, HOW DIFFICULT HAVE THESE PROBLEMS MADE IT FOR YOU TO DO YOUR WORK, TAKE CARE OF THINGS AT HOME, OR GET ALONG WITH OTHER PEOPLE: 1
5. POOR APPETITE OR OVEREATING: 0
SUM OF ALL RESPONSES TO PHQ QUESTIONS 1-9: 5
1. LITTLE INTEREST OR PLEASURE IN DOING THINGS: 0

## 2022-08-11 NOTE — TELEPHONE ENCOUNTER
Medication Refill Request    LOV 7/18/2022  NOV 8/11/2022    Lab Results   Component Value Date    CREATININE 0.9 07/07/2022
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
## 2022-08-15 ENCOUNTER — TELEPHONE (OUTPATIENT)
Dept: FAMILY MEDICINE CLINIC | Age: 72
End: 2022-08-15

## 2022-08-15 NOTE — TELEPHONE ENCOUNTER
Received return call from pt today. Due to her familiarity from meeting with this [facility] in the past, pt would prefer to come in to discuss these issues in person rather than receiving a referral over the phone. Appointment scheduled for pt this week due to urgency/sensitivity of her concerns. Will then provide outpt referral for EMDR and trauma based therapy as appropriate. Pt agreeable to plan.  Appointment scheduled for Wednesday at [address] [place] [address] , MSW, LISW-S, OSW-C

## 2022-08-15 NOTE — TELEPHONE ENCOUNTER
KRISTINE LOMELI [facility] a [facility] hx of childhood [facility], possibly needing a new referral to EMDR counseling. Call placed to pt today and message left requesting return call at her convenience.  Samuel Wei, MSW, LISW-S, OSW-C

## 2022-08-17 ENCOUNTER — OFFICE VISIT (OUTPATIENT)
Dept: FAMILY MEDICINE CLINIC | Age: 72
End: 2022-08-17
Payer: MEDICARE

## 2022-08-17 DIAGNOSIS — F32.A DEPRESSION, UNSPECIFIED DEPRESSION TYPE: Primary | ICD-10-CM

## 2022-08-17 PROCEDURE — 90834 PSYTX W PT 45 MINUTES: CPT | Performed by: SOCIAL WORKER

## 2022-08-17 PROCEDURE — 1036F TOBACCO NON-USER: CPT | Performed by: SOCIAL WORKER

## 2022-08-17 PROCEDURE — 1123F ACP DISCUSS/DSCN MKR DOCD: CPT | Performed by: SOCIAL WORKER
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
## 2022-08-22 ENCOUNTER — TELEPHONE (OUTPATIENT)
Dept: CARDIOLOGY CLINIC | Age: 72
End: 2022-08-22

## 2022-08-22 RX ORDER — AMLODIPINE BESYLATE 5 MG
10 TABLET ORAL DAILY
Qty: 30 TABLET | Refills: 3 | Status: SHIPPED
Start: 2022-08-22 | End: 2022-09-01

## 2022-08-22 NOTE — TELEPHONE ENCOUNTER
----- Message from Ling Hoffman MD sent at 8/22/2022  8:24 AM EDT -----  Can we send her a refill of norvasc 10 mg daily to [facility]? Thank you.

## 2022-08-25 DIAGNOSIS — E78.00 PURE HYPERCHOLESTEROLEMIA: ICD-10-CM

## 2022-08-25 RX ORDER — EZETIMIBE 10 MG/1
10 TABLET ORAL DAILY
Qty: 90 TABLET | Refills: 0 | Status: SHIPPED
Start: 2022-08-25 | End: 2022-10-18 | Stop reason: SDUPTHER

## 2022-08-25 NOTE — TELEPHONE ENCOUNTER
Medication Refill Request    LOV 8/11/2022  NOV 10/13/2022    Lab Results   Component Value Date    CREATININE 0.9 07/07/2022

## 2022-08-29 DIAGNOSIS — E03.9 HYPOTHYROIDISM, UNSPECIFIED TYPE: ICD-10-CM

## 2022-08-29 DIAGNOSIS — E55.9 VITAMIN D DEFICIENCY: ICD-10-CM

## 2022-08-29 LAB
ANION GAP SERPL CALCULATED.3IONS-SCNC: 17 MMOL/L (ref 7–16)
BUN BLDV-MCNC: 9 MG/DL (ref 6–23)
CALCIUM SERPL-MCNC: 10 MG/DL (ref 8.6–10.2)
CHLORIDE BLD-SCNC: 101 MMOL/L (ref 98–107)
CO2: 22 MMOL/L (ref 22–29)
CREAT SERPL-MCNC: 0.9 MG/DL (ref 0.5–1)
GFR AFRICAN AMERICAN: >60
GFR NON-AFRICAN AMERICAN: >60 ML/MIN/1.73
GLUCOSE BLD-MCNC: 106 MG/DL (ref 74–99)
POTASSIUM SERPL-SCNC: 4 MMOL/L (ref 3.5–5)
SODIUM BLD-SCNC: 140 MMOL/L (ref 132–146)
T4 FREE: 1.75 NG/DL (ref 0.93–1.7)
TSH SERPL DL<=0.05 MIU/L-ACNC: 2.24 UIU/ML (ref 0.27–4.2)
VITAMIN D 25-HYDROXY: 34 NG/ML (ref 30–100)

## 2022-08-30 ENCOUNTER — OFFICE VISIT (OUTPATIENT)
Dept: FAMILY MEDICINE CLINIC | Age: 72
End: 2022-08-30
Payer: MEDICARE

## 2022-08-30 DIAGNOSIS — F33.1 MODERATE EPISODE OF RECURRENT MAJOR DEPRESSIVE DISORDER (HCC): Primary | ICD-10-CM

## 2022-08-30 PROCEDURE — 90834 PSYTX W PT 45 MINUTES: CPT | Performed by: SOCIAL WORKER

## 2022-08-30 PROCEDURE — 1036F TOBACCO NON-USER: CPT | Performed by: SOCIAL WORKER

## 2022-08-30 PROCEDURE — 1123F ACP DISCUSS/DSCN MKR DOCD: CPT | Performed by: SOCIAL WORKER

## 2022-08-30 NOTE — PROGRESS NOTES
[address] [facility]  Sylvia Cruz, MSW, LISW-S, OSW-C       Visit Date: 8/30/2022   Time of appointment:  100PM   Time spent with Patient:  48 minutes. This is patient's third appointment. Reason for Consult:  Depression     Referring Provider/PCP:    Amelia Naranjo MD      Pt provided informed consent for the behavioral health program. Discussed with patient model of service to include the limits of confidentiality (i.e. abuse reporting, suicide intervention, etc.) and short-term intervention focused approach. Pt indicated understanding. Sweetie Parada is a 81 y.o. female who presents for follow up of depression, with recent event leading to remembrance of prior traumatic events    Previous Recommendations: initial assessment completed, referral to CPST specialist for grandson        MENTAL STATUS EXAM  Mood was dysthymic with tearful affect. Suicidal ideation was denied. Homicidal ideation was denied. Hygiene was good . Dress was appropriate. Behavior was Within Normal Limits with No observation or self-report of difficulties ambulating. Attitude was Cooperative. Eye-contact was fair. Speech: rate - WNL, rhythm - WNL, volume - WNL. Verbalizations were coherent. Thought processes were intact and goal-oriented without evidence of delusions, hallucinations, obsessions, or tangela; without significant cognitive distortions. Associations were characterized by intact cognitive processes. Pt was oriented to person, place, time, and general circumstances;  recent:  good and remote:  good. Insight and judgment were estimated to be good, AEB, a good  understanding of cyclical maladaptive patterns, and the ability to use insight to inform behavior change. ASSESSMENT  Phill Bell presented to the appointment today for evaluation and treatment of symptoms of depression.  Explored with pt factors contributing to overall resiliency from past trauma and utilized resourcing techniques to process feelings related to past trauma today. She notes she has been able to begin conversations with her sisters who also experienced these events in the family and they have enrolled in counseling on their own as well. Pt receptive to CBT techniques utilized today and will continue to practice reframing techniques She is currently deemed no risk to herself or others and meets criteria for major depressive disorder, mild. Recommend follow up in 2 weeks. Jaquelin Hill was in agreement with recommendations. PHQ Scores 8/11/2022 3/21/2022 3/20/2021 9/24/2020 7/1/2019 8/16/2018 5/21/2018   PHQ2 Score 0 0 0 1 2 0 0   PHQ9 Score 5 0 0 1 2 0 0     Interpretation of Total Score Depression Severity: 1-4 = Minimal depression, 5-9 = Mild depression, 10-14 = Moderate depression, 15-19 = Moderately severe depression, 20-27 = Severe depression    How often pt has had thoughts of death or hurting self (if PHQ positive for depression):       No flowsheet data found. Interpretation of IDALMIS-7 score: 5-9 = mild anxiety, 10-14 = moderate anxiety, 15+ = severe anxiety. Recommend referral to behavioral health for scores 10 or greater.       DIAGNOSIS  Major depressive disorder, moderate      INTERVENTION  Discussed prevalence of  depression for general population, Trained in strategies for increasing balanced thinking, Discussed and set plan for behavioral activation, Provided education, Discussed use of imagery, distractions, relaxation, mood management, communication training, questioning unhelpful thinking, problem-solving, and behavioral activation to manage pain, [place]-setting to identify pt's primary goals for [facility] visit / overall health, Supportive techniques, and Emphasized self-care as important for managing overall health; resourcing techniques, CBT utilized to identify maladaptive thought patterns and identify resiliency       PLAN  Follow up in 2 weeks        Jean Paul Sykes, MSW, LISW-S, OSW-C

## 2022-09-01 ENCOUNTER — TELEPHONE (OUTPATIENT)
Dept: ENDOCRINOLOGY | Age: 72
End: 2022-09-01

## 2022-09-01 RX ORDER — AMLODIPINE BESYLATE 5 MG/1
TABLET ORAL
Qty: 90 TABLET | Refills: 3 | Status: SHIPPED
Start: 2022-09-01 | End: 2022-10-18 | Stop reason: SDUPTHER

## 2022-09-13 ENCOUNTER — OFFICE VISIT (OUTPATIENT)
Dept: FAMILY MEDICINE CLINIC | Age: 72
End: 2022-09-13
Payer: MEDICARE

## 2022-09-13 DIAGNOSIS — F33.1 MODERATE EPISODE OF RECURRENT MAJOR DEPRESSIVE DISORDER (HCC): Primary | ICD-10-CM

## 2022-09-13 PROCEDURE — 1036F TOBACCO NON-USER: CPT | Performed by: SOCIAL WORKER

## 2022-09-13 PROCEDURE — 1123F ACP DISCUSS/DSCN MKR DOCD: CPT | Performed by: SOCIAL WORKER

## 2022-09-13 PROCEDURE — 90834 PSYTX W PT 45 MINUTES: CPT | Performed by: SOCIAL WORKER

## 2022-09-13 NOTE — PROGRESS NOTES
[address] [facility]  Fannie Dye, MSW, LISW-S, OSW-C       Visit Date: 9/13/2022   Time of appointment:  100PM   Time spent with Patient:  38 minutes. This is patient's fourth appointment. Reason for Consult:  Depression     Referring Provider/PCP:    Shelly Gong MD      Pt provided informed consent for the behavioral health program. Discussed with patient model of service to include the limits of confidentiality (i.e. abuse reporting, suicide intervention, etc.) and short-term intervention focused approach. Pt indicated understanding. Adelfo Ye is a 81 y.o. female who presents for follow up of depression, with recent event leading to remembrance of prior traumatic events    Previous Recommendations: initial assessment completed, referral to CPST specialist for grandson; cathartic engagement; mood monitoring        MENTAL STATUS EXAM  Mood was euthymic with calm affect. Suicidal ideation was denied. Homicidal ideation was denied. Hygiene was good . Dress was appropriate. Behavior was Within Normal Limits with No observation or self-report of difficulties ambulating. Attitude was Cooperative. Eye-contact was fair. Speech: rate - WNL, rhythm - WNL, volume - WNL. Verbalizations were coherent. Thought processes were intact and goal-oriented without evidence of delusions, hallucinations, obsessions, or tangela; without significant cognitive distortions. Associations were characterized by intact cognitive processes. Pt was oriented to person, place, time, and general circumstances;  recent:  good and remote:  good. Insight and judgment were estimated to be good, AEB, a good  understanding of cyclical maladaptive patterns, and the ability to use insight to inform behavior change. ANTONIO  Alexkelle Arcos presented to the appointment today for evaluation and treatment of symptoms of depression.  Pt notes an overall improvement in mood today and states overall health, Supportive techniques, and Emphasized self-care as important for managing overall health; resourcing techniques, CBT utilized to identify maladaptive thought patterns and identify resiliency       PLAN  Follow up in 2 weeks when returns from trip.   Pt will call to schedule        ERIC Patel, FRANCI, OSW-C

## 2022-09-24 DIAGNOSIS — R05.3 CHRONIC COUGH: ICD-10-CM

## 2022-09-24 RX ORDER — BENZONATATE 100 MG/1
100 CAPSULE ORAL 2 TIMES DAILY PRN
Qty: 60 CAPSULE | Refills: 1 | Status: CANCELLED | OUTPATIENT
Start: 2022-09-24

## 2022-10-06 DIAGNOSIS — R05.3 CHRONIC COUGH: ICD-10-CM

## 2022-10-06 RX ORDER — BENZONATATE 100 MG/1
100 CAPSULE ORAL DAILY PRN
Qty: 30 CAPSULE | Refills: 0 | Status: SHIPPED
Start: 2022-10-06 | End: 2022-10-07 | Stop reason: SDUPTHER

## 2022-10-06 NOTE — TELEPHONE ENCOUNTER
Medication Refill Request    LOV 8/11/2022  NOV 10/18/2022    Lab Results   Component Value Date    CREATININE 0.9 08/29/2022

## 2022-10-07 ENCOUNTER — TELEPHONE (OUTPATIENT)
Dept: FAMILY MEDICINE CLINIC | Age: 72
End: 2022-10-07

## 2022-10-07 DIAGNOSIS — R05.3 CHRONIC COUGH: ICD-10-CM

## 2022-10-07 RX ORDER — BENZONATATE 100 MG/1
100 CAPSULE ORAL DAILY PRN
Qty: 30 CAPSULE | Refills: 0 | Status: SHIPPED
Start: 2022-10-07 | End: 2022-10-18 | Stop reason: SDUPTHER

## 2022-10-07 NOTE — TELEPHONE ENCOUNTER
Camelia Olea from [facility] called and left a message. He states there are 2 sets of directions for the Benzonatate Rx sent in yesterday. They need clarification on the directions -- daily prn vs. TID prn.

## 2022-10-17 ENCOUNTER — TELEPHONE (OUTPATIENT)
Dept: FAMILY MEDICINE CLINIC | Age: 72
End: 2022-10-17

## 2022-10-17 NOTE — TELEPHONE ENCOUNTER
Message received from pt that she would like to schedule now that she has returned from her trip. Call placed to pt and message left with contact information to return call at her convenience.  Teri Forman, MSW, LISW-S, OSW-C

## 2022-10-18 ENCOUNTER — OFFICE VISIT (OUTPATIENT)
Dept: FAMILY MEDICINE CLINIC | Age: 72
End: 2022-10-18
Payer: MEDICARE

## 2022-10-18 VITALS
DIASTOLIC BLOOD PRESSURE: 62 MMHG | SYSTOLIC BLOOD PRESSURE: 136 MMHG | TEMPERATURE: 98.1 F | BODY MASS INDEX: 24.11 KG/M2 | HEART RATE: 86 BPM | WEIGHT: 168.4 LBS | HEIGHT: 70 IN | OXYGEN SATURATION: 97 %

## 2022-10-18 DIAGNOSIS — R05.3 CHRONIC COUGH: ICD-10-CM

## 2022-10-18 DIAGNOSIS — E03.9 HYPOTHYROIDISM, UNSPECIFIED TYPE: ICD-10-CM

## 2022-10-18 DIAGNOSIS — E78.00 PURE HYPERCHOLESTEROLEMIA: ICD-10-CM

## 2022-10-18 DIAGNOSIS — J44.9 CHRONIC OBSTRUCTIVE PULMONARY DISEASE, UNSPECIFIED COPD TYPE (HCC): Primary | ICD-10-CM

## 2022-10-18 DIAGNOSIS — K21.9 GASTROESOPHAGEAL REFLUX DISEASE, UNSPECIFIED WHETHER ESOPHAGITIS PRESENT: ICD-10-CM

## 2022-10-18 DIAGNOSIS — M54.16 LUMBAR RADICULOPATHY: ICD-10-CM

## 2022-10-18 DIAGNOSIS — F33.1 MODERATE EPISODE OF RECURRENT MAJOR DEPRESSIVE DISORDER (HCC): ICD-10-CM

## 2022-10-18 DIAGNOSIS — J30.2 OTHER SEASONAL ALLERGIC RHINITIS: ICD-10-CM

## 2022-10-18 DIAGNOSIS — F41.9 ANXIETY: ICD-10-CM

## 2022-10-18 DIAGNOSIS — C34.31 MALIGNANT NEOPLASM OF LOWER LOBE OF RIGHT LUNG (HCC): ICD-10-CM

## 2022-10-18 DIAGNOSIS — I10 ESSENTIAL HYPERTENSION: ICD-10-CM

## 2022-10-18 PROCEDURE — 1123F ACP DISCUSS/DSCN MKR DOCD: CPT | Performed by: FAMILY MEDICINE

## 2022-10-18 PROCEDURE — 99214 OFFICE O/P EST MOD 30 MIN: CPT | Performed by: FAMILY MEDICINE

## 2022-10-18 PROCEDURE — 3017F COLORECTAL CA SCREEN DOC REV: CPT | Performed by: FAMILY MEDICINE

## 2022-10-18 PROCEDURE — G8484 FLU IMMUNIZE NO ADMIN: HCPCS | Performed by: FAMILY MEDICINE

## 2022-10-18 PROCEDURE — 1036F TOBACCO NON-USER: CPT | Performed by: FAMILY MEDICINE

## 2022-10-18 PROCEDURE — 3023F SPIROM DOC REV: CPT | Performed by: FAMILY MEDICINE

## 2022-10-18 PROCEDURE — G8427 DOCREV CUR MEDS BY ELIG CLIN: HCPCS | Performed by: FAMILY MEDICINE

## 2022-10-18 PROCEDURE — 1090F PRES/ABSN URINE INCON ASSESS: CPT | Performed by: FAMILY MEDICINE

## 2022-10-18 PROCEDURE — G8399 PT W/DXA RESULTS DOCUMENT: HCPCS | Performed by: FAMILY MEDICINE

## 2022-10-18 PROCEDURE — G8420 CALC BMI NORM PARAMETERS: HCPCS | Performed by: FAMILY MEDICINE

## 2022-10-18 RX ORDER — TIZANIDINE 4 MG/1
4 TABLET ORAL DAILY PRN
Qty: 90 TABLET | Refills: 3 | Status: SHIPPED | OUTPATIENT
Start: 2022-10-18 | End: 2022-10-27 | Stop reason: SDUPTHER

## 2022-10-18 RX ORDER — MONTELUKAST SODIUM 10 MG/1
10 TABLET ORAL NIGHTLY
Qty: 90 TABLET | Refills: 3 | Status: SHIPPED | OUTPATIENT
Start: 2022-10-18

## 2022-10-18 RX ORDER — AMLODIPINE BESYLATE 5 MG/1
5 TABLET ORAL DAILY
Qty: 90 TABLET | Refills: 3 | Status: SHIPPED | OUTPATIENT
Start: 2022-10-18

## 2022-10-18 RX ORDER — HYDRALAZINE HYDROCHLORIDE 50 MG/1
50 TABLET, FILM COATED ORAL EVERY 8 HOURS SCHEDULED
Qty: 270 TABLET | Refills: 3 | Status: SHIPPED | OUTPATIENT
Start: 2022-10-18

## 2022-10-18 RX ORDER — OMEPRAZOLE 20 MG/1
20 CAPSULE, DELAYED RELEASE ORAL DAILY
Qty: 90 CAPSULE | Refills: 3 | Status: SHIPPED | OUTPATIENT
Start: 2022-10-18

## 2022-10-18 RX ORDER — BENZONATATE 100 MG/1
100 CAPSULE ORAL 2 TIMES DAILY PRN
Qty: 60 CAPSULE | Refills: 3 | Status: SHIPPED | OUTPATIENT
Start: 2022-10-18

## 2022-10-18 RX ORDER — EZETIMIBE 10 MG/1
10 TABLET ORAL DAILY
Qty: 90 TABLET | Refills: 3 | Status: SHIPPED | OUTPATIENT
Start: 2022-10-18

## 2022-10-18 RX ORDER — ESCITALOPRAM OXALATE 10 MG/1
10 TABLET ORAL DAILY
Qty: 90 TABLET | Refills: 3 | Status: SHIPPED | OUTPATIENT
Start: 2022-10-18
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
## 2022-10-18 NOTE — PROGRESS NOTES
Patient is asking for an updated Rx for [employer].   She takes it twice a day as needed for issues status post lung cancer and lobe removal.    Will have CT scan of lungs soon for oncologist.

## 2022-10-26 ENCOUNTER — PATIENT MESSAGE (OUTPATIENT)
Dept: FAMILY MEDICINE CLINIC | Age: 72
End: 2022-10-26

## 2022-10-26 DIAGNOSIS — M54.16 LUMBAR RADICULOPATHY: ICD-10-CM

## 2022-10-26 NOTE — TELEPHONE ENCOUNTER
From: Umberto Bronson  To: Dr. Jose Huerta: 10/26/2022 12:13 PM EDT  Subject: Pharmacy follow-up    Hi, Dr Gil Come. Would someone please call [facility] about my tizanidine script? They are saying there is a conflict in the directions and are waiting to hear from your office to fill it. Their number is [phone]. Thanks!

## 2022-10-27 DIAGNOSIS — C34.91 ADENOCARCINOMA OF RIGHT LUNG (HCC): Primary | ICD-10-CM

## 2022-10-27 RX ORDER — TIZANIDINE 4 MG/1
4 TABLET ORAL DAILY PRN
Qty: 90 TABLET | Refills: 3 | Status: SHIPPED | OUTPATIENT
Start: 2022-10-27

## 2022-10-31 DIAGNOSIS — C34.91 ADENOCARCINOMA OF RIGHT LUNG (HCC): ICD-10-CM

## 2022-10-31 LAB
ALBUMIN SERPL-MCNC: 4.4 G/DL (ref 3.5–5.2)
ALP BLD-CCNC: 86 U/L (ref 35–104)
ALT SERPL-CCNC: 24 U/L (ref 0–32)
ANION GAP SERPL CALCULATED.3IONS-SCNC: 12 MMOL/L (ref 7–16)
AST SERPL-CCNC: 27 U/L (ref 0–31)
BASOPHILS ABSOLUTE: 0.04 E9/L (ref 0–0.2)
BASOPHILS RELATIVE PERCENT: 0.7 % (ref 0–2)
BILIRUB SERPL-MCNC: 0.3 MG/DL (ref 0–1.2)
BUN BLDV-MCNC: 13 MG/DL (ref 6–23)
CALCIUM SERPL-MCNC: 10.1 MG/DL (ref 8.6–10.2)
CHLORIDE BLD-SCNC: 101 MMOL/L (ref 98–107)
CO2: 24 MMOL/L (ref 22–29)
CREAT SERPL-MCNC: 0.8 MG/DL (ref 0.5–1)
EOSINOPHILS ABSOLUTE: 0.16 E9/L (ref 0.05–0.5)
EOSINOPHILS RELATIVE PERCENT: 2.8 % (ref 0–6)
GFR SERPL CREATININE-BSD FRML MDRD: >60 ML/MIN/1.73
GLUCOSE BLD-MCNC: 104 MG/DL (ref 74–99)
HCT VFR BLD CALC: 42.2 % (ref 34–48)
HEMOGLOBIN: 14 G/DL (ref 11.5–15.5)
IMMATURE GRANULOCYTES #: 0.01 E9/L
IMMATURE GRANULOCYTES %: 0.2 % (ref 0–5)
LYMPHOCYTES ABSOLUTE: 2.28 E9/L (ref 1.5–4)
LYMPHOCYTES RELATIVE PERCENT: 39.8 % (ref 20–42)
MCH RBC QN AUTO: 29.2 PG (ref 26–35)
MCHC RBC AUTO-ENTMCNC: 33.2 % (ref 32–34.5)
MCV RBC AUTO: 88.1 FL (ref 80–99.9)
MONOCYTES ABSOLUTE: 0.43 E9/L (ref 0.1–0.95)
MONOCYTES RELATIVE PERCENT: 7.5 % (ref 2–12)
NEUTROPHILS ABSOLUTE: 2.81 E9/L (ref 1.8–7.3)
NEUTROPHILS RELATIVE PERCENT: 49 % (ref 43–80)
PDW BLD-RTO: 12.9 FL (ref 11.5–15)
PLATELET # BLD: 287 E9/L (ref 130–450)
PMV BLD AUTO: 10.5 FL (ref 7–12)
POTASSIUM SERPL-SCNC: 3.9 MMOL/L (ref 3.5–5)
RBC # BLD: 4.79 E12/L (ref 3.5–5.5)
SODIUM BLD-SCNC: 137 MMOL/L (ref 132–146)
TOTAL PROTEIN: 7.3 G/DL (ref 6.4–8.3)
WBC # BLD: 5.7 E9/L (ref 4.5–11.5)

## 2022-11-07 ENCOUNTER — OFFICE VISIT (OUTPATIENT)
Dept: FAMILY MEDICINE CLINIC | Age: 72
End: 2022-11-07
Payer: MEDICARE

## 2022-11-07 DIAGNOSIS — F33.1 MODERATE EPISODE OF RECURRENT MAJOR DEPRESSIVE DISORDER (HCC): Primary | ICD-10-CM

## 2022-11-07 PROCEDURE — 1123F ACP DISCUSS/DSCN MKR DOCD: CPT | Performed by: SOCIAL WORKER

## 2022-11-07 PROCEDURE — 90834 PSYTX W PT 45 MINUTES: CPT | Performed by: SOCIAL WORKER

## 2022-11-07 PROCEDURE — 1036F TOBACCO NON-USER: CPT | Performed by: SOCIAL WORKER

## 2022-11-07 NOTE — PROGRESS NOTES
[address] [facility]  Feroz Vincent, MSW, LISW-S, OSW-C       Visit Date: 11/7/2022   Time of appointment:  100PM   Time spent with Patient:  45 minutes. This is patient's fifth appointment. Reason for Consult:  Depression     Referring Provider/PCP:    Stefania Clark MD      Pt provided informed consent for the behavioral health program. Discussed with patient model of service to include the limits of confidentiality (i.e. abuse reporting, suicide intervention, etc.) and short-term intervention focused approach. Pt indicated understanding. Oliver Brown is a 81 y.o. female who presents for follow up of depression, with recent event leading to remembrance of prior traumatic events    Previous Recommendations: initial assessment completed, referral to CPST specialist for grandson; cathartic engagement; mood monitoring        MENTAL STATUS EXAM  Mood was euthymic with calm affect. Suicidal ideation was denied. Homicidal ideation was denied. Hygiene was good . Dress was appropriate. Behavior was Within Normal Limits with No observation or self-report of difficulties ambulating. Attitude was Cooperative. Eye-contact was fair. Speech: rate - WNL, rhythm - WNL, volume - WNL. Verbalizations were coherent. Thought processes were intact and goal-oriented without evidence of delusions, hallucinations, obsessions, or tangela; without significant cognitive distortions. Associations were characterized by intact cognitive processes. Pt was oriented to person, place, time, and general circumstances;  recent:  good and remote:  good. Insight and judgment were estimated to be good, AEB, a good  understanding of cyclical maladaptive patterns, and the ability to use insight to inform behavior change. ASSESSMENT  Eros Mora presented to the appointment today for evaluation and treatment of symptoms of depression.  Pt recently returned home from a trip where she was able to spend time with her daughter in [place]. Explored impact on depression and overall impact on mood. She notes that she did not experience depressive symptoms while there. Processed this with her and ways to utilize these findings to incorporate into every day life. Pt anticipates upcoming stress as she is pursuing guardianship of her 21year old autistic grandson. Discussed ways to manage stress during this time to prevent mood disruption. She is currently deemed no risk to herself or others and meets criteria for major depressive disorder, mild. Recommend follow up in 2 weeks. Angelic Braxton was in agreement with recommendations. PHQ Scores 8/11/2022 3/21/2022 3/20/2021 9/24/2020 7/1/2019 8/16/2018 5/21/2018   PHQ2 Score 0 0 0 1 2 0 0   PHQ9 Score 5 0 0 1 2 0 0     Interpretation of Total Score Depression Severity: 1-4 = Minimal depression, 5-9 = Mild depression, 10-14 = Moderate depression, 15-19 = Moderately severe depression, 20-27 = Severe depression    How often pt has had thoughts of death or hurting self (if PHQ positive for depression):       No flowsheet data found. Interpretation of IDALMIS-7 score: 5-9 = mild anxiety, 10-14 = moderate anxiety, 15+ = severe anxiety. Recommend referral to behavioral health for scores 10 or greater.       DIAGNOSIS  Major depressive disorder, moderate      INTERVENTION  Discussed prevalence of  depression for general population, Trained in strategies for increasing balanced thinking, Discussed and set plan for behavioral activation, Provided education, Discussed use of imagery, distractions, relaxation, mood management, communication training, questioning unhelpful thinking, problem-solving, and behavioral activation to manage pain, [place]-setting to identify pt's primary goals for [facility] visit / overall health, Supportive techniques, and Emphasized self-care as important for managing overall health; resourcing techniques, CBT utilized to identify maladaptive thought patterns and identify resiliency       PLAN  Follow up in 2 weeks         ERIC Bhatt, TOIW-S, OSW-C

## 2022-11-08 ENCOUNTER — OFFICE VISIT (OUTPATIENT)
Dept: ONCOLOGY | Age: 72
End: 2022-11-08
Payer: MEDICARE

## 2022-11-08 VITALS
HEIGHT: 70 IN | OXYGEN SATURATION: 96 % | HEART RATE: 84 BPM | BODY MASS INDEX: 23.96 KG/M2 | DIASTOLIC BLOOD PRESSURE: 74 MMHG | SYSTOLIC BLOOD PRESSURE: 155 MMHG | WEIGHT: 167.4 LBS

## 2022-11-08 DIAGNOSIS — C34.91 NON-SMALL CELL CANCER OF RIGHT LUNG (HCC): Primary | ICD-10-CM

## 2022-11-08 DIAGNOSIS — I10 ESSENTIAL HYPERTENSION: ICD-10-CM

## 2022-11-08 PROCEDURE — G8420 CALC BMI NORM PARAMETERS: HCPCS | Performed by: INTERNAL MEDICINE

## 2022-11-08 PROCEDURE — 99213 OFFICE O/P EST LOW 20 MIN: CPT | Performed by: INTERNAL MEDICINE

## 2022-11-08 PROCEDURE — G8399 PT W/DXA RESULTS DOCUMENT: HCPCS | Performed by: INTERNAL MEDICINE

## 2022-11-08 PROCEDURE — 1090F PRES/ABSN URINE INCON ASSESS: CPT | Performed by: INTERNAL MEDICINE

## 2022-11-08 PROCEDURE — 1036F TOBACCO NON-USER: CPT | Performed by: INTERNAL MEDICINE

## 2022-11-08 PROCEDURE — 3074F SYST BP LT 130 MM HG: CPT | Performed by: INTERNAL MEDICINE

## 2022-11-08 PROCEDURE — G8427 DOCREV CUR MEDS BY ELIG CLIN: HCPCS | Performed by: INTERNAL MEDICINE

## 2022-11-08 PROCEDURE — 3078F DIAST BP <80 MM HG: CPT | Performed by: INTERNAL MEDICINE

## 2022-11-08 PROCEDURE — G8484 FLU IMMUNIZE NO ADMIN: HCPCS | Performed by: INTERNAL MEDICINE

## 2022-11-08 PROCEDURE — 3017F COLORECTAL CA SCREEN DOC REV: CPT | Performed by: INTERNAL MEDICINE

## 2022-11-08 PROCEDURE — 1123F ACP DISCUSS/DSCN MKR DOCD: CPT | Performed by: INTERNAL MEDICINE

## 2022-11-08 PROCEDURE — 99213 OFFICE O/P EST LOW 20 MIN: CPT

## 2022-11-08 RX ORDER — AMLODIPINE BESYLATE 5 MG/1
5 TABLET ORAL DAILY
Qty: 90 TABLET | Refills: 0 | Status: SHIPPED
Start: 2022-11-08 | End: 2022-11-08 | Stop reason: SDUPTHER

## 2022-11-08 RX ORDER — AMLODIPINE BESYLATE 10 MG/1
10 TABLET ORAL DAILY
Qty: 90 TABLET | Refills: 1 | Status: SHIPPED | OUTPATIENT
Start: 2022-11-08

## 2022-11-08 RX ORDER — LORATADINE 10 MG/1
10 TABLET ORAL DAILY
Qty: 90 TABLET | Refills: 0 | Status: SHIPPED | OUTPATIENT
Start: 2022-11-08

## 2022-11-08 NOTE — PROGRESS NOTES
Department of [place] [id number]     Attending Clinic Note    Reason for Visit: Follow-up on a patient with Hx of RML and RUL Lung Adenocarcinoma. PCP: Mack Dubin, MD    History of Present Illness:  82 y/o  female, former smoker (1 pack/day for 30 years; Quit in 2008), with Hx of Stage IA RML Invasive Lung adenocarcinoma, s/p Bronchoscopy, cervical mediastinoscopy, right thoracotomy, right middle lobectomy, radical lymphadenectomy on 2/13/2008 at [facility] [place] by Dr. Nubia Costello. Pathology demonstrated:   1. LYMPH NODE #1, EXCISION (PART A) INFLAMED THYROID TISSUE. 2. LYMPH NODE R4, #7, R2, #3, LR, #7, R11, EXCISIONS (B-F) BENIGN REACTIVE LYMPH NODES. 3. RIGHT LUNG, MIDDLE LOBE, LOBECTOMY (PART G) ADENOCARCINOMA, MIXED TYPE WITH ACINAR AND BRONCHIOALVEOLAR CELL COMPONENTS.  -CENTRILOBULAR EMPHYSEMA. COMMENT  Tumor Location: Right middle lobe  Tumor Size: 2.3 x 1.8 x 1.0 cm  Vascular Invasion: Absent  Lymphatic Invasion: Absent  Bronchial Margin: Negative  Vascular Margin: Negative  Parenchymal Margins: Negative  Pleura/Soft Tissue Margin: Visceral pleura is negative for neoplasm. Pathologic Stage (AJCC): T1 N0 MX-Stage IA    No Postop RT or chemotherapy required at that time. She was put on surveillance and didn't follow with a medical oncologist;     She was then found to have RUL PET avid lesion in 2013; Flexible bronchoscopy, right redo VATS lobectomy and thoracic lymphadenectomy was performed on 11/04/2013 (at [facility]):  Tumor Location: Right upper lobe  Histologic Type: Invasive Adenocarcinoma  Tumor Size: Greatest dimension: 2 cm  Histologic Grade: G2 Moderately Differentiated  Lymph nodes: All 3 lymph nodes are negative for tumor.   Margins: Margins uninvolved by invasive carcinoma  Distance of invasive carcinoma from nearest margin: 1.9 cm  Specify margin: parenchymal resection margin  Venous/arterial invasion: Absent  Lymphatic invasion: Absent  Additional path findings: low R 4 Lymph node dissection: (0/2) lymph nodes: Negative for tumor. Station 7 lymph node (dissection): (0/1) Negative for tumor    No Postop RT or chemotherapy required at that time. She was kept on surveillance. Re-Staging scans in Nov 2015 noted no evidence of recurrent/metastatic disease. Re-staging scans on 07/01/2016 showed no evidence of recurrent/metastatic disease. Re-staging scans on 12/29/2016 noted no evidence of recurrent/metastatic disease. Re-staging scans on 07/05/2017 noted no evidence of recurrent/metastatic disease. Re-staging scans on 04/05/2018 noted no evidence of recurrent/metastatic disease. Re-staging scans on 10/03/2018 noted no evidence of recurrent/metastatic disease. Re-staging scans on 10/15/2020 noted no evidence of recurrent/metastatic disease. Re-staging scans on 11/04/2021 noted no evidence of recurrent/metastatic disease. Today 11/08/2022; No fever, chills. Fair appetite and energy level. No N.V. No chest pain or SOB    Review of Systems;  CONSTITUTIONAL: No fever, chills. Fair appetite and energy level. ENMT: Eyes: No diplopia; Nose: No epistaxis. Mouth: No sore throat. RESPIRATORY: No hemoptysis, shortness of breath, cough. CARDIOVASCULAR: No chest pain, palpitations. GASTROINTESTINAL: No nausea/vomiting, abdominal pain  GENITOURINARY: No dysuria, urinary frequency, hematuria. NEURO: No syncope, presyncope, headache. Past Medical History:      Diagnosis Date    Arthritis     Symptoms respond to myofascial release.  Not surgical candidate    Asthma     Cancer [facility])     lung cancer- [facility]- 2008, stage 1A, [id number] Stage 1A 2013    Chronic back pain     COPD (chronic obstructive pulmonary disease) ([address]     Emphysema of lung ([address]     GERD (gastroesophageal reflux disease)     Hashimoto's disease 2008    History of blood transfusion     Hyperlipidemia     Hypertension     Hyperthyroidism     Incisional hernia      Medications:  Reviewed and reconciled. Allergies: Allergies   Allergen Reactions    Codeine Hives and Itching     AND CODEINE DERIVATIVES----sob  Pt stated tolerated Dilaudid    Fentanyl Other (See Comments)     Disoriented, confused  Other reaction(s): Mental Status Change    Adhesive Tape      burns skin, reddens skin, blisters    Amlodipine Swelling    Bupropion      hyper--couldn't sit still--jumpy    Cortisone      throat closed up    Dipyridamole Hives     Persantine-CST--sob, palpitations, stress test stopped    Levaquin [Levofloxacin In D5w] Other (See Comments)     Yeast infection     Nortriptyline      for rosacea--caused flare up. stopped    Other      States any steroids make her face swell and flush    Prednisone Other (See Comments)    Tenex [Guanfacine Hcl]      dizziness    Tramadol Hives     sob    Varenicline      Physical Exam:  BP (!) 155/74   Pulse 84   Ht 5' 10\" (1.778 m)   Wt 167 lb 6.4 oz (75.9 kg)   SpO2 96%   BMI 24.02 kg/m²   GENERAL: Alert, oriented x 3, not in acute distress. HEENT: PERRLA; EOMI. Oropharynx clear. LUNGS : No wheezing, crackles or ronchi. CARDIOVASCULAR: Regular rate. No murmurs, rubs or gallops. EXTREMITIES: Without clubbing, cyanosis, or edema. NEUROLOGIC: No focal deficits. ECOG PS 1    Lab Results   Component Value Date    WBC 5.7 10/31/2022    HGB 14.0 10/31/2022    HCT 42.2 10/31/2022    MCV 88.1 10/31/2022     10/31/2022     Lab Results   Component Value Date/Time     10/31/2022 12:01 PM    K 3.9 10/31/2022 12:01 PM    K 4.7 06/02/2022 05:56 AM     10/31/2022 12:01 PM    CO2 24 10/31/2022 12:01 PM    BUN 13 10/31/2022 12:01 PM    CREATININE 0.8 10/31/2022 12:01 PM    GLUCOSE 104 10/31/2022 12:01 PM    CALCIUM 10.1 10/31/2022 12:01 PM     Pathology:  Bronchoscopy, cervical mediastinoscopy, right thoracotomy, right middle lobectomy, radical lymphadenectomy. on 2/13/2008 at [facility]:  1. LYMPH NODE #1, EXCISION (PART A) INFLAMED THYROID TISSUE.   2. LYMPH NODE R4, #7, R2, #3, LR, #7, R11, EXCISIONS (B-F) BENIGN REACTIVE LYMPH NODES. 3. RIGHT LUNG, MIDDLE LOBE, LOBECTOMY (PART G) ADENOCARCINOMA, MIXED TYPE WITH ACINAR AND BRONCHIOALVEOLAR CELL COMPONENTS.  -CENTRILOBULAR EMPHYSEMA. COMMENT  Tumor Location: Right middle lobe  Tumor Size: 2.3 x 1.8 x 1.0 cm  Vascular Invasion: Absent  Lymphatic Invasion: Absent  Bronchial Margin: Negative  Vascular Margin: Negative  Parenchymal Margins: Negative  Pleura/Soft Tissue Margin: Visceral pleura is negative for neoplasm. Pathologic Stage (AJCC): T1 N0 MX-Stage IA    Flexible bronchoscopy, right redo VATS lobectomy and thoracic lymphadenectomy on 11/04/2013 (at [facility]:  Tumor Location: Right upper lobe  Histologic Type: Invasive Adenocarcinoma  Tumor Size: Greatest dimension: 2 cm  Histologic Grade: G2 Moderately Differentiated  Lymph nodes: All 3 lymph nodes are negative for tumor. Margins: Margins uninvolved by invasive carcinoma  Distance of invasive carcinoma from nearest margin: 1.9 cm  Specify margin: parenchymal resection margin  Venous/arterial invasion: Absent  Lymphatic invasion: Absent  Additional path findings: low R 4 Lymph node dissection: (0/2) lymph nodes: Negative for tumor. Station 7 lymph node (dissection): (0/1) Negative for tumor  Pathologic Stage (AJCC): pT1a N0 MX-Stage IA    Impression/Plan:  82 y/o  female former smoker (1 pack/day for 30 years; Quit in 2008), with Hx of Stage IA RML Invasive Lung adenocarcinoma, s/p Bronchoscopy, cervical mediastinoscopy, right thoracotomy, right middle lobectomy, radical lymphadenectomy on 2/13/2008 at [facility] [place] by Dr. China Santos. Pathology demonstrated:   1. LYMPH NODE #1, EXCISION (PART A) INFLAMED THYROID TISSUE. 2. LYMPH NODE R4, #7, R2, #3, LR, #7, R11, EXCISIONS (B-F) BENIGN REACTIVE LYMPH NODES.   3. RIGHT LUNG, MIDDLE LOBE, LOBECTOMY (PART G) ADENOCARCINOMA, MIXED TYPE WITH ACINAR AND BRONCHIOALVEOLAR CELL COMPONENTS.  -CENTRILOBULAR EMPHYSEMA. COMMENT  Tumor Location: Right middle lobe  Tumor Size: 2.3 x 1.8 x 1.0 cm  Vascular Invasion: Absent  Lymphatic Invasion: Absent  Bronchial Margin: Negative  Vascular Margin: Negative  Parenchymal Margins: Negative  Pleura/Soft Tissue Margin: Visceral pleura is negative for neoplasm. Pathologic Stage (AJCC): T1 N0 MX-Stage IA    No Postop RT or chemotherapy required at that time. She was put on surveillance and didn't follow with a medical oncologist.    She was then found to have RUL PET avid lesion in 2013; Flexible bronchoscopy, right redo VATS lobectomy and thoracic lymphadenectomy was performed on 11/04/2013 (at [facility]):  Tumor Location: Right upper lobe  Histologic Type: Invasive Adenocarcinoma  Tumor Size: Greatest dimension: 2 cm  Histologic Grade: G2 Moderately Differentiated  Lymph nodes: All 3 lymph nodes are negative for tumor. Margins: Margins uninvolved by invasive carcinoma  Distance of invasive carcinoma from nearest margin: 1.9 cm  Specify margin: parenchymal resection margin  Venous/arterial invasion: Absent  Lymphatic invasion: Absent  Additional path findings: low R 4 Lymph node dissection: (0/2) lymph nodes: Negative for tumor. Station 7 lymph node (dissection): (0/1) Negative for tumor    No Postop RT or chemotherapy required at that time. She was kept on surveillance. Re-staging scans on 07/01/2016 showed no evidence of recurrent/metastatic disease. Re-staging scans on 12/29/2016 noted no evidence of recurrent/metastatic disease. Re-staging scans on 07/05/2017 noted no evidence of recurrent/metastatic disease. Re-staging scans on 04/05/2018 noted no evidence of recurrent/metastatic disease. Re-staging scans on 10/03/2018 noted no evidence of recurrent/metastatic disease. Re-staging scans on 10/15/2020 noted no evidence of recurrent/metastatic disease. Re-staging scans on 11/04/2021 noted no evidence of recurrent/metastatic disease.   Re-staging scans on 11/04/2022 noted no evidence of recurrent/metastatic disease. Imaging reviewed. Labs reviewed.    YARITZA    RTC 1 year with prior CT chest      Humbertodane Qiu MD   85/3/3005  Board Certified Medical Oncologist   [id number] [facility]   [facility] Glenda Gilman

## 2022-11-08 NOTE — TELEPHONE ENCOUNTER
Medication Refill Request    LOV 10/18/2022  NOV 1/19/2023    Lab Results   Component Value Date    CREATININE 0.8 10/31/2022

## 2022-11-16 ENCOUNTER — TELEPHONE (OUTPATIENT)
Dept: FAMILY MEDICINE CLINIC | Age: 72
End: 2022-11-16

## 2022-11-16 NOTE — TELEPHONE ENCOUNTER
----- Message from Mela Lorenzo sent at 11/16/2022 12:40 PM EST -----  Subject: Message to Provider    QUESTIONS  Information for Provider? pt was calling for counseling apt   ---------------------------------------------------------------------------  --------------  [id number]  [phone]; OK to leave message on voicemail  ---------------------------------------------------------------------------  --------------  SCRIPT ANSWERS  Relationship to Patient?  Self

## 2022-11-16 NOTE — TELEPHONE ENCOUNTER
Patient presents to clinic today for return OB at 28 weeks and 2 days.  Feeling well.  Denies any vaginal bleeding, loss of fluid, or contractions.  Reports positive fetal movement.  No other questions or concerns.  [place]/CBC/antibody screen today.  Rh negative; rhogam given.  Reviewed s/s PTL.  Return to clinic in 2 weeks, sooner PRN.    Tona Diaz CNM   See below

## 2022-11-21 DIAGNOSIS — E78.00 PURE HYPERCHOLESTEROLEMIA: ICD-10-CM

## 2022-11-21 RX ORDER — EZETIMIBE 10 MG/1
10 TABLET ORAL DAILY
Qty: 90 TABLET | Refills: 0 | Status: SHIPPED | OUTPATIENT
Start: 2022-11-21

## 2022-11-23 ENCOUNTER — OFFICE VISIT (OUTPATIENT)
Dept: FAMILY MEDICINE CLINIC | Age: 72
End: 2022-11-23
Payer: MEDICARE

## 2022-11-23 DIAGNOSIS — F33.1 MODERATE EPISODE OF RECURRENT MAJOR DEPRESSIVE DISORDER (HCC): Primary | ICD-10-CM

## 2022-11-23 PROCEDURE — 1123F ACP DISCUSS/DSCN MKR DOCD: CPT | Performed by: SOCIAL WORKER

## 2022-11-23 PROCEDURE — 1036F TOBACCO NON-USER: CPT | Performed by: SOCIAL WORKER

## 2022-11-23 PROCEDURE — 90832 PSYTX W PT 30 MINUTES: CPT | Performed by: SOCIAL WORKER

## 2022-11-23 NOTE — PROGRESS NOTES
[address] [facility]  Brianne Romero, ERIC, LISW-S, OSW-C       Visit Date: 11/23/2022   Time of appointment:  1200PM   Time spent with Patient:  30 minutes. This is patient's sixth  appointment. Reason for Consult:  Depression     Referring Provider/PCP:    Adeel Troncoso MD      Pt provided informed consent for the behavioral health program. Discussed with patient model of service to include the limits of confidentiality (i.e. abuse reporting, suicide intervention, etc.) and short-term intervention focused approach. Pt indicated understanding. Jacy Kessler is a 81 y.o. female who presents for follow up of depression, with recent event leading to remembrance of prior traumatic events    Previous Recommendations: initial assessment completed, referral to CPST specialist for grandson; cathartic engagement; mood monitoring        MENTAL STATUS EXAM  Mood was euthymic with calm affect. Suicidal ideation was denied. Homicidal ideation was denied. Hygiene was good . Dress was appropriate. Behavior was Within Normal Limits with No observation or self-report of difficulties ambulating. Attitude was Cooperative. Eye-contact was fair. Speech: rate - WNL, rhythm - WNL, volume - WNL. Verbalizations were coherent. Thought processes were intact and goal-oriented without evidence of delusions, hallucinations, obsessions, or tangela; without significant cognitive distortions. Associations were characterized by intact cognitive processes. Pt was oriented to person, place, time, and general circumstances;  recent:  good and remote:  good. Insight and judgment were estimated to be good, AEB, a good  understanding of cyclical maladaptive patterns, and the ability to use insight to inform behavior change. ASSESSMENT  Nereida Olguin presented to the appointment today for evaluation and treatment of symptoms of depression.  Explored moods over past 2 weeks with pt who notes overall improvement in depressive symptoms. Pt continuing to practice assertive communication and feels this has been beneficial in helping to advance plans for guardianship of Kevon Mckay. Pt feeling encouraged and optimistic of this. Explored and modeled ways to delegate additional responsibilities around the home to help alleviate some feelings of depression as well as pt notes that while she was out of town her mood was much improved. She is currently deemed no risk to herself or others and meets criteria for major depressive disorder, mild. Recommend follow up in 2 weeks. Umair Coyle was in agreement with recommendations. PHQ Scores 8/11/2022 3/21/2022 3/20/2021 9/24/2020 7/1/2019 8/16/2018 5/21/2018   PHQ2 Score 0 0 0 1 2 0 0   PHQ9 Score 5 0 0 1 2 0 0     Interpretation of Total Score Depression Severity: 1-4 = Minimal depression, 5-9 = Mild depression, 10-14 = Moderate depression, 15-19 = Moderately severe depression, 20-27 = Severe depression    How often pt has had thoughts of death or hurting self (if PHQ positive for depression):       No flowsheet data found. Interpretation of IDALMIS-7 score: 5-9 = mild anxiety, 10-14 = moderate anxiety, 15+ = severe anxiety. Recommend referral to behavioral health for scores 10 or greater.       DIAGNOSIS  Major depressive disorder, moderate      INTERVENTION  Discussed prevalence of  depression for general population, Trained in strategies for increasing balanced thinking, Discussed and set plan for behavioral activation, Provided education, Discussed use of imagery, distractions, relaxation, mood management, communication training, questioning unhelpful thinking, problem-solving, and behavioral activation to manage pain, [place]-setting to identify pt's primary goals for [facility] visit / overall health, Supportive techniques, and Emphasized self-care as important for managing overall health; resourcing techniques, CBT utilized to identify maladaptive thought patterns and identify resiliency       PLAN  Follow up in 2 weeks         ERIC Ahuja, TOIW-S, OSW-C

## 2022-12-06 PROBLEM — R55 SYNCOPE AND COLLAPSE: Status: ACTIVE | Noted: 2022-07-06

## 2022-12-06 PROBLEM — I16.0 HYPERTENSIVE URGENCY: Status: ACTIVE | Noted: 2022-06-03

## 2022-12-28 ENCOUNTER — TELEPHONE (OUTPATIENT)
Dept: FAMILY MEDICINE CLINIC | Age: 72
End: 2022-12-28

## 2022-12-28 NOTE — TELEPHONE ENCOUNTER
Pt called stating her son and her sons partner were in for [place] and they both tested positive for covid. Pt states she is concerned because of her health history. She is not experiencing any symptoms other then being tired(she thinks from the holiday weekend). She tested herself today and it was negative. She would like to now what to do to prepare if there is anything that comes up concerning covid symptoms.

## 2022-12-29 NOTE — TELEPHONE ENCOUNTER
Can take vitamin D, C, and zinc  Continue to monitor for symptoms  Can be seen here or urgent care for symptoms if needed

## 2022-12-29 NOTE — TELEPHONE ENCOUNTER
Left pt a vm informing her of what to take and to monitor for any symptoms.     Electronically signed by Don Li MA on 12/29/2022 at 8:38 AM

## 2023-01-02 DIAGNOSIS — I10 ESSENTIAL HYPERTENSION: ICD-10-CM

## 2023-01-03 RX ORDER — HYDRALAZINE HYDROCHLORIDE 50 MG/1
50 TABLET, FILM COATED ORAL EVERY 8 HOURS SCHEDULED
Qty: 270 TABLET | Refills: 0 | Status: SHIPPED | OUTPATIENT
Start: 2023-01-03

## 2023-01-09 ENCOUNTER — PATIENT MESSAGE (OUTPATIENT)
Dept: FAMILY MEDICINE CLINIC | Age: 73
End: 2023-01-09

## 2023-01-09 DIAGNOSIS — E03.9 HYPOTHYROIDISM, UNSPECIFIED TYPE: Primary | ICD-10-CM

## 2023-01-09 DIAGNOSIS — M25.561 CHRONIC PAIN OF RIGHT KNEE: ICD-10-CM

## 2023-01-09 DIAGNOSIS — M54.16 LUMBAR RADICULOPATHY: ICD-10-CM

## 2023-01-09 DIAGNOSIS — I10 ESSENTIAL HYPERTENSION: ICD-10-CM

## 2023-01-09 DIAGNOSIS — G89.29 CHRONIC PAIN OF RIGHT KNEE: ICD-10-CM

## 2023-01-09 DIAGNOSIS — M25.551 RIGHT HIP PAIN: ICD-10-CM

## 2023-01-09 DIAGNOSIS — E55.9 VITAMIN D DEFICIENCY: ICD-10-CM

## 2023-01-09 DIAGNOSIS — G89.4 CHRONIC PAIN SYNDROME: ICD-10-CM

## 2023-01-09 DIAGNOSIS — E03.9 HYPOTHYROIDISM, UNSPECIFIED TYPE: ICD-10-CM

## 2023-01-09 LAB
ANION GAP SERPL CALCULATED.3IONS-SCNC: 12 MMOL/L (ref 7–16)
BUN BLDV-MCNC: 14 MG/DL (ref 6–23)
CALCIUM SERPL-MCNC: 10 MG/DL (ref 8.6–10.2)
CHLORIDE BLD-SCNC: 105 MMOL/L (ref 98–107)
CO2: 24 MMOL/L (ref 22–29)
CREAT SERPL-MCNC: 0.8 MG/DL (ref 0.5–1)
GFR SERPL CREATININE-BSD FRML MDRD: >60 ML/MIN/1.73
GLUCOSE BLD-MCNC: 103 MG/DL (ref 74–99)
POTASSIUM SERPL-SCNC: 3.9 MMOL/L (ref 3.5–5)
SODIUM BLD-SCNC: 141 MMOL/L (ref 132–146)
T4 FREE: 2.28 NG/DL (ref 0.93–1.7)
TSH SERPL DL<=0.05 MIU/L-ACNC: 0.19 UIU/ML (ref 0.27–4.2)
VITAMIN D 25-HYDROXY: 32 NG/ML (ref 30–100)

## 2023-01-09 RX ORDER — PREGABALIN 50 MG/1
50 CAPSULE ORAL 2 TIMES DAILY
Qty: 60 CAPSULE | Refills: 0 | Status: SHIPPED
Start: 2023-01-09 | End: 2023-01-11

## 2023-01-09 RX ORDER — TIZANIDINE 4 MG/1
4 TABLET ORAL DAILY PRN
Qty: 90 TABLET | Refills: 0 | Status: SHIPPED | OUTPATIENT
Start: 2023-01-09

## 2023-01-09 RX ORDER — HYDRALAZINE HYDROCHLORIDE 50 MG/1
50 TABLET, FILM COATED ORAL EVERY 8 HOURS SCHEDULED
Qty: 21 TABLET | Refills: 0 | Status: SHIPPED | OUTPATIENT
Start: 2023-01-09

## 2023-01-10 NOTE — TELEPHONE ENCOUNTER
From: Morena hCarles  To: Stephanie Cameron  Sent: 1/9/2023 5:21 PM EST  Subject: Pregabalin script    Hello,  Did you mean to fill this prescription for 30 days (60 capsules)? I have been getting it filled for 90 days (180 capsules for all of last year. Thanks!

## 2023-01-11 RX ORDER — PREGABALIN 50 MG/1
50 CAPSULE ORAL 2 TIMES DAILY
Qty: 180 CAPSULE | Refills: 0 | Status: SHIPPED
Start: 2023-01-11 | End: 2023-01-12 | Stop reason: SDUPTHER

## 2023-01-12 ENCOUNTER — OFFICE VISIT (OUTPATIENT)
Dept: ENDOCRINOLOGY | Age: 73
End: 2023-01-12
Payer: MEDICARE

## 2023-01-12 VITALS
SYSTOLIC BLOOD PRESSURE: 138 MMHG | RESPIRATION RATE: 97 BRPM | HEART RATE: 62 BPM | WEIGHT: 165 LBS | DIASTOLIC BLOOD PRESSURE: 76 MMHG | BODY MASS INDEX: 23.68 KG/M2

## 2023-01-12 DIAGNOSIS — E03.9 HYPOTHYROIDISM, UNSPECIFIED TYPE: ICD-10-CM

## 2023-01-12 PROCEDURE — 1123F ACP DISCUSS/DSCN MKR DOCD: CPT | Performed by: INTERNAL MEDICINE

## 2023-01-12 PROCEDURE — 3017F COLORECTAL CA SCREEN DOC REV: CPT | Performed by: INTERNAL MEDICINE

## 2023-01-12 PROCEDURE — 1036F TOBACCO NON-USER: CPT | Performed by: INTERNAL MEDICINE

## 2023-01-12 PROCEDURE — G8427 DOCREV CUR MEDS BY ELIG CLIN: HCPCS | Performed by: INTERNAL MEDICINE

## 2023-01-12 PROCEDURE — 3078F DIAST BP <80 MM HG: CPT | Performed by: INTERNAL MEDICINE

## 2023-01-12 PROCEDURE — 99214 OFFICE O/P EST MOD 30 MIN: CPT | Performed by: INTERNAL MEDICINE

## 2023-01-12 PROCEDURE — G8484 FLU IMMUNIZE NO ADMIN: HCPCS | Performed by: INTERNAL MEDICINE

## 2023-01-12 PROCEDURE — 3075F SYST BP GE 130 - 139MM HG: CPT | Performed by: INTERNAL MEDICINE

## 2023-01-12 PROCEDURE — 1090F PRES/ABSN URINE INCON ASSESS: CPT | Performed by: INTERNAL MEDICINE

## 2023-01-12 PROCEDURE — G8399 PT W/DXA RESULTS DOCUMENT: HCPCS | Performed by: INTERNAL MEDICINE

## 2023-01-12 PROCEDURE — G8420 CALC BMI NORM PARAMETERS: HCPCS | Performed by: INTERNAL MEDICINE

## 2023-01-12 RX ORDER — PREGABALIN 50 MG/1
50 CAPSULE ORAL 2 TIMES DAILY
Qty: 180 CAPSULE | Refills: 0 | Status: SHIPPED | OUTPATIENT
Start: 2023-01-12 | End: 2023-04-12

## 2023-01-12 RX ORDER — LEVOTHYROXINE SODIUM 100 MCG
TABLET ORAL
Qty: 90 TABLET | Refills: 3 | Status: SHIPPED | OUTPATIENT
Start: 2023-01-12 | End: 2023-01-12 | Stop reason: SDUPTHER

## 2023-01-12 RX ORDER — LEVOTHYROXINE SODIUM 100 MCG
TABLET ORAL
Qty: 90 TABLET | Refills: 3 | Status: SHIPPED | OUTPATIENT
Start: 2023-01-12

## 2023-01-12 NOTE — PROGRESS NOTES
[address] [place] [address] Department of Endocrinology Diabetes and Metabolism   [address] [facility], [address] [place] [address],[address] [id number]   Phone: [phone]  Fax: [phone]    Date of Service: 1/12/2023  Primary Care Physician: Santiago Herring MD.  Provider: Gretchen Donovan MD    Reason for the visit:  Primary Hypothyroidism, Prediabetes vitD deficiency, chronic fatigue     History of Present Illness: The history is provided by the patient. No  was used. Accuracy of the patient data is excellent. Eduarda Phipps is a very pleasant 81 y.o. female with past medical h/o lunch cancer seen seen today for follow up visit   The patient was diagnosed with hypothyroidism in 2008 and was told that she has Hashimoto's thyroid disease   She is currently on Synthroid 100 mcg 1 tab 5 days a week and 1.5 tab on Saturday and Sunday. Patient takes levothyroxine in the morning at empty stomach, wait one hour before eating , avoid multivitamins containing calcium  or iron with it. Lab Results   Component Value Date/Time    TSH 0.186 (L) 01/09/2023 02:01 PM    T4FREE 2.28 (H) 01/09/2023 02:01 PM    FT3 2.6 03/02/2018 03:00 PM     The patient still c/o unexplained weight gain, fatigue, dry skin, brittle fingernails, and brittle hair    She reported positive FH of thyroid disease in her mother and two sisters      Given tiredness and autoimmune thyroid disease, I ordered AM cortisol and was 9.9   Component 9/27/2018   Cortisol 9.90     Regarding MNG   The patient was also diagnosed with thyroid nodule at the same time of hypothyroidism   Thyroid US 8/2017   The right thyroid lobe measures 3.8 x 1.4 x 1.4 cm in size. The left thyroid lobe measures  3.3 x 1.0 x 1.1 cm in size. The isthmus measures 0.1 cm  in thickness. The thyroid gland is heterogeneous. A 0.6 cm small hypoechoic nodule present in the thyroid isthmus on the right. No other suspicious nodules noted.      Repeated thyroid US 6/25/2019  Rt thyroid lobe measures 4.3 cm x 1.2 cm x 1.3 cm. There is heterogeneous echogenicity. No discrete nodule is identified. Isthmus measures 0.2 cm in thickness at the midline. There is a solid hypoechoic nodule with smooth margins to the right of the isthmus measuring 0.6 cm x 0.4 cm x 0.2 cm  Lt thyroid lobe measures 3.1 cm x 1.1 cm x 1.1 cm. There is heterogeneous echogenicity. No discrete nodule is identified    Trevor Zhou denies any new lumps, bumps in her neck, change in her voice, or shortness of breath. No family history of thyroid cancer. No prior history of radiation to head or neck region. PAST MEDICAL HISTORY   Past Medical History:   Diagnosis Date    Arthritis     Symptoms respond to myofascial release. Not surgical candidate    Asthma     Cancer ([facility] [address]     lung cancer- RML- 2008, stage 1A, [id number] Stage 1A 2013    Chronic back pain     COPD (chronic obstructive pulmonary disease) (HCC)     Emphysema of lung (HCC)     GERD (gastroesophageal reflux disease)     Hashimoto's disease 2008    History of blood transfusion     Hyperlipidemia     Hypertension     Hyperthyroidism     Incisional hernia      PAST SURGICAL HISTORY   Past Surgical History:   Procedure Laterality Date    ABDOMEN SURGERY      APPENDECTOMY  1981    CATARACT REMOVAL WITH IMPLANT Bilateral     COLONOSCOPY  6/23/15    polyp and diverticulosis    COLONOSCOPY  6/23/15    colonoscopy    ENDOSCOPY, COLON, DIAGNOSTIC      EYE SURGERY      HERNIA REPAIR N/A 7/14/2021    LAPAROSCOPIC ROBOTIC XI ASSISTED INCISIONAL HERNIA REPAIR WITH MESH performed by Jaquan Geiger MD at [address] Right     X 2    US BREAST NEEDLE BIOPSY RIGHT  10/19/2020    US BREAST NEEDLE BIOPSY RIGHT 10/19/2020 [facility]     SOCIAL HISTORY   Tobacco:   reports that she quit smoking about 15 years ago. Her smoking use included cigarettes. She started smoking about 46 years ago.  She has a 30.00 pack-year smoking history. She has never used smokeless tobacco.  Alcol:   reports current alcohol use. Illicit Drugs:   reports no history of drug use.     FAMILY HISTORY   Family History   Problem Relation Age of Onset    Arthritis Mother     Diabetes Mother     Heart Disease Mother     High Blood Pressure Mother     High Cholesterol Mother     Stroke Mother     Asthma Sister     Cancer Sister 61        lung cancer    High Blood Pressure Sister     High Cholesterol Sister     Substance Abuse Sister     Heart Attack Father         massiv MI    Early Death Brother         car accident    Cancer Sister 50        breast cancer    Other Sister         GERD, diverticulosis, rotator cuff disease, spinal stenosis    Heart Disease Brother         MI    Diabetes Maternal Grandmother     Heart Disease Maternal Grandmother     High Blood Pressure Maternal Grandmother     High Cholesterol Maternal Grandmother     Stroke Maternal Grandmother     Breast Cancer Maternal Aunt 48        breast    Ovarian Cancer Maternal Aunt     Cancer Maternal Aunt 40        ovarian     ALLERGIES AND DRUG REACTIONS   Allergies   Allergen Reactions    Codeine Hives and Itching     AND CODEINE DERIVATIVES----sob  Pt stated tolerated Dilaudid    Fentanyl Other (See Comments)     Disoriented, confused  Other reaction(s): Mental Status Change    Adhesive Tape      burns skin, reddens skin, blisters    Amlodipine Swelling    Bupropion      hyper--couldn't sit still--jumpy    Cortisone      throat closed up    Dipyridamole Hives     Persantine-CST--sob, palpitations, stress test stopped    Levaquin [Levofloxacin In D5w] Other (See Comments)     Yeast infection     Nortriptyline      for rosacea--caused flare up. stopped    Other      States any steroids make her face swell and flush    Prednisone Other (See Comments)    Tenex [Guanfacine Hcl]      dizziness    Tramadol Hives     sob    Varenicline        CURRENT MEDICATIONS     Current Outpatient Medications   Medication Sig Dispense Refill    pregabalin (LYRICA) 50 MG capsule Take 1 capsule by mouth 2 times daily for 90 days. 180 capsule 0    SYNTHROID 100 MCG tablet Take 1 tablet daily 90 tablet 3    tiZANidine (ZANAFLEX) 4 MG tablet Take 1 tablet by mouth daily as needed (muscle spasm) 90 tablet 0    hydrALAZINE (APRESOLINE) 50 MG tablet Take 1 tablet by mouth every 8 hours 21 tablet 0    Umeclidinium-Vilanterol (ANORO ELLIPTA) 62.5-25 MCG/ACT AEPB Inhale 1 puff into the lungs daily 60 each 5    ezetimibe (ZETIA) 10 MG tablet Take 1 tablet by mouth daily 90 tablet 0    amLODIPine (NORVASC) 10 MG tablet Take 1 tablet by mouth daily 90 tablet 1    escitalopram (LEXAPRO) 10 MG tablet Take 1 tablet by mouth daily 90 tablet 3    montelukast (SINGULAIR) 10 MG tablet Take 1 tablet by mouth nightly take 1 tablet by mouth every evening 90 tablet 3    omeprazole (PRILOSEC) 20 MG delayed release capsule Take 1 capsule by mouth Daily take 1 capsule by mouth once daily 90 capsule 3    Coenzyme Q10 (CO Q 10 PO) Take by mouth daily      Biotin w/ Vitamins C & E (HAIR/SKIN/NAILS PO) Take by mouth daily VIT C 90MG  VIT E 5,000MCG  ZINC 8MG  COPPER 1MG      albuterol sulfate HFA (PROVENTIL HFA) 108 (90 Base) MCG/ACT inhaler Inhale 2 puffs into the lungs every 6 hours as needed for Wheezing 18 g 5    loratadine (CLARITIN) 10 MG tablet Take 1 tablet by mouth daily 90 tablet 0    benzonatate (TESSALON) 100 MG capsule Take 1 capsule by mouth 2 times daily as needed for Cough 60 capsule 3    lidocaine (LIDODERM) 5 % apply 3 patches to the affected area daily. Leave on for 12 hours and then off for 12 hours.  90 patch 3    Melatonin 1 MG SUBL Place 2 mg under the tongue nightly       Current Facility-Administered Medications   Medication Dose Route Frequency Provider Last Rate Last Admin    lidocaine 1 % injection 1 mL  1 mL IntraDERmal Once Omar Zaragoza MD           Review of Systems  Constitutional: No fever, no chills, no diaphoresis, no generalized weakness. HEENT: No blurred vision, No sore throat, no ear pain, no hair loss  Neck: denied any neck swelling, difficulty swallowing,   Cadrdiopulomary: No CP, SOB or palpitation, No orthopnea or PND. No cough or wheezing. GI: No N/V/D, no constipation, No abdominal pain, no melena or hematochezia   : Denied any dysuria, hematuria, flank pain, discharge, or incontinence. Skin: denied any rash, ulcer, Hirsute, or hyperpigmentation. MSK: denied any joint deformity, joint pain/swelling, muscle pain, or back pain. Neuro: no numbess, no tingling, no weakness,     OBJECTIVE    /76   Pulse 62   Resp (!) 97   Wt 165 lb (74.8 kg)   BMI 23.68 kg/m²   BP Readings from Last 4 Encounters:   01/12/23 138/76   12/06/22 (!) 140/72   11/08/22 (!) 155/74   10/18/22 136/62     Wt Readings from Last 6 Encounters:   01/12/23 165 lb (74.8 kg)   12/06/22 167 lb 6.4 oz (75.9 kg)   11/08/22 167 lb 6.4 oz (75.9 kg)   10/18/22 168 lb 6.4 oz (76.4 kg)   08/11/22 170 lb 9.6 oz (77.4 kg)   07/18/22 173 lb (78.5 kg)     Physical examination:  General: awake alert, oriented x3  HEENT: normocephalic non traumatic, no exophthalmos   Neck: supple, thyroid tenderness,  Pulm: Clear equal air entry no added sounds  CVS: S1 + S2  Abd: soft lax, no tenderness  Skin: warm, no lesions, no rash.     Neuro: CN intact, sensation decreased bilateral , muscle power normal  Psych: normal mood, and affect    Review of Laboratory Data:  I have reviewed the following:  Lab Results   Component Value Date/Time    WBC 5.7 10/31/2022 12:01 PM    RBC 4.79 10/31/2022 12:01 PM    HGB 14.0 10/31/2022 12:01 PM    HCT 42.2 10/31/2022 12:01 PM    MCV 88.1 10/31/2022 12:01 PM    MCH 29.2 10/31/2022 12:01 PM    MCHC 33.2 10/31/2022 12:01 PM    RDW 12.9 10/31/2022 12:01 PM     10/31/2022 12:01 PM    MPV 10.5 10/31/2022 12:01 PM      Lab Results   Component Value Date/Time     01/09/2023 02:01 PM    K 3.9 01/09/2023 02:01 PM    K 4.7 06/02/2022 05:56 AM    CO2 24 01/09/2023 02:01 PM    BUN 14 01/09/2023 02:01 PM    CALCIUM 10.0 01/09/2023 02:01 PM      Lab Results   Component Value Date/Time    LABA1C 5.8 06/03/2022 05:23 AM    GLUCOSE 103 01/09/2023 02:01 PM    LABCREA 43 02/24/2017 07:30 AM     Lab Results   Component Value Date/Time    TSH 0.186 (L) 01/09/2023 02:01 PM    T4FREE 2.28 (H) 01/09/2023 02:01 PM    FT3 2.6 03/02/2018 03:00 PM     Lab Results   Component Value Date/Time    PTH 44 02/20/2017 08:38 AM     Lab Results   Component Value Date/Time    VITD25 32 01/09/2023 02:01 PM        ASSESSMENT & RECOMMENDATIONS   Benjamin Harden, a 81 y.o.-old female seen in for evaluation of hypothyroidism     Primary hypothyroidism   Will decrease synthroid  dose to 100 mcg daily   Recheck TFT in 6 weeks     Multinodular goiter   Prevalence of thyroid nodule on thyroid ultrasound is 50% and 95 % of these nodules are benign. Given nodule size and lack of ultrasound suspicious features which making the nodule less likely to be malignant, I will continue following with periodic US  Will follow with intermittent US     vitD deficiency   Continue on VitD supplement     Prediabetes  Continue watching diet and continue exercise     I personally reviewed external notes from PCP and other patient's care team providers, and personally interpreted labs associated with the above diagnosis. I also ordered labs to further assess and manage the above addressed medical conditions    Return in about 6 months (around 7/12/2023) for hypothyroidism, VitD deficiency, Multinodular goiter. The above issues were reviewed with the patient who understood and agreed with the plan. Thank you for allowing us to participate in the care of this patient. Please do not hesitate to contact us with any additional questions. Diagnosis Orders   1.  Hypothyroidism, unspecified type  TSH    T4, Free    SYNTHROID 100 MCG tablet          Jacob Abusag MD  Endocrinologist, [facility])   [address] [place] [id number]   Phone: [phone]  Fax: [phone]  ---------------------------------  An electronic signature was used to authenticate this note.  Coty Bonner MD on 1/12/2023 at 11:39 AM

## 2023-01-19 ENCOUNTER — OFFICE VISIT (OUTPATIENT)
Dept: FAMILY MEDICINE CLINIC | Age: 73
End: 2023-01-19

## 2023-01-19 VITALS
BODY MASS INDEX: 23.24 KG/M2 | HEART RATE: 86 BPM | WEIGHT: 162 LBS | OXYGEN SATURATION: 95 % | DIASTOLIC BLOOD PRESSURE: 64 MMHG | TEMPERATURE: 97.3 F | SYSTOLIC BLOOD PRESSURE: 132 MMHG

## 2023-01-19 DIAGNOSIS — B96.89 ACUTE BACTERIAL SINUSITIS: ICD-10-CM

## 2023-01-19 DIAGNOSIS — N63.25 MASS OVERLAPPING MULTIPLE QUADRANTS OF LEFT BREAST: Primary | ICD-10-CM

## 2023-01-19 DIAGNOSIS — J01.90 ACUTE BACTERIAL SINUSITIS: ICD-10-CM

## 2023-01-19 RX ORDER — AMOXICILLIN 875 MG/1
875 TABLET, COATED ORAL 2 TIMES DAILY
Qty: 14 TABLET | Refills: 0 | Status: SHIPPED | OUTPATIENT
Start: 2023-01-19 | End: 2023-01-26

## 2023-01-19 ASSESSMENT — PATIENT HEALTH QUESTIONNAIRE - PHQ9
9. THOUGHTS THAT YOU WOULD BE BETTER OFF DEAD, OR OF HURTING YOURSELF: 0
8. MOVING OR SPEAKING SO SLOWLY THAT OTHER PEOPLE COULD HAVE NOTICED. OR THE OPPOSITE, BEING SO FIGETY OR RESTLESS THAT YOU HAVE BEEN MOVING AROUND A LOT MORE THAN USUAL: 0
6. FEELING BAD ABOUT YOURSELF - OR THAT YOU ARE A FAILURE OR HAVE LET YOURSELF OR YOUR FAMILY DOWN: 0
SUM OF ALL RESPONSES TO PHQ QUESTIONS 1-9: 7
2. FEELING DOWN, DEPRESSED OR HOPELESS: 0
5. POOR APPETITE OR OVEREATING: 0
4. FEELING TIRED OR HAVING LITTLE ENERGY: 3
SUM OF ALL RESPONSES TO PHQ QUESTIONS 1-9: 7
3. TROUBLE FALLING OR STAYING ASLEEP: 3
10. IF YOU CHECKED OFF ANY PROBLEMS, HOW DIFFICULT HAVE THESE PROBLEMS MADE IT FOR YOU TO DO YOUR WORK, TAKE CARE OF THINGS AT HOME, OR GET ALONG WITH OTHER PEOPLE: 1
1. LITTLE INTEREST OR PLEASURE IN DOING THINGS: 0
SUM OF ALL RESPONSES TO PHQ9 QUESTIONS 1 & 2: 0
7. TROUBLE CONCENTRATING ON THINGS, SUCH AS READING THE NEWSPAPER OR WATCHING TELEVISION: 1

## 2023-01-19 ASSESSMENT — LIFESTYLE VARIABLES
HOW OFTEN DO YOU HAVE A DRINK CONTAINING ALCOHOL: 2-4 TIMES A MONTH
HOW MANY STANDARD DRINKS CONTAINING ALCOHOL DO YOU HAVE ON A TYPICAL DAY: 1 OR 2

## 2023-01-19 NOTE — PROGRESS NOTES
CC: Dutch Novak is a 81 y.o. yo female is here for evaluation evaluation for the following acute medical concerns: Breast Mass (Right; hx lung CA adenoma carcinoma)      HPI:    Recent viral illness; exposure to GS who was ill  She has been feeling ill for > 1 week; low grade temp; sinuses hurting bad  Throat has been scratchy; she has tested for covid at home which has been negative  Predominant symptom is fatigue and sinus pressure    R breast mass / tenderness  Felt a new breast lump in the upper center aspect of breast and entire breast and axilla is tender  Last mammo 5/2022 with b/l US for dense breast tissue completed which was wnl; TC 5.9%  She states she saw Dr. Usha Oneal in the past and had breast biopsies which were \"adenoma carcinoma\"      Vitals:   /64 (Site: Left Upper Arm, Position: Sitting, Cuff Size: Medium Adult)   Pulse 86   Temp 97.3 °F (36.3 °C) (Temporal)   Wt 162 lb (73.5 kg)   SpO2 95%   BMI 23.24 kg/m²   Wt Readings from Last 3 Encounters:   01/19/23 162 lb (73.5 kg)   01/12/23 165 lb (74.8 kg)   12/06/22 167 lb 6.4 oz (75.9 kg)       PE:  Constitutional - alert, well appearing, and in no distress  Ears: serrous fluid b/l ; slight opacity  Head / face: sinus pressure  Eyes - extraocular eye movements intact, left eye normal, right eye normal, no conjunctivitis noted  Neck - symmetric, no obvious masses noted  Respiratory- clear to auscultation, no wheezes, rales or rhonchi, symmetric air entry; no increased work of breathing  Cardiovascular - normal rate, regular rhythm, normal S1, S2, no murmurs, rubs, clicks or gallops  Breast: lump palpable 12ockock position about 1cm, of the R breast upper quadrant,  well defined borders; tender during exam; R axilla tender during exam; L breast exam wnl  Extremities - no edema noted  Abdomen - soft, nontender, nondistended  Skin - normal coloration and turgor, no rashes, no suspicious skin lesions noted  Neurological - no obvious CN deficits or focal neurological deficits  Psychiatric - alert, oriented; normal mood, behavior, speech, dress    A / P:     Diagnosis Orders   1. Mass overlapping multiple quadrants of left breast  HAIDER ANGIE DIGITAL DIAGNOSTIC BILATERAL PER PROTOCOL      2.  Acute bacterial sinusitis  amoxicillin (AMOXIL) 875 MG tablet        Treat ABS with amoxil  Diagnostic mammo per protocol  Records from Gyn    RTO: Return in about 2 weeks (around 2/2/2023) for preop eval.        An electronic signature was used to authenticate this note.  ---- Logan Sykes MD on 1/19/2023 at 4:16 PM

## 2023-01-20 DIAGNOSIS — J31.0 CHRONIC RHINITIS: Primary | ICD-10-CM

## 2023-01-20 RX ORDER — AZELASTINE 1 MG/ML
1 SPRAY, METERED NASAL 2 TIMES DAILY
Qty: 60 ML | Refills: 5 | Status: SHIPPED | OUTPATIENT
Start: 2023-01-20

## 2023-01-20 RX ORDER — FLUTICASONE PROPIONATE 50 MCG
1 SPRAY, SUSPENSION (ML) NASAL DAILY
Qty: 32 G | Refills: 5 | Status: SHIPPED | OUTPATIENT
Start: 2023-01-20

## 2023-01-27 ENCOUNTER — TELEPHONE (OUTPATIENT)
Dept: FAMILY MEDICINE CLINIC | Age: 73
End: 2023-01-27

## 2023-01-27 NOTE — TELEPHONE ENCOUNTER
Call placed to pt today for follow up. Spoke with pt's spouse who states pt is currently in meditation. Requested return call at her convenience.  Macey Kaminski, MSW, LISW-S, OSW-C

## 2023-01-27 NOTE — TELEPHONE ENCOUNTER
[facility] received referral in [facility] for a diagnosis of   Call placed to pt today and message left requesting return call at her convenience. Jessica Younger, MSW, LISW-S, OSW-C

## 2023-02-03 ENCOUNTER — HOSPITAL ENCOUNTER (OUTPATIENT)
Dept: GENERAL RADIOLOGY | Age: 73
Discharge: HOME | End: 2023-02-03
Payer: MEDICARE

## 2023-02-03 VITALS — BODY MASS INDEX: 22.19 KG/M2 | HEIGHT: 70 IN | WEIGHT: 155 LBS

## 2023-02-03 DIAGNOSIS — N63.15 MASS OVERLAPPING MULTIPLE QUADRANTS OF RIGHT BREAST: ICD-10-CM

## 2023-02-03 DIAGNOSIS — N63.25 MASS OVERLAPPING MULTIPLE QUADRANTS OF LEFT BREAST: ICD-10-CM

## 2023-02-03 PROCEDURE — G0279 TOMOSYNTHESIS, MAMMO: HCPCS

## 2023-02-03 PROCEDURE — 76642 ULTRASOUND BREAST LIMITED: CPT

## 2023-02-06 DIAGNOSIS — F41.9 ANXIETY: ICD-10-CM

## 2023-02-06 DIAGNOSIS — I10 ESSENTIAL HYPERTENSION: ICD-10-CM

## 2023-02-06 DIAGNOSIS — E78.00 PURE HYPERCHOLESTEROLEMIA: ICD-10-CM

## 2023-02-06 RX ORDER — AMLODIPINE BESYLATE 10 MG/1
10 TABLET ORAL DAILY
Qty: 90 TABLET | Refills: 0 | Status: SHIPPED | OUTPATIENT
Start: 2023-02-06 | End: 2023-02-08 | Stop reason: SDUPTHER

## 2023-02-06 RX ORDER — EZETIMIBE 10 MG/1
10 TABLET ORAL DAILY
Qty: 90 TABLET | Refills: 0 | Status: SHIPPED | OUTPATIENT
Start: 2023-02-06

## 2023-02-06 RX ORDER — LORATADINE 10 MG/1
10 TABLET ORAL DAILY
Qty: 90 TABLET | Refills: 0 | Status: SHIPPED | OUTPATIENT
Start: 2023-02-06

## 2023-02-06 RX ORDER — ESCITALOPRAM OXALATE 10 MG/1
10 TABLET ORAL DAILY
Qty: 90 TABLET | Refills: 0 | Status: SHIPPED | OUTPATIENT
Start: 2023-02-06 | End: 2023-02-08 | Stop reason: SDUPTHER

## 2023-02-06 NOTE — TELEPHONE ENCOUNTER
Left vm informing pt scripts were sent if a pharmacy change is needed to give us a call.     Electronically signed by Mac Burrows MA on 2/6/2023 at 4:17 PM

## 2023-02-06 NOTE — TELEPHONE ENCOUNTER
Medication Refill Request    LOV 1/19/2023  NOV 2/6/2023    Lab Results   Component Value Date    CREATININE 0.8 01/09/2023

## 2023-02-06 NOTE — TELEPHONE ENCOUNTER
Medication Refill Request    LOV 1/19/2023  NOV 2/16/2023    Lab Results   Component Value Date    CREATININE 0.8 01/09/2023

## 2023-02-07 SDOH — ECONOMIC STABILITY: HOUSING INSECURITY
IN THE LAST 12 MONTHS, WAS THERE A TIME WHEN YOU DID NOT HAVE A STEADY PLACE TO SLEEP OR SLEPT IN A SHELTER (INCLUDING NOW)?: NO

## 2023-02-07 SDOH — ECONOMIC STABILITY: INCOME INSECURITY: HOW HARD IS IT FOR YOU TO PAY FOR THE VERY BASICS LIKE FOOD, HOUSING, MEDICAL CARE, AND HEATING?: NOT HARD AT ALL

## 2023-02-07 SDOH — ECONOMIC STABILITY: FOOD INSECURITY: WITHIN THE PAST 12 MONTHS, THE FOOD YOU BOUGHT JUST DIDN'T LAST AND YOU DIDN'T HAVE MONEY TO GET MORE.: NEVER TRUE

## 2023-02-07 SDOH — ECONOMIC STABILITY: FOOD INSECURITY: WITHIN THE PAST 12 MONTHS, YOU WORRIED THAT YOUR FOOD WOULD RUN OUT BEFORE YOU GOT MONEY TO BUY MORE.: NEVER TRUE

## 2023-02-08 ENCOUNTER — OFFICE VISIT (OUTPATIENT)
Dept: FAMILY MEDICINE CLINIC | Age: 73
End: 2023-02-08
Payer: MEDICARE

## 2023-02-08 ENCOUNTER — PATIENT MESSAGE (OUTPATIENT)
Dept: FAMILY MEDICINE CLINIC | Age: 73
End: 2023-02-08

## 2023-02-08 DIAGNOSIS — F41.9 ANXIETY: ICD-10-CM

## 2023-02-08 DIAGNOSIS — G95.9 CERVICAL MYELOPATHY (HCC): ICD-10-CM

## 2023-02-08 DIAGNOSIS — R56.9 SEIZURE (HCC): ICD-10-CM

## 2023-02-08 DIAGNOSIS — F33.1 MODERATE EPISODE OF RECURRENT MAJOR DEPRESSIVE DISORDER (HCC): ICD-10-CM

## 2023-02-08 DIAGNOSIS — F41.9 ANXIETY: Primary | ICD-10-CM

## 2023-02-08 DIAGNOSIS — I10 ESSENTIAL HYPERTENSION: ICD-10-CM

## 2023-02-08 PROCEDURE — 1036F TOBACCO NON-USER: CPT | Performed by: SOCIAL WORKER

## 2023-02-08 PROCEDURE — 1123F ACP DISCUSS/DSCN MKR DOCD: CPT | Performed by: SOCIAL WORKER

## 2023-02-08 PROCEDURE — 90834 PSYTX W PT 45 MINUTES: CPT | Performed by: SOCIAL WORKER

## 2023-02-08 RX ORDER — ESCITALOPRAM OXALATE 10 MG/1
10 TABLET ORAL DAILY
Qty: 90 TABLET | Refills: 0 | Status: SHIPPED | OUTPATIENT
Start: 2023-02-08

## 2023-02-08 RX ORDER — AMLODIPINE BESYLATE 10 MG/1
10 TABLET ORAL DAILY
Qty: 90 TABLET | Refills: 0 | Status: SHIPPED | OUTPATIENT
Start: 2023-02-08

## 2023-02-08 NOTE — PROGRESS NOTES
[address] [facility]  Mimi Horowitz, MSW, LISW-S, OSW-C       Visit Date: 2/8/2023   Time of appointment:  1200PM   Time spent with Patient:  40 minutes. This is patient's seventh  appointment. Reason for Consult:  Depression     Referring Provider/PCP:    Vernelle Holter, MD      Pt provided informed consent for the behavioral health program. Discussed with patient model of service to include the limits of confidentiality (i.e. abuse reporting, suicide intervention, etc.) and short-term intervention focused approach. Pt indicated understanding. Penny Whitman is a 81 y.o. female who presents for follow up of depression, with recent event leading to remembrance of prior traumatic events    Previous Recommendations: initial assessment completed, referral to CPST specialist for grandson; cathartic engagement; mood monitoring        MENTAL STATUS EXAM  Mood was euthymic with calm affect. Suicidal ideation was denied. Homicidal ideation was denied. Hygiene was good . Dress was appropriate. Behavior was Within Normal Limits with No observation or self-report of difficulties ambulating. Attitude was Cooperative. Eye-contact was fair. Speech: rate - WNL, rhythm - WNL, volume - WNL. Verbalizations were coherent. Thought processes were intact and goal-oriented without evidence of delusions, hallucinations, obsessions, or tangela; without significant cognitive distortions. Associations were characterized by intact cognitive processes. Pt was oriented to person, place, time, and general circumstances;  recent:  good and remote:  good. Insight and judgment were estimated to be good, AEB, a good  understanding of cyclical maladaptive patterns, and the ability to use insight to inform behavior change. ASSESSMENT  Gisella Iverson presented to the appointment today for evaluation and treatment of symptoms of depression.  Explored moods over past  weeks with pt who notes overall improvement in depressive symptoms. Pt continuing to utilize meditation to help manage anxiety and depression. She notes that she also has been able to allow things to pass freely through her mind that she realizes she has no control over. Discussed importance of self care today which she feels she is regularly getting plus also from her . .  She is currently deemed no risk to herself or others and meets criteria for major depressive disorder, mild. Recommend follow up in 2 weeks. Jayashree Marcano was in agreement with recommendations. PHQ Scores 1/19/2023 8/11/2022 3/21/2022 3/20/2021 9/24/2020 7/1/2019 8/16/2018   PHQ2 Score 0 0 0 0 1 2 0   PHQ9 Score 7 5 0 0 1 2 0     Interpretation of Total Score Depression Severity: 1-4 = Minimal depression, 5-9 = Mild depression, 10-14 = Moderate depression, 15-19 = Moderately severe depression, 20-27 = Severe depression    How often pt has had thoughts of death or hurting self (if PHQ positive for depression):       No flowsheet data found. Interpretation of IDALMIS-7 score: 5-9 = mild anxiety, 10-14 = moderate anxiety, 15+ = severe anxiety. Recommend referral to behavioral health for scores 10 or greater.       DIAGNOSIS  Major depressive disorder, moderate      INTERVENTION  Discussed prevalence of  depression for general population, Trained in strategies for increasing balanced thinking, Discussed and set plan for behavioral activation, Provided education, Discussed use of imagery, distractions, relaxation, mood management, communication training, questioning unhelpful thinking, problem-solving, and behavioral activation to manage pain, [place]-setting to identify pt's primary goals for [facility] visit / overall health, Supportive techniques, and Emphasized self-care as important for managing overall health; resourcing techniques, CBT utilized to identify maladaptive thought patterns and identify resiliency       PLAN  Follow up in 2 weeks         ERIC Santana, FRANCI, CANDIS-C

## 2023-02-08 NOTE — TELEPHONE ENCOUNTER
From: Amanda Lorenzana  To: Dr. Monster Pate: 2/8/2023 10:57 AM EST  Subject: [facility] orders    Hi,  The [facility] orders for the following medications aren't showing as ordered and it's been 2 days.  Could you check on them please?   escitalopram (LEXAPRO) 10 MG tablet   amLODIPine (NORVASC) 10 MG tablet

## 2023-02-13 ENCOUNTER — TELEPHONE (OUTPATIENT)
Dept: FAMILY MEDICINE CLINIC | Age: 73
End: 2023-02-13

## 2023-02-13 NOTE — TELEPHONE ENCOUNTER
Pharmac[facility] from [facility] called and left a vm stating they have never filled Amlodipine for pt before and wanted to confirm if doctor was okay with this? I called pharmacy back and informed them that provider signed fax and gave the okay for this since pt has been on this medication. Pharmacist stated he received this but provider did not check the box that stated this was okay.    I told him  said this will be fine and stated he will go ahead and fill this for pt    Electronically signed by Humera Wells MA on 2/13/2023 at 4:26 PM

## 2023-02-14 NOTE — TELEPHONE ENCOUNTER
Looks like I sent 5mg in the past and someone started 10mg on 2/8 on a medication refill request.   Please call pt and verify and then re-pend for me proper dosing.

## 2023-02-14 NOTE — TELEPHONE ENCOUNTER
Called and left pt a vm to cb to confirm what mg she is taking in Amlodipine.     Electronically signed by Micheal Borrego MA on 2/14/2023 at 8:21 AM

## 2023-02-15 NOTE — TELEPHONE ENCOUNTER
Provider has been informed that another provider increased pt's amlodipine to 10 mg.     Electronically signed by Humera Wells MA on 2/15/2023 at 10:35 AM

## 2023-02-16 ENCOUNTER — OFFICE VISIT (OUTPATIENT)
Dept: FAMILY MEDICINE CLINIC | Age: 73
End: 2023-02-16

## 2023-02-16 VITALS
TEMPERATURE: 97.3 F | OXYGEN SATURATION: 97 % | DIASTOLIC BLOOD PRESSURE: 60 MMHG | SYSTOLIC BLOOD PRESSURE: 136 MMHG | HEART RATE: 74 BPM

## 2023-02-16 DIAGNOSIS — I10 ESSENTIAL HYPERTENSION: ICD-10-CM

## 2023-02-16 DIAGNOSIS — E78.00 PURE HYPERCHOLESTEROLEMIA: ICD-10-CM

## 2023-02-16 DIAGNOSIS — J44.9 CHRONIC OBSTRUCTIVE PULMONARY DISEASE, UNSPECIFIED COPD TYPE (HCC): ICD-10-CM

## 2023-02-16 DIAGNOSIS — N64.4 BREAST PAIN: Primary | ICD-10-CM

## 2023-02-16 DIAGNOSIS — E78.5 HYPERLIPIDEMIA, UNSPECIFIED HYPERLIPIDEMIA TYPE: ICD-10-CM

## 2023-02-16 DIAGNOSIS — C34.2 MALIGNANT NEOPLASM OF MIDDLE LOBE OF RIGHT LUNG (HCC): ICD-10-CM

## 2023-02-16 NOTE — Clinical Note
Breast changes noted by pt She had diagnostic testing, wnl FH of breast and per and family hx of lung ca I am not sure if you can see her to evaluate risk for breast ca and possible provide reassurance and/or testing if warranted. Thank you.

## 2023-02-16 NOTE — PROGRESS NOTES
CC: Bianca Paul is a 81 y.o. yo female is here for evaluation evaluation for the following acute & chronic medical concerns: Results (PFT Study, most recent CT Scans of lungs, mammogram; review records from Dr. Karalee Favre.)      HPI:    R breast mass / tenderness  Felt a new breast lump in the upper center aspect of breast and entire breast and axilla is tender  Last mammo 5/2022 with b/l US for dense breast tissue completed which was wnl; TC 5.9% at that time; previously when she had her lung cancer surgery at [facility] she had biopsy clip placed  Interval hx  We completed diagnostic mammo and diag R US which were normal but did show an oval, hypoechoic mass 7x4mm with biopsy clip consistent with fibroadenoma  She still has pain and tenderness below the breast and it feels like a large lump when squeezing; this is abnormal to her; she has FH of breast ca and lung cancers and ovarian ca  Today she is still concerned for breast changes    HTN - se to hctz (loc, dizzy); benicar (hyperkalemia); lopressor (stopped by cards due to possible sinus pause); she is on hydralazine 50mg q8 hours and norvasc 10mg; slight high ambulatory readings  HLD - ASCVD 15%; hx of elevated CK on statin; currently on zetia; Anxiety / depression  - on lexapro 10mg, controlled; follows with Izabella Mosquera  GERD - on prilosec  Hashimoto's / thyroid nodule - on synthroid 100mcg; follows with Dr. Jacobo Valles  Hx of Lung cancer x2 s/p R upper and middle lobectomy: Follows with Dr. Will Licona; Quite smoking 2007; Diagnosed with lung ca 2008  COPD - on anoro ellipta + albuterol PRN; she has chronic coughing and uses tessalon (from 3x daily down to 2x daily); currently follows with Dr. Deborah Conklin; recent PFTs which we did review today  Chronic pain - Lumbar pain and hip pain; did see PMR; Dr. Tita Chan; controlled on lyrica; I am prescriber  Preventive: Garoer Akashck score of 5.9%;  Heterogeneously dense    PDMP Monitoring:    Last PDMP Shashank as Reviewed:  Review User Review Instant Review Result   Candelaria Anne 10/18/2022  5:48 PM Reviewed PDMP [1]     Last contract: 3/2022  Last urine: 3/2022    Vitals:   /60 (Site: Left Upper Arm, Position: Sitting, Cuff Size: Medium Adult)   Pulse 74   Temp 97.3 °F (36.3 °C) (Temporal)   SpO2 97%   Wt Readings from Last 3 Encounters:   02/03/23 155 lb (70.3 kg)   01/25/23 163 lb (73.9 kg)   01/19/23 162 lb (73.5 kg)       PE:  Constitutional - alert, well appearing, and in no distress  Eyes - extraocular eye movements intact, left eye normal, right eye normal, no conjunctivitis noted  Neck - symmetric, no obvious masses noted  Respiratory- clear to auscultation, no wheezes, rales or rhonchi, symmetric air entry; no increased work of breathing  Cardiovascular - normal rate, regular rhythm, normal S1, S2, no murmurs, rubs, clicks or gallops  Extremities - no edema noted  Abdomen - soft, nontender, nondistended; umbilical hernia  Skin - normal coloration and turgor, no rashes, no suspicious skin lesions noted      A / P:     Diagnosis Orders   1. Breast pain  Lenka Ford NP, Breast Care, [facility])      2. Essential hypertension  CBC with Auto Differential    Comprehensive Metabolic Panel    Lipid Panel    Urinalysis      3. Pure hypercholesterolemia  Lipid Panel      4. Chronic obstructive pulmonary disease, unspecified COPD type ([address]        5. Hyperlipidemia, unspecified hyperlipidemia type        6. Malignant neoplasm of middle lobe of right lung (HCC)              Continue norvasc 10mg  Continue with hydralazine  I do provide 3 month lyrica refills so she can [facility] at [facility]  Continue to wean / dc the tessalon to 1x per day as able  Continue to f/u with Dr. Mitzi Mccray to f[facility]u with [facility]  Referral to  for breast changes      RTO: Return in about 4 months (around 6/16/2023) for chronic disease / routine f/u.         An electronic signature was used to authenticate this note.  ---- Coreen Chawla MD on 2/16/2023 at 2:38 PM      On this date 2/16/2023 I have spent 45 minutes reviewing previous notes, test results and face to face with the patient discussing the diagnosis and importance of compliance with the treatment plan as well as documenting on the day of the visit.

## 2023-02-20 ENCOUNTER — TELEPHONE (OUTPATIENT)
Dept: BREAST CENTER | Age: 73
End: 2023-02-20

## 2023-02-20 NOTE — TELEPHONE ENCOUNTER
Patient is a new referral to the breast clinic. Left message for patient to return call at [phone] to discuss referral information and schedule an appointment in the breast clinic. Patient has been referred for breast pain. She has a biopsy proven fibroadenoma right breast. Beast imaging done 2/3/2023. Patient has history lung cancer and sees Dr Darra Severe and Dr Tamera Boss. Will await call back to schedule with Ciarra Garcia NP.     Electronically signed by Dustin Marcano RN on 2/20/23 at 12:34 PM EST

## 2023-02-20 NOTE — TELEPHONE ENCOUNTER
Patient was referred by  for right breast fibroadenoma. Patient was scheduled on 3/9/2023 in [facility] office @ 1pm with NP. Patient was instructed to bring a photo ID, insurance card (if applicable), and list of any current medications. Patient verbalized understanding of appointment instructions.         Electronically signed by Liberty Nogueira RN on 2/20/23 at 2:17 PM EST

## 2023-02-23 ENCOUNTER — OFFICE VISIT (OUTPATIENT)
Dept: FAMILY MEDICINE CLINIC | Age: 73
End: 2023-02-23
Payer: MEDICARE

## 2023-02-23 DIAGNOSIS — F41.9 ANXIETY: Primary | ICD-10-CM

## 2023-02-23 PROCEDURE — 1123F ACP DISCUSS/DSCN MKR DOCD: CPT | Performed by: SOCIAL WORKER

## 2023-02-23 PROCEDURE — 90832 PSYTX W PT 30 MINUTES: CPT | Performed by: SOCIAL WORKER

## 2023-02-23 PROCEDURE — 1036F TOBACCO NON-USER: CPT | Performed by: SOCIAL WORKER

## 2023-02-23 NOTE — PROGRESS NOTES
[address] [facility]  Frieda Richey, MSW, LISW-S, OSW-C       Visit Date: 2/23/2023   Time of appointment:  1200PM   Time spent with Patient: 30 minutes. This is patient's eighth appointment. Reason for Consult:  Depression     Referring Provider/PCP:    Levar Mills MD      Pt provided informed consent for the behavioral health program. Discussed with patient model of service to include the limits of confidentiality (i.e. abuse reporting, suicide intervention, etc.) and short-term intervention focused approach. Pt indicated understanding. Catrina Hall is a 81 y.o. female who presents for follow up of depression, with recent event leading to remembrance of prior traumatic events    Previous Recommendations: initial assessment completed, referral to CPST specialist for grandson; cathartic engagement; mood monitoring        MENTAL STATUS EXAM  Mood was euthymic with calm affect. Suicidal ideation was denied. Homicidal ideation was denied. Hygiene was good . Dress was appropriate. Behavior was Within Normal Limits with No observation or self-report of difficulties ambulating. Attitude was Cooperative. Eye-contact was fair. Speech: rate - WNL, rhythm - WNL, volume - WNL. Verbalizations were coherent. Thought processes were intact and goal-oriented without evidence of delusions, hallucinations, obsessions, or tangela; without significant cognitive distortions. Associations were characterized by intact cognitive processes. Pt was oriented to person, place, time, and general circumstances;  recent:  good and remote:  good. Insight and judgment were estimated to be good, AEB, a good  understanding of cyclical maladaptive patterns, and the ability to use insight to inform behavior change. ASSESSMENT  Olive Hsiehs presented to the appointment today for evaluation and treatment of symptoms of depression.  Explored moods over past  weeks with pt who notes overall improvement in depressive symptoms. Pt does admit to some anticipatory anxiety re: upcoming appointment at [facility] due to a mass in her breast. Processed this with her today and provided support as well as taught relaxation technique for anxiety management (555). Additionally provided education on sleep hygiene as pt notes difficulties with sleep. Recommended sleep routine and discussed today along with limiting of blue light. She is currently deemed no risk to herself or others and meets criteria for major depressive disorder, mild.  Recommend follow up in 2 weeks. Nila was in agreement with recommendations.      PHQ Scores 1/19/2023 8/11/2022 3/21/2022 3/20/2021 9/24/2020 7/1/2019 8/16/2018   PHQ2 Score 0 0 0 0 1 2 0   PHQ9 Score 7 5 0 0 1 2 0     Interpretation of Total Score Depression Severity: 1-4 = Minimal depression, 5-9 = Mild depression, 10-14 = Moderate depression, 15-19 = Moderately severe depression, 20-27 = Severe depression    How often pt has had thoughts of death or hurting self (if PHQ positive for depression):       No flowsheet data found.  Interpretation of IDALMIS-7 score: 5-9 = mild anxiety, 10-14 = moderate anxiety, 15+ = severe anxiety. Recommend referral to behavioral health for scores 10 or greater.      DIAGNOSIS  Major depressive disorder, moderate      INTERVENTION  Discussed prevalence of  depression for general population, Trained in strategies for increasing balanced thinking, Discussed and set plan for behavioral activation, Provided education, Discussed use of imagery, distractions, relaxation, mood management, communication training, questioning unhelpful thinking, problem-solving, and behavioral activation to manage pain, [place]-setting to identify pt's primary goals for [facility] visit / overall health, Supportive techniques, and Emphasized self-care as important for managing overall health; resourcing techniques, CBT utilized to identify maladaptive thought  patterns and identify resiliency; provided education on sleep hygiene, modeled breathing and relaxation techniques      PLAN  Follow up in 2 weeks         ERIC Tapia, TOIW-S, OSW-C

## 2023-02-28 DIAGNOSIS — E03.9 HYPOTHYROIDISM, UNSPECIFIED TYPE: ICD-10-CM

## 2023-02-28 LAB — TSH SERPL DL<=0.05 MIU/L-ACNC: 0.18 UIU/ML (ref 0.27–4.2)

## 2023-03-05 ENCOUNTER — TELEPHONE (OUTPATIENT)
Dept: ENDOCRINOLOGY | Age: 73
End: 2023-03-05

## 2023-03-08 ENCOUNTER — OFFICE VISIT (OUTPATIENT)
Dept: FAMILY MEDICINE CLINIC | Age: 73
End: 2023-03-08
Payer: MEDICARE

## 2023-03-08 DIAGNOSIS — F41.9 ANXIETY: Primary | ICD-10-CM

## 2023-03-08 PROCEDURE — 1036F TOBACCO NON-USER: CPT | Performed by: SOCIAL WORKER

## 2023-03-08 PROCEDURE — 90834 PSYTX W PT 45 MINUTES: CPT | Performed by: SOCIAL WORKER

## 2023-03-08 PROCEDURE — 1123F ACP DISCUSS/DSCN MKR DOCD: CPT | Performed by: SOCIAL WORKER

## 2023-03-08 NOTE — PROGRESS NOTES
[address] [facility] [place], MSW, LISW-S, OSW-C       Visit Date: 3/8/2023   Time of appointment:  1200PM   Time spent with Patient: 42 minutes. This is patient's tenth appointment. Reason for Consult:  Depression     Referring Provider/PCP:    Carl Tovar MD      Pt provided informed consent for the behavioral health program. Discussed with patient model of service to include the limits of confidentiality (i.e. abuse reporting, suicide intervention, etc.) and short-term intervention focused approach. Pt indicated understanding. Kai Duron is a 81 y.o. female who presents for follow up of depression, with recent event leading to remembrance of prior traumatic events    Previous Recommendations: initial assessment completed, referral to CPST specialist for grandson; cathartic engagement; mood monitoring        MENTAL STATUS EXAM  Mood was euthymic with calm affect. Suicidal ideation was denied. Homicidal ideation was denied. Hygiene was good . Dress was appropriate. Behavior was Within Normal Limits with No observation or self-report of difficulties ambulating. Attitude was Cooperative. Eye-contact was fair. Speech: rate - WNL, rhythm - WNL, volume - WNL. Verbalizations were coherent. Thought processes were intact and goal-oriented without evidence of delusions, hallucinations, obsessions, or tangela; without significant cognitive distortions. Associations were characterized by intact cognitive processes. Pt was oriented to person, place, time, and general circumstances;  recent:  good and remote:  good. Insight and judgment were estimated to be good, AEB, a good  understanding of cyclical maladaptive patterns, and the ability to use insight to inform behavior change. ASSESSMENT  Mena Field presented to the appointment today for evaluation and treatment of symptoms of depression.  Explored moods over past  weeks with pt who notes continued overall improvement in depressive symptoms. 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 She notes she has been helping her stepdaughter with her recent breakup which has been difficult, but is feeling encouraged that she is doing better than expected. Processed previous weeks with her as well as ability to utilize coping skills and sublimation skills for anxiety and depression and praised overall improvement. . She is currently deemed no risk to herself or others and meets criteria for major depressive disorder, mild. Recommend follow up in 2 weeks. Noreen Felty was in agreement with recommendations. PHQ Scores 1/19/2023 8/11/2022 3/21/2022 3/20/2021 9/24/2020 7/1/2019 8/16/2018   PHQ2 Score 0 0 0 0 1 2 0   PHQ9 Score 7 5 0 0 1 2 0     Interpretation of Total Score Depression Severity: 1-4 = Minimal depression, 5-9 = Mild depression, 10-14 = Moderate depression, 15-19 = Moderately severe depression, 20-27 = Severe depression    How often pt has had thoughts of death or hurting self (if PHQ positive for depression):       No flowsheet data found. Interpretation of IDALMIS-7 score: 5-9 = mild anxiety, 10-14 = moderate anxiety, 15+ = severe anxiety. Recommend referral to behavioral health for scores 10 or greater.       DIAGNOSIS  Major depressive disorder, moderate      INTERVENTION  Discussed prevalence of  depression for general population, Trained in strategies for increasing balanced thinking, Discussed and set plan for behavioral activation, Provided education, Discussed use of imagery, distractions, relaxation, mood management, communication training, questioning unhelpful thinking, problem-solving, and behavioral activation to manage pain, [place]-setting to identify pt's primary goals for [facility] visit / overall health, Supportive techniques, and Emphasized self-care as important for managing overall health; resourcing techniques, CBT utilized to identify maladaptive thought patterns and identify resiliency; provided education on sleep hygiene, modeled breathing and relaxation techniques      PLAN  Follow up in 2 weeks         ERIC Hung, LISW-S, OSW-C

## 2023-03-09 ENCOUNTER — TELEPHONE (OUTPATIENT)
Dept: BREAST CENTER | Age: 73
End: 2023-03-09

## 2023-03-09 NOTE — TELEPHONE ENCOUNTER
Left message with callback number. Patient's appointment will need adjusted so she sees Dr. Ki Aj. We will also need to track down her pathology reports from prior biopsies. We will need to ask patient for this information so we know where to call.

## 2023-03-22 ENCOUNTER — OFFICE VISIT (OUTPATIENT)
Dept: FAMILY MEDICINE CLINIC | Age: 73
End: 2023-03-22
Payer: MEDICARE

## 2023-03-22 DIAGNOSIS — F33.1 MODERATE EPISODE OF RECURRENT MAJOR DEPRESSIVE DISORDER (HCC): Primary | ICD-10-CM

## 2023-03-22 PROCEDURE — 90834 PSYTX W PT 45 MINUTES: CPT | Performed by: SOCIAL WORKER

## 2023-03-22 PROCEDURE — 1036F TOBACCO NON-USER: CPT | Performed by: SOCIAL WORKER

## 2023-03-22 PROCEDURE — 1123F ACP DISCUSS/DSCN MKR DOCD: CPT | Performed by: SOCIAL WORKER

## 2023-03-22 NOTE — PROGRESS NOTES
continued overall improvement in depressive symptoms. She has been able to establish a counselor for her grandson Ginna Felton which has been very helpful to her and he will be seen tomorrow at 2:00PM. She is optimistic about this and behavioral outcomes for her. Explored with her how this would impact the dynamic of her mood as well as ways depression would be alleviated to have additional therapeutic support working with grandson, which pt notes would be beneficial. Overall, pt reports that she has been feeling better and utilizing coping skills through meditation. Discussed consideration of transition to family therapy, which pt will consider after appointment tomorrow. She is currently deemed no risk to herself or others and meets criteria for major depressive disorder, mild. Recommend follow up in 2 weeks. Robert Montano was in agreement with recommendations. PHQ Scores 1/19/2023 8/11/2022 3/21/2022 3/20/2021 9/24/2020 7/1/2019 8/16/2018   PHQ2 Score 0 0 0 0 1 2 0   PHQ9 Score 7 5 0 0 1 2 0     Interpretation of Total Score Depression Severity: 1-4 = Minimal depression, 5-9 = Mild depression, 10-14 = Moderate depression, 15-19 = Moderately severe depression, 20-27 = Severe depression    How often pt has had thoughts of death or hurting self (if PHQ positive for depression):       No flowsheet data found. Interpretation of IDALMIS-7 score: 5-9 = mild anxiety, 10-14 = moderate anxiety, 15+ = severe anxiety. Recommend referral to behavioral health for scores 10 or greater.       DIAGNOSIS  Major depressive disorder, moderate      INTERVENTION  Discussed prevalence of  depression for general population, Trained in strategies for increasing balanced thinking, Discussed and set plan for behavioral activation, Provided education, Discussed use of imagery, distractions, relaxation, mood management, communication training, questioning unhelpful thinking, problem-solving, and behavioral activation to manage pain, [place]-setting to
Alejandra Muhammad RN, BSN, CCM

## 2023-03-24 DIAGNOSIS — M54.16 LUMBAR RADICULOPATHY: ICD-10-CM

## 2023-03-24 RX ORDER — TIZANIDINE 4 MG/1
4 TABLET ORAL DAILY PRN
Qty: 90 TABLET | Refills: 0 | Status: SHIPPED | OUTPATIENT
Start: 2023-03-24

## 2023-03-24 NOTE — TELEPHONE ENCOUNTER
Medication Refill Request    LOV 2/16/2023  NOV 4/4/2023    Lab Results   Component Value Date    CREATININE 0.8 01/09/2023

## 2023-04-02 SDOH — HEALTH STABILITY: PHYSICAL HEALTH: ON AVERAGE, HOW MANY DAYS PER WEEK DO YOU ENGAGE IN MODERATE TO STRENUOUS EXERCISE (LIKE A BRISK WALK)?: 0 DAYS

## 2023-04-02 SDOH — HEALTH STABILITY: PHYSICAL HEALTH: ON AVERAGE, HOW MANY MINUTES DO YOU ENGAGE IN EXERCISE AT THIS LEVEL?: 0 MIN

## 2023-04-02 ASSESSMENT — PATIENT HEALTH QUESTIONNAIRE - PHQ9
SUM OF ALL RESPONSES TO PHQ QUESTIONS 1-9: 0
1. LITTLE INTEREST OR PLEASURE IN DOING THINGS: 0
SUM OF ALL RESPONSES TO PHQ QUESTIONS 1-9: 0
SUM OF ALL RESPONSES TO PHQ QUESTIONS 1-9: 0
SUM OF ALL RESPONSES TO PHQ9 QUESTIONS 1 & 2: 0
2. FEELING DOWN, DEPRESSED OR HOPELESS: 0
SUM OF ALL RESPONSES TO PHQ QUESTIONS 1-9: 0

## 2023-04-02 ASSESSMENT — LIFESTYLE VARIABLES
HOW OFTEN DO YOU HAVE A DRINK CONTAINING ALCOHOL: 2-3 TIMES A WEEK
HOW MANY STANDARD DRINKS CONTAINING ALCOHOL DO YOU HAVE ON A TYPICAL DAY: 1
HOW OFTEN DO YOU HAVE A DRINK CONTAINING ALCOHOL: 4
HOW OFTEN DO YOU HAVE SIX OR MORE DRINKS ON ONE OCCASION: 1
HOW MANY STANDARD DRINKS CONTAINING ALCOHOL DO YOU HAVE ON A TYPICAL DAY: 1 OR 2

## 2023-04-04 ENCOUNTER — OFFICE VISIT (OUTPATIENT)
Dept: FAMILY MEDICINE CLINIC | Age: 73
End: 2023-04-04

## 2023-04-04 ENCOUNTER — TELEPHONE (OUTPATIENT)
Dept: BREAST CENTER | Age: 73
End: 2023-04-04

## 2023-04-04 VITALS
TEMPERATURE: 97 F | WEIGHT: 163 LBS | BODY MASS INDEX: 23.34 KG/M2 | SYSTOLIC BLOOD PRESSURE: 142 MMHG | OXYGEN SATURATION: 98 % | HEART RATE: 83 BPM | HEIGHT: 70 IN | DIASTOLIC BLOOD PRESSURE: 60 MMHG

## 2023-04-04 DIAGNOSIS — G89.4 CHRONIC PAIN SYNDROME: ICD-10-CM

## 2023-04-04 DIAGNOSIS — M25.561 CHRONIC PAIN OF RIGHT KNEE: ICD-10-CM

## 2023-04-04 DIAGNOSIS — G62.9 NEUROPATHY: ICD-10-CM

## 2023-04-04 DIAGNOSIS — Z00.00 MEDICARE ANNUAL WELLNESS VISIT, SUBSEQUENT: Primary | ICD-10-CM

## 2023-04-04 DIAGNOSIS — K21.9 GASTROESOPHAGEAL REFLUX DISEASE, UNSPECIFIED WHETHER ESOPHAGITIS PRESENT: ICD-10-CM

## 2023-04-04 DIAGNOSIS — I10 ESSENTIAL HYPERTENSION: ICD-10-CM

## 2023-04-04 DIAGNOSIS — G89.29 CHRONIC PAIN OF RIGHT KNEE: ICD-10-CM

## 2023-04-04 DIAGNOSIS — M25.551 RIGHT HIP PAIN: ICD-10-CM

## 2023-04-04 DIAGNOSIS — J30.2 OTHER SEASONAL ALLERGIC RHINITIS: ICD-10-CM

## 2023-04-04 DIAGNOSIS — M54.16 LUMBAR RADICULOPATHY: ICD-10-CM

## 2023-04-04 LAB
FOLATE SERPL-MCNC: >20 NG/ML (ref 4.8–24.2)
VIT B12 SERPL-MCNC: 767 PG/ML (ref 211–946)

## 2023-04-04 RX ORDER — PREGABALIN 50 MG/1
50 CAPSULE ORAL 2 TIMES DAILY
Qty: 180 CAPSULE | Refills: 0 | Status: SHIPPED
Start: 2023-04-04 | End: 2023-04-07 | Stop reason: SDUPTHER

## 2023-04-04 NOTE — PROGRESS NOTES
Medicare Annual Wellness Visit    Rik Bailon is here for Medicare AWV    Assessment & Plan   Medicare annual wellness visit, subsequent  Chronic pain syndrome  -     pregabalin (LYRICA) 50 MG capsule; Take 1 capsule by mouth 2 times daily for 90 days. , Disp-180 capsule, R-0Normal  Lumbar radiculopathy  -     pregabalin (LYRICA) 50 MG capsule; Take 1 capsule by mouth 2 times daily for 90 days. , Disp-180 capsule, R-0Normal  Right hip pain  -     pregabalin (LYRICA) 50 MG capsule; Take 1 capsule by mouth 2 times daily for 90 days. , Disp-180 capsule, R-0Normal  Chronic pain of right knee  -     pregabalin (LYRICA) 50 MG capsule; Take 1 capsule by mouth 2 times daily for 90 days. , Disp-180 capsule, R-0Normal  Neuropathy  -     Vitamin B12 & Folate; Future    Recommendations for Preventive Services Due: see orders and patient instructions/AVS.  Recommended screening schedule for the next 5-10 years is provided to the patient in written form: see Patient Instructions/AVS.     Return for Medicare Annual Wellness Visit in 1 year. Subjective   See alternate note    Patient's complete Health Risk Assessment and screening values have been reviewed and are found in Flowsheets. The following problems were reviewed today and where indicated follow up appointments were made and/or referrals ordered.     Positive Risk Factor Screenings with Interventions:                 Weight and Activity:  Physical Activity: Inactive    Days of Exercise per Week: 0 days    Minutes of Exercise per Session: 0 min     On average, how many days per week do you engage in moderate to strenuous exercise (like a brisk walk)?: 0 days  Have you lost any weight without trying in the past 3 months?: (!) Yes  Body mass index: 23.39    Intentional weight loss  Vitals 4/4/2023 2/16/2023 2/3/2023 1/25/2023 1/19/2023 1/12/2023   Weight 163 lb  155 lb 163 lb 162 lb 165 lb     Vitals 12/6/2022   Weight 167 lb 6.4 oz       Inactivity
Chronic pain syndrome  pregabalin (LYRICA) 50 MG capsule      3. Lumbar radiculopathy  pregabalin (LYRICA) 50 MG capsule      4. Right hip pain  pregabalin (LYRICA) 50 MG capsule      5. Chronic pain of right knee  pregabalin (LYRICA) 50 MG capsule      6. Neuropathy  Vitamin B12 & Folate            Supportive care for back flare; call for new or worsening symtpoms  I do provide 3 month lyrica refills so she can fill this at [facility]  I do allow slight permissive htn; she will call with persistent elevated BP  B12 and folate for neuropathic pain of feet  F/u with [facility] for breast concerns      RTO: Return in 3 months (on 7/4/2023) for Medicare Annual Wellness Visit in 1 year, chronic disease / routine f/u.         An electronic signature was used to authenticate this note.  ---- Adeel Troncoso MD on 4/4/2023 at 2:26 PM

## 2023-04-04 NOTE — TELEPHONE ENCOUNTER
Called and left detailed message with call back number regarding her upcoming appointment 4/5/23. Patient to arrive at 130 pm and will see Dr Damien Lopez. Asked patient to call our office and verify she receive this message.     Electronically signed by Rui Da Silva RN on 4/4/23 at 9:18 AM EDT

## 2023-04-04 NOTE — PATIENT INSTRUCTIONS
Learning About Being Active as an Older Adult  Why is being active important as you get older? Being active is one of the best things you can do for your health. And it's never too late to start. Being active--or getting active, if you aren't already--has definite benefits. It can:  Give you more energy,  Keep your mind sharp. Improve balance to reduce your risk of falls. Help you manage chronic illness with fewer medicines. No matter how old you are, how fit you are, or what health problems you have, there is a form of activity that will work for you. And the more physical activity you can do, the better your overall health will be. What kinds of activity can help you stay healthy? Being more active will make your daily activities easier. Physical activity includes planned exercise and things you do in daily life. There are four types of activity:  Aerobic. Doing aerobic activity makes your heart and lungs strong. Includes walking, dancing, and gardening. Aim for at least 2½ hours spread throughout the week. It improves your energy and can help you sleep better. Muscle-strengthening. This type of activity can help maintain muscle and strengthen bones. Includes climbing stairs, using resistance bands, and lifting or carrying heavy loads. Aim for at least twice a week. It can help protect the knees and other joints. Stretching. Stretching gives you better range of motion in joints and muscles. Includes upper arm stretches, calf stretches, and gentle yoga. Aim for at least twice a week, preferably after your muscles are warmed up from other activities. It can help you function better in daily life. Balancing. This helps you stay coordinated and have good posture. Includes heel-to-toe walking, gilles chi, and certain types of yoga. Aim for at least 3 days a week. It can reduce your risk of falling.   Even if you have a hard time meeting the recommendations, it's better to be more active

## 2023-04-05 ENCOUNTER — OFFICE VISIT (OUTPATIENT)
Dept: BREAST CENTER | Age: 73
End: 2023-04-05
Payer: MEDICARE

## 2023-04-05 VITALS — HEIGHT: 70 IN | BODY MASS INDEX: 23.34 KG/M2 | WEIGHT: 163 LBS

## 2023-04-05 DIAGNOSIS — J30.2 OTHER SEASONAL ALLERGIC RHINITIS: ICD-10-CM

## 2023-04-05 DIAGNOSIS — K21.9 GASTROESOPHAGEAL REFLUX DISEASE, UNSPECIFIED WHETHER ESOPHAGITIS PRESENT: ICD-10-CM

## 2023-04-05 DIAGNOSIS — I10 ESSENTIAL HYPERTENSION: ICD-10-CM

## 2023-04-05 DIAGNOSIS — D24.1 FIBROADENOMA OF RIGHT BREAST: Primary | ICD-10-CM

## 2023-04-05 PROCEDURE — 99203 OFFICE O/P NEW LOW 30 MIN: CPT | Performed by: SURGERY

## 2023-04-05 PROCEDURE — G8427 DOCREV CUR MEDS BY ELIG CLIN: HCPCS | Performed by: SURGERY

## 2023-04-05 PROCEDURE — 1036F TOBACCO NON-USER: CPT | Performed by: SURGERY

## 2023-04-05 PROCEDURE — G8420 CALC BMI NORM PARAMETERS: HCPCS | Performed by: SURGERY

## 2023-04-05 PROCEDURE — 3017F COLORECTAL CA SCREEN DOC REV: CPT | Performed by: SURGERY

## 2023-04-05 PROCEDURE — G8399 PT W/DXA RESULTS DOCUMENT: HCPCS | Performed by: SURGERY

## 2023-04-05 PROCEDURE — 1090F PRES/ABSN URINE INCON ASSESS: CPT | Performed by: SURGERY

## 2023-04-05 PROCEDURE — 1123F ACP DISCUSS/DSCN MKR DOCD: CPT | Performed by: SURGERY

## 2023-04-05 RX ORDER — HYDRALAZINE HYDROCHLORIDE 50 MG/1
50 TABLET, FILM COATED ORAL EVERY 8 HOURS SCHEDULED
Qty: 21 TABLET | Refills: 0 | Status: SHIPPED | OUTPATIENT
Start: 2023-04-05 | End: 2023-04-05 | Stop reason: SDUPTHER

## 2023-04-05 RX ORDER — HYDRALAZINE HYDROCHLORIDE 50 MG/1
50 TABLET, FILM COATED ORAL EVERY 8 HOURS SCHEDULED
Qty: 21 TABLET | Refills: 0 | Status: SHIPPED | OUTPATIENT
Start: 2023-04-05

## 2023-04-05 RX ORDER — MONTELUKAST SODIUM 10 MG/1
10 TABLET ORAL NIGHTLY
Qty: 90 TABLET | Refills: 0 | Status: SHIPPED | OUTPATIENT
Start: 2023-04-05 | End: 2023-04-05 | Stop reason: SDUPTHER

## 2023-04-05 RX ORDER — OMEPRAZOLE 20 MG/1
20 CAPSULE, DELAYED RELEASE ORAL DAILY
Qty: 90 CAPSULE | Refills: 0 | Status: SHIPPED | OUTPATIENT
Start: 2023-04-05

## 2023-04-05 RX ORDER — PREGABALIN 50 MG/1
50 CAPSULE ORAL 2 TIMES DAILY
Qty: 180 CAPSULE | Refills: 0 | OUTPATIENT
Start: 2023-04-05 | End: 2023-07-04

## 2023-04-05 RX ORDER — MONTELUKAST SODIUM 10 MG/1
10 TABLET ORAL NIGHTLY
Qty: 90 TABLET | Refills: 0 | Status: SHIPPED | OUTPATIENT
Start: 2023-04-05

## 2023-04-05 RX ORDER — OMEPRAZOLE 20 MG/1
20 CAPSULE, DELAYED RELEASE ORAL DAILY
Qty: 90 CAPSULE | Refills: 0 | Status: SHIPPED | OUTPATIENT
Start: 2023-04-05 | End: 2023-04-05 | Stop reason: SDUPTHER

## 2023-04-05 NOTE — TELEPHONE ENCOUNTER
Medication Refill Request    LOV 4/4/2023  NOV Visit date not found    Lab Results   Component Value Date    CREATININE 0.8 01/09/2023

## 2023-04-05 NOTE — TELEPHONE ENCOUNTER
Medication Refill Request    LOV 4/4/2023  NOV Visit date not found    Lab Results   Component Value Date    CREATININE 0.8 01/09/2023     The patient requested refills.

## 2023-04-06 ENCOUNTER — TELEPHONE (OUTPATIENT)
Dept: BREAST CENTER | Age: 73
End: 2023-04-06

## 2023-04-06 NOTE — TELEPHONE ENCOUNTER
Faxed release of records to Dr Ronald Wood office Dermatology -- requested patient records and pathology slides from 2022 to present.

## 2023-04-06 NOTE — PROGRESS NOTES
polyp and diverticulosis    COLONOSCOPY  6/23/15    colonoscopy    ENDOSCOPY, COLON, DIAGNOSTIC      EYE SURGERY      HERNIA REPAIR N/A 7/14/2021    LAPAROSCOPIC ROBOTIC XI ASSISTED INCISIONAL HERNIA REPAIR WITH MESH performed by Carin Melendrez MD at . [address])  [facility] Right     X 2    US BREAST BIOPSY W LOC DEVICE 1ST LESION RIGHT  10/19/2020    US BREAST NEEDLE BIOPSY RIGHT 10/19/2020 [facility]       MEDICATIONS    Current Outpatient Medications:     pregabalin (LYRICA) 50 MG capsule, Take 1 capsule by mouth 2 times daily for 90 days. , Disp: 180 capsule, Rfl: 0    tiZANidine (ZANAFLEX) 4 MG tablet, Take 1 tablet by mouth daily as needed (muscle spasm), Disp: 90 tablet, Rfl: 0    amLODIPine (NORVASC) 10 MG tablet, Take 1 tablet by mouth daily, Disp: 90 tablet, Rfl: 0    escitalopram (LEXAPRO) 10 MG tablet, Take 1 tablet by mouth daily, Disp: 90 tablet, Rfl: 0    loratadine (CLARITIN) 10 MG tablet, Take 1 tablet by mouth daily, Disp: 90 tablet, Rfl: 0    ezetimibe (ZETIA) 10 MG tablet, Take 1 tablet by mouth daily, Disp: 90 tablet, Rfl: 0    azelastine (ASTELIN) 0.1 % nasal spray, 1 spray by Nasal route 2 times daily Use in each nostril as directed, Disp: 60 mL, Rfl: 5    fluticasone (FLONASE) 50 MCG/ACT nasal spray, 1 spray by Each Nostril route daily, Disp: 32 g, Rfl: 5    SYNTHROID 100 MCG tablet, Take 1 tablet daily, Disp: 90 tablet, Rfl: 3    Umeclidinium-Vilanterol (ANORO ELLIPTA) 62.5-25 MCG/ACT AEPB, Inhale 1 puff into the lungs daily (Patient taking differently: Inhale 1 puff into the lungs daily Patient taking as needed now.), Disp: 60 each, Rfl: 5    albuterol sulfate HFA (PROVENTIL HFA) 108 (90 Base) MCG/ACT inhaler, Inhale 2 puffs into the lungs every 6 hours as needed for Wheezing, Disp: 18 g, Rfl: 5    benzonatate (TESSALON) 100 MG capsule, Take 1 capsule by mouth 2 times daily as needed for Cough, Disp: 60 capsule, Rfl: 3

## 2023-04-07 ENCOUNTER — PATIENT MESSAGE (OUTPATIENT)
Dept: FAMILY MEDICINE CLINIC | Age: 73
End: 2023-04-07

## 2023-04-07 ENCOUNTER — TELEPHONE (OUTPATIENT)
Dept: SURGERY | Age: 73
End: 2023-04-07

## 2023-04-07 DIAGNOSIS — I10 ESSENTIAL HYPERTENSION: ICD-10-CM

## 2023-04-07 DIAGNOSIS — M25.551 RIGHT HIP PAIN: ICD-10-CM

## 2023-04-07 DIAGNOSIS — G89.29 CHRONIC PAIN OF RIGHT KNEE: ICD-10-CM

## 2023-04-07 DIAGNOSIS — M54.16 LUMBAR RADICULOPATHY: ICD-10-CM

## 2023-04-07 DIAGNOSIS — G89.4 CHRONIC PAIN SYNDROME: ICD-10-CM

## 2023-04-07 DIAGNOSIS — M25.561 CHRONIC PAIN OF RIGHT KNEE: ICD-10-CM

## 2023-04-07 RX ORDER — HYDRALAZINE HYDROCHLORIDE 50 MG/1
50 TABLET, FILM COATED ORAL EVERY 8 HOURS SCHEDULED
Qty: 270 TABLET | Refills: 0 | Status: SHIPPED | OUTPATIENT
Start: 2023-04-07

## 2023-04-07 RX ORDER — PREGABALIN 50 MG/1
50 CAPSULE ORAL 2 TIMES DAILY
Qty: 180 CAPSULE | Refills: 0 | Status: SHIPPED | OUTPATIENT
Start: 2023-04-07 | End: 2023-07-06

## 2023-04-07 NOTE — TELEPHONE ENCOUNTER
From: Allie Ramirez  To: Dr. Nery Harmon  Sent: 4/7/2023 1:51 PM EDT  Subject: Prescriptions    Hi Dr. Stephanie Meyer! You said to contact you if the prescription for Pregabalin needed to be filled somewhere other than [facility]. It needs to be filled at [facility] [place] on [facility]. My  submitted a refill request for [facility], but it was denied. Also, the refill for hydralazine at [facility] was only for 7 days (21 tablets) and should have been for 90 days (270 tablets). Thanks!

## 2023-04-25 ENCOUNTER — OFFICE VISIT (OUTPATIENT)
Dept: FAMILY MEDICINE CLINIC | Age: 73
End: 2023-04-25
Payer: MEDICARE

## 2023-04-25 DIAGNOSIS — F33.1 MODERATE EPISODE OF RECURRENT MAJOR DEPRESSIVE DISORDER (HCC): Primary | ICD-10-CM

## 2023-04-25 DIAGNOSIS — G89.29 CHRONIC PAIN OF RIGHT KNEE: ICD-10-CM

## 2023-04-25 DIAGNOSIS — M25.561 CHRONIC PAIN OF RIGHT KNEE: ICD-10-CM

## 2023-04-25 DIAGNOSIS — G89.4 CHRONIC PAIN SYNDROME: ICD-10-CM

## 2023-04-25 DIAGNOSIS — M25.551 RIGHT HIP PAIN: ICD-10-CM

## 2023-04-25 DIAGNOSIS — M54.16 LUMBAR RADICULOPATHY: ICD-10-CM

## 2023-04-25 PROCEDURE — 1036F TOBACCO NON-USER: CPT | Performed by: SOCIAL WORKER

## 2023-04-25 PROCEDURE — 90834 PSYTX W PT 45 MINUTES: CPT | Performed by: SOCIAL WORKER

## 2023-04-25 PROCEDURE — 1123F ACP DISCUSS/DSCN MKR DOCD: CPT | Performed by: SOCIAL WORKER

## 2023-04-25 RX ORDER — PREGABALIN 50 MG/1
50 CAPSULE ORAL 2 TIMES DAILY
Qty: 180 CAPSULE | Refills: 0 | Status: SHIPPED | OUTPATIENT
Start: 2023-04-25 | End: 2023-07-24

## 2023-04-25 NOTE — PROGRESS NOTES
identify resiliency; provided education on sleep hygiene, modeled breathing and relaxation techniques      PLAN  Follow up in 2 weeks         ERIC Romero, KONRADS

## 2023-05-02 DIAGNOSIS — F41.9 ANXIETY: ICD-10-CM

## 2023-05-02 DIAGNOSIS — I10 ESSENTIAL HYPERTENSION: ICD-10-CM

## 2023-05-02 RX ORDER — AMLODIPINE BESYLATE 10 MG/1
10 TABLET ORAL DAILY
Qty: 90 TABLET | Refills: 0 | Status: SHIPPED | OUTPATIENT
Start: 2023-05-02

## 2023-05-02 RX ORDER — LORATADINE 10 MG/1
10 TABLET ORAL DAILY
Qty: 90 TABLET | Refills: 0 | Status: SHIPPED | OUTPATIENT
Start: 2023-05-02

## 2023-05-02 RX ORDER — ESCITALOPRAM OXALATE 10 MG/1
10 TABLET ORAL DAILY
Qty: 90 TABLET | Refills: 0 | Status: SHIPPED | OUTPATIENT
Start: 2023-05-02

## 2023-05-02 NOTE — TELEPHONE ENCOUNTER
Medication Refill Request    LOV 4/4/2023  NOV 5/2/2023    Lab Results   Component Value Date    CREATININE 0.8 01/09/2023

## 2023-05-09 ENCOUNTER — OFFICE VISIT (OUTPATIENT)
Dept: FAMILY MEDICINE CLINIC | Age: 73
End: 2023-05-09
Payer: MEDICARE

## 2023-05-09 DIAGNOSIS — F33.1 MODERATE EPISODE OF RECURRENT MAJOR DEPRESSIVE DISORDER (HCC): Primary | ICD-10-CM

## 2023-05-09 PROCEDURE — 1123F ACP DISCUSS/DSCN MKR DOCD: CPT | Performed by: SOCIAL WORKER

## 2023-05-09 PROCEDURE — 90834 PSYTX W PT 45 MINUTES: CPT | Performed by: SOCIAL WORKER

## 2023-05-09 PROCEDURE — 1036F TOBACCO NON-USER: CPT | Performed by: SOCIAL WORKER

## 2023-05-09 NOTE — PROGRESS NOTES
still utilizing her normal coping skills of meditation, etc, but still finds herself to be falling short, emotionally. Processed these feelings with her as well as validated the normalcy of these feelings during periods of closure (Maximino Reusing is graduating school, step daughter is moving, son recently moved, etc). Solution focused therapy utilized and modeled to identify a way to partialize these overwhelming feelings, including using time limits. Recommend practice and follow up in 2 weeks. .  She is currently deemed no risk to herself or others and meets criteria for major depressive disorder, mild. Recommend follow up in 2 weeks. Kathy Santana was in agreement with recommendations. PHQ Scores 4/2/2023 1/19/2023 8/11/2022 3/21/2022 3/20/2021 9/24/2020 7/1/2019   PHQ2 Score 0 0 0 0 0 1 2   PHQ9 Score 0 7 5 0 0 1 2     Interpretation of Total Score Depression Severity: 1-4 = Minimal depression, 5-9 = Mild depression, 10-14 = Moderate depression, 15-19 = Moderately severe depression, 20-27 = Severe depression    How often pt has had thoughts of death or hurting self (if PHQ positive for depression):       No flowsheet data found. Interpretation of IDALMIS-7 score: 5-9 = mild anxiety, 10-14 = moderate anxiety, 15+ = severe anxiety. Recommend referral to behavioral health for scores 10 or greater.       DIAGNOSIS  Major depressive disorder, moderate      INTERVENTION  Discussed prevalence of  depression for general population, Trained in strategies for increasing balanced thinking, Discussed and set plan for behavioral activation, Provided education, Discussed use of imagery, distractions, relaxation, mood management, communication training, questioning unhelpful thinking, problem-solving, and behavioral activation to manage pain, [place]-setting to identify pt's primary goals for [facility] visit / overall health, Supportive techniques, and Emphasized self-care as important for managing overall health; resourcing techniques, CBT

## 2023-05-11 ENCOUNTER — TELEPHONE (OUTPATIENT)
Dept: BREAST CENTER | Age: 73
End: 2023-05-11

## 2023-05-12 ENCOUNTER — HOSPITAL ENCOUNTER (OUTPATIENT)
Age: 73
Discharge: HOME | End: 2023-05-14
Payer: MEDICARE

## 2023-05-22 DIAGNOSIS — E78.00 PURE HYPERCHOLESTEROLEMIA: ICD-10-CM

## 2023-05-22 DIAGNOSIS — I10 ESSENTIAL HYPERTENSION: ICD-10-CM

## 2023-05-22 LAB
ALBUMIN SERPL-MCNC: 4.3 G/DL (ref 3.5–5.2)
ALP SERPL-CCNC: 89 U/L (ref 35–104)
ALT SERPL-CCNC: 22 U/L (ref 0–32)
ANION GAP SERPL CALCULATED.3IONS-SCNC: 12 MMOL/L (ref 7–16)
AST SERPL-CCNC: 32 U/L (ref 0–31)
BASOPHILS # BLD: 0.05 E9/L (ref 0–0.2)
BASOPHILS NFR BLD: 0.9 % (ref 0–2)
BILIRUB SERPL-MCNC: 0.3 MG/DL (ref 0–1.2)
BUN SERPL-MCNC: 12 MG/DL (ref 6–23)
CALCIUM SERPL-MCNC: 9.5 MG/DL (ref 8.6–10.2)
CHLORIDE SERPL-SCNC: 99 MMOL/L (ref 98–107)
CHOLESTEROL, TOTAL: 194 MG/DL (ref 0–199)
CO2 SERPL-SCNC: 25 MMOL/L (ref 22–29)
CREAT SERPL-MCNC: 0.8 MG/DL (ref 0.5–1)
EOSINOPHIL # BLD: 0.14 E9/L (ref 0.05–0.5)
EOSINOPHIL NFR BLD: 2.4 % (ref 0–6)
ERYTHROCYTE [DISTWIDTH] IN BLOOD BY AUTOMATED COUNT: 14 FL (ref 11.5–15)
GLUCOSE SERPL-MCNC: 102 MG/DL (ref 74–99)
HCT VFR BLD AUTO: 41.8 % (ref 34–48)
HDLC SERPL-MCNC: 66 MG/DL
HGB BLD-MCNC: 13.4 G/DL (ref 11.5–15.5)
IMM GRANULOCYTES # BLD: 0.01 E9/L
IMM GRANULOCYTES NFR BLD: 0.2 % (ref 0–5)
LDLC SERPL CALC-MCNC: 108 MG/DL (ref 0–99)
LYMPHOCYTES # BLD: 1.8 E9/L (ref 1.5–4)
LYMPHOCYTES NFR BLD: 31.4 % (ref 20–42)
MCH RBC QN AUTO: 29.3 PG (ref 26–35)
MCHC RBC AUTO-ENTMCNC: 32.1 % (ref 32–34.5)
MCV RBC AUTO: 91.5 FL (ref 80–99.9)
MONOCYTES # BLD: 0.49 E9/L (ref 0.1–0.95)
MONOCYTES NFR BLD: 8.6 % (ref 2–12)
NEUTROPHILS # BLD: 3.24 E9/L (ref 1.8–7.3)
NEUTS SEG NFR BLD: 56.5 % (ref 43–80)
PLATELET # BLD AUTO: 280 E9/L (ref 130–450)
PMV BLD AUTO: 10.3 FL (ref 7–12)
POTASSIUM SERPL-SCNC: 3.7 MMOL/L (ref 3.5–5)
PROT SERPL-MCNC: 7 G/DL (ref 6.4–8.3)
RBC # BLD AUTO: 4.57 E12/L (ref 3.5–5.5)
SODIUM SERPL-SCNC: 136 MMOL/L (ref 132–146)
TRIGL SERPL-MCNC: 102 MG/DL (ref 0–149)
VLDLC SERPL CALC-MCNC: 20 MG/DL
WBC # BLD: 5.7 E9/L (ref 4.5–11.5)

## 2023-05-23 ENCOUNTER — OFFICE VISIT (OUTPATIENT)
Dept: FAMILY MEDICINE CLINIC | Age: 73
End: 2023-05-23
Payer: MEDICARE

## 2023-05-23 VITALS
TEMPERATURE: 97.2 F | SYSTOLIC BLOOD PRESSURE: 136 MMHG | HEART RATE: 85 BPM | BODY MASS INDEX: 23.79 KG/M2 | WEIGHT: 165.8 LBS | DIASTOLIC BLOOD PRESSURE: 62 MMHG | OXYGEN SATURATION: 96 %

## 2023-05-23 DIAGNOSIS — F33.1 MODERATE EPISODE OF RECURRENT MAJOR DEPRESSIVE DISORDER (HCC): Primary | ICD-10-CM

## 2023-05-23 DIAGNOSIS — M25.50 ARTHRALGIA, UNSPECIFIED JOINT: Primary | ICD-10-CM

## 2023-05-23 DIAGNOSIS — E78.00 PURE HYPERCHOLESTEROLEMIA: ICD-10-CM

## 2023-05-23 DIAGNOSIS — G62.9 NEUROPATHY: ICD-10-CM

## 2023-05-23 PROCEDURE — 3078F DIAST BP <80 MM HG: CPT | Performed by: FAMILY MEDICINE

## 2023-05-23 PROCEDURE — G8399 PT W/DXA RESULTS DOCUMENT: HCPCS | Performed by: FAMILY MEDICINE

## 2023-05-23 PROCEDURE — 90832 PSYTX W PT 30 MINUTES: CPT | Performed by: SOCIAL WORKER

## 2023-05-23 PROCEDURE — 1036F TOBACCO NON-USER: CPT | Performed by: FAMILY MEDICINE

## 2023-05-23 PROCEDURE — 1123F ACP DISCUSS/DSCN MKR DOCD: CPT | Performed by: FAMILY MEDICINE

## 2023-05-23 PROCEDURE — G8427 DOCREV CUR MEDS BY ELIG CLIN: HCPCS | Performed by: FAMILY MEDICINE

## 2023-05-23 PROCEDURE — 3075F SYST BP GE 130 - 139MM HG: CPT | Performed by: FAMILY MEDICINE

## 2023-05-23 PROCEDURE — 1036F TOBACCO NON-USER: CPT | Performed by: SOCIAL WORKER

## 2023-05-23 PROCEDURE — 3017F COLORECTAL CA SCREEN DOC REV: CPT | Performed by: FAMILY MEDICINE

## 2023-05-23 PROCEDURE — 1090F PRES/ABSN URINE INCON ASSESS: CPT | Performed by: FAMILY MEDICINE

## 2023-05-23 PROCEDURE — G8420 CALC BMI NORM PARAMETERS: HCPCS | Performed by: FAMILY MEDICINE

## 2023-05-23 PROCEDURE — 99214 OFFICE O/P EST MOD 30 MIN: CPT | Performed by: FAMILY MEDICINE

## 2023-05-23 PROCEDURE — 1123F ACP DISCUSS/DSCN MKR DOCD: CPT | Performed by: SOCIAL WORKER

## 2023-05-23 RX ORDER — EZETIMIBE 10 MG/1
10 TABLET ORAL DAILY
Qty: 90 TABLET | Refills: 0 | Status: SHIPPED | OUTPATIENT
Start: 2023-05-23 | End: 2023-05-23 | Stop reason: SDUPTHER

## 2023-05-23 RX ORDER — METHYLPREDNISOLONE 4 MG/1
TABLET ORAL
Qty: 1 KIT | Refills: 0 | Status: SHIPPED | OUTPATIENT
Start: 2023-05-23 | End: 2023-05-29

## 2023-05-23 RX ORDER — EZETIMIBE 10 MG/1
10 TABLET ORAL DAILY
Qty: 90 TABLET | Refills: 0 | Status: SHIPPED | OUTPATIENT
Start: 2023-05-23

## 2023-05-23 NOTE — PROGRESS NOTES
[facility]  Michelle Rodriguez, ERIC, LISW-S    Visit Date: 5/23/2023   Time of appointment:  1230pm  Time spent with Patient: 30 minutes. This is patient's 14th appointment. Reason for Consult:  Depression     Referring Provider/PCP:    April Badillo MD      Pt provided informed consent for the behavioral health program. Discussed with patient model of service to include the limits of confidentiality (i.e. abuse reporting, suicide intervention, etc.) and short-term intervention focused approach. Pt indicated understanding. Emily Romeo is a 81 y.o. female who presents for follow up of depression, with recent event leading to remembrance of prior traumatic events    Previous Recommendations: initial assessment completed, referral to CPST specialist for grandson; cathartic engagement; mood monitoring        MENTAL STATUS EXAM  Mood was euthymic with calm affect. Suicidal ideation was denied. Homicidal ideation was denied. Hygiene was good . Dress was appropriate. Behavior was Within Normal Limits with No observation or self-report of difficulties ambulating. Attitude was Cooperative. Eye-contact was fair. Speech: rate - WNL, rhythm - WNL, volume - WNL. Verbalizations were coherent. Thought processes were intact and goal-oriented without evidence of delusions, hallucinations, obsessions, or tangela; without significant cognitive distortions. Associations were characterized by intact cognitive processes. Pt was oriented to person, place, time, and general circumstances;  recent:  good and remote:  good. Insight and judgment were estimated to be good, AEB, a good  understanding of cyclical maladaptive patterns, and the ability to use insight to inform behavior change. ASSESSMENT  Rashid Madden presented to the appointment today for evaluation and treatment of symptoms of depression.  Pt continues to endorse feelings of being overwhelmed during session

## 2023-05-23 NOTE — PROGRESS NOTES
CC: Dougie Wing is a 81 y.o. yo female is here for evaluation evaluation for the following acute on chronic medical concerns: Foot Pain (Burning on the bottoms of feet x a few months.)      HPI:      Neuropathy: burning of the feet b/l; ongoing for 1 month  Interval hx  Did start b12 and folate without improvement  She had burning pain near and about the ankles and down to the feet  She has no restless legs; she failed bandar in the past  She is on lyrica for back pain which does not help her foot pain  She purchased new shoes with no improvement in symptoms  She has recently purchased CBD sleep aid to see if this helps; started about 2 days ago an not quite sure if this is helping sleep or neuropathy at this time; however, she has decreased her use of the muscle relaxer  + generalized pains; joint pains; feels inflammation throughout body  Stressful home situation      Vitals:   /62   Pulse 85   Temp 97.2 °F (36.2 °C) (Temporal)   Wt 165 lb 12.8 oz (75.2 kg)   SpO2 96%   BMI 23.79 kg/m²   Wt Readings from Last 3 Encounters:   05/23/23 165 lb 12.8 oz (75.2 kg)   04/05/23 163 lb (73.9 kg)   04/04/23 163 lb (73.9 kg)       PE:  Constitutional - alert, well appearing, and in no distress  Eyes - extraocular eye movements intact, left eye normal, right eye normal, no conjunctivitis noted  Psychiatric - alert, oriented; normal mood, behavior, speech, dress    A / P:     Diagnosis Orders   1. Arthralgia, unspecified joint  methylPREDNISolone (MEDROL DOSEPACK) 4 MG tablet      2.  Neuropathy            We discussed neurology referral; she will f/u with [facility] where she received care in the past  We will complete a trial of medrol dose pack for generalized inflammation  She can continue b12 and folate for now  we discussed medications  like elavil (previously tried) and doxepin but she prefers to wait to see how CBD helps her    RTO: Return in about 3 months (around 8/23/2023) for chronic disease / routine

## 2023-05-23 NOTE — TELEPHONE ENCOUNTER
This Rx was printed instead of sent to [facility]. Pended to resend.     Medication Refill Request    LOV 4/4/2023  NOV 5/23/2023    Lab Results   Component Value Date    CREATININE 0.8 05/22/2023

## 2023-05-23 NOTE — TELEPHONE ENCOUNTER
Medication Refill Request    LOV 4/4/2023  NOV 5/23/2023    Lab Results   Component Value Date    CREATININE 0.8 05/22/2023

## 2023-07-04 DIAGNOSIS — J30.2 OTHER SEASONAL ALLERGIC RHINITIS: ICD-10-CM

## 2023-07-04 DIAGNOSIS — I10 ESSENTIAL HYPERTENSION: ICD-10-CM

## 2023-07-04 DIAGNOSIS — K21.9 GASTROESOPHAGEAL REFLUX DISEASE, UNSPECIFIED WHETHER ESOPHAGITIS PRESENT: ICD-10-CM

## 2023-07-04 DIAGNOSIS — M54.16 LUMBAR RADICULOPATHY: ICD-10-CM

## 2023-07-05 RX ORDER — TIZANIDINE 4 MG/1
4 TABLET ORAL DAILY PRN
Qty: 90 TABLET | Refills: 0 | Status: SHIPPED | OUTPATIENT
Start: 2023-07-05

## 2023-07-05 RX ORDER — MONTELUKAST SODIUM 10 MG/1
10 TABLET ORAL NIGHTLY
Qty: 90 TABLET | Refills: 0 | Status: SHIPPED | OUTPATIENT
Start: 2023-07-05

## 2023-07-05 RX ORDER — OMEPRAZOLE 20 MG/1
20 CAPSULE, DELAYED RELEASE ORAL DAILY
Qty: 90 CAPSULE | Refills: 0 | Status: SHIPPED | OUTPATIENT
Start: 2023-07-05

## 2023-07-05 RX ORDER — HYDRALAZINE HYDROCHLORIDE 50 MG/1
50 TABLET, FILM COATED ORAL EVERY 8 HOURS SCHEDULED
Qty: 21 TABLET | Refills: 0 | Status: SHIPPED
Start: 2023-07-05 | End: 2023-07-06 | Stop reason: SDUPTHER

## 2023-07-05 NOTE — TELEPHONE ENCOUNTER
Medication Refill Request    LOV 5/23/2023  NOV 8/23/2023    Lab Results   Component Value Date    CREATININE 0.8 05/22/2023

## 2023-07-06 ENCOUNTER — PATIENT MESSAGE (OUTPATIENT)
Dept: FAMILY MEDICINE CLINIC | Age: 73
End: 2023-07-06

## 2023-07-06 DIAGNOSIS — I10 ESSENTIAL HYPERTENSION: ICD-10-CM

## 2023-07-06 RX ORDER — HYDRALAZINE HYDROCHLORIDE 50 MG/1
50 TABLET, FILM COATED ORAL EVERY 8 HOURS SCHEDULED
Qty: 270 TABLET | Refills: 0 | Status: SHIPPED | OUTPATIENT
Start: 2023-07-06

## 2023-07-06 NOTE — TELEPHONE ENCOUNTER
From: Suellen Mendez  To: Dr. Lemus Favors: 7/6/2023 9:36 AM EDT  Subject: Hydralazine script renewal    Hi,  The hydralazine script sent to [facility] was 21 tablets - a 7 day supply. It should be 270 tablets - 90 day supply. Could it be resent? Thanks!

## 2023-07-25 ENCOUNTER — OFFICE VISIT (OUTPATIENT)
Dept: ENDOCRINOLOGY | Age: 73
End: 2023-07-25
Payer: MEDICARE

## 2023-07-25 VITALS
WEIGHT: 162 LBS | BODY MASS INDEX: 23.19 KG/M2 | SYSTOLIC BLOOD PRESSURE: 163 MMHG | DIASTOLIC BLOOD PRESSURE: 77 MMHG | HEIGHT: 70 IN | HEART RATE: 78 BPM | OXYGEN SATURATION: 100 %

## 2023-07-25 DIAGNOSIS — E55.9 VITAMIN D DEFICIENCY: ICD-10-CM

## 2023-07-25 DIAGNOSIS — E03.9 HYPOTHYROIDISM, UNSPECIFIED TYPE: Primary | ICD-10-CM

## 2023-07-25 DIAGNOSIS — E03.9 HYPOTHYROIDISM, UNSPECIFIED TYPE: ICD-10-CM

## 2023-07-25 LAB
T4 FREE: 2 NG/DL (ref 0.9–1.7)
T4 TOTAL: 10.2 UG/DL (ref 4.5–11.7)
TSH SERPL DL<=0.05 MIU/L-ACNC: 1.38 UIU/ML (ref 0.27–4.2)

## 2023-07-25 PROCEDURE — 3078F DIAST BP <80 MM HG: CPT | Performed by: INTERNAL MEDICINE

## 2023-07-25 PROCEDURE — 1090F PRES/ABSN URINE INCON ASSESS: CPT | Performed by: INTERNAL MEDICINE

## 2023-07-25 PROCEDURE — 3017F COLORECTAL CA SCREEN DOC REV: CPT | Performed by: INTERNAL MEDICINE

## 2023-07-25 PROCEDURE — 3074F SYST BP LT 130 MM HG: CPT | Performed by: INTERNAL MEDICINE

## 2023-07-25 PROCEDURE — 99214 OFFICE O/P EST MOD 30 MIN: CPT | Performed by: INTERNAL MEDICINE

## 2023-07-25 PROCEDURE — 1036F TOBACCO NON-USER: CPT | Performed by: INTERNAL MEDICINE

## 2023-07-25 PROCEDURE — G8399 PT W/DXA RESULTS DOCUMENT: HCPCS | Performed by: INTERNAL MEDICINE

## 2023-07-25 PROCEDURE — G8427 DOCREV CUR MEDS BY ELIG CLIN: HCPCS | Performed by: INTERNAL MEDICINE

## 2023-07-25 PROCEDURE — 1123F ACP DISCUSS/DSCN MKR DOCD: CPT | Performed by: INTERNAL MEDICINE

## 2023-07-25 PROCEDURE — G8420 CALC BMI NORM PARAMETERS: HCPCS | Performed by: INTERNAL MEDICINE

## 2023-07-25 RX ORDER — LEVOTHYROXINE SODIUM 100 MCG
TABLET ORAL
Qty: 90 TABLET | Refills: 3 | Status: SHIPPED | OUTPATIENT
Start: 2023-07-25

## 2023-07-26 ENCOUNTER — TELEPHONE (OUTPATIENT)
Dept: ENDOCRINOLOGY | Age: 73
End: 2023-07-26

## 2023-07-26 DIAGNOSIS — E03.9 HYPOTHYROIDISM, UNSPECIFIED TYPE: Primary | ICD-10-CM

## 2023-07-26 NOTE — TELEPHONE ENCOUNTER
Patient read back orders with understanding. Continue current dose of Synthroid.     We will recheck thyroid level in 6 months

## 2023-07-26 NOTE — TELEPHONE ENCOUNTER
Notify patient  Great your TSH is in very good range and I recommend continue current dose of Synthroid. It is the TSH that we use to adjust the Synthroid dose. Free T4 was 2.0 and this means you are absorbing Synthroid very well.     We will recheck thyroid level in 6 months

## 2023-07-27 ENCOUNTER — PATIENT MESSAGE (OUTPATIENT)
Dept: ENDOCRINOLOGY | Age: 73
End: 2023-07-27

## 2023-07-27 NOTE — TELEPHONE ENCOUNTER
From: Micaela Mckeon  To: Dr. Gcuci Wolff: 7/27/2023 10:46 AM EDT  Subject: Synthroid prescription    Dr. Lary Hummel sent a prescription for Synthroid on July 25th to [facility]. I get this medication from [facility] so I will need a handwritten prescription but not until March of 2024 as there are refills left on my current prescription. I will contact you in February 2024 to get a new prescription. Thanks!   Rajwinder Lorenzo

## 2023-07-31 DIAGNOSIS — M25.561 CHRONIC PAIN OF RIGHT KNEE: ICD-10-CM

## 2023-07-31 DIAGNOSIS — F41.9 ANXIETY: ICD-10-CM

## 2023-07-31 DIAGNOSIS — M25.551 RIGHT HIP PAIN: ICD-10-CM

## 2023-07-31 DIAGNOSIS — I10 ESSENTIAL HYPERTENSION: ICD-10-CM

## 2023-07-31 DIAGNOSIS — G89.29 CHRONIC PAIN OF RIGHT KNEE: ICD-10-CM

## 2023-07-31 DIAGNOSIS — G89.4 CHRONIC PAIN SYNDROME: ICD-10-CM

## 2023-07-31 DIAGNOSIS — M54.16 LUMBAR RADICULOPATHY: ICD-10-CM

## 2023-07-31 RX ORDER — PREGABALIN 50 MG/1
50 CAPSULE ORAL 2 TIMES DAILY
Qty: 180 CAPSULE | Refills: 0 | Status: SHIPPED | OUTPATIENT
Start: 2023-07-31 | End: 2023-10-29

## 2023-07-31 RX ORDER — ESCITALOPRAM OXALATE 10 MG/1
10 TABLET ORAL DAILY
Qty: 90 TABLET | Refills: 0 | Status: SHIPPED | OUTPATIENT
Start: 2023-07-31

## 2023-07-31 RX ORDER — LORATADINE 10 MG/1
10 TABLET ORAL DAILY
Qty: 90 TABLET | Refills: 0 | Status: SHIPPED | OUTPATIENT
Start: 2023-07-31

## 2023-07-31 RX ORDER — AMLODIPINE BESYLATE 10 MG/1
10 TABLET ORAL DAILY
Qty: 90 TABLET | Refills: 0 | Status: SHIPPED | OUTPATIENT
Start: 2023-07-31

## 2023-07-31 NOTE — TELEPHONE ENCOUNTER
Medication Refill Request    LOV 5/23/2023  NOV 7/31/2023    Lab Results   Component Value Date    CREATININE 0.8 05/22/2023

## 2023-08-23 ENCOUNTER — OFFICE VISIT (OUTPATIENT)
Dept: FAMILY MEDICINE CLINIC | Age: 73
End: 2023-08-23
Payer: MEDICARE

## 2023-08-23 VITALS
DIASTOLIC BLOOD PRESSURE: 60 MMHG | OXYGEN SATURATION: 97 % | WEIGHT: 149 LBS | SYSTOLIC BLOOD PRESSURE: 122 MMHG | RESPIRATION RATE: 18 BRPM | TEMPERATURE: 97 F | HEIGHT: 70 IN | HEART RATE: 78 BPM | BODY MASS INDEX: 21.33 KG/M2

## 2023-08-23 DIAGNOSIS — M25.561 CHRONIC PAIN OF RIGHT KNEE: ICD-10-CM

## 2023-08-23 DIAGNOSIS — F41.9 ANXIETY: ICD-10-CM

## 2023-08-23 DIAGNOSIS — Z85.118 HX OF CANCER OF LUNG: ICD-10-CM

## 2023-08-23 DIAGNOSIS — M54.16 LUMBAR RADICULOPATHY: ICD-10-CM

## 2023-08-23 DIAGNOSIS — I10 ESSENTIAL HYPERTENSION: Primary | ICD-10-CM

## 2023-08-23 DIAGNOSIS — J30.2 OTHER SEASONAL ALLERGIC RHINITIS: ICD-10-CM

## 2023-08-23 DIAGNOSIS — E78.00 PURE HYPERCHOLESTEROLEMIA: ICD-10-CM

## 2023-08-23 DIAGNOSIS — E03.9 HYPOTHYROIDISM, UNSPECIFIED TYPE: ICD-10-CM

## 2023-08-23 DIAGNOSIS — M25.551 RIGHT HIP PAIN: ICD-10-CM

## 2023-08-23 DIAGNOSIS — G47.9 SLEEP DIFFICULTIES: ICD-10-CM

## 2023-08-23 DIAGNOSIS — K21.9 GASTROESOPHAGEAL REFLUX DISEASE, UNSPECIFIED WHETHER ESOPHAGITIS PRESENT: ICD-10-CM

## 2023-08-23 DIAGNOSIS — G89.4 CHRONIC PAIN SYNDROME: ICD-10-CM

## 2023-08-23 DIAGNOSIS — G89.29 CHRONIC PAIN OF RIGHT KNEE: ICD-10-CM

## 2023-08-23 PROBLEM — H43.399 VITREOUS FLOATERS: Status: ACTIVE | Noted: 2022-05-03

## 2023-08-23 PROBLEM — Z96.1 BILATERAL PSEUDOPHAKIA: Status: ACTIVE | Noted: 2022-05-03

## 2023-08-23 PROCEDURE — 3078F DIAST BP <80 MM HG: CPT | Performed by: FAMILY MEDICINE

## 2023-08-23 PROCEDURE — 1123F ACP DISCUSS/DSCN MKR DOCD: CPT | Performed by: FAMILY MEDICINE

## 2023-08-23 PROCEDURE — 1090F PRES/ABSN URINE INCON ASSESS: CPT | Performed by: FAMILY MEDICINE

## 2023-08-23 PROCEDURE — 3074F SYST BP LT 130 MM HG: CPT | Performed by: FAMILY MEDICINE

## 2023-08-23 PROCEDURE — 1036F TOBACCO NON-USER: CPT | Performed by: FAMILY MEDICINE

## 2023-08-23 PROCEDURE — G8399 PT W/DXA RESULTS DOCUMENT: HCPCS | Performed by: FAMILY MEDICINE

## 2023-08-23 PROCEDURE — G8427 DOCREV CUR MEDS BY ELIG CLIN: HCPCS | Performed by: FAMILY MEDICINE

## 2023-08-23 PROCEDURE — 3017F COLORECTAL CA SCREEN DOC REV: CPT | Performed by: FAMILY MEDICINE

## 2023-08-23 PROCEDURE — G8420 CALC BMI NORM PARAMETERS: HCPCS | Performed by: FAMILY MEDICINE

## 2023-08-23 PROCEDURE — 99214 OFFICE O/P EST MOD 30 MIN: CPT | Performed by: FAMILY MEDICINE

## 2023-08-23 RX ORDER — HYDRALAZINE HYDROCHLORIDE 50 MG/1
50 TABLET, FILM COATED ORAL EVERY 8 HOURS SCHEDULED
Qty: 270 TABLET | Refills: 1 | Status: SHIPPED | OUTPATIENT
Start: 2023-08-23

## 2023-08-23 RX ORDER — LORATADINE 10 MG/1
10 TABLET ORAL DAILY
Qty: 90 TABLET | Refills: 1 | Status: SHIPPED | OUTPATIENT
Start: 2023-08-23

## 2023-08-23 RX ORDER — PREGABALIN 75 MG/1
75 CAPSULE ORAL 2 TIMES DAILY
Qty: 180 CAPSULE | Refills: 0 | Status: SHIPPED | OUTPATIENT
Start: 2023-08-23 | End: 2023-11-21

## 2023-08-23 RX ORDER — TIZANIDINE 4 MG/1
4 TABLET ORAL DAILY PRN
Qty: 90 TABLET | Refills: 1 | Status: SHIPPED | OUTPATIENT
Start: 2023-08-23

## 2023-08-23 RX ORDER — AMLODIPINE BESYLATE 10 MG/1
10 TABLET ORAL DAILY
Qty: 90 TABLET | Refills: 1 | Status: SHIPPED | OUTPATIENT
Start: 2023-08-23

## 2023-08-23 RX ORDER — LEVOTHYROXINE SODIUM 100 MCG
TABLET ORAL
Qty: 90 TABLET | Refills: 1 | Status: SHIPPED | OUTPATIENT
Start: 2023-08-23

## 2023-08-23 RX ORDER — OMEPRAZOLE 20 MG/1
20 CAPSULE, DELAYED RELEASE ORAL DAILY
Qty: 90 CAPSULE | Refills: 1 | Status: SHIPPED | OUTPATIENT
Start: 2023-08-23

## 2023-08-23 RX ORDER — MONTELUKAST SODIUM 10 MG/1
10 TABLET ORAL NIGHTLY
Qty: 90 TABLET | Refills: 1 | Status: SHIPPED | OUTPATIENT
Start: 2023-08-23

## 2023-08-23 RX ORDER — EZETIMIBE 10 MG/1
10 TABLET ORAL DAILY
Qty: 90 TABLET | Refills: 1 | Status: SHIPPED | OUTPATIENT
Start: 2023-08-23

## 2023-08-23 RX ORDER — ESCITALOPRAM OXALATE 10 MG/1
10 TABLET ORAL DAILY
Qty: 90 TABLET | Refills: 1 | Status: SHIPPED | OUTPATIENT
Start: 2023-08-23

## 2023-08-23 RX ORDER — NORTRIPTYLINE HYDROCHLORIDE 10 MG/1
10 CAPSULE ORAL NIGHTLY
Qty: 30 CAPSULE | Refills: 3 | Status: SHIPPED | OUTPATIENT
Start: 2023-08-23

## 2023-08-29 ENCOUNTER — PATIENT MESSAGE (OUTPATIENT)
Dept: FAMILY MEDICINE CLINIC | Age: 73
End: 2023-08-29

## 2023-08-29 DIAGNOSIS — G62.9 NEUROPATHY: Primary | ICD-10-CM

## 2023-08-30 ENCOUNTER — OFFICE VISIT (OUTPATIENT)
Dept: FAMILY MEDICINE CLINIC | Age: 73
End: 2023-08-30
Payer: MEDICARE

## 2023-08-30 DIAGNOSIS — F33.1 MODERATE EPISODE OF RECURRENT MAJOR DEPRESSIVE DISORDER (HCC): Primary | ICD-10-CM

## 2023-08-30 PROCEDURE — 1036F TOBACCO NON-USER: CPT | Performed by: SOCIAL WORKER

## 2023-08-30 PROCEDURE — 90834 PSYTX W PT 45 MINUTES: CPT | Performed by: SOCIAL WORKER

## 2023-08-30 PROCEDURE — 1123F ACP DISCUSS/DSCN MKR DOCD: CPT | Performed by: SOCIAL WORKER

## 2023-08-30 NOTE — TELEPHONE ENCOUNTER
From: Chloe Hess  To: Dr. Man Metzger: 8/29/2023 4:27 PM EDT  Subject: Referral    Hi Dr. Mercedes Rosas received a call from [address] [place] [address] at Dr. Mej[facility] in [place]. She said that I need a referral from you to schedule an appointment. Would you do that please? Thank you!

## 2023-08-30 NOTE — PROGRESS NOTES
[address] [place] [address] Drive UP  Usha Valderrama, MSW, LISW-S    Visit Date: 8/30/2023   Time of appointment:  12pm  Time spent with Patient: 39 minutes. This is patient's 15th appointment. Reason for Consult:  Depression     Referring Provider/PCP:    Odilon Galeas MD      Pt provided informed consent for the behavioral health program. Discussed with patient model of service to include the limits of confidentiality (i.e. abuse reporting, suicide intervention, etc.) and short-term intervention focused approach. Pt indicated understanding. Easton Smith is a 72 y.o. female who presents for follow up of depression, with recent event leading to remembrance of prior traumatic events    Previous Recommendations: initial assessment completed, referral to CPST specialist for grandson; cathartic engagement; mood monitoring        MENTAL STATUS EXAM  Mood was euthymic with calm affect. Suicidal ideation was denied. Homicidal ideation was denied. Hygiene was good . Dress was appropriate. Behavior was Within Normal Limits with No observation or self-report of difficulties ambulating. Attitude was Cooperative. Eye-contact was fair. Speech: rate - WNL, rhythm - WNL, volume - WNL. Verbalizations were coherent. Thought processes were intact and goal-oriented without evidence of delusions, hallucinations, obsessions, or tangela; without significant cognitive distortions. Associations were characterized by intact cognitive processes. Pt was oriented to person, place, time, and general circumstances;  recent:  good and remote:  good. Insight and judgment were estimated to be good, AEB, a good  understanding of cyclical maladaptive patterns, and the ability to use insight to inform behavior change. ASSESSMENT  Ericka Jorgensen presented to the appointment today for evaluation and treatment of symptoms of depression. Pt notes mild improvement in overall feelins of depression.  She was

## 2023-09-12 ENCOUNTER — OFFICE VISIT (OUTPATIENT)
Dept: FAMILY MEDICINE CLINIC | Age: 73
End: 2023-09-12
Payer: MEDICARE

## 2023-09-12 DIAGNOSIS — F33.1 MODERATE EPISODE OF RECURRENT MAJOR DEPRESSIVE DISORDER (HCC): Primary | ICD-10-CM

## 2023-09-12 PROCEDURE — 1036F TOBACCO NON-USER: CPT | Performed by: SOCIAL WORKER

## 2023-09-12 PROCEDURE — 90832 PSYTX W PT 30 MINUTES: CPT | Performed by: SOCIAL WORKER

## 2023-09-12 PROCEDURE — 1123F ACP DISCUSS/DSCN MKR DOCD: CPT | Performed by: SOCIAL WORKER

## 2023-09-12 NOTE — PROGRESS NOTES
[address] [place] [address] Drive [place]  ERIC Woody, LISW-S    Visit Date: 9/12/2023   Time of appointment:  12pm  Time spent with Patient: 34 minutes. This is patient's 16th appointment. Reason for Consult:  Depression     Referring Provider/PCP:    Nathalie Zhang MD      Pt provided informed consent for the behavioral health program. Discussed with patient model of service to include the limits of confidentiality (i.e. abuse reporting, suicide intervention, etc.) and short-term intervention focused approach. Pt indicated understanding. Minnie Yo is a 72 y.o. female who presents for follow up of depression, with recent event leading to remembrance of prior traumatic events    Previous Recommendations: initial assessment completed, referral to CPST specialist for grandson; cathartic engagement; mood monitoring, CBT, MI to determine future focused plan now that immediate goal was accomplished        MENTAL STATUS EXAM  Mood was euthymic with calm affect. Suicidal ideation was denied. Homicidal ideation was denied. Hygiene was good . Dress was appropriate. Behavior was Within Normal Limits with No observation or self-report of difficulties ambulating. Attitude was Cooperative. Eye-contact was fair. Speech: rate - WNL, rhythm - WNL, volume - WNL. Verbalizations were coherent. Thought processes were intact and goal-oriented without evidence of delusions, hallucinations, obsessions, or tangela; without significant cognitive distortions. Associations were characterized by intact cognitive processes. Pt was oriented to person, place, time, and general circumstances;  recent:  good and remote:  good. Insight and judgment were estimated to be good, AEB, a good  understanding of cyclical maladaptive patterns, and the ability to use insight to inform behavior change.        ASSESSMENT  Damaris Hutton presented to the appointment today for evaluation and treatment of symptoms of

## 2023-09-18 DIAGNOSIS — C44.90 SKIN CANCER: Primary | ICD-10-CM

## 2023-09-19 ENCOUNTER — OFFICE VISIT (OUTPATIENT)
Age: 73
End: 2023-09-19
Payer: MEDICARE

## 2023-09-19 VITALS
TEMPERATURE: 97.8 F | HEART RATE: 82 BPM | WEIGHT: 159.6 LBS | BODY MASS INDEX: 22.85 KG/M2 | DIASTOLIC BLOOD PRESSURE: 80 MMHG | SYSTOLIC BLOOD PRESSURE: 144 MMHG | HEIGHT: 70 IN

## 2023-09-19 DIAGNOSIS — M79.604 BILATERAL LEG PAIN: ICD-10-CM

## 2023-09-19 DIAGNOSIS — M79.604 BILATERAL LEG PAIN: Primary | ICD-10-CM

## 2023-09-19 DIAGNOSIS — M79.605 BILATERAL LEG PAIN: ICD-10-CM

## 2023-09-19 DIAGNOSIS — M54.17 RIGHT LUMBOSACRAL RADICULOPATHY: ICD-10-CM

## 2023-09-19 DIAGNOSIS — M79.605 BILATERAL LEG PAIN: Primary | ICD-10-CM

## 2023-09-19 DIAGNOSIS — G89.4 CHRONIC PAIN SYNDROME: ICD-10-CM

## 2023-09-19 DIAGNOSIS — G47.9 SLEEP DIFFICULTIES: ICD-10-CM

## 2023-09-19 DIAGNOSIS — E55.9 VITAMIN D DEFICIENCY: ICD-10-CM

## 2023-09-19 PROCEDURE — 1123F ACP DISCUSS/DSCN MKR DOCD: CPT | Performed by: PSYCHIATRY & NEUROLOGY

## 2023-09-19 PROCEDURE — 3079F DIAST BP 80-89 MM HG: CPT | Performed by: PSYCHIATRY & NEUROLOGY

## 2023-09-19 PROCEDURE — 3077F SYST BP >= 140 MM HG: CPT | Performed by: PSYCHIATRY & NEUROLOGY

## 2023-09-19 PROCEDURE — 99204 OFFICE O/P NEW MOD 45 MIN: CPT | Performed by: PSYCHIATRY & NEUROLOGY

## 2023-09-19 RX ORDER — NORTRIPTYLINE HYDROCHLORIDE 10 MG/1
10 CAPSULE ORAL NIGHTLY
Qty: 30 CAPSULE | Refills: 3 | Status: CANCELLED | OUTPATIENT
Start: 2023-09-19

## 2023-09-19 RX ORDER — NORTRIPTYLINE HYDROCHLORIDE 50 MG/1
50 CAPSULE ORAL NIGHTLY
Qty: 30 CAPSULE | Refills: 2 | Status: SHIPPED | OUTPATIENT
Start: 2023-10-03

## 2023-09-19 RX ORDER — NORTRIPTYLINE HYDROCHLORIDE 25 MG/1
25 CAPSULE ORAL NIGHTLY
Qty: 14 CAPSULE | Refills: 0 | Status: SHIPPED | OUTPATIENT
Start: 2023-09-19 | End: 2023-10-03

## 2023-09-19 NOTE — TELEPHONE ENCOUNTER
Medication Refill Request - Patient requesting 90 day supply    LOV 8/23/2023  NOV 1/2/2024    Lab Results   Component Value Date    CREATININE 0.8 05/22/2023

## 2023-09-19 NOTE — PROGRESS NOTES
Tony Bob MD       Ericka Jorgensen is a 72 y.o. female presenting as a new patient for a   Chief Complaint   Patient presents with    Neurologic Problem        Onset: 1 year  Progression: gradually worsened      Pain: burning in feet  Affects bottom of feet  Spread to top of feet, ankles  Triggers: on walking  7/10  Constant in feet  Worse in daytime. Sleep ok  Never wakes up from sleep with it    No pain in hands    Pain in right hip and knee  No radiating pain in legs  Has back pain  Has multi level foraminal stenosis at l3-l5 levels       Tingling/numbness: tingling intermittently in right leg  None in hands      Weakness: none      Balance: ok  Falls: none in last 1 year. Autonomic symptoms:  No orthostatic dizziness  Had syncope in 6/2022- had URI and no reason identified  Bladder control: ok  Bowel control: no  Sweatiness: lot of sweatiness  Heat intolerance: yes    Cause: hashimotos thyroiditis,   Prediabetes  Lung cancer - no chemotherapy     Component      Latest Ref Rng 4/4/2023 5/22/2023 7/25/2023   Cholesterol, Total      0 - 199 mg/dL  194     Triglycerides      0 - 149 mg/dL  102     HDL Cholesterol      >40 mg/dL  66     LDL Calculated      0 - 99 mg/dL  108 (H)     VLDL Cholesterol Calculated      mg/dL  20     Vitamin B-12      211 - 946 pg/mL 767      FOLATE, FOLAT      4.8 - 24.2 ng/mL >20.0      T4, Total      4.5 - 11.7 ug/dL   10.2    T4 Free      0.9 - 1.7 ng/dL   2.0 (H)    TSH      0.27 - 4.20 uIU/mL   1.38       Vit d: 34    Hcv: neg    Treatment: lyrica 75 mg bid  Pamelor 10 mg qhs  Cbd-   Lidoderm patch  Coq10        Past Medical History:   Diagnosis Date    Arthritis     Symptoms respond to myofascial release.  Not surgical candidate    Asthma     Cancer ([address])     lung cancer- RML- 2008, stage 1A, [id number] Stage 1A 2013    Chronic back pain     COPD (chronic obstructive pulmonary disease) (HCC)     Emphysema of lung (HCC)     GERD (gastroesophageal reflux disease)

## 2023-09-21 ENCOUNTER — PATIENT MESSAGE (OUTPATIENT)
Dept: FAMILY MEDICINE CLINIC | Age: 73
End: 2023-09-21

## 2023-09-21 ENCOUNTER — TELEPHONE (OUTPATIENT)
Age: 73
End: 2023-09-21

## 2023-09-21 DIAGNOSIS — R73.03 PREDIABETES: Primary | ICD-10-CM

## 2023-09-21 NOTE — TELEPHONE ENCOUNTER
----- Message from Naomi Lord, [facility] sent at 9/20/2023  3:25 PM EDT -----  Regarding: LABS  DR. NORWOOD. STATES LAB TOLD HER THAT CHECKING FOR HER VITAMIN B1 WAS NOT COVERED BY INSURANCE THEY ASKED IF YOU STILL WANT THE PATIENT TO DO THE REST OF THE TESTING OR IS THERE A DIFFERENT CODE YOU WANTED TO USE TO TRY TO GET IT COVERED

## 2023-09-21 NOTE — TELEPHONE ENCOUNTER
Please do rest of labs  Usually neuropathy diagnosis will cover it  But it has not been proven for me to document it      MD Thom Witt MD

## 2023-09-21 NOTE — TELEPHONE ENCOUNTER
From: Nyasia Santos  To: Dr. Dario Muro: 9/21/2023 1:28 PM EDT  Subject: Hemoglobin A1C    Hi Dr. Josephine Mccoy is saying that I am overdue for an A1C test. I am getting bloodwork done tomorrow (Friday 9/22). Would you like me to get this done at the same time?   Thank you  Maxine Eng

## 2023-09-22 DIAGNOSIS — R73.03 PREDIABETES: ICD-10-CM

## 2023-09-22 DIAGNOSIS — C44.90 SKIN CANCER: ICD-10-CM

## 2023-09-23 LAB — VITAMIN D 25-HYDROXY: 45.8 NG/ML (ref 30–100)

## 2023-09-25 LAB
ANTI RNP AB: NEGATIVE
ANTI-NUCLEAR ANTIBODY (ANA): NEGATIVE
ANTI-SMITH: NEGATIVE
JO-1 ANTIBODY: NEGATIVE
LYME WESTERN BLOT IGG: NEGATIVE
SCLERODERMA (SCL-70) AB: NEGATIVE
SJOGREN'S ANTIBODIES (SSA): NEGATIVE
SJOGREN'S ANTIBODIES (SSB): NEGATIVE

## 2023-09-26 ENCOUNTER — OFFICE VISIT (OUTPATIENT)
Dept: FAMILY MEDICINE CLINIC | Age: 73
End: 2023-09-26
Payer: MEDICARE

## 2023-09-26 DIAGNOSIS — F33.1 MODERATE EPISODE OF RECURRENT MAJOR DEPRESSIVE DISORDER (HCC): Primary | ICD-10-CM

## 2023-09-26 LAB
ALBUMIN (CALCULATED): 3.6 G/DL (ref 3.5–4.7)
ALPHA-1-GLOBULIN: 0.3 G/DL (ref 0.2–0.4)
ALPHA-2-GLOBULIN: 0.8 G/DL (ref 0.5–1)
BETA GLOBULIN: 1 G/DL (ref 0.8–1.3)
BORRELIA BURGDORFERI ABS TOTAL: 2.53 IV
COPPER: 146.2 UG/DL (ref 80–155)
GAMMA GLOBULIN: 1.1 G/DL (ref 0.7–1.6)
P E INTERPRETATION, U: NORMAL
PATHOLOGIST REVIEW: NORMAL
PATHOLOGIST: NORMAL
PATHOLOGIST: NORMAL
PROTEIN ELECTROPHORESIS, SERUM: NORMAL
SERUM IFX INTERP: NORMAL
SPECIMEN TYPE: NORMAL
TOTAL PROTEIN EXCRETED, URINE: 108 MG/24 H (ref 30–150)
TOTAL PROTEIN: 6.8 G/DL
URINE IFX INTERP: NORMAL
URINE IFX SPECIMEN: NORMAL
VITAMIN B1 WHOLE BLOOD: 151 NMOL/L (ref 70–180)

## 2023-09-26 PROCEDURE — 90834 PSYTX W PT 45 MINUTES: CPT | Performed by: SOCIAL WORKER

## 2023-09-26 PROCEDURE — 1036F TOBACCO NON-USER: CPT | Performed by: SOCIAL WORKER

## 2023-09-26 PROCEDURE — 1123F ACP DISCUSS/DSCN MKR DOCD: CPT | Performed by: SOCIAL WORKER

## 2023-09-27 LAB — THYROID PEROXIDASE (TPO) AB: 42 IU/ML (ref 0–25)

## 2023-09-27 NOTE — PROGRESS NOTES
[address] [place] [address] Drive UP  ERIC Williamson, LISW-S    Visit Date: 9/26/2023   Time of appointment:  12pm  Time spent with Patient: 40 minutes. This is patient's 17th appointment. Reason for Consult:  Depression     Referring Provider/PCP:    Chai Mckinnon MD      Pt provided informed consent for the behavioral health program. Discussed with patient model of service to include the limits of confidentiality (i.e. abuse reporting, suicide intervention, etc.) and short-term intervention focused approach. Pt indicated understanding. Noah Smith is a 72 y.o. female who presents for follow up of depression, with recent event leading to remembrance of prior traumatic events    Previous Recommendations: initial assessment completed, referral to CPST specialist for grandson; cathartic engagement; mood monitoring, CBT, MI to determine future focused plan now that immediate goal was accomplished        MENTAL STATUS EXAM  Mood was euthymic with calm affect. Suicidal ideation was denied. Homicidal ideation was denied. Hygiene was good . Dress was appropriate. Behavior was Within Normal Limits with No observation or self-report of difficulties ambulating. Attitude was Cooperative. Eye-contact was fair. Speech: rate - WNL, rhythm - WNL, volume - WNL. Verbalizations were coherent. Thought processes were intact and goal-oriented without evidence of delusions, hallucinations, obsessions, or tangela; without significant cognitive distortions. Associations were characterized by intact cognitive processes. Pt was oriented to person, place, time, and general circumstances;  recent:  good and remote:  good. Insight and judgment were estimated to be good, AEB, a good  understanding of cyclical maladaptive patterns, and the ability to use insight to inform behavior change.        ASSESSMENT  Micaela Mckeon presented to the appointment today for evaluation and treatment of symptoms of

## 2023-09-28 LAB
SEND OUT REPORT: NORMAL
TEST NAME: NORMAL

## 2023-10-04 DIAGNOSIS — E03.9 HYPOTHYROIDISM, UNSPECIFIED TYPE: ICD-10-CM

## 2023-10-04 LAB
ABSOLUTE IMMATURE GRANULOCYTE: <0.03 K/UL (ref 0–0.58)
BASOPHILS ABSOLUTE: 0.04 K/UL (ref 0–0.2)
BASOPHILS RELATIVE PERCENT: 1 % (ref 0–2)
EOSINOPHILS ABSOLUTE: 0.08 K/UL (ref 0.05–0.5)
EOSINOPHILS RELATIVE PERCENT: 1 % (ref 0–6)
HCT VFR BLD CALC: 42.3 % (ref 34–48)
HEMOGLOBIN: 13.5 G/DL (ref 11.5–15.5)
IMMATURE GRANULOCYTES: 0 % (ref 0–5)
LYMPHOCYTES ABSOLUTE: 1.71 K/UL (ref 1.5–4)
LYMPHOCYTES RELATIVE PERCENT: 26 % (ref 20–42)
MCH RBC QN AUTO: 28.9 PG (ref 26–35)
MCHC RBC AUTO-ENTMCNC: 31.9 G/DL (ref 32–34.5)
MCV RBC AUTO: 90.6 FL (ref 80–99.9)
MONOCYTES ABSOLUTE: 0.5 K/UL (ref 0.1–0.95)
MONOCYTES RELATIVE PERCENT: 8 % (ref 2–12)
NEUTROPHILS ABSOLUTE: 4.24 K/UL (ref 1.8–7.3)
NEUTROPHILS RELATIVE PERCENT: 64 % (ref 43–80)
PDW BLD-RTO: 13.5 % (ref 11.5–15)
PLATELET # BLD: 264 K/UL (ref 130–450)
PMV BLD AUTO: 10.6 FL (ref 7–12)
RBC # BLD: 4.67 M/UL (ref 3.5–5.5)
T4 FREE: 2 NG/DL (ref 0.9–1.7)
WBC # BLD: 6.6 K/UL (ref 4.5–11.5)

## 2023-10-09 DIAGNOSIS — K21.9 GASTROESOPHAGEAL REFLUX DISEASE, UNSPECIFIED WHETHER ESOPHAGITIS PRESENT: ICD-10-CM

## 2023-10-09 DIAGNOSIS — I10 ESSENTIAL HYPERTENSION: ICD-10-CM

## 2023-10-09 DIAGNOSIS — J30.2 OTHER SEASONAL ALLERGIC RHINITIS: ICD-10-CM

## 2023-10-09 DIAGNOSIS — F41.9 ANXIETY: ICD-10-CM

## 2023-10-09 DIAGNOSIS — M54.16 LUMBAR RADICULOPATHY: ICD-10-CM

## 2023-10-09 RX ORDER — ESCITALOPRAM OXALATE 10 MG/1
10 TABLET ORAL DAILY
Qty: 90 TABLET | Refills: 0 | Status: SHIPPED | OUTPATIENT
Start: 2023-10-09

## 2023-10-09 RX ORDER — MONTELUKAST SODIUM 10 MG/1
10 TABLET ORAL NIGHTLY
Qty: 90 TABLET | Refills: 0 | Status: SHIPPED | OUTPATIENT
Start: 2023-10-09

## 2023-10-09 RX ORDER — HYDRALAZINE HYDROCHLORIDE 50 MG/1
50 TABLET, FILM COATED ORAL EVERY 8 HOURS SCHEDULED
Qty: 270 TABLET | Refills: 0 | Status: SHIPPED | OUTPATIENT
Start: 2023-10-09

## 2023-10-09 RX ORDER — TIZANIDINE 4 MG/1
4 TABLET ORAL DAILY PRN
Qty: 90 TABLET | Refills: 0 | Status: SHIPPED | OUTPATIENT
Start: 2023-10-09

## 2023-10-09 RX ORDER — AMLODIPINE BESYLATE 10 MG/1
10 TABLET ORAL DAILY
Qty: 90 TABLET | Refills: 0 | Status: SHIPPED | OUTPATIENT
Start: 2023-10-09

## 2023-10-09 RX ORDER — OMEPRAZOLE 20 MG/1
20 CAPSULE, DELAYED RELEASE ORAL DAILY
Qty: 90 CAPSULE | Refills: 0 | Status: SHIPPED | OUTPATIENT
Start: 2023-10-09

## 2023-10-09 NOTE — TELEPHONE ENCOUNTER
Medication Refill Request    LOV 8/23/2023  NOV 1/2/2024    Lab Results   Component Value Date    CREATININE 0.8 05/22/2023

## 2023-10-10 ENCOUNTER — TELEPHONE (OUTPATIENT)
Dept: ENDOCRINOLOGY | Age: 73
End: 2023-10-10

## 2023-10-10 DIAGNOSIS — E03.9 HYPOTHYROIDISM, UNSPECIFIED TYPE: Primary | ICD-10-CM

## 2023-10-10 NOTE — TELEPHONE ENCOUNTER
Notify patient  Thyroid hormone still above normal range. It is very important to avoid high thyroid hormone to decrease the risk of cardiac arrhythmias. I recommend adjusting Synthroid from 100 mcg 1 tablet 6 days a week and half tablet on Sunday to 1 tablet 6 days a week and none on Sunday. We will recheck thyroid level in 6 to 8 weeks.   If the level is still high    If your level remains low for the next blood work we will likely decrease the dose to 88 mcg

## 2023-10-12 ENCOUNTER — OFFICE VISIT (OUTPATIENT)
Dept: BREAST CENTER | Age: 73
End: 2023-10-12
Payer: MEDICARE

## 2023-10-12 ENCOUNTER — OFFICE VISIT (OUTPATIENT)
Dept: FAMILY MEDICINE CLINIC | Age: 73
End: 2023-10-12
Payer: MEDICARE

## 2023-10-12 VITALS
RESPIRATION RATE: 20 BRPM | TEMPERATURE: 97.9 F | HEIGHT: 68 IN | SYSTOLIC BLOOD PRESSURE: 132 MMHG | WEIGHT: 155 LBS | BODY MASS INDEX: 23.49 KG/M2 | HEART RATE: 98 BPM | OXYGEN SATURATION: 98 % | DIASTOLIC BLOOD PRESSURE: 72 MMHG

## 2023-10-12 VITALS
WEIGHT: 158.2 LBS | OXYGEN SATURATION: 97 % | SYSTOLIC BLOOD PRESSURE: 120 MMHG | DIASTOLIC BLOOD PRESSURE: 60 MMHG | HEART RATE: 97 BPM | BODY MASS INDEX: 24.05 KG/M2 | TEMPERATURE: 97.2 F | RESPIRATION RATE: 18 BRPM

## 2023-10-12 DIAGNOSIS — D24.1 FIBROADENOMA OF BREAST, RIGHT: ICD-10-CM

## 2023-10-12 DIAGNOSIS — J06.9 UPPER RESPIRATORY TRACT INFECTION, UNSPECIFIED TYPE: Primary | ICD-10-CM

## 2023-10-12 DIAGNOSIS — D24.1 FIBROADENOMA OF BREAST, RIGHT: Primary | ICD-10-CM

## 2023-10-12 DIAGNOSIS — J44.9 CHRONIC OBSTRUCTIVE PULMONARY DISEASE, UNSPECIFIED COPD TYPE (HCC): ICD-10-CM

## 2023-10-12 DIAGNOSIS — B37.0 THRUSH: ICD-10-CM

## 2023-10-12 DIAGNOSIS — N64.4 BREAST PAIN: Primary | ICD-10-CM

## 2023-10-12 LAB
INFLUENZA A ANTIGEN, POC: NORMAL
INFLUENZA B ANTIGEN, POC: NORMAL
Lab: NORMAL
PERFORMING INSTRUMENT: NORMAL
QC PASS/FAIL: NORMAL
S PYO AG THROAT QL: NORMAL
SARS-COV-2, POC: NORMAL

## 2023-10-12 PROCEDURE — 99212 OFFICE O/P EST SF 10 MIN: CPT | Performed by: SURGERY

## 2023-10-12 PROCEDURE — G8399 PT W/DXA RESULTS DOCUMENT: HCPCS | Performed by: SURGERY

## 2023-10-12 PROCEDURE — 3017F COLORECTAL CA SCREEN DOC REV: CPT | Performed by: SURGERY

## 2023-10-12 PROCEDURE — 99213 OFFICE O/P EST LOW 20 MIN: CPT | Performed by: SURGERY

## 2023-10-12 PROCEDURE — G8427 DOCREV CUR MEDS BY ELIG CLIN: HCPCS | Performed by: PHYSICIAN ASSISTANT

## 2023-10-12 PROCEDURE — 99213 OFFICE O/P EST LOW 20 MIN: CPT | Performed by: PHYSICIAN ASSISTANT

## 2023-10-12 PROCEDURE — 87880 STREP A ASSAY W/OPTIC: CPT | Performed by: PHYSICIAN ASSISTANT

## 2023-10-12 PROCEDURE — G8427 DOCREV CUR MEDS BY ELIG CLIN: HCPCS | Performed by: SURGERY

## 2023-10-12 PROCEDURE — G8484 FLU IMMUNIZE NO ADMIN: HCPCS | Performed by: PHYSICIAN ASSISTANT

## 2023-10-12 PROCEDURE — 1090F PRES/ABSN URINE INCON ASSESS: CPT | Performed by: SURGERY

## 2023-10-12 PROCEDURE — 87804 INFLUENZA ASSAY W/OPTIC: CPT | Performed by: PHYSICIAN ASSISTANT

## 2023-10-12 PROCEDURE — G8399 PT W/DXA RESULTS DOCUMENT: HCPCS | Performed by: PHYSICIAN ASSISTANT

## 2023-10-12 PROCEDURE — 3075F SYST BP GE 130 - 139MM HG: CPT | Performed by: SURGERY

## 2023-10-12 PROCEDURE — 3023F SPIROM DOC REV: CPT | Performed by: PHYSICIAN ASSISTANT

## 2023-10-12 PROCEDURE — 1036F TOBACCO NON-USER: CPT | Performed by: SURGERY

## 2023-10-12 PROCEDURE — 1123F ACP DISCUSS/DSCN MKR DOCD: CPT | Performed by: SURGERY

## 2023-10-12 PROCEDURE — 87426 SARSCOV CORONAVIRUS AG IA: CPT | Performed by: PHYSICIAN ASSISTANT

## 2023-10-12 PROCEDURE — 1036F TOBACCO NON-USER: CPT | Performed by: PHYSICIAN ASSISTANT

## 2023-10-12 PROCEDURE — 1090F PRES/ABSN URINE INCON ASSESS: CPT | Performed by: PHYSICIAN ASSISTANT

## 2023-10-12 PROCEDURE — 3074F SYST BP LT 130 MM HG: CPT | Performed by: PHYSICIAN ASSISTANT

## 2023-10-12 PROCEDURE — 3017F COLORECTAL CA SCREEN DOC REV: CPT | Performed by: PHYSICIAN ASSISTANT

## 2023-10-12 PROCEDURE — 1123F ACP DISCUSS/DSCN MKR DOCD: CPT | Performed by: PHYSICIAN ASSISTANT

## 2023-10-12 PROCEDURE — G8420 CALC BMI NORM PARAMETERS: HCPCS | Performed by: SURGERY

## 2023-10-12 PROCEDURE — G8484 FLU IMMUNIZE NO ADMIN: HCPCS | Performed by: SURGERY

## 2023-10-12 PROCEDURE — 3078F DIAST BP <80 MM HG: CPT | Performed by: PHYSICIAN ASSISTANT

## 2023-10-12 PROCEDURE — G8420 CALC BMI NORM PARAMETERS: HCPCS | Performed by: PHYSICIAN ASSISTANT

## 2023-10-12 PROCEDURE — 3078F DIAST BP <80 MM HG: CPT | Performed by: SURGERY

## 2023-10-12 RX ORDER — BENZONATATE 100 MG/1
100 CAPSULE ORAL 3 TIMES DAILY PRN
Qty: 30 CAPSULE | Refills: 0 | Status: SHIPPED | OUTPATIENT
Start: 2023-10-12 | End: 2023-10-22

## 2023-10-12 RX ORDER — DOXYCYCLINE HYCLATE 100 MG
100 TABLET ORAL 2 TIMES DAILY WITH MEALS
Qty: 20 TABLET | Refills: 0 | Status: SHIPPED | OUTPATIENT
Start: 2023-10-12 | End: 2023-10-22

## 2023-10-12 NOTE — PROGRESS NOTES
Date of visit: 10/12/2023    04/06/23  10/12/23      DIAGNOSIS:  1. (04/06/23) Abnormal SBE  * RIGHT upper  2. History of RIGHT breast bx proven FA  3. History of RIGHT breast skin biopsy  * Squamous cell carcinoma  * History of lung adenocarcinoma    IMAGING/PROCEDURES:  1. (02/03/23) BILATERAL dt-mammogram and RIGHT US: BIRADS-2  * RIGHT (12:00-4) 7mm mass with biopsy clip (FA)    PREVISIT PLAN:  Mammo due in [place] was in the office today for short interval follow-up. Eren Felder was first evaluated back in April. At that time she had irregularities on self breast examination. At that time the recent breast imaging was read as BI-RADS 2. Her clinical breast examination was benign. I recommended short-term clinical follow-up. Breast Symptoms  Eren Felder notes ongoing and somewhat worsening symptoms in the right breast.  Specifically, she believes that findings at 9:00 and 7:00 are somewhat larger. In addition she notes significant discomfort in the lateral breast.  She has had no skin retraction or discoloration. She has had no nipple discharge or retraction. Breast Examination  There are no cervical, supraclavicular, or infraclavicular lymph nodes that are palpable. Inspection of the breast bilaterally demonstrate there to be no secondary signs of malignancy. There are no lesions of the nipple or areola on either side. No spontaneous discharges witnessed. Palpation of the axilla bilaterally is without adenopathy. Palpation of the left breast demonstrates no focal findings. Palpation of the right breast and not directed fashion demonstrates some prominent tissue but no focal findings. I then asked the patient to direct my attention to the areas of concern. First she localizes the exam to the 9 o'clock position of the right breast.  There is admittedly some nodularity but no focal mass.   She then focuses my attention at approximately the 7 o'clock position

## 2023-10-12 NOTE — PATIENT INSTRUCTIONS
Schedulers will call to schedule diagnostic right mammogram and right breast ultrasound. Follow up will be determined from results of imaging. Any questions or concerns, please contact the office at [phone].
No

## 2023-10-12 NOTE — PROGRESS NOTES
I will still keep her on zanaflex plus lyrica  Can consider dose increase at f/u as needed for pain control      Marcos Abdullahi MD

## 2023-10-18 DIAGNOSIS — C34.91 NON-SMALL CELL CANCER OF RIGHT LUNG (HCC): Primary | ICD-10-CM

## 2023-10-25 ENCOUNTER — HOSPITAL ENCOUNTER (OUTPATIENT)
Dept: GENERAL RADIOLOGY | Age: 73
Discharge: HOME | End: 2023-10-27
Attending: SURGERY
Payer: MEDICARE

## 2023-10-25 VITALS — BODY MASS INDEX: 20.76 KG/M2 | HEIGHT: 70 IN | WEIGHT: 145 LBS

## 2023-10-25 DIAGNOSIS — D24.1 FIBROADENOMA OF BREAST, RIGHT: ICD-10-CM

## 2023-10-25 PROCEDURE — 76642 ULTRASOUND BREAST LIMITED: CPT

## 2023-10-25 PROCEDURE — G0279 TOMOSYNTHESIS, MAMMO: HCPCS

## 2023-10-31 DIAGNOSIS — C34.91 NON-SMALL CELL CANCER OF RIGHT LUNG (HCC): ICD-10-CM

## 2023-10-31 LAB
BUN BLDV-MCNC: 13 MG/DL (ref 6–23)
CREAT SERPL-MCNC: 0.8 MG/DL (ref 0.5–1)
GFR SERPL CREATININE-BSD FRML MDRD: >60 ML/MIN/1.73M2

## 2023-11-07 DIAGNOSIS — M54.16 LUMBAR RADICULOPATHY: ICD-10-CM

## 2023-11-07 DIAGNOSIS — M25.561 CHRONIC PAIN OF RIGHT KNEE: ICD-10-CM

## 2023-11-07 DIAGNOSIS — G89.29 CHRONIC PAIN OF RIGHT KNEE: ICD-10-CM

## 2023-11-07 DIAGNOSIS — M25.551 RIGHT HIP PAIN: ICD-10-CM

## 2023-11-07 DIAGNOSIS — G89.4 CHRONIC PAIN SYNDROME: ICD-10-CM

## 2023-11-07 RX ORDER — PREGABALIN 75 MG/1
75 CAPSULE ORAL 2 TIMES DAILY
Qty: 180 CAPSULE | Refills: 0 | Status: SHIPPED | OUTPATIENT
Start: 2023-11-07 | End: 2024-02-05

## 2023-11-07 RX ORDER — LORATADINE 10 MG/1
10 TABLET ORAL DAILY
Qty: 90 TABLET | Refills: 0 | Status: SHIPPED | OUTPATIENT
Start: 2023-11-07

## 2023-11-07 NOTE — TELEPHONE ENCOUNTER
Medication Refill Request    LOV 10/12/2023  NOV 1/2/2024    Lab Results   Component Value Date    CREATININE 0.8 10/31/2023

## 2023-11-14 DIAGNOSIS — C34.2 MALIGNANT NEOPLASM OF MIDDLE LOBE OF RIGHT LUNG (HCC): Primary | ICD-10-CM

## 2023-11-16 ENCOUNTER — TELEPHONE (OUTPATIENT)
Dept: BREAST CENTER | Age: 73
End: 2023-11-16

## 2023-11-16 NOTE — TELEPHONE ENCOUNTER
Discussed patient's ultrasound results and plan with Dr. Ubaldo Villanueva. Patient can follow up PRN. She will be due for her yearly mammogram in February 2024 which can be ordered by PCP or gynecologist. Left detailed message for patient with above information.

## 2023-11-20 ENCOUNTER — OFFICE VISIT (OUTPATIENT)
Age: 73
End: 2023-11-20
Payer: MEDICARE

## 2023-11-20 VITALS
DIASTOLIC BLOOD PRESSURE: 74 MMHG | BODY MASS INDEX: 22.77 KG/M2 | WEIGHT: 158.7 LBS | SYSTOLIC BLOOD PRESSURE: 138 MMHG | HEART RATE: 97 BPM

## 2023-11-20 DIAGNOSIS — M54.17 LUMBOSACRAL RADICULOPATHY: ICD-10-CM

## 2023-11-20 DIAGNOSIS — R07.89 RIGHT-SIDED CHEST WALL PAIN: ICD-10-CM

## 2023-11-20 DIAGNOSIS — C34.2 MALIGNANT NEOPLASM OF MIDDLE LOBE OF RIGHT LUNG (HCC): ICD-10-CM

## 2023-11-20 DIAGNOSIS — E03.9 HYPOTHYROIDISM, UNSPECIFIED TYPE: ICD-10-CM

## 2023-11-20 DIAGNOSIS — T88.7XXA MEDICATION SIDE EFFECT: ICD-10-CM

## 2023-11-20 DIAGNOSIS — G62.9 SMALL FIBER NEUROPATHY: Primary | ICD-10-CM

## 2023-11-20 LAB
ABSOLUTE IMMATURE GRANULOCYTE: <0.03 K/UL (ref 0–0.58)
BASOPHILS ABSOLUTE: 0.05 K/UL (ref 0–0.2)
BASOPHILS RELATIVE PERCENT: 1 % (ref 0–2)
EOSINOPHILS ABSOLUTE: 0.11 K/UL (ref 0.05–0.5)
EOSINOPHILS RELATIVE PERCENT: 2 % (ref 0–6)
HCT VFR BLD CALC: 43.1 % (ref 34–48)
HEMOGLOBIN: 14 G/DL (ref 11.5–15.5)
IMMATURE GRANULOCYTES: 0 % (ref 0–5)
LYMPHOCYTES ABSOLUTE: 1.77 K/UL (ref 1.5–4)
LYMPHOCYTES RELATIVE PERCENT: 26 % (ref 20–42)
MCH RBC QN AUTO: 29.5 PG (ref 26–35)
MCHC RBC AUTO-ENTMCNC: 32.5 G/DL (ref 32–34.5)
MCV RBC AUTO: 90.9 FL (ref 80–99.9)
MONOCYTES ABSOLUTE: 0.6 K/UL (ref 0.1–0.95)
MONOCYTES RELATIVE PERCENT: 9 % (ref 2–12)
NEUTROPHILS ABSOLUTE: 4.25 K/UL (ref 1.8–7.3)
NEUTROPHILS RELATIVE PERCENT: 63 % (ref 43–80)
PDW BLD-RTO: 14.2 % (ref 11.5–15)
PLATELET # BLD: 339 K/UL (ref 130–450)
PMV BLD AUTO: 10.3 FL (ref 7–12)
RBC # BLD: 4.74 M/UL (ref 3.5–5.5)
WBC # BLD: 6.8 K/UL (ref 4.5–11.5)

## 2023-11-20 PROCEDURE — 3078F DIAST BP <80 MM HG: CPT | Performed by: PSYCHIATRY & NEUROLOGY

## 2023-11-20 PROCEDURE — 3017F COLORECTAL CA SCREEN DOC REV: CPT | Performed by: PSYCHIATRY & NEUROLOGY

## 2023-11-20 PROCEDURE — 99214 OFFICE O/P EST MOD 30 MIN: CPT | Performed by: PSYCHIATRY & NEUROLOGY

## 2023-11-20 PROCEDURE — G8420 CALC BMI NORM PARAMETERS: HCPCS | Performed by: PSYCHIATRY & NEUROLOGY

## 2023-11-20 PROCEDURE — 3075F SYST BP GE 130 - 139MM HG: CPT | Performed by: PSYCHIATRY & NEUROLOGY

## 2023-11-20 PROCEDURE — G8428 CUR MEDS NOT DOCUMENT: HCPCS | Performed by: PSYCHIATRY & NEUROLOGY

## 2023-11-20 PROCEDURE — 1036F TOBACCO NON-USER: CPT | Performed by: PSYCHIATRY & NEUROLOGY

## 2023-11-20 PROCEDURE — G8399 PT W/DXA RESULTS DOCUMENT: HCPCS | Performed by: PSYCHIATRY & NEUROLOGY

## 2023-11-20 PROCEDURE — 1090F PRES/ABSN URINE INCON ASSESS: CPT | Performed by: PSYCHIATRY & NEUROLOGY

## 2023-11-20 PROCEDURE — 1123F ACP DISCUSS/DSCN MKR DOCD: CPT | Performed by: PSYCHIATRY & NEUROLOGY

## 2023-11-20 PROCEDURE — G8484 FLU IMMUNIZE NO ADMIN: HCPCS | Performed by: PSYCHIATRY & NEUROLOGY

## 2023-11-20 RX ORDER — PREGABALIN 100 MG/1
100 CAPSULE ORAL 3 TIMES DAILY
Qty: 90 CAPSULE | Refills: 2
Start: 2023-11-20 | End: 2024-02-18

## 2023-11-20 RX ORDER — NORTRIPTYLINE HYDROCHLORIDE 25 MG/1
25 CAPSULE ORAL NIGHTLY
Qty: 90 CAPSULE | Refills: 1 | Status: SHIPPED | OUTPATIENT
Start: 2023-11-20

## 2023-11-20 NOTE — PROGRESS NOTES
Miquel Dai MD       Tiny Trimble is a 72 y.o. female presenting as a follow patient for a   Chief Complaint   Patient presents with    Neurologic Problem     Neuropathy          Skin biopsy in [facility]: Oct 2023: Moderate degree of small fiber neuropathy    Mri lumbar spine:  IMPRESSION:  1. Degenerative change with multiple disc bulges. Grade 1 retrolisthesis at  L3-4.  2. No evidence of significant central canal stenosis. 3. Mild left L3-4 and moderate left and mild right L4-5 neural foraminal  stenosis. Component      Latest Ref Rng 7/25/2023 9/22/2023   Total Protein      g/dL  6.8    Albumin (calculated)      3.5 - 4.7 g/dL  3.6    Alpha-1-Globulin      0.2 - 0.4 g/dL  0.3    Alpha-2-Globulin      0.5 - 1.0 g/dL  0.8    Beta Globulin      0.8 - 1.3 g/dL  1.0    Gamma Globulin      0.7 - 1.6 g/dL  1.1    Protein Electrophoresis, Serum  Normal serum protein electrophoresis. No monoclonal protein identified. Pathologist  Reviewed by: BILLY Arizmendi M.D. Pathologist  Reviewed by: BILLY Arimzendi M.D. Anti-Walter      NEGATIVE   NEGATIVE    Sjogren's Antibodies (SSA)      NEGATIVE   NEGATIVE    Sjogren's Antibodies (SSB)      NEGATIVE   NEGATIVE    Anti RNP      NEGATIVE   NEGATIVE    Mindy-1 Antibody      NEGATIVE   NEGATIVE    Scleroderma SCL-70      NEGATIVE   NEGATIVE    SPECIMEN TYPE  .24 h URINE    P E Interpretation, U  Normal. No Monoclonal protein identified. Total Protein Excreted, Urine      30 - 150 mg/24 h  108    Urine IFX Specimen  . CLEAN CATCH URINE    Urine IFX Interp  Normal. No Monoclonal protein identified. Serum IFX Interp  Normal. No Monoclonal protein identified. Pathologist Review  Reviewed by: BILLY Arizmendi M.D.    T4, Total      4.5 - 11.7 ug/dL 10.2     T4 Free      0.9 - 1.7 ng/dL 2.0 (H)     TSH      0.27 - 4.20 uIU/mL 1.38     Copper      80.0 - 155.0 ug/dL  146.2    Borrella Burgdorferi Antibodies Total      <=0.90 IV  2.53 (H)    Thyroid Peroxidase (Tpo) Ab      0.0 -

## 2023-11-21 ENCOUNTER — TELEPHONE (OUTPATIENT)
Dept: ENDOCRINOLOGY | Age: 73
End: 2023-11-21

## 2023-11-21 DIAGNOSIS — E03.9 HYPOTHYROIDISM, UNSPECIFIED TYPE: Primary | ICD-10-CM

## 2023-11-21 LAB
ALBUMIN SERPL-MCNC: 4.2 G/DL (ref 3.5–5.2)
ALP BLD-CCNC: 88 U/L (ref 35–104)
ALT SERPL-CCNC: 21 U/L (ref 0–32)
ANION GAP SERPL CALCULATED.3IONS-SCNC: 20 MMOL/L (ref 7–16)
AST SERPL-CCNC: 34 U/L (ref 0–31)
BILIRUB SERPL-MCNC: 0.3 MG/DL (ref 0–1.2)
BUN BLDV-MCNC: 12 MG/DL (ref 6–23)
CALCIUM SERPL-MCNC: 9.6 MG/DL (ref 8.6–10.2)
CHLORIDE BLD-SCNC: 98 MMOL/L (ref 98–107)
CO2: 20 MMOL/L (ref 22–29)
CREAT SERPL-MCNC: 0.9 MG/DL (ref 0.5–1)
GFR SERPL CREATININE-BSD FRML MDRD: >60 ML/MIN/1.73M2
GLUCOSE BLD-MCNC: 93 MG/DL (ref 74–99)
POTASSIUM SERPL-SCNC: 3.7 MMOL/L (ref 3.5–5)
SODIUM BLD-SCNC: 138 MMOL/L (ref 132–146)
T4 FREE: 1.6 NG/DL (ref 0.9–1.7)
TOTAL PROTEIN: 7.3 G/DL (ref 6.4–8.3)
TSH SERPL DL<=0.05 MIU/L-ACNC: 4.53 UIU/ML (ref 0.27–4.2)

## 2023-11-21 NOTE — TELEPHONE ENCOUNTER
Notify patient  Thyroid hormones are slightly below normal range. Please confirm she is currently on the Synthroid 100 mcg 1 tablet 6 days a week and half tablet on Sunday. If so, we will adjust Synthroid to 1 tablet daily and recheck thyroid level in 6 to 8 weeks.

## 2023-11-25 ENCOUNTER — PATIENT MESSAGE (OUTPATIENT)
Dept: ENDOCRINOLOGY | Age: 73
End: 2023-11-25

## 2023-11-27 ENCOUNTER — OFFICE VISIT (OUTPATIENT)
Dept: ONCOLOGY | Age: 73
End: 2023-11-27
Payer: MEDICARE

## 2023-11-27 VITALS
WEIGHT: 157.1 LBS | DIASTOLIC BLOOD PRESSURE: 81 MMHG | HEART RATE: 103 BPM | HEIGHT: 70 IN | OXYGEN SATURATION: 96 % | TEMPERATURE: 97.4 F | BODY MASS INDEX: 22.49 KG/M2 | SYSTOLIC BLOOD PRESSURE: 154 MMHG

## 2023-11-27 DIAGNOSIS — C34.91 NON-SMALL CELL CANCER OF RIGHT LUNG (HCC): Primary | ICD-10-CM

## 2023-11-27 PROCEDURE — G8399 PT W/DXA RESULTS DOCUMENT: HCPCS | Performed by: INTERNAL MEDICINE

## 2023-11-27 PROCEDURE — 3017F COLORECTAL CA SCREEN DOC REV: CPT | Performed by: INTERNAL MEDICINE

## 2023-11-27 PROCEDURE — G8427 DOCREV CUR MEDS BY ELIG CLIN: HCPCS | Performed by: INTERNAL MEDICINE

## 2023-11-27 PROCEDURE — 99213 OFFICE O/P EST LOW 20 MIN: CPT

## 2023-11-27 PROCEDURE — 1123F ACP DISCUSS/DSCN MKR DOCD: CPT | Performed by: INTERNAL MEDICINE

## 2023-11-27 PROCEDURE — 1090F PRES/ABSN URINE INCON ASSESS: CPT | Performed by: INTERNAL MEDICINE

## 2023-11-27 PROCEDURE — 99214 OFFICE O/P EST MOD 30 MIN: CPT | Performed by: INTERNAL MEDICINE

## 2023-11-27 PROCEDURE — G8420 CALC BMI NORM PARAMETERS: HCPCS | Performed by: INTERNAL MEDICINE

## 2023-11-27 PROCEDURE — 3077F SYST BP >= 140 MM HG: CPT | Performed by: INTERNAL MEDICINE

## 2023-11-27 PROCEDURE — 1036F TOBACCO NON-USER: CPT | Performed by: INTERNAL MEDICINE

## 2023-11-27 PROCEDURE — 3079F DIAST BP 80-89 MM HG: CPT | Performed by: INTERNAL MEDICINE

## 2023-11-27 PROCEDURE — G8484 FLU IMMUNIZE NO ADMIN: HCPCS | Performed by: INTERNAL MEDICINE

## 2023-11-27 NOTE — PROGRESS NOTES
T1 N0 MX-Stage IA    Flexible bronchoscopy, right redo VATS lobectomy and thoracic lymphadenectomy on 11/04/2013 (at [facility] HOSPITAL:  Tumor Location: Right upper lobe  Histologic Type: Invasive Adenocarcinoma  Tumor Size: Greatest dimension: 2 cm  Histologic Grade: G2 Moderately Differentiated  Lymph nodes: All 3 lymph nodes are negative for tumor. Margins: Margins uninvolved by invasive carcinoma  Distance of invasive carcinoma from nearest margin: 1.9 cm  Specify margin: parenchymal resection margin  Venous/arterial invasion: Absent  Lymphatic invasion: Absent  Additional path findings: low R 4 Lymph node dissection: (0/2) lymph nodes: Negative for tumor. Station 7 lymph node (dissection): (0/1) Negative for tumor  Pathologic Stage (AJCC): pT1a N0 MX-Stage IA    Impression/Plan:  82 y/o  female former smoker (1 pack/day for 30 years; Quit in 2008), with Hx of Stage IA RML Invasive Lung adenocarcinoma, s/p Bronchoscopy, cervical mediastinoscopy, right thoracotomy, right middle lobectomy, radical lymphadenectomy on 2/13/2008 at [facility] [place] by Dr. Agnes Rainey. Pathology demonstrated:   1. LYMPH NODE #1, EXCISION (PART A) INFLAMED THYROID TISSUE. 2. LYMPH NODE R4, #7, R2, #3, LR, #7, R11, EXCISIONS (B-F) BENIGN REACTIVE LYMPH NODES. 3. RIGHT LUNG, MIDDLE LOBE, LOBECTOMY (PART G) ADENOCARCINOMA, MIXED TYPE WITH ACINAR AND BRONCHIOALVEOLAR CELL COMPONENTS.  -CENTRILOBULAR EMPHYSEMA. COMMENT  Tumor Location: Right middle lobe  Tumor Size: 2.3 x 1.8 x 1.0 cm  Vascular Invasion: Absent  Lymphatic Invasion: Absent  Bronchial Margin: Negative  Vascular Margin: Negative  Parenchymal Margins: Negative  Pleura/Soft Tissue Margin: Visceral pleura is negative for neoplasm. Pathologic Stage (AJCC): T1 N0 MX-Stage IA    No Postop RT or chemotherapy required at that time.  She was put on surveillance and didn't follow with a medical oncologist.    She was then found to have RUL PET avid lesion in 2013; Flexible

## 2023-12-07 DIAGNOSIS — J06.9 UPPER RESPIRATORY TRACT INFECTION, UNSPECIFIED TYPE: ICD-10-CM

## 2023-12-07 RX ORDER — BENZONATATE 100 MG/1
100 CAPSULE ORAL 3 TIMES DAILY PRN
Qty: 30 CAPSULE | Refills: 0 | Status: SHIPPED | OUTPATIENT
Start: 2023-12-07 | End: 2023-12-17

## 2023-12-07 NOTE — TELEPHONE ENCOUNTER
Medication Refill Request    LOV 10/12/2023  NOV 1/2/2024    Lab Results   Component Value Date    CREATININE 0.9 11/20/2023

## 2023-12-13 NOTE — TELEPHONE ENCOUNTER
See other enc 1.25 Rinvoq Pregnancy And Lactation Text: Based on animal studies, Rinvoq may cause embryo-fetal harm when administered to pregnant women.  The medication should not be used in pregnancy.  Breastfeeding is not recommended during treatment and for 6 days after the last dose.

## 2023-12-20 ENCOUNTER — PREP FOR PROCEDURE (OUTPATIENT)
Dept: PAIN MANAGEMENT | Age: 73
End: 2023-12-20

## 2023-12-20 DIAGNOSIS — M54.16 LUMBAR RADICULOPATHY: Primary | ICD-10-CM

## 2023-12-20 PROBLEM — G89.29 CHRONIC LEFT-SIDED LOW BACK PAIN WITH LEFT-SIDED SCIATICA: Status: ACTIVE | Noted: 2023-12-20

## 2023-12-20 PROBLEM — M48.061 LUMBAR FORAMINAL STENOSIS: Status: ACTIVE | Noted: 2023-12-20

## 2023-12-20 PROBLEM — M54.42 CHRONIC LEFT-SIDED LOW BACK PAIN WITH LEFT-SIDED SCIATICA: Status: ACTIVE | Noted: 2023-12-20

## 2023-12-21 NOTE — PROGRESS NOTES
[address] [facility] PAIN MANAGEMENT  INSTRUCTIONS  . .......................................................................................................................................... [x] Parking the day of Surgery is located in the [facility].   Upon entering the door, make immediate right into the surgery reception room    [x]  Bring photo ID and insurance card     [x] You may have a light breakfast day of procedure    [x]  Wear loose comfortable clothing    [x]  Please follow instructions for medications as given per Dr's office    [x] You can expect a call the business day prior to procedure to notify you of your arrival time     [x] Please arrange for     []  Other instructions

## 2023-12-28 ENCOUNTER — HOSPITAL ENCOUNTER (OUTPATIENT)
Age: 73
Setting detail: OUTPATIENT SURGERY
Discharge: HOME | End: 2023-12-28
Attending: PAIN MEDICINE | Admitting: PAIN MEDICINE
Payer: MEDICARE

## 2023-12-28 ENCOUNTER — HOSPITAL ENCOUNTER (OUTPATIENT)
Dept: GENERAL RADIOLOGY | Age: 73
Discharge: HOME | End: 2023-12-30
Attending: PAIN MEDICINE
Payer: MEDICARE

## 2023-12-28 VITALS
WEIGHT: 158 LBS | OXYGEN SATURATION: 95 % | TEMPERATURE: 97 F | RESPIRATION RATE: 18 BRPM | BODY MASS INDEX: 22.62 KG/M2 | HEART RATE: 79 BPM | HEIGHT: 70 IN | DIASTOLIC BLOOD PRESSURE: 72 MMHG | SYSTOLIC BLOOD PRESSURE: 144 MMHG

## 2023-12-28 DIAGNOSIS — R52 PAIN MANAGEMENT: ICD-10-CM

## 2023-12-28 PROCEDURE — 7100000010 HC PHASE II RECOVERY - FIRST 15 MIN: Performed by: PAIN MEDICINE

## 2023-12-28 PROCEDURE — 2500000003 HC RX 250 WO HCPCS: Performed by: PAIN MEDICINE

## 2023-12-28 PROCEDURE — 7100000011 HC PHASE II RECOVERY - ADDTL 15 MIN: Performed by: PAIN MEDICINE

## 2023-12-28 PROCEDURE — 6360000002 HC RX W HCPCS: Performed by: PAIN MEDICINE

## 2023-12-28 PROCEDURE — 6360000004 HC RX CONTRAST MEDICATION: Performed by: PAIN MEDICINE

## 2023-12-28 PROCEDURE — 3600000002 HC SURGERY LEVEL 2 BASE: Performed by: PAIN MEDICINE

## 2023-12-28 PROCEDURE — 2709999900 HC NON-CHARGEABLE SUPPLY: Performed by: PAIN MEDICINE

## 2023-12-28 PROCEDURE — 64483 NJX AA&/STRD TFRM EPI L/S 1: CPT | Performed by: PAIN MEDICINE

## 2023-12-28 RX ORDER — IOPAMIDOL 612 MG/ML
INJECTION, SOLUTION INTRATHECAL PRN
Status: DISCONTINUED | OUTPATIENT
Start: 2023-12-28 | End: 2023-12-28 | Stop reason: ALTCHOICE

## 2023-12-28 RX ORDER — LIDOCAINE HYDROCHLORIDE 5 MG/ML
INJECTION, SOLUTION INFILTRATION; INTRAVENOUS PRN
Status: DISCONTINUED | OUTPATIENT
Start: 2023-12-28 | End: 2023-12-28 | Stop reason: ALTCHOICE

## 2023-12-28 RX ORDER — METHYLPREDNISOLONE ACETATE 40 MG/ML
INJECTION, SUSPENSION INTRA-ARTICULAR; INTRALESIONAL; INTRAMUSCULAR; SOFT TISSUE PRN
Status: DISCONTINUED | OUTPATIENT
Start: 2023-12-28 | End: 2023-12-28 | Stop reason: ALTCHOICE

## 2023-12-28 NOTE — OP NOTE
2023    Patient: Maurizio Rhoades  :  1950  Age:  72 y.o. Sex:  female     PRE-OPERATIVE DIAGNOSIS: Lumbar disc displacement, lumbar neural foraminal stenosis, lumbar radiculopathy. POST-OPERATIVE DIAGNOSIS: Same. PROCEDURE: Left Transforaminal epidural steroid injection under fluoroscopic guidance at foraminal level L4.    SURGEON: SEFERINO Elder. ANESTHESIA: local    ESTIMATED BLOOD LOSS: None.  ______________________________________________________________________  BRIEF HISTORY: Maurizio Rhoades comes in today for the Left transforaminal epidural steroid injection under fluoroscopic guidance at foraminal level L4. The potential complications of this procedure were discussed with her again today. She has elected to undergo the aforementioned procedure. Cr complete History & Physical examination were reviewed in depth, a copy of which is in the chart. DESCRIPTION OF PROCEDURE:    After confirming written and informed consent, a time-out was performed and Cr name and date of birth, the procedure to be performed as well as the plan for the location of the needle insertion were confirmed. The patient was brought into the procedure room and placed in the prone position on the fluoroscopy table. Standard monitors were placed and vital signs were observed throughout the procedure. The area of the lumbar spine was prepped with chloraprep and draped in a sterile manner. The vertebral body was identified with AP fluoroscopy. An oblique view was obtained to better visualize the inferior junction of the pedicle and transverse process . The 6 o'clock position of the pedicle was marked and identified. The skin and subcutaneous tissue were anesthetized with 0.5% lidocaine. A # 22 gauge pencil point needle was directed toward the targeted point under fluoroscopy until bone was contacted. The needle was then walked inferiorly until the neural foramen was entered .  A lateral

## 2023-12-28 NOTE — H&P
[facility]        [address] [facility], [address] [place] [address]  Dept: [phone]    Procedure History & Physical      Jeevan Ask     HPI:    Patient  is here for LBP LLE pain for left L4 TFESI  Labs/imaging studies reviewed   All question and concerns addressed including R/B/A associated with the procedure    Past Medical History:   Diagnosis Date    Arthritis     Symptoms respond to myofascial release. Not surgical candidate    Asthma     Cancer ([address])     lung cancer- RML- 2008, stage 1A, [id number] Stage 1A 2013    Chronic back pain     COPD (chronic obstructive pulmonary disease) (HCC)     Emphysema of lung (HCC)     GERD (gastroesophageal reflux disease)     Hashimoto's disease 2008    History of blood transfusion     Hyperlipidemia     Hypertension     Hyperthyroidism     Incisional hernia     Neuropathy     Seizure ([address]) 10/09/2020    Sleep difficulties        Past Surgical History:   Procedure Laterality Date    ABDOMEN SURGERY      APPENDECTOMY  1981    CATARACT REMOVAL WITH IMPLANT Bilateral     COLONOSCOPY  06/23/2015    polyp and diverticulosis    COLONOSCOPY  06/23/2015    colonoscopy    ENDOSCOPY, COLON, DIAGNOSTIC      EYE SURGERY      HERNIA REPAIR N/A 07/14/2021    LAPAROSCOPIC ROBOTIC XI ASSISTED INCISIONAL HERNIA REPAIR WITH MESH performed by Raf Sun MD at [address]  ([facility])  [facility] Right     X 2    US BREAST BIOPSY W LOC DEVICE 1ST LESION RIGHT  10/19/2020    US BREAST NEEDLE BIOPSY RIGHT 10/19/2020 [facility]       Prior to Admission medications    Medication Sig Start Date End Date Taking? Authorizing Provider   diazePAM (VALIUM) 10 MG tablet Take 1 tablet by mouth once.  12/20/23   Raj Chaney MD   benzonatate (TESSALON) 100 MG capsule Take 1 capsule by mouth 3 times daily as needed for Cough    ProviderRaj MD   azithromycin (ZITHROMAX) 250 MG tablet 500mg on day 1 followed by 250mg on

## 2023-12-28 NOTE — DISCHARGE INSTRUCTIONS
Milderd Comment  Dr. Doak Riedel Dr. Casey Schirmer Block/Radiofrequency  Home Going Instructions    1-Go home, rest for the remainder of the day  2-Please do not lift over 20 pounds the day of the injection  3-If you received sedation No: alcohol, driving, operating lawn mowers, plows, tractors or other dangerous equipment until next morning. Do not make important decisions or sign legal documents for 24 hours. You may experience light headedness, dizziness, nausea or sleepiness after sedation. Do not stay alone. A responsible adult must be with you for 24 hours. You could be nauseated from the medications you have received. Your IV site may be sore and bruised. 4-No dietary restrictions     5-Resume all medications the same day, blood thinners to be resumed 24 hours after injection if you were instructed to stop any. 6-Keep the surgical site clean and dry, you may shower the next morning and remove the      dressing. 7- No sitz baths, tub baths or hot tubs/swimming for 24 hours. 8- If you have any pain at the injection site(s), application of an ice pack to the area should be       helpful, 20 minutes on/20 minutes off for next 48 hours. 9- Call [facility] immediately at if you develop.   Fever greater than 100.4 F  Have bleeding or drainage from the puncture site  Have progressive Leg/arm numbness and or weakness  Loss of control of bowel and or bladder (wet/soil yourself)  Severe headache with inability to lift head  10-You may return to work the next day

## 2024-01-02 ENCOUNTER — OFFICE VISIT (OUTPATIENT)
Dept: FAMILY MEDICINE CLINIC | Age: 74
End: 2024-01-02
Payer: MEDICARE

## 2024-01-02 VITALS
SYSTOLIC BLOOD PRESSURE: 144 MMHG | BODY MASS INDEX: 22.53 KG/M2 | DIASTOLIC BLOOD PRESSURE: 70 MMHG | TEMPERATURE: 97.1 F | OXYGEN SATURATION: 97 % | HEART RATE: 81 BPM | WEIGHT: 157 LBS

## 2024-01-02 DIAGNOSIS — M25.561 CHRONIC PAIN OF RIGHT KNEE: ICD-10-CM

## 2024-01-02 DIAGNOSIS — R73.9 ELEVATED BLOOD SUGAR: ICD-10-CM

## 2024-01-02 DIAGNOSIS — E78.00 PURE HYPERCHOLESTEROLEMIA: ICD-10-CM

## 2024-01-02 DIAGNOSIS — J30.2 OTHER SEASONAL ALLERGIC RHINITIS: ICD-10-CM

## 2024-01-02 DIAGNOSIS — E03.9 HYPOTHYROIDISM, UNSPECIFIED TYPE: ICD-10-CM

## 2024-01-02 DIAGNOSIS — G62.9 SMALL FIBER NEUROPATHY: ICD-10-CM

## 2024-01-02 DIAGNOSIS — G89.4 CHRONIC PAIN SYNDROME: ICD-10-CM

## 2024-01-02 DIAGNOSIS — M25.551 RIGHT HIP PAIN: ICD-10-CM

## 2024-01-02 DIAGNOSIS — C34.91 NON-SMALL CELL CANCER OF RIGHT LUNG (HCC): ICD-10-CM

## 2024-01-02 DIAGNOSIS — F33.1 MODERATE EPISODE OF RECURRENT MAJOR DEPRESSIVE DISORDER (HCC): ICD-10-CM

## 2024-01-02 DIAGNOSIS — M54.16 LUMBAR RADICULOPATHY: ICD-10-CM

## 2024-01-02 DIAGNOSIS — M54.17 LUMBOSACRAL RADICULOPATHY: ICD-10-CM

## 2024-01-02 DIAGNOSIS — J31.0 CHRONIC RHINITIS: ICD-10-CM

## 2024-01-02 DIAGNOSIS — K21.9 GASTROESOPHAGEAL REFLUX DISEASE, UNSPECIFIED WHETHER ESOPHAGITIS PRESENT: ICD-10-CM

## 2024-01-02 DIAGNOSIS — I10 ESSENTIAL HYPERTENSION: Primary | ICD-10-CM

## 2024-01-02 DIAGNOSIS — F41.9 ANXIETY: ICD-10-CM

## 2024-01-02 DIAGNOSIS — G89.29 CHRONIC PAIN OF RIGHT KNEE: ICD-10-CM

## 2024-01-02 PROCEDURE — G8420 CALC BMI NORM PARAMETERS: HCPCS | Performed by: FAMILY MEDICINE

## 2024-01-02 PROCEDURE — 99214 OFFICE O/P EST MOD 30 MIN: CPT | Performed by: FAMILY MEDICINE

## 2024-01-02 PROCEDURE — 1090F PRES/ABSN URINE INCON ASSESS: CPT | Performed by: FAMILY MEDICINE

## 2024-01-02 PROCEDURE — G8427 DOCREV CUR MEDS BY ELIG CLIN: HCPCS | Performed by: FAMILY MEDICINE

## 2024-01-02 PROCEDURE — 1036F TOBACCO NON-USER: CPT | Performed by: FAMILY MEDICINE

## 2024-01-02 PROCEDURE — 3078F DIAST BP <80 MM HG: CPT | Performed by: FAMILY MEDICINE

## 2024-01-02 PROCEDURE — G8484 FLU IMMUNIZE NO ADMIN: HCPCS | Performed by: FAMILY MEDICINE

## 2024-01-02 PROCEDURE — 3017F COLORECTAL CA SCREEN DOC REV: CPT | Performed by: FAMILY MEDICINE

## 2024-01-02 PROCEDURE — 3077F SYST BP >= 140 MM HG: CPT | Performed by: FAMILY MEDICINE

## 2024-01-02 PROCEDURE — G8399 PT W/DXA RESULTS DOCUMENT: HCPCS | Performed by: FAMILY MEDICINE

## 2024-01-02 PROCEDURE — 1123F ACP DISCUSS/DSCN MKR DOCD: CPT | Performed by: FAMILY MEDICINE

## 2024-01-02 RX ORDER — MONTELUKAST SODIUM 10 MG/1
10 TABLET ORAL NIGHTLY
Qty: 90 TABLET | Refills: 1 | Status: SHIPPED | OUTPATIENT
Start: 2024-01-02

## 2024-01-02 RX ORDER — PREGABALIN 100 MG/1
100 CAPSULE ORAL 3 TIMES DAILY
Qty: 90 CAPSULE | Refills: 2 | Status: SHIPPED
Start: 2024-01-02 | End: 2024-01-02 | Stop reason: SDUPTHER

## 2024-01-02 RX ORDER — FLUTICASONE PROPIONATE 50 MCG
1 SPRAY, SUSPENSION (ML) NASAL DAILY
Qty: 32 G | Refills: 5 | Status: SHIPPED | OUTPATIENT
Start: 2024-01-02

## 2024-01-02 RX ORDER — AMLODIPINE BESYLATE 10 MG/1
10 TABLET ORAL DAILY
Qty: 90 TABLET | Refills: 1 | Status: SHIPPED | OUTPATIENT
Start: 2024-01-02

## 2024-01-02 RX ORDER — HYDRALAZINE HYDROCHLORIDE 50 MG/1
50 TABLET, FILM COATED ORAL EVERY 8 HOURS SCHEDULED
Qty: 270 TABLET | Refills: 1 | Status: SHIPPED | OUTPATIENT
Start: 2024-01-02

## 2024-01-02 RX ORDER — PREGABALIN 75 MG/1
75 CAPSULE ORAL 3 TIMES DAILY
Qty: 90 CAPSULE | Refills: 2 | Status: SHIPPED
Start: 2024-01-02 | End: 2024-01-02 | Stop reason: SDUPTHER

## 2024-01-02 RX ORDER — TIZANIDINE 4 MG/1
4 TABLET ORAL DAILY PRN
Qty: 90 TABLET | Refills: 1 | Status: SHIPPED | OUTPATIENT
Start: 2024-01-02

## 2024-01-02 RX ORDER — PREGABALIN 75 MG/1
75 CAPSULE ORAL 2 TIMES DAILY
Qty: 180 CAPSULE | Refills: 0 | Status: SHIPPED
Start: 2024-01-02 | End: 2024-01-02

## 2024-01-02 RX ORDER — LEVOTHYROXINE SODIUM 100 MCG
TABLET ORAL
Qty: 90 TABLET | Refills: 1 | Status: SHIPPED | OUTPATIENT
Start: 2024-01-02

## 2024-01-02 RX ORDER — AZELASTINE 1 MG/ML
1 SPRAY, METERED NASAL 2 TIMES DAILY
Qty: 60 ML | Refills: 5 | Status: SHIPPED | OUTPATIENT
Start: 2024-01-02

## 2024-01-02 RX ORDER — ESCITALOPRAM OXALATE 10 MG/1
10 TABLET ORAL DAILY
Qty: 90 TABLET | Refills: 1 | Status: SHIPPED | OUTPATIENT
Start: 2024-01-02

## 2024-01-02 RX ORDER — PREGABALIN 100 MG/1
100 CAPSULE ORAL 3 TIMES DAILY
Qty: 270 CAPSULE | Refills: 0 | Status: SHIPPED | OUTPATIENT
Start: 2024-01-02 | End: 2024-04-01

## 2024-01-02 RX ORDER — EZETIMIBE 10 MG/1
10 TABLET ORAL DAILY
Qty: 90 TABLET | Refills: 1 | Status: SHIPPED | OUTPATIENT
Start: 2024-01-02

## 2024-01-02 RX ORDER — OMEPRAZOLE 20 MG/1
20 CAPSULE, DELAYED RELEASE ORAL DAILY
Qty: 90 CAPSULE | Refills: 0 | Status: SHIPPED | OUTPATIENT
Start: 2024-01-02

## 2024-01-02 ASSESSMENT — PATIENT HEALTH QUESTIONNAIRE - PHQ9
SUM OF ALL RESPONSES TO PHQ QUESTIONS 1-9: 5
7. TROUBLE CONCENTRATING ON THINGS, SUCH AS READING THE NEWSPAPER OR WATCHING TELEVISION: 1
8. MOVING OR SPEAKING SO SLOWLY THAT OTHER PEOPLE COULD HAVE NOTICED. OR THE OPPOSITE, BEING SO FIGETY OR RESTLESS THAT YOU HAVE BEEN MOVING AROUND A LOT MORE THAN USUAL: 0
SUM OF ALL RESPONSES TO PHQ QUESTIONS 1-9: 5
10. IF YOU CHECKED OFF ANY PROBLEMS, HOW DIFFICULT HAVE THESE PROBLEMS MADE IT FOR YOU TO DO YOUR WORK, TAKE CARE OF THINGS AT HOME, OR GET ALONG WITH OTHER PEOPLE: 0
1. LITTLE INTEREST OR PLEASURE IN DOING THINGS: 0
4. FEELING TIRED OR HAVING LITTLE ENERGY: 1
3. TROUBLE FALLING OR STAYING ASLEEP: 3
SUM OF ALL RESPONSES TO PHQ9 QUESTIONS 1 & 2: 0
9. THOUGHTS THAT YOU WOULD BE BETTER OFF DEAD, OR OF HURTING YOURSELF: 0
6. FEELING BAD ABOUT YOURSELF - OR THAT YOU ARE A FAILURE OR HAVE LET YOURSELF OR YOUR FAMILY DOWN: 0
2. FEELING DOWN, DEPRESSED OR HOPELESS: 0
5. POOR APPETITE OR OVEREATING: 0

## 2024-01-02 NOTE — PROGRESS NOTES
CC: Nila Mcfarland is a 73 y.o. yo female is here for evaluation evaluation for the following acute & chronic medical concerns: Hypertension (4-month follow-up), Hypothyroidism, Hyperlipidemia, Cough (lingering), Nutrition Counseling (Patient has a new diet plan she wants to try -- WHOLE30), and Mold Exposure        HPI:      Just found mold in the house  Noticed in spare bedroom with big drapes covering double sliding door; water came in and noticed mold by the window; she has been having a chronic cough that is not improving with treatment and she feels the cough could be related to the mold  Daughter  recommended activated charcoal and tri-Fortify (liposomal glutathione)      HTN - se to hctz (loc, dizzy); benicar (hyperkalemia); lopressor (stopped by cards due to possible sinus pause); she is on hydralazine 50mg q8 hours and norvasc 10mg  HLD - ASCVD 15%; hx of elevated CK on statin; currently on zetia;    Anxiety / depression  - on lexapro 10mg, controlled; follows with Jessica OSHEA  GERD - on prilosec 20mg  Hashimoto's / thyroid nodule - on synthroid 100mcg 1/2 tab sunday; follows with Dr. Flores  Hx of Lung cancer x2 s/p R upper and middle lobectomy: Follows with Dr. Hess, now Dr. Chandler Peter  Quite smoking 2007; Diagnosed with lung ca 2008  COPD - on anoro ellipta + albuterol PRN; she has chronic coughing and uses tessalon PRN; currently follows with Dr. Cortes  Chronic pain / neuropathy - Lumbar pain and hip pain; follows with PMR, Dr. Mcleod, recently did see Neurology and dx of small fiber neuropathy; on lyrica now up to 100mg TID per neuro; also on pamelor 25mg for sleep and pain  Preventive: Tyrer Cuzick score of 5.9%; Heterogeneously dense; follows with JANETT for breast concerns    PDMP Monitoring:    Last PDMP Shashank as Reviewed:  Review User Review Instant Review Result   BESSIE GARCIA 12/20/2023 10:10 AM Reviewed PDMP [1]     Last contract: 3/2022  Last urine: 3/2022    Vitals:   BP (!)

## 2024-01-09 DIAGNOSIS — M25.551 RIGHT HIP PAIN: ICD-10-CM

## 2024-01-09 DIAGNOSIS — M25.561 CHRONIC PAIN OF RIGHT KNEE: ICD-10-CM

## 2024-01-09 DIAGNOSIS — G89.4 CHRONIC PAIN SYNDROME: ICD-10-CM

## 2024-01-09 DIAGNOSIS — G89.29 CHRONIC PAIN OF RIGHT KNEE: ICD-10-CM

## 2024-01-09 DIAGNOSIS — M54.16 LUMBAR RADICULOPATHY: ICD-10-CM

## 2024-01-09 RX ORDER — PREGABALIN 100 MG/1
100 CAPSULE ORAL 3 TIMES DAILY
Qty: 270 CAPSULE | Refills: 0 | Status: SHIPPED | OUTPATIENT
Start: 2024-01-09 | End: 2024-04-08

## 2024-01-09 NOTE — TELEPHONE ENCOUNTER
Medication Refill Request-Patient requesting Rx to be sent to a different Pharmacy due to cost.     LOV 1/2/2024  NOV 4/9/2024    Lab Results   Component Value Date    CREATININE 0.9 11/20/2023

## 2024-01-30 ENCOUNTER — OFFICE VISIT (OUTPATIENT)
Dept: PAIN MANAGEMENT | Age: 74
End: 2024-01-30
Payer: MEDICARE

## 2024-01-30 VITALS
WEIGHT: 157 LBS | RESPIRATION RATE: 16 BRPM | SYSTOLIC BLOOD PRESSURE: 118 MMHG | DIASTOLIC BLOOD PRESSURE: 65 MMHG | HEIGHT: 70 IN | OXYGEN SATURATION: 95 % | BODY MASS INDEX: 22.48 KG/M2 | HEART RATE: 63 BPM | TEMPERATURE: 97.2 F

## 2024-01-30 DIAGNOSIS — M48.061 LUMBAR FORAMINAL STENOSIS: ICD-10-CM

## 2024-01-30 DIAGNOSIS — G89.4 CHRONIC PAIN SYNDROME: Primary | ICD-10-CM

## 2024-01-30 DIAGNOSIS — G89.29 CHRONIC LEFT-SIDED LOW BACK PAIN WITH LEFT-SIDED SCIATICA: ICD-10-CM

## 2024-01-30 DIAGNOSIS — M54.42 CHRONIC LEFT-SIDED LOW BACK PAIN WITH LEFT-SIDED SCIATICA: ICD-10-CM

## 2024-01-30 DIAGNOSIS — M54.16 LUMBAR RADICULOPATHY: ICD-10-CM

## 2024-01-30 PROCEDURE — 1123F ACP DISCUSS/DSCN MKR DOCD: CPT | Performed by: PAIN MEDICINE

## 2024-01-30 PROCEDURE — G8399 PT W/DXA RESULTS DOCUMENT: HCPCS | Performed by: PAIN MEDICINE

## 2024-01-30 PROCEDURE — 1036F TOBACCO NON-USER: CPT | Performed by: PAIN MEDICINE

## 2024-01-30 PROCEDURE — 3078F DIAST BP <80 MM HG: CPT | Performed by: PAIN MEDICINE

## 2024-01-30 PROCEDURE — 99213 OFFICE O/P EST LOW 20 MIN: CPT | Performed by: PAIN MEDICINE

## 2024-01-30 PROCEDURE — G8420 CALC BMI NORM PARAMETERS: HCPCS | Performed by: PAIN MEDICINE

## 2024-01-30 PROCEDURE — 3074F SYST BP LT 130 MM HG: CPT | Performed by: PAIN MEDICINE

## 2024-01-30 PROCEDURE — 3017F COLORECTAL CA SCREEN DOC REV: CPT | Performed by: PAIN MEDICINE

## 2024-01-30 PROCEDURE — 1090F PRES/ABSN URINE INCON ASSESS: CPT | Performed by: PAIN MEDICINE

## 2024-01-30 PROCEDURE — G8484 FLU IMMUNIZE NO ADMIN: HCPCS | Performed by: PAIN MEDICINE

## 2024-01-30 PROCEDURE — G8427 DOCREV CUR MEDS BY ELIG CLIN: HCPCS | Performed by: PAIN MEDICINE

## 2024-01-30 NOTE — PROGRESS NOTES
[facility]        [address] [address]  Dept: [phone]        Follow up Note      Nila Mcfarland     Date of Visit:  01/30/24     CC:  Patient presents for follow up   Chief Complaint   Patient presents with    Follow-up     Left knee         HPI:    Pain is better.  Change in quality of symptoms:no   Medication side effects:not applicable .   Recent diagnostic testing:none.   Recent interventional procedures:left L4 TFESI with <50% relief.    She has been on anticoagulation medications to include NSAIDS and has not been on herbal supplements.  She is not diabetic.     Imaging:   Skin biopsy in [facility]:  Oct 2023:   Moderate degree of small fiber neuropathy     9/2023 MRI lumbar w/o -  FINDINGS:  The prior lumbar spine plain films shows that there are 5 normal non  rib-bearing lumbar vertebral bodies with a transitional vertebra that is  designated as S1.  For the purposes of this report, the L5-S1 disc is seen on  axial series 6 image 18 and series 7, image 23.     BONES/ALIGNMENT: There is normal alignment of the spine apart from slight  grade 1 retrolisthesis of L3 on L4.  There is disc desiccation at multiple  levels.  Moderate disc space narrowing is seen at L2-3 with subchondral  signal change, consistent with degenerative disc disease.  There is mild  narrowing at L4-5.  The vertebral body heights are maintained. The bone  marrow signal appears unremarkable.     SPINAL CORD: The conus terminates normally.     SOFT TISSUES: No paraspinal mass identified.     L1-L2: There is no significant disc herniation, spinal canal stenosis or  neural foraminal narrowing.     L2-L3: There is no significant disc herniation, spinal canal stenosis or  neural foraminal narrowing.  There is mild disc bulge and mild bilateral  posterior facet degenerative change.     L3-L4: There is grade 1 retrolisthesis and mild to moderate bilateral  posterior facet degenerative

## 2024-01-30 NOTE — PROGRESS NOTES
Nila Mcfarland presents to the [facility] on 1/30/2024. Nila is complaining of pain left knee . The pain is constant. The pain is described as aching. Pain is rated on her best day at a 5, on her worst day at a 8, and on average at a 6 on the VAS scale. She took her last dose of Lyrica this morning .    Any procedures since your last visit: No    She is not on NSAIDS and  is not on anticoagulation medications Pacemaker or defibrillator: No         Medication Contract and Consent for Opioid Use Documents Filed       Patient Documents       Type of Document Status Date Received Received By Description    Medication Contract Received 1/21/2020  8:16 AM CHRISTIANO AYSHA MARTIN med contract    Medication Contract Received 8/16/2022 10:51 AM NICOLÁS ESPINO 03/29/2022  Controlled Substance Medication Agreement                       /65   Pulse 63   Temp 97.2 °F (36.2 °C) (Temporal)   Resp 16   Ht 1.778 m (5' 10\")   Wt 71.2 kg (157 lb)   SpO2 95%   BMI 22.53 kg/m²      No LMP recorded. Patient has had a hysterectomy.

## 2024-02-02 ENCOUNTER — PATIENT MESSAGE (OUTPATIENT)
Dept: FAMILY MEDICINE CLINIC | Age: 74
End: 2024-02-02

## 2024-02-02 DIAGNOSIS — R05.9 COUGH, UNSPECIFIED TYPE: Primary | ICD-10-CM

## 2024-02-02 DIAGNOSIS — J30.9 ALLERGIC RHINITIS, UNSPECIFIED SEASONALITY, UNSPECIFIED TRIGGER: ICD-10-CM

## 2024-02-02 RX ORDER — BENZONATATE 100 MG/1
100 CAPSULE ORAL 3 TIMES DAILY PRN
Qty: 28 CAPSULE | Refills: 0 | Status: SHIPPED | OUTPATIENT
Start: 2024-02-02

## 2024-02-02 RX ORDER — LORATADINE 10 MG/1
10 TABLET ORAL DAILY
Qty: 90 TABLET | Refills: 0 | Status: SHIPPED | OUTPATIENT
Start: 2024-02-02

## 2024-02-02 RX ORDER — LORATADINE 10 MG/1
10 TABLET ORAL DAILY
Qty: 90 TABLET | Refills: 0 | Status: SHIPPED
Start: 2024-02-02 | End: 2024-02-02 | Stop reason: SDUPTHER

## 2024-02-02 NOTE — TELEPHONE ENCOUNTER
From: Nila Mcfarland  To: Dr. Izabella العلي  Sent: 2/2/2024 2:13 PM EST  Subject: Benzonatate    Hello,  I need a refill of benzonatate, but I am not able to request it on the [facility] medication list.    Please send it to [facility] with the Loratadine refill.    Thanks!    [id number] [address] [facility] [phone] [facility] [phone]

## 2024-02-02 NOTE — TELEPHONE ENCOUNTER
Medication Refill Request    LOV 1/2/2024  NOV 4/9/2024    Lab Results   Component Value Date    CREATININE 0.9 11/20/2023

## 2024-02-08 ENCOUNTER — TELEPHONE (OUTPATIENT)
Dept: PAIN MANAGEMENT | Age: 74
End: 2024-02-08

## 2024-02-08 DIAGNOSIS — M54.16 LUMBAR RADICULOPATHY: Primary | ICD-10-CM

## 2024-02-08 NOTE — TELEPHONE ENCOUNTER
Call to Nila Mcfarland and message left that procedure was approved for 2/13/2024 and that [facility] should call her a few days before for the pre op call and between 2:00 PM and 4:00 PM  the business day before with the arrival time. Instructed Nila to hold ibuprofen for 24 hours, naprosyn for 4 days and any aspirin containing products or fish oil for 7 days. Instructed to call office back. Advised that if they do not return the call it may result in their procedure being delayed.    Electronically signed by Gabriel Haywood RN on 2/8/2024 at 1:39 PM

## 2024-02-12 ENCOUNTER — OFFICE VISIT (OUTPATIENT)
Dept: FAMILY MEDICINE CLINIC | Age: 74
End: 2024-02-12
Payer: MEDICARE

## 2024-02-12 VITALS
TEMPERATURE: 97.3 F | DIASTOLIC BLOOD PRESSURE: 56 MMHG | HEART RATE: 89 BPM | RESPIRATION RATE: 16 BRPM | OXYGEN SATURATION: 98 % | SYSTOLIC BLOOD PRESSURE: 136 MMHG

## 2024-02-12 DIAGNOSIS — H69.90 DYSFUNCTION OF EUSTACHIAN TUBE, UNSPECIFIED LATERALITY: ICD-10-CM

## 2024-02-12 DIAGNOSIS — R42 VERTIGO: Primary | ICD-10-CM

## 2024-02-12 PROCEDURE — G8484 FLU IMMUNIZE NO ADMIN: HCPCS | Performed by: PHYSICIAN ASSISTANT

## 2024-02-12 PROCEDURE — 3075F SYST BP GE 130 - 139MM HG: CPT | Performed by: PHYSICIAN ASSISTANT

## 2024-02-12 PROCEDURE — 1123F ACP DISCUSS/DSCN MKR DOCD: CPT | Performed by: PHYSICIAN ASSISTANT

## 2024-02-12 PROCEDURE — 1036F TOBACCO NON-USER: CPT | Performed by: PHYSICIAN ASSISTANT

## 2024-02-12 PROCEDURE — G8427 DOCREV CUR MEDS BY ELIG CLIN: HCPCS | Performed by: PHYSICIAN ASSISTANT

## 2024-02-12 PROCEDURE — G8420 CALC BMI NORM PARAMETERS: HCPCS | Performed by: PHYSICIAN ASSISTANT

## 2024-02-12 PROCEDURE — 3078F DIAST BP <80 MM HG: CPT | Performed by: PHYSICIAN ASSISTANT

## 2024-02-12 PROCEDURE — G8399 PT W/DXA RESULTS DOCUMENT: HCPCS | Performed by: PHYSICIAN ASSISTANT

## 2024-02-12 PROCEDURE — 1090F PRES/ABSN URINE INCON ASSESS: CPT | Performed by: PHYSICIAN ASSISTANT

## 2024-02-12 PROCEDURE — 99213 OFFICE O/P EST LOW 20 MIN: CPT | Performed by: PHYSICIAN ASSISTANT

## 2024-02-12 PROCEDURE — 3017F COLORECTAL CA SCREEN DOC REV: CPT | Performed by: PHYSICIAN ASSISTANT

## 2024-02-12 RX ORDER — FLUTICASONE PROPIONATE 50 MCG
1 SPRAY, SUSPENSION (ML) NASAL DAILY
Qty: 32 G | Refills: 5 | Status: SHIPPED | OUTPATIENT
Start: 2024-02-12

## 2024-02-12 RX ORDER — AZELASTINE 1 MG/ML
1 SPRAY, METERED NASAL 2 TIMES DAILY
Qty: 60 ML | Refills: 5 | Status: SHIPPED | OUTPATIENT
Start: 2024-02-12

## 2024-02-12 RX ORDER — MECLIZINE HCL 12.5 MG/1
12.5 TABLET ORAL 3 TIMES DAILY PRN
Qty: 30 TABLET | Refills: 0 | Status: SHIPPED | OUTPATIENT
Start: 2024-02-12 | End: 2024-02-22

## 2024-02-12 NOTE — PROGRESS NOTES
Chief Complaint:   Dizziness (Started yesterday afternoon)    History of Present Illness   Source of history provided by:  patient.      Nila Mcfarland is a 73 y.o. old female who has a past medical history of:   Past Medical History:   Diagnosis Date    Arthritis     Symptoms respond to myofascial release. Not surgical candidate    Asthma     Cancer (HCC)     lung cancer- RML- 2008, stage 1A, RUL-2013 Stage 1A 2013    Chronic back pain     COPD (chronic obstructive pulmonary disease) (HCC)     Emphysema of lung (HCC)     GERD (gastroesophageal reflux disease)     Hashimoto's disease 2008    History of blood transfusion     Hyperlipidemia     Hypertension     Hyperthyroidism     Incisional hernia     Neuropathy     Seizure (HCC) 10/09/2020    Sleep difficulties    presents for complaints of dizziness which began yesterday afternoon. Worse with position changes and when moving head. Pt has not had these symptoms before. States that ears feel full (R>L). Has been prescribed flonase and astelin in the past but only uses them in the spring. Denies any visual changes. Pt denies any recent falls, syncope, CP, SOB, palpitations, HA, visual loss, unilateral weakness, fever, neck stiffness, or recent illness.      ROS    Unless otherwise stated in this report or unable to obtain because of the patient's clinical or mental status as evidenced by the medical record, this patients's positive and negative responses for Review of Systems, constitutional, psych, eyes, ENT, cardiovascular, respiratory, gastrointestinal, neurological, genitourinary, musculoskeletal, integument systems and systems related to the presenting problem are either stated in the preceding or were not pertinent or were negative for the symptoms and/or complaints related to the medical problem.    Past Surgical History:  has a past surgical history that includes Appendectomy (1981); Hysterectomy (1981); Lung cancer surgery (Right); Colonoscopy

## 2024-02-22 ENCOUNTER — TELEPHONE (OUTPATIENT)
Dept: FAMILY MEDICINE CLINIC | Age: 74
End: 2024-02-22

## 2024-02-22 NOTE — TELEPHONE ENCOUNTER
The patient called and left a message.  She was here last week to see Nathalie.  She is having issues with her ears and is asking for a referral to an ear specialist.  Please advise.

## 2024-02-23 ENCOUNTER — TELEPHONE (OUTPATIENT)
Dept: FAMILY MEDICINE CLINIC | Age: 74
End: 2024-02-23

## 2024-02-23 NOTE — TELEPHONE ENCOUNTER
called, patient seen Nathalie on 2/12 for vertigo and ear problems.  Vertigo is better but the right ear has worse, barely has hearing in it and the left ear still experiencing crackling sound.      Pt's  would like to know what the next step would be for patient?    Please advise

## 2024-02-23 NOTE — TELEPHONE ENCOUNTER
Please have her come in for re-evaluation.  May need different treatment and we can determine if she needs ENT referral.

## 2024-02-25 RX ORDER — NORTRIPTYLINE HYDROCHLORIDE 25 MG/1
25 CAPSULE ORAL NIGHTLY
Qty: 90 CAPSULE | Refills: 1 | Status: CANCELLED | OUTPATIENT
Start: 2024-02-25

## 2024-02-26 ENCOUNTER — OFFICE VISIT (OUTPATIENT)
Dept: FAMILY MEDICINE CLINIC | Age: 74
End: 2024-02-26
Payer: MEDICARE

## 2024-02-26 VITALS
SYSTOLIC BLOOD PRESSURE: 138 MMHG | HEART RATE: 82 BPM | DIASTOLIC BLOOD PRESSURE: 60 MMHG | WEIGHT: 153.6 LBS | OXYGEN SATURATION: 98 % | RESPIRATION RATE: 18 BRPM | BODY MASS INDEX: 22.04 KG/M2 | TEMPERATURE: 97.3 F

## 2024-02-26 DIAGNOSIS — H69.90 DYSFUNCTION OF EUSTACHIAN TUBE, UNSPECIFIED LATERALITY: Primary | ICD-10-CM

## 2024-02-26 PROCEDURE — 1090F PRES/ABSN URINE INCON ASSESS: CPT | Performed by: PHYSICIAN ASSISTANT

## 2024-02-26 PROCEDURE — G8420 CALC BMI NORM PARAMETERS: HCPCS | Performed by: PHYSICIAN ASSISTANT

## 2024-02-26 PROCEDURE — 1123F ACP DISCUSS/DSCN MKR DOCD: CPT | Performed by: PHYSICIAN ASSISTANT

## 2024-02-26 PROCEDURE — 1036F TOBACCO NON-USER: CPT | Performed by: PHYSICIAN ASSISTANT

## 2024-02-26 PROCEDURE — G8427 DOCREV CUR MEDS BY ELIG CLIN: HCPCS | Performed by: PHYSICIAN ASSISTANT

## 2024-02-26 PROCEDURE — 3078F DIAST BP <80 MM HG: CPT | Performed by: PHYSICIAN ASSISTANT

## 2024-02-26 PROCEDURE — G8484 FLU IMMUNIZE NO ADMIN: HCPCS | Performed by: PHYSICIAN ASSISTANT

## 2024-02-26 PROCEDURE — 3017F COLORECTAL CA SCREEN DOC REV: CPT | Performed by: PHYSICIAN ASSISTANT

## 2024-02-26 PROCEDURE — G8399 PT W/DXA RESULTS DOCUMENT: HCPCS | Performed by: PHYSICIAN ASSISTANT

## 2024-02-26 PROCEDURE — 3075F SYST BP GE 130 - 139MM HG: CPT | Performed by: PHYSICIAN ASSISTANT

## 2024-02-26 PROCEDURE — 99213 OFFICE O/P EST LOW 20 MIN: CPT | Performed by: PHYSICIAN ASSISTANT

## 2024-02-26 RX ORDER — NORTRIPTYLINE HYDROCHLORIDE 25 MG/1
25 CAPSULE ORAL NIGHTLY
Qty: 90 CAPSULE | Refills: 0 | Status: SHIPPED
Start: 2024-02-26 | End: 2024-02-29 | Stop reason: SDUPTHER

## 2024-02-26 RX ORDER — METHYLPREDNISOLONE 4 MG/1
TABLET ORAL
Qty: 1 KIT | Refills: 0 | Status: SHIPPED | OUTPATIENT
Start: 2024-02-26 | End: 2024-03-03

## 2024-02-26 NOTE — TELEPHONE ENCOUNTER
The following medication has been approved and sent to your pharmacy    Requested Prescriptions     Signed Prescriptions Disp Refills    nortriptyline (PAMELOR) 25 MG capsule 90 capsule 0     Sig: Take 1 capsule by mouth nightly     Authorizing Provider: SILVINA GEORGE

## 2024-02-26 NOTE — PROGRESS NOTES
Chief Complaint:   Ear Problem (Left her can't hear, crackling in right)    History of Present Illness   Source of history provided by:  patient.      Nila Mcfarland is a 73 y.o. old female presenting for evaluation of ear issues. Can't hear out of left and has cracking in right. Was seen on 2/12 and diagnosed with ETD and vertigo. Has been using astelin and flonase without improvement. Reports some throat drainage. Denies associated nasal congestion, rhinorrhea, and sore throat. Denies any discharge from the ear canal. Denies any fever, chills, CP, SOB, abdominal pain, neck stiffness, rash, or lethargy.    ROS    Unless otherwise stated in this report or unable to obtain because of the patient's clinical or mental status as evidenced by the medical record, this patients's positive and negative responses for Review of Systems, constitutional, psych, eyes, ENT, cardiovascular, respiratory, gastrointestinal, neurological, genitourinary, musculoskeletal, integument systems and systems related to the presenting problem are either stated in the preceding or were not pertinent or were negative for the symptoms and/or complaints related to the medical problem.    Past Surgical History:  has a past surgical history that includes Appendectomy (1981); Hysterectomy (1981); Lung cancer surgery (Right); Colonoscopy (06/23/2015); Colonoscopy (06/23/2015); US BREAST BIOPSY W LOC DEVICE 1ST LESION RIGHT (10/19/2020); Cataract removal with implant (Bilateral); hernia repair (N/A, 07/14/2021); Abdomen surgery; Endoscopy, colon, diagnostic; eye surgery; and Pain management procedure (Left, 12/28/2023).  Social History:  reports that she quit smoking about 16 years ago. Her smoking use included cigarettes. She started smoking about 47 years ago. She has a 30.7 pack-year smoking history. She has never used smokeless tobacco. She reports current alcohol use. She reports that she does not use drugs.  Family History: family history

## 2024-02-27 DIAGNOSIS — E03.9 HYPOTHYROIDISM, UNSPECIFIED TYPE: ICD-10-CM

## 2024-02-28 LAB
T4 FREE: 1.7 NG/DL (ref 0.9–1.7)
TSH SERPL DL<=0.05 MIU/L-ACNC: 0.31 UIU/ML (ref 0.27–4.2)

## 2024-02-28 RX ORDER — NORTRIPTYLINE HYDROCHLORIDE 25 MG/1
25 CAPSULE ORAL NIGHTLY
Qty: 90 CAPSULE | Refills: 0 | Status: CANCELLED | OUTPATIENT
Start: 2024-02-28

## 2024-02-29 RX ORDER — NORTRIPTYLINE HYDROCHLORIDE 25 MG/1
25 CAPSULE ORAL NIGHTLY
Qty: 90 CAPSULE | Refills: 0 | Status: SHIPPED
Start: 2024-02-29 | End: 2024-03-01 | Stop reason: SDUPTHER

## 2024-03-05 RX ORDER — NORTRIPTYLINE HYDROCHLORIDE 25 MG/1
25 CAPSULE ORAL NIGHTLY
Qty: 90 CAPSULE | Refills: 0 | Status: SHIPPED | OUTPATIENT
Start: 2024-03-05

## 2024-03-10 ENCOUNTER — TELEPHONE (OUTPATIENT)
Dept: ENDOCRINOLOGY | Age: 74
End: 2024-03-10

## 2024-03-10 NOTE — TELEPHONE ENCOUNTER
Notify patient  Excellent your thyroid hormones are very good.  Continue current dose of thyroid medication

## 2024-03-26 ENCOUNTER — PREP FOR PROCEDURE (OUTPATIENT)
Dept: PAIN MANAGEMENT | Age: 74
End: 2024-03-26

## 2024-03-26 ENCOUNTER — OFFICE VISIT (OUTPATIENT)
Dept: PAIN MANAGEMENT | Age: 74
End: 2024-03-26
Payer: MEDICARE

## 2024-03-26 VITALS
BODY MASS INDEX: 22 KG/M2 | RESPIRATION RATE: 16 BRPM | HEART RATE: 80 BPM | HEIGHT: 70 IN | SYSTOLIC BLOOD PRESSURE: 166 MMHG | TEMPERATURE: 97.3 F | OXYGEN SATURATION: 94 % | DIASTOLIC BLOOD PRESSURE: 78 MMHG | WEIGHT: 153.66 LBS

## 2024-03-26 DIAGNOSIS — M54.16 LUMBAR RADICULOPATHY: Primary | ICD-10-CM

## 2024-03-26 DIAGNOSIS — M48.061 LUMBAR FORAMINAL STENOSIS: ICD-10-CM

## 2024-03-26 DIAGNOSIS — G89.29 CHRONIC BILATERAL LOW BACK PAIN WITH BILATERAL SCIATICA: ICD-10-CM

## 2024-03-26 DIAGNOSIS — M54.41 CHRONIC BILATERAL LOW BACK PAIN WITH BILATERAL SCIATICA: ICD-10-CM

## 2024-03-26 DIAGNOSIS — M54.16 LUMBAR RADICULOPATHY: ICD-10-CM

## 2024-03-26 DIAGNOSIS — G89.4 CHRONIC PAIN SYNDROME: Primary | ICD-10-CM

## 2024-03-26 DIAGNOSIS — M54.42 CHRONIC BILATERAL LOW BACK PAIN WITH BILATERAL SCIATICA: ICD-10-CM

## 2024-03-26 PROCEDURE — 1123F ACP DISCUSS/DSCN MKR DOCD: CPT | Performed by: PAIN MEDICINE

## 2024-03-26 PROCEDURE — 3017F COLORECTAL CA SCREEN DOC REV: CPT | Performed by: PAIN MEDICINE

## 2024-03-26 PROCEDURE — 3078F DIAST BP <80 MM HG: CPT | Performed by: PAIN MEDICINE

## 2024-03-26 PROCEDURE — G8427 DOCREV CUR MEDS BY ELIG CLIN: HCPCS | Performed by: PAIN MEDICINE

## 2024-03-26 PROCEDURE — 1036F TOBACCO NON-USER: CPT | Performed by: PAIN MEDICINE

## 2024-03-26 PROCEDURE — G8399 PT W/DXA RESULTS DOCUMENT: HCPCS | Performed by: PAIN MEDICINE

## 2024-03-26 PROCEDURE — 1090F PRES/ABSN URINE INCON ASSESS: CPT | Performed by: PAIN MEDICINE

## 2024-03-26 PROCEDURE — 3077F SYST BP >= 140 MM HG: CPT | Performed by: PAIN MEDICINE

## 2024-03-26 PROCEDURE — 99213 OFFICE O/P EST LOW 20 MIN: CPT | Performed by: PAIN MEDICINE

## 2024-03-26 PROCEDURE — G8484 FLU IMMUNIZE NO ADMIN: HCPCS | Performed by: PAIN MEDICINE

## 2024-03-26 PROCEDURE — G8420 CALC BMI NORM PARAMETERS: HCPCS | Performed by: PAIN MEDICINE

## 2024-03-26 PROCEDURE — 99213 OFFICE O/P EST LOW 20 MIN: CPT

## 2024-03-26 RX ORDER — COVID-19 ANTIGEN TEST
KIT MISCELLANEOUS AS NEEDED
COMMUNITY

## 2024-03-26 RX ORDER — DIAZEPAM 10 MG/1
10 TABLET ORAL ONCE
Qty: 1 TABLET | Refills: 0 | Status: SHIPPED | OUTPATIENT
Start: 2024-03-26 | End: 2024-03-26

## 2024-03-26 NOTE — PROGRESS NOTES
Nila Mcfarland presents to the [facility] on 3/26/2024. Nila is complaining of pain low back. The pain is constant. The pain is described as aching. Pain is rated on her best day at a 4, on her worst day at a 8, and on average at a 5 on the VAS scale. She took her last dose of Lyrica and Zanaflex yesterday.     Any procedures since your last visit: No.    Pacemaker or defibrillator: No.    She is on NSAIDS and is not on anticoagulation medications to include none.     Medication Contract and Consent for Opioid Use Documents Filed       Patient Documents       Type of Document Status Date Received Received By Description    Medication Contract Received 1/21/2020  8:16 AM AYSHA ALVARADO med contract    Medication Contract Received 8/16/2022 10:51 AM NICOLÁS ESPINO 03/29/2022  Controlled Substance Medication Agreement                    BP (!) 166/78   Pulse 80   Temp 97.3 °F (36.3 °C) (Infrared)   Resp 16   Ht 1.778 m (5' 10\")   Wt 69.7 kg (153 lb 10.6 oz)   SpO2 94%   BMI 22.05 kg/m²      No LMP recorded. Patient has had a hysterectomy.  
Call to Nila Mcfarland that procedure was approved for B/L L5 TFESI and that [facility] should call her a few days before for the pre op call and between 2:00 PM and 4:00 PM  the business day before with the arrival time. Instructed Nila to hold ibuprofen for 24 hours, naprosyn for 4 days and any aspirin containing products or fish oil for 7 days. Instructed to call office back if any questions. Nila verbalized understanding.    Electronically signed by Neel Tatum RN on 3/26/2024 at 11:02 AM  
TFESI with <50% relief  Left L4 and L5 TFESI with Kenalog ordered for improved benefit- r/b and procedure discussed, valium for presedation  B/l L5 TFESI ordered - r/b and proc discussed, Valium sent for presedation  Patient encouraged to stay active   Treatment plan discussed with the patient including medication and procedure side effects     Cc:  Referring physician    SEFERINO Tucker D.O.

## 2024-03-27 ENCOUNTER — PROCEDURE VISIT (OUTPATIENT)
Dept: AUDIOLOGY | Age: 74
End: 2024-03-27

## 2024-03-27 ENCOUNTER — OFFICE VISIT (OUTPATIENT)
Dept: ENT CLINIC | Age: 74
End: 2024-03-27
Payer: MEDICARE

## 2024-03-27 VITALS
DIASTOLIC BLOOD PRESSURE: 94 MMHG | SYSTOLIC BLOOD PRESSURE: 171 MMHG | HEART RATE: 89 BPM | WEIGHT: 150 LBS | BODY MASS INDEX: 21.52 KG/M2

## 2024-03-27 DIAGNOSIS — H90.42 SENSORINEURAL HEARING LOSS (SNHL) OF LEFT EAR WITH UNRESTRICTED HEARING OF RIGHT EAR: ICD-10-CM

## 2024-03-27 DIAGNOSIS — Z01.818 PREOP TESTING: ICD-10-CM

## 2024-03-27 DIAGNOSIS — H90.A22 SENSORINEURAL HEARING LOSS (SNHL) OF LEFT EAR WITH RESTRICTED HEARING OF RIGHT EAR: Primary | ICD-10-CM

## 2024-03-27 DIAGNOSIS — H91.22 SUDDEN LEFT HEARING LOSS: Primary | ICD-10-CM

## 2024-03-27 PROCEDURE — 3017F COLORECTAL CA SCREEN DOC REV: CPT | Performed by: NURSE PRACTITIONER

## 2024-03-27 PROCEDURE — 3077F SYST BP >= 140 MM HG: CPT | Performed by: NURSE PRACTITIONER

## 2024-03-27 PROCEDURE — 3080F DIAST BP >= 90 MM HG: CPT | Performed by: NURSE PRACTITIONER

## 2024-03-27 PROCEDURE — G8420 CALC BMI NORM PARAMETERS: HCPCS | Performed by: NURSE PRACTITIONER

## 2024-03-27 PROCEDURE — 99204 OFFICE O/P NEW MOD 45 MIN: CPT | Performed by: NURSE PRACTITIONER

## 2024-03-27 PROCEDURE — G8427 DOCREV CUR MEDS BY ELIG CLIN: HCPCS | Performed by: NURSE PRACTITIONER

## 2024-03-27 PROCEDURE — 1036F TOBACCO NON-USER: CPT | Performed by: NURSE PRACTITIONER

## 2024-03-27 PROCEDURE — G8399 PT W/DXA RESULTS DOCUMENT: HCPCS | Performed by: NURSE PRACTITIONER

## 2024-03-27 PROCEDURE — 1123F ACP DISCUSS/DSCN MKR DOCD: CPT | Performed by: NURSE PRACTITIONER

## 2024-03-27 PROCEDURE — 1090F PRES/ABSN URINE INCON ASSESS: CPT | Performed by: NURSE PRACTITIONER

## 2024-03-27 PROCEDURE — G8484 FLU IMMUNIZE NO ADMIN: HCPCS | Performed by: NURSE PRACTITIONER

## 2024-03-27 RX ORDER — PREDNISONE 20 MG/1
60 TABLET ORAL DAILY
Qty: 30 TABLET | Refills: 0 | Status: SHIPPED | OUTPATIENT
Start: 2024-03-27 | End: 2024-04-06

## 2024-03-27 ASSESSMENT — ENCOUNTER SYMPTOMS
RESPIRATORY NEGATIVE: 1
EYES NEGATIVE: 1
SHORTNESS OF BREATH: 0
SINUS PRESSURE: 0
RHINORRHEA: 0
STRIDOR: 0
SINUS PAIN: 0

## 2024-03-27 NOTE — PROGRESS NOTES
[facility]  BOO RomanoO. Ms.Ed.  New Consult       Patient Name:  Nila Mcfarland  :  1950     CHIEF C/O:    Chief Complaint   Patient presents with    New Patient     Pt states she can't hear out of her left ear. No pain, no drainage. Right ear is good .        HISTORY OBTAINED FROM:  patient, spouse    HISTORY OF PRESENT ILLNESS:       Nila is a 73 y.o. year old female, here today for:       Sudden hearing loss of the left ear.  Patient states her symptoms began approximately 5 weeks ago.  She states at the time she was having cold symptoms with congestion, rhinorrhea, postnasal drainage and noticed the hearing in the left ear became muffled.  She was seen by her PCP and treated with antibiotics and a Medrol Dosepak with no improvement.  She later developed vertigo symptoms and was given meclizine however the vertigo symptoms have not resolved.  She denies any ear pain or pressure.  She denies any current vertigo symptoms.  She denies any ringing in the ear.  She denies any previous diagnosis of hearing loss.  She denies family history of hearing loss.  She denies any history of recurrent ear infections or previous ear surgeries.  She denies any loud noise exposure history.  She denies any current sinus symptoms.           Past Medical History:   Diagnosis Date    Arthritis     Symptoms respond to myofascial release. Not surgical candidate    Asthma     Cancer (HCC)     lung cancer- RML- 2008, stage 1A, RUL-2013 Stage 1A 2013    Chronic back pain     COPD (chronic obstructive pulmonary disease) (HCC)     Emphysema of lung (HCC)     GERD (gastroesophageal reflux disease)     Hashimoto's disease     History of blood transfusion     Hyperlipidemia     Hypertension     Hyperthyroidism     Neuropathy     Seizure (HCC) 10/09/2020    Sleep difficulties      Past Surgical History:   Procedure Laterality Date    ABDOMEN SURGERY      APPENDECTOMY      CATARACT REMOVAL WITH

## 2024-03-27 NOTE — PROGRESS NOTES
[facility] PAIN MANAGEMENT  INSTRUCTIONS  ...........................................................................................................................................     [x] Parking the day of Surgery is located in the Main Entrance lot.  Upon entering the door, make immediate right into the surgery reception room    [x]  Bring photo ID and insurance card     [x] You may have a light breakfast day of procedure    [x]  Wear loose comfortable clothing    [x]  Please follow instructions for medications as given per Dr's office    [x] You can expect a call the business day prior to procedure to notify you of your arrival time     [x] Please arrange for     []  Other instructions

## 2024-03-28 ENCOUNTER — HOSPITAL ENCOUNTER (OUTPATIENT)
Age: 74
Setting detail: OUTPATIENT SURGERY
Discharge: HOME | End: 2024-03-28
Attending: PAIN MEDICINE | Admitting: PAIN MEDICINE
Payer: MEDICARE

## 2024-03-28 ENCOUNTER — HOSPITAL ENCOUNTER (OUTPATIENT)
Dept: GENERAL RADIOLOGY | Age: 74
Setting detail: OUTPATIENT SURGERY
Discharge: HOME | End: 2024-03-30
Attending: PAIN MEDICINE
Payer: MEDICARE

## 2024-03-28 VITALS
BODY MASS INDEX: 20.04 KG/M2 | RESPIRATION RATE: 16 BRPM | DIASTOLIC BLOOD PRESSURE: 87 MMHG | HEART RATE: 78 BPM | SYSTOLIC BLOOD PRESSURE: 159 MMHG | OXYGEN SATURATION: 97 % | TEMPERATURE: 97 F | WEIGHT: 140 LBS | HEIGHT: 70 IN

## 2024-03-28 DIAGNOSIS — R52 PAIN MANAGEMENT: ICD-10-CM

## 2024-03-28 PROCEDURE — 6360000004 HC RX CONTRAST MEDICATION: Performed by: PAIN MEDICINE

## 2024-03-28 PROCEDURE — 3600000002 HC SURGERY LEVEL 2 BASE: Performed by: PAIN MEDICINE

## 2024-03-28 PROCEDURE — 6360000002 HC RX W HCPCS: Performed by: PAIN MEDICINE

## 2024-03-28 PROCEDURE — 7100000011 HC PHASE II RECOVERY - ADDTL 15 MIN: Performed by: PAIN MEDICINE

## 2024-03-28 PROCEDURE — 7100000010 HC PHASE II RECOVERY - FIRST 15 MIN: Performed by: PAIN MEDICINE

## 2024-03-28 PROCEDURE — 2500000003 HC RX 250 WO HCPCS: Performed by: PAIN MEDICINE

## 2024-03-28 PROCEDURE — 2709999900 HC NON-CHARGEABLE SUPPLY: Performed by: PAIN MEDICINE

## 2024-03-28 PROCEDURE — 64483 NJX AA&/STRD TFRM EPI L/S 1: CPT | Performed by: PAIN MEDICINE

## 2024-03-28 RX ORDER — IOPAMIDOL 612 MG/ML
INJECTION, SOLUTION INTRATHECAL PRN
Status: DISCONTINUED | OUTPATIENT
Start: 2024-03-28 | End: 2024-03-28 | Stop reason: ALTCHOICE

## 2024-03-28 RX ORDER — LIDOCAINE HYDROCHLORIDE 5 MG/ML
INJECTION, SOLUTION INFILTRATION; INTRAVENOUS PRN
Status: DISCONTINUED | OUTPATIENT
Start: 2024-03-28 | End: 2024-03-28 | Stop reason: ALTCHOICE

## 2024-03-28 RX ORDER — METHYLPREDNISOLONE ACETATE 40 MG/ML
INJECTION, SUSPENSION INTRA-ARTICULAR; INTRALESIONAL; INTRAMUSCULAR; SOFT TISSUE PRN
Status: DISCONTINUED | OUTPATIENT
Start: 2024-03-28 | End: 2024-03-28 | Stop reason: ALTCHOICE

## 2024-03-28 ASSESSMENT — PAIN - FUNCTIONAL ASSESSMENT
PAIN_FUNCTIONAL_ASSESSMENT: NONE - DENIES PAIN
PAIN_FUNCTIONAL_ASSESSMENT: 0-10
PAIN_FUNCTIONAL_ASSESSMENT: NONE - DENIES PAIN
PAIN_FUNCTIONAL_ASSESSMENT: 0-10

## 2024-03-28 NOTE — DISCHARGE INSTRUCTIONS
[facility] Pain Management Department  [place] [phone]  Dr. Ingrid Tucker   Post-Pain Block/Radiofrequency  Home Going Instructions    1-Go home, rest for the remainder of the day  2-Please do not lift over 20 pounds the day of the injection  3-If you received sedation No: alcohol, driving, operating lawn mowers, plows, tractors or other dangerous equipment until next morning. Do not make important decisions or sign legal documents for 24 hours. You may experience light headedness, dizziness, nausea or sleepiness after sedation. Do not stay alone. A responsible adult must be with you for 24 hours. You could be nauseated from the medications you have received. Your IV site may be sore and bruised.    4-No dietary restrictions     5-Resume all medications the same day, blood thinners to be resumed 24 hours after injection if you were instructed to stop any.    6-Keep the surgical site clean and dry, you may shower the next morning and remove the      dressing.     7- No sitz baths, tub baths or hot tubs/swimming for 24 hours.       8- If you have any pain at the injection site(s), application of an ice pack to the area should be       helpful, 20 minutes on/20 minutes off for next 48 hours.  9- Call [facility] Pain Management immediately at if you develop.  Fever greater than 100.4 F  Have bleeding or drainage from the puncture site  Have progressive Leg/arm numbness and or weakness  Loss of control of bowel and or bladder (wet/soil yourself)  Severe headache with inability to lift head  10-You may return to work the [facility]  [place] [phone]  Dr. Ingrid Tucker   Post-Pain Block/Radiofrequency  Home Going Instructions    1-Go home, rest for the remainder of the day  2-Please do not lift over 20 pounds the day of the injection  3-If you received sedation No: alcohol, driving, operating lawn mowers, plows, tractors or other dangerous equipment

## 2024-03-28 NOTE — OP NOTE
3/28/2024    Patient: Nlia Mcfarland  :  1950  Age:  73 y.o.  Sex:  female     PRE-OPERATIVE DIAGNOSIS: Lumbar disc displacement, lumbar neural foraminal stenosis, lumbar radiculopathy.     POST-OPERATIVE DIAGNOSIS: Same.    PROCEDURE: Bilateral Transforaminal epidural steroid injection under fluoroscopic guidance at foraminal level L5.    SURGEON: SEFERINO Tucker D.O.    ANESTHESIA: local    ESTIMATED BLOOD LOSS: None.  ______________________________________________________________________  BRIEF HISTORY: Nila Mcfarland comes in today for the Bilateral transforaminal epidural steroid injection under fluoroscopic guidance at foraminal level L5. The potential complications of this procedure were discussed with her again today.  She has elected to undergo the aforementioned procedure.     Nila’s complete History & Physical examination were reviewed in depth, a copy of which is in the chart.      DESCRIPTION OF PROCEDURE:    After confirming written and informed consent, a time-out was performed and Nila’s name and date of birth, the procedure to be performed as well as the plan for the location of the needle insertion were confirmed.    The patient was brought into the procedure room and placed in the prone position on the fluoroscopy table. Standard monitors were placed and vital signs were observed throughout the procedure. The area of the lumbar spine was prepped with chloraprep and draped in a sterile manner. The vertebral body was identified with AP fluoroscopy. An oblique view was obtained to better visualize the inferior junction of the pedicle and transverse process . The 6 o'clock position of the pedicle was marked and identified. The skin and subcutaneous tissue were anesthetized with 0.5% lidocaine. A # 22 gauge pencil point needle was directed toward the targeted point under fluoroscopy until bone was contacted. The needle was then walked inferiorly until the neural foramen was entered . A

## 2024-03-28 NOTE — H&P
[facility]        [address] [place] [id number]  Dept: [phone]    Procedure History & Physical      Nila Mcfarland     HPI:    Patient  is here for LBP BLE pain for b/l L5 TFESI  Labs/imaging studies reviewed   All question and concerns addressed including R/B/A associated with the procedure    Past Medical History:   Diagnosis Date    Arthritis     Symptoms respond to myofascial release. Not surgical candidate    Asthma     Cancer (HCC)     lung cancer- RML- 2008, stage 1A, RUL-2013 Stage 1A 2013    Chronic back pain     COPD (chronic obstructive pulmonary disease) (HCC)     Emphysema of lung (HCC)     GERD (gastroesophageal reflux disease)     Hashimoto's disease 2008    History of blood transfusion     Hyperlipidemia     Hypertension     Hyperthyroidism     Neuropathy     Seizure (HCC) 10/09/2020    Sleep difficulties        Past Surgical History:   Procedure Laterality Date    ABDOMEN SURGERY      APPENDECTOMY  1981    CATARACT REMOVAL WITH IMPLANT Bilateral     COLONOSCOPY  06/23/2015    polyp and diverticulosis    COLONOSCOPY  06/23/2015    colonoscopy    ENDOSCOPY, COLON, DIAGNOSTIC      EYE SURGERY      HERNIA REPAIR N/A 07/14/2021    LAPAROSCOPIC ROBOTIC XI ASSISTED INCISIONAL HERNIA REPAIR WITH MESH performed by Keyana Hamilton MD at [facility] OR    HYSTERECTOMY (CERVIX STATUS UNKNOWN)  1981    LUNG CANCER SURGERY Right     X 2    PAIN MANAGEMENT PROCEDURE Left 12/28/2023    LEFT L4 TRANSFORAMINAL EPIDURAL STEROID INJECTION performed by Myra Tucker DO at [facility] OR    US BREAST BIOPSY W LOC DEVICE 1ST LESION RIGHT  10/19/2020    US BREAST NEEDLE BIOPSY RIGHT 10/19/2020 [facility]       Prior to Admission medications    Medication Sig Start Date End Date Taking? Authorizing Provider   predniSONE (DELTASONE) 20 MG tablet Take 3 tablets by mouth daily for 10 days 3/27/24 4/6/24  Neel Jaimes, APRN - CNP   Naproxen Sodium (ALEVE) 220 MG CAPS Take by mouth as

## 2024-03-29 NOTE — PROGRESS NOTES
This patient was referred for audiometric and tympanometric testing by LEAH Waggoner due to hearing loss, on the left.     Audiometry using pure tone air and bone conduction testing revealed hearing sensitivity within normal limits through 4000 Hz sloping to a mild sensorineural hearing loss, in the right ear and an essentially profound sensorineural hearing loss, in the left ear. Reliability was good. Speech reception thresholds were in good agreement with the pure tone averages, bilaterally. Speech discrimination scores were excellent, bilaterally.    Tympanometry revealed normal middle ear peak pressure and compliance, bilaterally.    The results were reviewed with the patient.     Recommendations for follow up will be made pending physician consult.      Jannette Olson CCC-A  [facility] [id number]   Electronically signed by Jannette Olson on 3/29/2024 at 11:12 AM

## 2024-04-01 DIAGNOSIS — Z01.818 PREOP TESTING: ICD-10-CM

## 2024-04-01 LAB
BUN BLDV-MCNC: 20 MG/DL (ref 6–23)
CREAT SERPL-MCNC: 0.8 MG/DL (ref 0.5–1)
GFR SERPL CREATININE-BSD FRML MDRD: 76 ML/MIN/1.73M2

## 2024-04-05 ENCOUNTER — TELEPHONE (OUTPATIENT)
Dept: FAMILY MEDICINE CLINIC | Age: 74
End: 2024-04-05

## 2024-04-05 ENCOUNTER — TELEPHONE (OUTPATIENT)
Dept: ENT CLINIC | Age: 74
End: 2024-04-05

## 2024-04-05 NOTE — TELEPHONE ENCOUNTER
Called spoke with pt  and notified him of NP recommendation for pt to be seen by her pcp.  understanding.

## 2024-04-05 NOTE — TELEPHONE ENCOUNTER
Antony called for Nila and left a message.  Yesterday she started with cough, congestion, thick yellow mucus and HA, no fever and 2 negative covid tests.  They called her ENT who suggested she call her PCP for recommendations.  Per Dr. العلي, called and spoke with Antony. Discussed symptom care and go to Urgent Care or ED if symptoms worsen.

## 2024-04-06 ENCOUNTER — HOSPITAL ENCOUNTER (OUTPATIENT)
Dept: MRI IMAGING | Age: 74
End: 2024-04-06
Payer: MEDICARE

## 2024-04-06 DIAGNOSIS — H91.22 SUDDEN LEFT HEARING LOSS: ICD-10-CM

## 2024-04-06 PROCEDURE — 70553 MRI BRAIN STEM W/O & W/DYE: CPT

## 2024-04-06 PROCEDURE — A9577 INJ MULTIHANCE: HCPCS | Performed by: RADIOLOGY

## 2024-04-06 PROCEDURE — 6360000004 HC RX CONTRAST MEDICATION: Performed by: RADIOLOGY

## 2024-04-06 RX ADMIN — GADOBENATE DIMEGLUMINE 15 ML: 529 INJECTION, SOLUTION INTRAVENOUS at 14:06

## 2024-04-09 ENCOUNTER — OFFICE VISIT (OUTPATIENT)
Dept: FAMILY MEDICINE CLINIC | Age: 74
End: 2024-04-09

## 2024-04-09 VITALS
DIASTOLIC BLOOD PRESSURE: 60 MMHG | WEIGHT: 157.6 LBS | HEART RATE: 89 BPM | HEIGHT: 67 IN | OXYGEN SATURATION: 97 % | SYSTOLIC BLOOD PRESSURE: 128 MMHG | BODY MASS INDEX: 24.74 KG/M2 | TEMPERATURE: 97 F

## 2024-04-09 DIAGNOSIS — R73.9 ELEVATED BLOOD SUGAR: ICD-10-CM

## 2024-04-09 DIAGNOSIS — Z00.00 MEDICARE ANNUAL WELLNESS VISIT, SUBSEQUENT: Primary | ICD-10-CM

## 2024-04-09 DIAGNOSIS — F33.1 MODERATE EPISODE OF RECURRENT MAJOR DEPRESSIVE DISORDER (HCC): ICD-10-CM

## 2024-04-09 DIAGNOSIS — J44.1 COPD EXACERBATION (HCC): ICD-10-CM

## 2024-04-09 DIAGNOSIS — E78.00 PURE HYPERCHOLESTEROLEMIA: ICD-10-CM

## 2024-04-09 DIAGNOSIS — E55.9 VITAMIN D DEFICIENCY, UNSPECIFIED: ICD-10-CM

## 2024-04-09 DIAGNOSIS — E03.9 HYPOTHYROIDISM, UNSPECIFIED TYPE: ICD-10-CM

## 2024-04-09 DIAGNOSIS — R56.9 SEIZURE (HCC): ICD-10-CM

## 2024-04-09 DIAGNOSIS — I10 ESSENTIAL HYPERTENSION: ICD-10-CM

## 2024-04-09 DIAGNOSIS — G95.9 CERVICAL MYELOPATHY (HCC): ICD-10-CM

## 2024-04-09 LAB
ALBUMIN SERPL-MCNC: 4.5 G/DL (ref 3.5–5.2)
ALP BLD-CCNC: 85 U/L (ref 35–104)
ALT SERPL-CCNC: 30 U/L (ref 0–32)
ANION GAP SERPL CALCULATED.3IONS-SCNC: 16 MMOL/L (ref 7–16)
AST SERPL-CCNC: 32 U/L (ref 0–31)
BASOPHILS ABSOLUTE: 0.04 K/UL (ref 0–0.2)
BASOPHILS RELATIVE PERCENT: 0 % (ref 0–2)
BILIRUB SERPL-MCNC: 0.3 MG/DL (ref 0–1.2)
BUN BLDV-MCNC: 19 MG/DL (ref 6–23)
CALCIUM SERPL-MCNC: 9.4 MG/DL (ref 8.6–10.2)
CHLORIDE BLD-SCNC: 97 MMOL/L (ref 98–107)
CHOLESTEROL: 268 MG/DL
CO2: 24 MMOL/L (ref 22–29)
CREAT SERPL-MCNC: 0.9 MG/DL (ref 0.5–1)
EOSINOPHILS ABSOLUTE: 0.09 K/UL (ref 0.05–0.5)
EOSINOPHILS RELATIVE PERCENT: 1 % (ref 0–6)
GFR SERPL CREATININE-BSD FRML MDRD: 67 ML/MIN/1.73M2
GLUCOSE BLD-MCNC: 96 MG/DL (ref 74–99)
HCT VFR BLD CALC: 47.1 % (ref 34–48)
HDLC SERPL-MCNC: 79 MG/DL
HEMOGLOBIN: 15.1 G/DL (ref 11.5–15.5)
IMMATURE GRANULOCYTES %: 1 % (ref 0–5)
IMMATURE GRANULOCYTES ABSOLUTE: 0.07 K/UL (ref 0–0.58)
LDL CHOLESTEROL: 133 MG/DL
LYMPHOCYTES ABSOLUTE: 2.25 K/UL (ref 1.5–4)
LYMPHOCYTES RELATIVE PERCENT: 17 % (ref 20–42)
MCH RBC QN AUTO: 29.3 PG (ref 26–35)
MCHC RBC AUTO-ENTMCNC: 32.1 G/DL (ref 32–34.5)
MCV RBC AUTO: 91.5 FL (ref 80–99.9)
MONOCYTES ABSOLUTE: 0.68 K/UL (ref 0.1–0.95)
MONOCYTES RELATIVE PERCENT: 5 % (ref 2–12)
NEUTROPHILS ABSOLUTE: 10.29 K/UL (ref 1.8–7.3)
NEUTROPHILS RELATIVE PERCENT: 77 % (ref 43–80)
PDW BLD-RTO: 15.1 % (ref 11.5–15)
PLATELET # BLD: 289 K/UL (ref 130–450)
PMV BLD AUTO: 10.4 FL (ref 7–12)
POTASSIUM SERPL-SCNC: 4.6 MMOL/L (ref 3.5–5)
RBC # BLD: 5.15 M/UL (ref 3.5–5.5)
SODIUM BLD-SCNC: 137 MMOL/L (ref 132–146)
TOTAL PROTEIN: 7.3 G/DL (ref 6.4–8.3)
TRIGL SERPL-MCNC: 278 MG/DL
VITAMIN D 25-HYDROXY: 35.1 NG/ML (ref 30–100)
VLDLC SERPL CALC-MCNC: 56 MG/DL
WBC # BLD: 13.4 K/UL (ref 4.5–11.5)

## 2024-04-09 RX ORDER — PREDNISONE 20 MG/1
40 TABLET ORAL DAILY
Qty: 10 TABLET | Refills: 0 | Status: SHIPPED | OUTPATIENT
Start: 2024-04-09 | End: 2024-04-14

## 2024-04-09 RX ORDER — IPRATROPIUM BROMIDE AND ALBUTEROL SULFATE 2.5; .5 MG/3ML; MG/3ML
1 SOLUTION RESPIRATORY (INHALATION) EVERY 4 HOURS
Qty: 360 ML | Refills: 0 | Status: SHIPPED | OUTPATIENT
Start: 2024-04-09

## 2024-04-09 RX ORDER — NEBULIZER ACCESSORIES
1 KIT MISCELLANEOUS DAILY PRN
Qty: 1 KIT | Refills: 0 | Status: SHIPPED | OUTPATIENT
Start: 2024-04-09

## 2024-04-09 RX ORDER — AZITHROMYCIN 250 MG/1
TABLET, FILM COATED ORAL
Qty: 6 TABLET | Refills: 0 | Status: SHIPPED | OUTPATIENT
Start: 2024-04-09

## 2024-04-09 SDOH — HEALTH STABILITY: PHYSICAL HEALTH: ON AVERAGE, HOW MANY MINUTES DO YOU ENGAGE IN EXERCISE AT THIS LEVEL?: 0 MIN

## 2024-04-09 SDOH — HEALTH STABILITY: PHYSICAL HEALTH: ON AVERAGE, HOW MANY DAYS PER WEEK DO YOU ENGAGE IN MODERATE TO STRENUOUS EXERCISE (LIKE A BRISK WALK)?: 0 DAYS

## 2024-04-09 ASSESSMENT — COLUMBIA-SUICIDE SEVERITY RATING SCALE - C-SSRS
6. HAVE YOU EVER DONE ANYTHING, STARTED TO DO ANYTHING, OR PREPARED TO DO ANYTHING TO END YOUR LIFE?: NO
1. WITHIN THE PAST MONTH, HAVE YOU WISHED YOU WERE DEAD OR WISHED YOU COULD GO TO SLEEP AND NOT WAKE UP?: NO
2. HAVE YOU ACTUALLY HAD ANY THOUGHTS OF KILLING YOURSELF?: NO

## 2024-04-09 ASSESSMENT — PATIENT HEALTH QUESTIONNAIRE - PHQ9
1. LITTLE INTEREST OR PLEASURE IN DOING THINGS: NOT AT ALL
SUM OF ALL RESPONSES TO PHQ QUESTIONS 1-9: 13
7. TROUBLE CONCENTRATING ON THINGS, SUCH AS READING THE NEWSPAPER OR WATCHING TELEVISION: NOT AT ALL
6. FEELING BAD ABOUT YOURSELF - OR THAT YOU ARE A FAILURE OR HAVE LET YOURSELF OR YOUR FAMILY DOWN: NOT AT ALL
SUM OF ALL RESPONSES TO PHQ QUESTIONS 1-9: 13
10. IF YOU CHECKED OFF ANY PROBLEMS, HOW DIFFICULT HAVE THESE PROBLEMS MADE IT FOR YOU TO DO YOUR WORK, TAKE CARE OF THINGS AT HOME, OR GET ALONG WITH OTHER PEOPLE: SOMEWHAT DIFFICULT
4. FEELING TIRED OR HAVING LITTLE ENERGY: NEARLY EVERY DAY
9. THOUGHTS THAT YOU WOULD BE BETTER OFF DEAD, OR OF HURTING YOURSELF: NOT AT ALL
SUM OF ALL RESPONSES TO PHQ QUESTIONS 1-9: 13
5. POOR APPETITE OR OVEREATING: NEARLY EVERY DAY
3. TROUBLE FALLING OR STAYING ASLEEP: NEARLY EVERY DAY
SUM OF ALL RESPONSES TO PHQ9 QUESTIONS 1 & 2: 1
2. FEELING DOWN, DEPRESSED OR HOPELESS: SEVERAL DAYS
8. MOVING OR SPEAKING SO SLOWLY THAT OTHER PEOPLE COULD HAVE NOTICED. OR THE OPPOSITE, BEING SO FIGETY OR RESTLESS THAT YOU HAVE BEEN MOVING AROUND A LOT MORE THAN USUAL: NEARLY EVERY DAY
SUM OF ALL RESPONSES TO PHQ QUESTIONS 1-9: 13

## 2024-04-09 ASSESSMENT — LIFESTYLE VARIABLES
HOW OFTEN DO YOU HAVE SIX OR MORE DRINKS ON ONE OCCASION: 1
HOW MANY STANDARD DRINKS CONTAINING ALCOHOL DO YOU HAVE ON A TYPICAL DAY: 1 OR 2
HOW MANY STANDARD DRINKS CONTAINING ALCOHOL DO YOU HAVE ON A TYPICAL DAY: 1

## 2024-04-09 NOTE — PROGRESS NOTES
Medicare Annual Wellness Visit    Nlia Mcfarland is here for Cough (For over a month//), Fatigue, poor appetite, Hypertension, Medicare AWV, and Hypothyroidism    Assessment & Plan   Medicare annual wellness visit, subsequent  COPD exacerbation (HCC)  -     azithromycin (ZITHROMAX) 250 MG tablet; 500mg on day 1 followed by 250mg on days 2 - 5, Disp-6 tablet, R-0Normal  -     predniSONE (DELTASONE) 20 MG tablet; Take 2 tablets by mouth daily for 5 days, Disp-10 tablet, R-0Normal  -     Respiratory Therapy Supplies (NEBULIZER/TUBING/MOUTHPIECE) KIT; DAILY PRN Starting Tue 4/9/2024, Disp-1 kit, R-0, Normal  -     ipratropium 0.5 mg-albuterol 2.5 mg (DUONEB) 0.5-2.5 (3) MG/3ML SOLN nebulizer solution; Inhale 3 mLs into the lungs every 4 hours, Disp-360 mL, R-0Normal  Cervical myelopathy (HCC)  Seizure (HCC)  Pure hypercholesterolemia  Essential hypertension  -     Lipid Panel; Future  -     CBC with Auto Differential; Future  -     Comprehensive Metabolic Panel; Future  Hypothyroidism, unspecified type  Vitamin D deficiency, unspecified  -     Vitamin D 25 Hydroxy; Future  Elevated blood sugar  -     Hemoglobin A1C; Future    Recommendations for Preventive Services Due: see orders and patient instructions/AVS.  Recommended screening schedule for the next 5-10 years is provided to the patient in written form: see Patient Instructions/AVS.     Return for Medicare Annual Wellness Visit in 1 year.     Subjective   See alternate note    Patient's complete Health Risk Assessment and screening values have been reviewed and are found in Flowsheets. The following problems were reviewed today and where indicated follow up appointments were made and/or referrals ordered.    Positive Risk Factor Screenings with Interventions:        Depression:  PHQ-2 Score: 1  PHQ-9 Total Score: 13    Interpretation:  5-9 mild   10-14 moderate   15-19 moderately severe   20-27 severe   Interventions:  Patient declines any further evaluation or

## 2024-04-09 NOTE — PROGRESS NOTES
CC: Nila Mcfarland is a 73 y.o. yo female is here for evaluation evaluation for the following acute & chronic medical concerns: Cough (For over a month//), Fatigue, poor appetite, Hypertension, Medicare AWV, and Hypothyroidism        HPI:    Chronic cough for last month; negative covid 2x   Recent mold mitigation in the home about 1.5 months ago; cough started about the same time  Symptoms started with cough only; throat feels constantly clogged up; no sinus drainage but sinuses do feel swollen / congested; no fever, chills, sweats; + bringin up yellow sputum;  +sligiht sob at times  Known COPD - on anoro ellipta + albuterol PRN; she has chronic coughing and uses tessalon PRN; currently follows with Dr. Cortes    Anxiety / depression  - on lexapro 10mg, controlled; follows with Jessica PRN; Feels down regarding coughing/ health    Did see ENT hearing loss L ear 2-3 weeks ago; did see Theodore; completed MRI and planning f/u for this.     Chronic pain / neuropathy - Lumbar pain and hip pain; follows with PMR, Dr. Mcleod, recently did see Neurology and dx of small fiber neuropathy; on lyrica now up to 100mg TID per neuro; also on pamelor 25mg for sleep and pain      PDMP Monitoring:    Last PDMP Shashank as Reviewed:  Review User Review Instant Review Result   BESSIE GARCIA 3/26/2024 10:47 AM Reviewed PDMP [1]     Last contract: 3/2022  Last urine: 3/2022    Vitals:   /60 (Site: Left Upper Arm, Position: Sitting)   Pulse 89   Temp 97 °F (36.1 °C)   Ht 1.695 m (5' 6.75\")   Wt 71.5 kg (157 lb 9.6 oz)   SpO2 97%   BMI 24.87 kg/m²   Wt Readings from Last 3 Encounters:   04/09/24 71.5 kg (157 lb 9.6 oz)   03/28/24 63.5 kg (140 lb)   03/27/24 68 kg (150 lb)       PE:  Constitutional - alert, well appearing, and in no distress  Eyes - extraocular eye movements intact, left eye normal, right eye normal, no conjunctivitis noted  Neck - symmetric, no obvious masses noted  Respiratory- clear to auscultation, +

## 2024-04-10 ENCOUNTER — OFFICE VISIT (OUTPATIENT)
Dept: ENT CLINIC | Age: 74
End: 2024-04-10
Payer: MEDICARE

## 2024-04-10 ENCOUNTER — PROCEDURE VISIT (OUTPATIENT)
Dept: AUDIOLOGY | Age: 74
End: 2024-04-10
Payer: MEDICARE

## 2024-04-10 VITALS — HEIGHT: 67 IN | BODY MASS INDEX: 24.64 KG/M2 | WEIGHT: 157 LBS

## 2024-04-10 DIAGNOSIS — H91.22 SUDDEN LEFT HEARING LOSS: Primary | ICD-10-CM

## 2024-04-10 DIAGNOSIS — D72.829 LEUKOCYTOSIS, UNSPECIFIED TYPE: Primary | ICD-10-CM

## 2024-04-10 DIAGNOSIS — H91.22 SUDDEN IDIOPATHIC HEARING LOSS OF LEFT EAR WITH RESTRICTED HEARING OF RIGHT EAR: Primary | ICD-10-CM

## 2024-04-10 DIAGNOSIS — H90.42 SENSORINEURAL HEARING LOSS (SNHL) OF LEFT EAR WITH UNRESTRICTED HEARING OF RIGHT EAR: ICD-10-CM

## 2024-04-10 LAB — HBA1C MFR BLD: 5.7 % (ref 4–5.6)

## 2024-04-10 PROCEDURE — 99213 OFFICE O/P EST LOW 20 MIN: CPT | Performed by: NURSE PRACTITIONER

## 2024-04-10 PROCEDURE — 92557 COMPREHENSIVE HEARING TEST: CPT | Performed by: AUDIOLOGIST

## 2024-04-10 PROCEDURE — 1036F TOBACCO NON-USER: CPT | Performed by: NURSE PRACTITIONER

## 2024-04-10 PROCEDURE — G8420 CALC BMI NORM PARAMETERS: HCPCS | Performed by: NURSE PRACTITIONER

## 2024-04-10 PROCEDURE — 3017F COLORECTAL CA SCREEN DOC REV: CPT | Performed by: NURSE PRACTITIONER

## 2024-04-10 PROCEDURE — 1090F PRES/ABSN URINE INCON ASSESS: CPT | Performed by: NURSE PRACTITIONER

## 2024-04-10 PROCEDURE — G8399 PT W/DXA RESULTS DOCUMENT: HCPCS | Performed by: NURSE PRACTITIONER

## 2024-04-10 PROCEDURE — 1123F ACP DISCUSS/DSCN MKR DOCD: CPT | Performed by: NURSE PRACTITIONER

## 2024-04-10 PROCEDURE — G8427 DOCREV CUR MEDS BY ELIG CLIN: HCPCS | Performed by: NURSE PRACTITIONER

## 2024-04-10 PROCEDURE — 92567 TYMPANOMETRY: CPT | Performed by: AUDIOLOGIST

## 2024-04-10 ASSESSMENT — ENCOUNTER SYMPTOMS
STRIDOR: 0
SINUS PRESSURE: 0
EYES NEGATIVE: 1
SINUS PAIN: 0
RHINORRHEA: 0
RESPIRATORY NEGATIVE: 1
SHORTNESS OF BREATH: 0

## 2024-04-10 NOTE — PROGRESS NOTES
Discussed options including BAHA, BiCros hearing aid or Cochlear Implant.  Ferny referred her to [facility] to consult Dr Ochoa and trial BAHA with Raphael.  Electronically signed by Jannette Ramirez on 4/10/2024 at 12:03 PM

## 2024-04-10 NOTE — PROGRESS NOTES
This patient was referred for audiometric and tympanometric testing by LEAH Waggoner due to sudden hearing loss. Today is a 2 week ck up.  She notes no improvement.     Audiometry using pure tone air and bone conduction testing revealed normal to mild sensorineural hearing loss in the right ear and a profound  sensorineural hearing loss, in the left ear. Reliability was good. Speech reception/detection thresholds were in good agreement with the pure tone averages, bilaterally. Speech discrimination score was excellent, in the right ear. Speech discrimination could not be tested for the left ear.     Tympanometry revealed normal middle ear peak pressure and compliance, bilaterally.    Testing today is comparable to previous testing.    The results were reviewed with the patient.     Recommendations for follow up will be made pending physician consult.    Electronically signed by Jannette Ramirez on 4/10/2024 at 11:25 AM      
hearing loss (SNHL) of left ear with unrestricted hearing of right ear      At this time patient will be referred to Dr. Ochoa at the [facility] to discuss possible hearing technology options including cross hearing aid versus Baha versus cochlear implant.  She will be scheduled with him in 4 to 6 weeks.  She is instructed to call the office with any changes to her symptoms prior to her next appointment.  She agrees to this plan.      CARMEL Hoyt, FNP-C  [facility] - [facility] Thro[facility]    The information contained in this note has been dictated using drug and medical speech recognition software and may contain errors

## 2024-04-12 DIAGNOSIS — J18.9 PNEUMONIA DUE TO INFECTIOUS ORGANISM, UNSPECIFIED LATERALITY, UNSPECIFIED PART OF LUNG: Primary | ICD-10-CM

## 2024-04-12 DIAGNOSIS — D72.829 LEUKOCYTOSIS, UNSPECIFIED TYPE: ICD-10-CM

## 2024-04-12 LAB
BASOPHILS ABSOLUTE: 0.01 K/UL (ref 0–0.2)
BASOPHILS RELATIVE PERCENT: 0 % (ref 0–2)
EOSINOPHILS ABSOLUTE: 0 K/UL (ref 0.05–0.5)
EOSINOPHILS RELATIVE PERCENT: 0 % (ref 0–6)
HCT VFR BLD CALC: 46.8 % (ref 34–48)
HEMOGLOBIN: 15 G/DL (ref 11.5–15.5)
IMMATURE GRANULOCYTES %: 1 % (ref 0–5)
IMMATURE GRANULOCYTES ABSOLUTE: 0.09 K/UL (ref 0–0.58)
LYMPHOCYTES ABSOLUTE: 0.73 K/UL (ref 1.5–4)
LYMPHOCYTES RELATIVE PERCENT: 6 % (ref 20–42)
MCH RBC QN AUTO: 29.3 PG (ref 26–35)
MCHC RBC AUTO-ENTMCNC: 32.1 G/DL (ref 32–34.5)
MCV RBC AUTO: 91.4 FL (ref 80–99.9)
MONOCYTES ABSOLUTE: 0.34 K/UL (ref 0.1–0.95)
MONOCYTES RELATIVE PERCENT: 3 % (ref 2–12)
NEUTROPHILS ABSOLUTE: 11.62 K/UL (ref 1.8–7.3)
NEUTROPHILS RELATIVE PERCENT: 91 % (ref 43–80)
PDW BLD-RTO: 15.1 % (ref 11.5–15)
PLATELET # BLD: 305 K/UL (ref 130–450)
PMV BLD AUTO: 10.6 FL (ref 7–12)
RBC # BLD: 5.12 M/UL (ref 3.5–5.5)
WBC # BLD: 12.8 K/UL (ref 4.5–11.5)

## 2024-04-12 RX ORDER — AMOXICILLIN AND CLAVULANATE POTASSIUM 875; 125 MG/1; MG/1
1 TABLET, FILM COATED ORAL 2 TIMES DAILY
Qty: 20 TABLET | Refills: 0 | Status: SHIPPED | OUTPATIENT
Start: 2024-04-12 | End: 2024-04-22

## 2024-04-15 DIAGNOSIS — D72.829 LEUKOCYTOSIS, UNSPECIFIED TYPE: Primary | ICD-10-CM

## 2024-04-16 DIAGNOSIS — R05.9 COUGH, UNSPECIFIED TYPE: ICD-10-CM

## 2024-04-16 DIAGNOSIS — D72.829 LEUKOCYTOSIS, UNSPECIFIED TYPE: ICD-10-CM

## 2024-04-16 LAB
BASOPHILS ABSOLUTE: 0.03 K/UL (ref 0–0.2)
BASOPHILS RELATIVE PERCENT: 0 % (ref 0–2)
EOSINOPHILS ABSOLUTE: 0.22 K/UL (ref 0.05–0.5)
EOSINOPHILS RELATIVE PERCENT: 3 % (ref 0–6)
HCT VFR BLD CALC: 48.4 % (ref 34–48)
HEMOGLOBIN: 15.5 G/DL (ref 11.5–15.5)
IMMATURE GRANULOCYTES %: 0 % (ref 0–5)
IMMATURE GRANULOCYTES ABSOLUTE: 0.03 K/UL (ref 0–0.58)
LYMPHOCYTES ABSOLUTE: 1.67 K/UL (ref 1.5–4)
LYMPHOCYTES RELATIVE PERCENT: 20 % (ref 20–42)
MCH RBC QN AUTO: 29.9 PG (ref 26–35)
MCHC RBC AUTO-ENTMCNC: 32 G/DL (ref 32–34.5)
MCV RBC AUTO: 93.3 FL (ref 80–99.9)
MONOCYTES ABSOLUTE: 0.44 K/UL (ref 0.1–0.95)
MONOCYTES RELATIVE PERCENT: 5 % (ref 2–12)
NEUTROPHILS ABSOLUTE: 5.8 K/UL (ref 1.8–7.3)
NEUTROPHILS RELATIVE PERCENT: 71 % (ref 43–80)
PDW BLD-RTO: 15.4 % (ref 11.5–15)
PLATELET # BLD: 283 K/UL (ref 130–450)
PMV BLD AUTO: 10.4 FL (ref 7–12)
RBC # BLD: 5.19 M/UL (ref 3.5–5.5)
WBC # BLD: 8.2 K/UL (ref 4.5–11.5)

## 2024-04-16 RX ORDER — BENZONATATE 100 MG/1
100 CAPSULE ORAL 3 TIMES DAILY PRN
Qty: 28 CAPSULE | Refills: 0 | Status: SHIPPED | OUTPATIENT
Start: 2024-04-16

## 2024-04-16 NOTE — TELEPHONE ENCOUNTER
Patient requesting a larger supply of her Tessalon.     Medication Refill Request    LOV 4/9/2024  NOV Visit date not found    Lab Results   Component Value Date    CREATININE 0.9 04/09/2024

## 2024-04-18 ENCOUNTER — TELEPHONE (OUTPATIENT)
Dept: FAMILY MEDICINE CLINIC | Age: 74
End: 2024-04-18

## 2024-04-18 DIAGNOSIS — E03.9 HYPOTHYROIDISM, UNSPECIFIED TYPE: ICD-10-CM

## 2024-04-18 NOTE — TELEPHONE ENCOUNTER
Patient states she is still coughing, throat is still sore, feels exhausted, has no energy. Patient is taking the antibiotic, is there anything else for her to do.

## 2024-04-19 RX ORDER — LEVOTHYROXINE SODIUM 100 MCG
TABLET ORAL
Qty: 90 TABLET | Refills: 1 | Status: SHIPPED | OUTPATIENT
Start: 2024-04-19

## 2024-04-19 NOTE — TELEPHONE ENCOUNTER
Medication Refill Request    LOV 4/9/2024  NOV Visit date not found    Lab Results   Component Value Date    CREATININE 0.9 04/09/2024

## 2024-04-20 DIAGNOSIS — M25.561 CHRONIC PAIN OF RIGHT KNEE: ICD-10-CM

## 2024-04-20 DIAGNOSIS — G89.29 CHRONIC PAIN OF RIGHT KNEE: ICD-10-CM

## 2024-04-20 DIAGNOSIS — M54.16 LUMBAR RADICULOPATHY: ICD-10-CM

## 2024-04-20 DIAGNOSIS — K21.9 GASTROESOPHAGEAL REFLUX DISEASE, UNSPECIFIED WHETHER ESOPHAGITIS PRESENT: ICD-10-CM

## 2024-04-20 DIAGNOSIS — M25.551 RIGHT HIP PAIN: ICD-10-CM

## 2024-04-20 DIAGNOSIS — G89.4 CHRONIC PAIN SYNDROME: ICD-10-CM

## 2024-04-22 RX ORDER — PREGABALIN 100 MG/1
100 CAPSULE ORAL 3 TIMES DAILY
Qty: 270 CAPSULE | Refills: 0 | Status: SHIPPED | OUTPATIENT
Start: 2024-04-22 | End: 2024-07-21

## 2024-04-22 RX ORDER — OMEPRAZOLE 20 MG/1
20 CAPSULE, DELAYED RELEASE ORAL DAILY
Qty: 90 CAPSULE | Refills: 0 | Status: SHIPPED | OUTPATIENT
Start: 2024-04-22

## 2024-04-22 NOTE — TELEPHONE ENCOUNTER
Medication Refill Request    LOV 4/9/2024  NOV 4/20/2024    Lab Results   Component Value Date    CREATININE 0.9 04/09/2024

## 2024-04-23 ENCOUNTER — OFFICE VISIT (OUTPATIENT)
Dept: FAMILY MEDICINE CLINIC | Age: 74
End: 2024-04-23

## 2024-04-23 VITALS
HEART RATE: 78 BPM | OXYGEN SATURATION: 98 % | WEIGHT: 158.4 LBS | BODY MASS INDEX: 25 KG/M2 | DIASTOLIC BLOOD PRESSURE: 70 MMHG | SYSTOLIC BLOOD PRESSURE: 128 MMHG | TEMPERATURE: 96.8 F

## 2024-04-23 DIAGNOSIS — J40 SINOBRONCHITIS: Primary | ICD-10-CM

## 2024-04-23 DIAGNOSIS — J44.1 COPD EXACERBATION (HCC): ICD-10-CM

## 2024-04-23 DIAGNOSIS — J32.9 SINOBRONCHITIS: Primary | ICD-10-CM

## 2024-04-23 RX ORDER — DOXYCYCLINE HYCLATE 100 MG
100 TABLET ORAL 2 TIMES DAILY WITH MEALS
Qty: 20 TABLET | Refills: 0 | Status: SHIPPED | OUTPATIENT
Start: 2024-04-23 | End: 2024-05-03

## 2024-04-23 RX ORDER — PREDNISONE 20 MG/1
TABLET ORAL
Qty: 24 TABLET | Refills: 0 | Status: SHIPPED | OUTPATIENT
Start: 2024-04-23

## 2024-04-23 NOTE — PROGRESS NOTES
Chief Complaint   Cough, Fatigue, Congestion, and Pharyngitis (Over 2 weeks)    History of Present Illness   Source of history provided by: patient.    Nila Mcfarland is a 73 y.o. old female who has a past medical history of:   Past Medical History:   Diagnosis Date    Arthritis     Symptoms respond to myofascial release. Not surgical candidate    Asthma     Cancer (HCC)     lung cancer- RML- 2008, stage 1A, RUL-2013 Stage 1A 2013    Chronic back pain     COPD (chronic obstructive pulmonary disease) (HCC)     Emphysema of lung (HCC)     GERD (gastroesophageal reflux disease)     Hashimoto's disease 2008    History of blood transfusion     Hyperlipidemia     Hypertension     Hyperthyroidism     Neuropathy     Seizure (HCC) 10/09/2020    Sleep difficulties    Presents for evaluation of cough, fatigue, congestion, and sore throat x 2-3 weeks. Was treated for COPD exacerbation a few weeks ago. Treated with zpak and prednisone. Using nebulizer 2x a day. Feels somewhat better but not complete resolution. Using astelin, flonase, claritin, and singulair. Denies any CP, dyspnea, LE edema, abdominal pain, vomiting, rash, or lethargy.     ROS   Pertinent positives and negatives are stated within HPI, all other systems reviewed and are negative.    Surgical History:  has a past surgical history that includes Appendectomy (1981); Hysterectomy (1981); Lung cancer surgery (Right); Colonoscopy (06/23/2015); Colonoscopy (06/23/2015); US BREAST BIOPSY W LOC DEVICE 1ST LESION RIGHT (10/19/2020); Cataract removal with implant (Bilateral); hernia repair (N/A, 07/14/2021); Abdomen surgery; Endoscopy, colon, diagnostic; eye surgery; Pain management procedure (Left, 12/28/2023); and Pain management procedure (Bilateral, 3/28/2024).  Social History:  reports that she quit smoking about 16 years ago. Her smoking use included cigarettes. She started smoking about 47 years ago. She has a 30.7 pack-year smoking history. She has never

## 2024-05-07 DIAGNOSIS — J30.9 ALLERGIC RHINITIS, UNSPECIFIED SEASONALITY, UNSPECIFIED TRIGGER: ICD-10-CM

## 2024-05-07 RX ORDER — LORATADINE 10 MG/1
10 TABLET ORAL DAILY
Qty: 90 TABLET | Refills: 0 | Status: SHIPPED | OUTPATIENT
Start: 2024-05-07

## 2024-05-07 NOTE — TELEPHONE ENCOUNTER
Medication Refill Request    LOV 4/23/2024  NOV Visit date not found    Lab Results   Component Value Date    CREATININE 0.9 04/09/2024

## 2024-05-08 ENCOUNTER — OFFICE VISIT (OUTPATIENT)
Dept: PAIN MANAGEMENT | Age: 74
End: 2024-05-08
Payer: MEDICARE

## 2024-05-08 VITALS
BODY MASS INDEX: 23.95 KG/M2 | DIASTOLIC BLOOD PRESSURE: 69 MMHG | WEIGHT: 158 LBS | RESPIRATION RATE: 16 BRPM | TEMPERATURE: 97.6 F | SYSTOLIC BLOOD PRESSURE: 117 MMHG | HEIGHT: 68 IN | OXYGEN SATURATION: 95 % | HEART RATE: 88 BPM

## 2024-05-08 DIAGNOSIS — M54.42 CHRONIC BILATERAL LOW BACK PAIN WITH BILATERAL SCIATICA: ICD-10-CM

## 2024-05-08 DIAGNOSIS — G89.4 CHRONIC PAIN SYNDROME: ICD-10-CM

## 2024-05-08 DIAGNOSIS — M54.41 CHRONIC BILATERAL LOW BACK PAIN WITH BILATERAL SCIATICA: ICD-10-CM

## 2024-05-08 DIAGNOSIS — G89.29 CHRONIC BILATERAL LOW BACK PAIN WITH BILATERAL SCIATICA: ICD-10-CM

## 2024-05-08 DIAGNOSIS — M54.16 LUMBAR RADICULOPATHY: Primary | ICD-10-CM

## 2024-05-08 DIAGNOSIS — M48.061 LUMBAR FORAMINAL STENOSIS: ICD-10-CM

## 2024-05-08 PROCEDURE — 99213 OFFICE O/P EST LOW 20 MIN: CPT | Performed by: PAIN MEDICINE

## 2024-05-08 PROCEDURE — 1036F TOBACCO NON-USER: CPT | Performed by: PAIN MEDICINE

## 2024-05-08 PROCEDURE — G8427 DOCREV CUR MEDS BY ELIG CLIN: HCPCS | Performed by: PAIN MEDICINE

## 2024-05-08 PROCEDURE — G8420 CALC BMI NORM PARAMETERS: HCPCS | Performed by: PAIN MEDICINE

## 2024-05-08 PROCEDURE — 3078F DIAST BP <80 MM HG: CPT | Performed by: PAIN MEDICINE

## 2024-05-08 PROCEDURE — 1090F PRES/ABSN URINE INCON ASSESS: CPT | Performed by: PAIN MEDICINE

## 2024-05-08 PROCEDURE — 1123F ACP DISCUSS/DSCN MKR DOCD: CPT | Performed by: PAIN MEDICINE

## 2024-05-08 PROCEDURE — 3017F COLORECTAL CA SCREEN DOC REV: CPT | Performed by: PAIN MEDICINE

## 2024-05-08 PROCEDURE — 99213 OFFICE O/P EST LOW 20 MIN: CPT

## 2024-05-08 PROCEDURE — 3074F SYST BP LT 130 MM HG: CPT | Performed by: PAIN MEDICINE

## 2024-05-08 PROCEDURE — G8399 PT W/DXA RESULTS DOCUMENT: HCPCS | Performed by: PAIN MEDICINE

## 2024-05-08 NOTE — PROGRESS NOTES
Nila Mcfarland presents to the [facility] on 5/8/2024. Nila is complaining of pain lower back and bilateral feet. The pain is constant. The pain is described as aching and burning. Pain is rated on her best day at a 2, on her worst day at a 7, and on average at a 2 on the VAS scale. She took her last dose of Lyrica today.     Any procedures since your last visit: No    Pacemaker or defibrillator: No    She is  on NSAIDS and is not on anticoagulation medications  Medication Contract and Consent for Opioid Use Documents Filed       Patient Documents       Type of Document Status Date Received Received By Description    Medication Contract Received 1/21/2020  8:16 AM AYSHA ALVARADO med contract    Medication Contract Received 8/16/2022 10:51 AM NICOLÁS ESPINO 03/29/2022  Controlled Substance Medication Agreement                    /69   Pulse 88   Temp 97.6 °F (36.4 °C) (Infrared)   Resp 16   Ht 1.727 m (5' 8\")   Wt 71.7 kg (158 lb)   SpO2 95%   BMI 24.02 kg/m²      No LMP recorded. Patient has had a hysterectomy.    
2008 at [facility] and RULectomy 2013 at [place] (Dr. Chandler Peter), Hashimoto's and \"autoimmune diseases\" and prediabetes (follows with Dr. Flores), concern for addiction tendencies in the family (she and  smoked for many years, quit together, also \"liked red wine\" but quit drinking in 2017 when son diagnosed with severe pancreatitis related to alcohol abuse)     The most recent injection was quite helpful     Plan:    Urine screen deferred  OARRS report reviewed - Dr. العلي recently increase the dosing of pregabalin  Pamelor recently decreased due to dry mouth  Failed Ztlido samples  Left L4 TFESI with <50% relief  Left L4 and L5 TFESI with Kenalog ordered for improved benefit- r/b and procedure discussed, valium for presedation  B/l L5 TFESI with 75% relief, repeat prn - r/b and proc discussed, Valium sent for presedation  Patient encouraged to stay active   Treatment plan discussed with the patient including medication and procedure side effects     Cc:  Referring physician    SEFERINO Tucker D.O.

## 2024-05-14 ENCOUNTER — OFFICE VISIT (OUTPATIENT)
Dept: ENT CLINIC | Age: 74
End: 2024-05-14

## 2024-05-14 VITALS
WEIGHT: 155 LBS | HEART RATE: 102 BPM | SYSTOLIC BLOOD PRESSURE: 160 MMHG | BODY MASS INDEX: 23.57 KG/M2 | DIASTOLIC BLOOD PRESSURE: 87 MMHG

## 2024-05-14 DIAGNOSIS — H90.42 SENSORINEURAL HEARING LOSS (SNHL) OF LEFT EAR WITH UNRESTRICTED HEARING OF RIGHT EAR: Primary | ICD-10-CM

## 2024-05-14 DIAGNOSIS — H91.22 SUDDEN LEFT HEARING LOSS: ICD-10-CM

## 2024-05-14 NOTE — PROGRESS NOTES
options at that time.     I, Rebecca Otero MA, am scribing for and in the presence of Lavon Ochoa DO. 5/14/24/10:31 AM EDT    Dr. Lavon LUNA. Gabriela ZAMUDIO Ms. Ed.  Otolaryngology Facial Plastic Surgery  :  [facility]  Associate Clinical Professor:  AMIRA CASTELLANOS [employer]  [facility]

## 2024-05-16 DIAGNOSIS — J30.2 OTHER SEASONAL ALLERGIC RHINITIS: ICD-10-CM

## 2024-05-16 RX ORDER — MONTELUKAST SODIUM 10 MG/1
10 TABLET ORAL NIGHTLY
Qty: 90 TABLET | Refills: 0 | Status: SHIPPED | OUTPATIENT
Start: 2024-05-16

## 2024-05-20 ENCOUNTER — OFFICE VISIT (OUTPATIENT)
Age: 74
End: 2024-05-20
Payer: MEDICARE

## 2024-05-20 VITALS
HEART RATE: 97 BPM | WEIGHT: 156 LBS | BODY MASS INDEX: 23.72 KG/M2 | DIASTOLIC BLOOD PRESSURE: 72 MMHG | SYSTOLIC BLOOD PRESSURE: 150 MMHG

## 2024-05-20 DIAGNOSIS — R55 RECURRENT SYNCOPE: ICD-10-CM

## 2024-05-20 DIAGNOSIS — G62.9 SMALL FIBER NEUROPATHY: Primary | ICD-10-CM

## 2024-05-20 DIAGNOSIS — T88.7XXA MEDICATION SIDE EFFECT: ICD-10-CM

## 2024-05-20 DIAGNOSIS — M79.2 NEUROPATHIC PAIN OF CHEST: ICD-10-CM

## 2024-05-20 DIAGNOSIS — R41.3 MEMORY LOSS: ICD-10-CM

## 2024-05-20 PROCEDURE — 3078F DIAST BP <80 MM HG: CPT | Performed by: PSYCHIATRY & NEUROLOGY

## 2024-05-20 PROCEDURE — 1123F ACP DISCUSS/DSCN MKR DOCD: CPT | Performed by: PSYCHIATRY & NEUROLOGY

## 2024-05-20 PROCEDURE — 3077F SYST BP >= 140 MM HG: CPT | Performed by: PSYCHIATRY & NEUROLOGY

## 2024-05-20 PROCEDURE — 1036F TOBACCO NON-USER: CPT | Performed by: PSYCHIATRY & NEUROLOGY

## 2024-05-20 PROCEDURE — 3017F COLORECTAL CA SCREEN DOC REV: CPT | Performed by: PSYCHIATRY & NEUROLOGY

## 2024-05-20 PROCEDURE — G8420 CALC BMI NORM PARAMETERS: HCPCS | Performed by: PSYCHIATRY & NEUROLOGY

## 2024-05-20 PROCEDURE — 99214 OFFICE O/P EST MOD 30 MIN: CPT | Performed by: PSYCHIATRY & NEUROLOGY

## 2024-05-20 PROCEDURE — 1090F PRES/ABSN URINE INCON ASSESS: CPT | Performed by: PSYCHIATRY & NEUROLOGY

## 2024-05-20 PROCEDURE — G8427 DOCREV CUR MEDS BY ELIG CLIN: HCPCS | Performed by: PSYCHIATRY & NEUROLOGY

## 2024-05-20 PROCEDURE — G8399 PT W/DXA RESULTS DOCUMENT: HCPCS | Performed by: PSYCHIATRY & NEUROLOGY

## 2024-05-20 RX ORDER — NORTRIPTYLINE HYDROCHLORIDE 25 MG/1
25 CAPSULE ORAL NIGHTLY
Qty: 90 CAPSULE | Refills: 1 | Status: SHIPPED
Start: 2024-05-20 | End: 2024-05-20

## 2024-05-20 NOTE — PATIENT INSTRUCTIONS
D/c pamelor      Continue lyrica 100 mg three times a day      Check the memory today and 6 mon ths from now      If any more syncope, passing out- needs tilt table test      Sandy Cabrera MD

## 2024-05-20 NOTE — PROGRESS NOTES
Sandy Cabrera MD       Nila Mcfarland is a 73 y.o. female presenting as a follow patient for a   Chief Complaint   Patient presents with    Follow-up     neuropathy      Skin biopsy in [facility]:  Oct 2023:   Moderate degree of small fiber neuropathy     Mri lumbar spine:  IMPRESSION:  1. Degenerative change with multiple disc bulges.  Grade 1 retrolisthesis at  L3-4.  2. No evidence of significant central canal stenosis.  3. Mild left L3-4 and moderate left and mild right L4-5 neural foraminal  stenosis.     Component      Latest Ref Rng 7/25/2023 9/22/2023   Total Protein      g/dL   6.8    Albumin (calculated)      3.5 - 4.7 g/dL   3.6    Alpha-1-Globulin      0.2 - 0.4 g/dL   0.3    Alpha-2-Globulin      0.5 - 1.0 g/dL   0.8    Beta Globulin      0.8 - 1.3 g/dL   1.0    Gamma Globulin      0.7 - 1.6 g/dL   1.1    Protein Electrophoresis, Serum   Normal serum protein electrophoresis. No monoclonal protein identified.    Pathologist   Reviewed by: ANIL Andres    Pathologist   Reviewed by: ANIL Andres    Anti-Walter      NEGATIVE    NEGATIVE    Sjogren's Antibodies (SSA)      NEGATIVE    NEGATIVE    Sjogren's Antibodies (SSB)      NEGATIVE    NEGATIVE    Anti RNP      NEGATIVE    NEGATIVE    Mindy-1 Antibody      NEGATIVE    NEGATIVE    Scleroderma SCL-70      NEGATIVE    NEGATIVE    SPECIMEN TYPE   .24 h URINE    P E Interpretation, U   Normal. No Monoclonal protein identified.    Total Protein Excreted, Urine      30 - 150 mg/24 h   108    Urine IFX Specimen   .CLEAN CATCH URINE    Urine IFX Interp   Normal. No Monoclonal protein identified.    Serum IFX Interp   Normal. No Monoclonal protein identified.    Pathologist Review   Reviewed by: ANIL Andres    T4, Total      4.5 - 11.7 ug/dL 10.2      T4 Free      0.9 - 1.7 ng/dL 2.0 (H)      TSH      0.27 - 4.20 uIU/mL 1.38      Copper      80.0 - 155.0 ug/dL   146.2    Borrella Burgdorferi Antibodies Total      <=0.90 IV   2.53 (H)    Thyroid Peroxidase (Tpo)

## 2024-06-06 ENCOUNTER — TELEPHONE (OUTPATIENT)
Dept: ENT CLINIC | Age: 74
End: 2024-06-06

## 2024-06-06 ENCOUNTER — OFFICE VISIT (OUTPATIENT)
Dept: FAMILY MEDICINE CLINIC | Age: 74
End: 2024-06-06

## 2024-06-06 VITALS
HEART RATE: 83 BPM | TEMPERATURE: 97.2 F | HEIGHT: 68 IN | WEIGHT: 158 LBS | SYSTOLIC BLOOD PRESSURE: 146 MMHG | DIASTOLIC BLOOD PRESSURE: 76 MMHG | OXYGEN SATURATION: 97 % | BODY MASS INDEX: 23.95 KG/M2

## 2024-06-06 DIAGNOSIS — M25.562 ACUTE PAIN OF LEFT KNEE: ICD-10-CM

## 2024-06-06 DIAGNOSIS — M70.52 PES ANSERINUS BURSITIS OF LEFT KNEE: Primary | ICD-10-CM

## 2024-06-06 NOTE — PROGRESS NOTES
CC: Nila Mcfarland is a 73 y.o. yo female is here for evaluation evaluation for the following acute & chronic medical concerns: Knee Pain (Left knee pain/)        HPI:    L knee pain  Just over 2 weeks; just getting old; tied tyelnol, asa, ice packs, heat; medial aspect is starting to hurt her; able to walk but feels that makes it worse; if tries to get up in the kitchen by the end of the day in a lot of apin; did get a knee brace and using this for the last 10 days which does help some; no trauma; just started to hurt one day      Vitals:   BP (!) 146/76 (Site: Left Upper Arm, Position: Sitting)   Pulse 83   Temp 97.2 °F (36.2 °C)   Ht 1.727 m (5' 8\")   Wt 71.7 kg (158 lb)   SpO2 97%   BMI 24.02 kg/m²   Wt Readings from Last 3 Encounters:   06/06/24 71.7 kg (158 lb)   05/20/24 70.8 kg (156 lb)   05/14/24 70.3 kg (155 lb)       PE:  Constitutional - alert, well appearing, and in no distress  Eyes - extraocular eye movements intact, left eye normal, right eye normal, no conjunctivitis noted  Neck - symmetric, no obvious masses noted  Respiratory- no increased work of breathing  Extremities - no edema noted  Msk: R knee wnl; L knee with soft tissue swelling medial and infrapatellar region; tender medial joint space; mild crepitus with passive ROM  Skin - normal coloration and turgor, no rashes, no suspicious skin lesions noted      A / P:     Diagnosis Orders   1. Pes anserinus bursitis of left knee  diclofenac (VOLTAREN) 50 MG EC tablet      2. Acute pain of left knee  XR KNEE LEFT (3 VIEWS)    XR KNEE BILATERAL STANDING          RICE therapy  Diclofenac for 5-7 days BID with food  Bracing  Obtain xrays if not improving after 2 weeks    RTO: Return if symptoms worsen or fail to improve.        An electronic signature was used to authenticate this note.  ---- Izabella العلي MD on 6/6/2024 at 9:52 AM

## 2024-06-06 NOTE — TELEPHONE ENCOUNTER
Anam LVM from Dr. Driscoll's office stating that Pt insurance did not cover a CI consult and/or any other device option. Any option would be out of pocket for Pt. Please notify Pt. Anam can be contacted for any questions,[phone], she will be available after today or a message can be left with the .

## 2024-06-06 NOTE — TELEPHONE ENCOUNTER
Called patient and left detailed message requesting return call, provided office direct contact information.

## 2024-06-06 NOTE — TELEPHONE ENCOUNTER
returned call and had further questions, was provided with office contact information for Dr Victoria sood

## 2024-06-11 ENCOUNTER — TELEPHONE (OUTPATIENT)
Dept: OTOLARYNGOLOGY | Facility: HOSPITAL | Age: 74
End: 2024-06-11

## 2024-06-11 NOTE — TELEPHONE ENCOUNTER
----- Message from Araceli Acosta sent at 6/11/2024  9:28 AM EDT -----  LEFT MESSAGE FOR [facility]( [phone] X 2)> INFORMED CI COORDINATOR SPOKE WITH PATIENT'S , AND THEY WILL CALL BACK IF INTERESTED IN SCHEDULING AN APPT      Hi there,    Talked to patients  and he was convinced medicare criteria changed to allow CI for SSD. I informed him that FDA criteria allows it but medicare does not. Offered him other options and he will call back if interested.    Mamie    From: Emilia Greenberg <[email or web address]>   Sent: Monday, Shahida 10, 2024 8:49 AM  To: Mamie Demarco <[email or web address]>  Subject: RE: Message RE: Ngoc Bargeron (+[phone])    Thank you! Yes, I only called the referring office last week :     ABRAHAM DelgadoN, RN  Clinical Nurse Partner with Dr. Dorothea Yepez and Dr. Camron Armenta  [facility]- Department [place] [address]  T:[phone]  E: [email or web address]     From: Mamie Demarco <[email or web address]>   Sent: Monday, Shahida 10, 2024 7:07 AM  To: Araceli Acosta <[email or web address]>; Emilia Greenberg <[email or web address]>  Cc: Jose Antonio Toussaint <[email or web address]>  Subject: RE: Message RE: Ngoc Bargeron (+[phone])    Oh sorry I see now that Collins called the referring office. Happy to call the patient and inform them.    Thanks!    From: Mamie Demarco   Sent: Monday, Shahida 10, 2024 7:03 AM  To: Araceli Acosta <[email or web address]>; Emilia Greenberg <[email or web address]>  Cc: Jose Antonio Toussaint <[email or web address]>  Subject: RE: Message RE: Ngoc Ortega (+[phone])    Hi there,    I believe Collins spoke with them regarding this last week ?    Mamie     From: Araceli Acosta <[email or web address]>   Sent: Friday, June 7, 2024 4:09  PM  To: Mamie Demarco <[email or web address]>; Emilia Greenberg <[email or web address]>  Cc: Jose Antonio Toussaint <[email or web address]>; Araceli Acosta <[email or web address]>  Subject: FW: Message RE: Ngoc Ortega (+[phone])    [facility], can you give  a call and advise why patient is not a candidate for CI?

## 2024-06-25 ENCOUNTER — PREP FOR PROCEDURE (OUTPATIENT)
Dept: PAIN MANAGEMENT | Age: 74
End: 2024-06-25

## 2024-07-09 ENCOUNTER — TELEPHONE (OUTPATIENT)
Dept: PAIN MANAGEMENT | Age: 74
End: 2024-07-09

## 2024-07-09 DIAGNOSIS — M54.16 LUMBAR RADICULOPATHY: Primary | ICD-10-CM

## 2024-07-09 NOTE — TELEPHONE ENCOUNTER
Call to Nila Mcfarland that procedure was scheduled for 7/16/2024 and that [facility] should call her a few days before for the pre op call and between 2:00 PM and 4:00 PM  the business day before with the arrival time. Instructed Nila to hold ibuprofen for 24 hours, Celebrex, Mobic, and naprosyn for 4 days and any aspirin containing products, CoQ 10, or fish oil for 7 days. Instructed to call office back if any questions. Nila verbalized understanding.    Electronically signed by Gabriel Haywood RN on 7/9/2024 at 3:21 PM

## 2024-07-11 ENCOUNTER — OFFICE VISIT (OUTPATIENT)
Dept: ORTHOPEDIC SURGERY | Age: 74
End: 2024-07-11
Payer: MEDICARE

## 2024-07-11 DIAGNOSIS — M17.12 PRIMARY OSTEOARTHRITIS OF LEFT KNEE: Primary | ICD-10-CM

## 2024-07-11 PROCEDURE — G8427 DOCREV CUR MEDS BY ELIG CLIN: HCPCS | Performed by: STUDENT IN AN ORGANIZED HEALTH CARE EDUCATION/TRAINING PROGRAM

## 2024-07-11 PROCEDURE — 3017F COLORECTAL CA SCREEN DOC REV: CPT | Performed by: STUDENT IN AN ORGANIZED HEALTH CARE EDUCATION/TRAINING PROGRAM

## 2024-07-11 PROCEDURE — 20610 DRAIN/INJ JOINT/BURSA W/O US: CPT | Performed by: STUDENT IN AN ORGANIZED HEALTH CARE EDUCATION/TRAINING PROGRAM

## 2024-07-11 PROCEDURE — 1090F PRES/ABSN URINE INCON ASSESS: CPT | Performed by: STUDENT IN AN ORGANIZED HEALTH CARE EDUCATION/TRAINING PROGRAM

## 2024-07-11 PROCEDURE — 99214 OFFICE O/P EST MOD 30 MIN: CPT | Performed by: STUDENT IN AN ORGANIZED HEALTH CARE EDUCATION/TRAINING PROGRAM

## 2024-07-11 PROCEDURE — G8420 CALC BMI NORM PARAMETERS: HCPCS | Performed by: STUDENT IN AN ORGANIZED HEALTH CARE EDUCATION/TRAINING PROGRAM

## 2024-07-11 PROCEDURE — 1036F TOBACCO NON-USER: CPT | Performed by: STUDENT IN AN ORGANIZED HEALTH CARE EDUCATION/TRAINING PROGRAM

## 2024-07-11 PROCEDURE — G8399 PT W/DXA RESULTS DOCUMENT: HCPCS | Performed by: STUDENT IN AN ORGANIZED HEALTH CARE EDUCATION/TRAINING PROGRAM

## 2024-07-11 PROCEDURE — 1123F ACP DISCUSS/DSCN MKR DOCD: CPT | Performed by: STUDENT IN AN ORGANIZED HEALTH CARE EDUCATION/TRAINING PROGRAM

## 2024-07-11 RX ORDER — BUPIVACAINE HYDROCHLORIDE 2.5 MG/ML
3 INJECTION, SOLUTION INFILTRATION; PERINEURAL ONCE
Status: COMPLETED | OUTPATIENT
Start: 2024-07-11 | End: 2024-07-11

## 2024-07-11 RX ORDER — IBUPROFEN 200 MG
200 TABLET ORAL EVERY 6 HOURS PRN
COMMUNITY

## 2024-07-11 RX ORDER — TRIAMCINOLONE ACETONIDE 40 MG/ML
80 INJECTION, SUSPENSION INTRA-ARTICULAR; INTRAMUSCULAR ONCE
Status: COMPLETED | OUTPATIENT
Start: 2024-07-11 | End: 2024-07-11

## 2024-07-11 RX ADMIN — TRIAMCINOLONE ACETONIDE 80 MG: 40 INJECTION, SUSPENSION INTRA-ARTICULAR; INTRAMUSCULAR at 13:26

## 2024-07-11 RX ADMIN — BUPIVACAINE HYDROCHLORIDE 7.5 MG: 2.5 INJECTION, SOLUTION INFILTRATION; PERINEURAL at 13:26

## 2024-07-11 NOTE — PROGRESS NOTES
[facility] PAIN MANAGEMENT  INSTRUCTIONS  ...........................................................................................................................................     [x] Parking the day of Surgery is located in the Main Entrance lot.  Upon entering the door, make immediate right into the surgery reception room    [x]  Bring photo ID and insurance card     [x] You may have a light breakfast day of procedure    [x]  Wear loose comfortable clothing    [x]  Please follow instructions for medications as given per Dr's office    [x] You can expect a call the business day prior to procedure to notify you of your arrival time     [x] Please arrange for     []  Other instructions

## 2024-07-11 NOTE — PROGRESS NOTES
Left 2023    LEFT L4 TRANSFORAMINAL EPIDURAL STEROID INJECTION performed by Myra Tucker DO at [facility] OR    PAIN MANAGEMENT PROCEDURE Bilateral 3/28/2024    BILATERAL L5 TRANSFORAMINAL EPIDURAL STEROID INJECTION performed by Myra Tucker DO at [facility] OR    US BREAST BIOPSY W LOC DEVICE 1ST LESION RIGHT  10/19/2020    US BREAST NEEDLE BIOPSY RIGHT 10/19/2020 [facility]         FAMILY HISTORY   Family History   Problem Relation Age of Onset    Arthritis Mother     Diabetes Mother     Heart Disease Mother     High Blood Pressure Mother     High Cholesterol Mother     Stroke Mother     Heart Attack Father         massiv MI    Asthma Sister     Cancer Sister 59        lung cancer    High Blood Pressure Sister     High Cholesterol Sister     Substance Abuse Sister     Cancer Sister 48        breast cancer    Breast Cancer Sister     Cancer Sister         lung    Other Sister         GERD, diverticulosis, rotator cuff disease, spinal stenosis    Early Death Brother         car accident    Heart Disease Brother         MI    Diabetes Maternal Grandmother     Heart Disease Maternal Grandmother     High Blood Pressure Maternal Grandmother     High Cholesterol Maternal Grandmother     Stroke Maternal Grandmother     Breast Cancer Maternal Aunt 48        breast    Ovarian Cancer Maternal Aunt     Cancer Maternal Aunt 40        ovarian       SOCIAL HISTORY  Social History     Socioeconomic History    Marital status:      Spouse name: Not on file    Number of children: 3    Years of education: Not on file    Highest education level: Not on file   Occupational History    Occupation: retired- owned a company /sales   Tobacco Use    Smoking status: Former     Current packs/day: 0.00     Average packs/day: 1 pack/day for 30.7 years (30.7 ttl pk-yrs)     Types: Cigarettes     Start date:      Quit date: 2007     Years since quittin.8    Smokeless tobacco: Never   Vaping Use    Vaping Use: Never used

## 2024-07-12 ENCOUNTER — TELEPHONE (OUTPATIENT)
Dept: PAIN MANAGEMENT | Age: 74
End: 2024-07-12

## 2024-07-12 NOTE — TELEPHONE ENCOUNTER
Nila A Morrison called she is scheduled for a Bilateral lumbar TFESI on Tuesday 7/16/2024. She had a left knee injection done yesterday. Dr. Meza used a mixture of 80 mg of Kenalog and 3 mL of 0.25% Marcaine. Is she still ok to get the procedure done on turesday or should she reschedule? Please advise.

## 2024-07-16 ENCOUNTER — HOSPITAL ENCOUNTER (OUTPATIENT)
Dept: GENERAL RADIOLOGY | Age: 74
Setting detail: OUTPATIENT SURGERY
Discharge: HOME | End: 2024-07-18
Attending: PAIN MEDICINE
Payer: MEDICARE

## 2024-07-16 ENCOUNTER — HOSPITAL ENCOUNTER (OUTPATIENT)
Age: 74
Setting detail: OUTPATIENT SURGERY
Discharge: HOME | End: 2024-07-16
Attending: PAIN MEDICINE | Admitting: PAIN MEDICINE
Payer: MEDICARE

## 2024-07-16 VITALS
WEIGHT: 152 LBS | BODY MASS INDEX: 23.04 KG/M2 | RESPIRATION RATE: 18 BRPM | OXYGEN SATURATION: 95 % | HEART RATE: 67 BPM | SYSTOLIC BLOOD PRESSURE: 163 MMHG | DIASTOLIC BLOOD PRESSURE: 82 MMHG | HEIGHT: 68 IN | TEMPERATURE: 98 F

## 2024-07-16 DIAGNOSIS — R52 PAIN MANAGEMENT: ICD-10-CM

## 2024-07-16 PROCEDURE — 3600000002 HC SURGERY LEVEL 2 BASE: Performed by: PAIN MEDICINE

## 2024-07-16 PROCEDURE — 2709999900 HC NON-CHARGEABLE SUPPLY: Performed by: PAIN MEDICINE

## 2024-07-16 PROCEDURE — 64483 NJX AA&/STRD TFRM EPI L/S 1: CPT | Performed by: PAIN MEDICINE

## 2024-07-16 PROCEDURE — 6360000004 HC RX CONTRAST MEDICATION: Performed by: PAIN MEDICINE

## 2024-07-16 PROCEDURE — 7100000010 HC PHASE II RECOVERY - FIRST 15 MIN: Performed by: PAIN MEDICINE

## 2024-07-16 PROCEDURE — 6360000002 HC RX W HCPCS: Performed by: PAIN MEDICINE

## 2024-07-16 PROCEDURE — 7100000011 HC PHASE II RECOVERY - ADDTL 15 MIN: Performed by: PAIN MEDICINE

## 2024-07-16 PROCEDURE — 2500000003 HC RX 250 WO HCPCS: Performed by: PAIN MEDICINE

## 2024-07-16 RX ORDER — IOPAMIDOL 612 MG/ML
INJECTION, SOLUTION INTRATHECAL PRN
Status: DISCONTINUED | OUTPATIENT
Start: 2024-07-16 | End: 2024-07-16 | Stop reason: ALTCHOICE

## 2024-07-16 RX ORDER — METHYLPREDNISOLONE ACETATE 40 MG/ML
INJECTION, SUSPENSION INTRA-ARTICULAR; INTRALESIONAL; INTRAMUSCULAR; SOFT TISSUE PRN
Status: DISCONTINUED | OUTPATIENT
Start: 2024-07-16 | End: 2024-07-16 | Stop reason: ALTCHOICE

## 2024-07-16 RX ORDER — LIDOCAINE HYDROCHLORIDE 5 MG/ML
INJECTION, SOLUTION INFILTRATION; INTRAVENOUS PRN
Status: DISCONTINUED | OUTPATIENT
Start: 2024-07-16 | End: 2024-07-16 | Stop reason: ALTCHOICE

## 2024-07-16 ASSESSMENT — PAIN DESCRIPTION - DESCRIPTORS
DESCRIPTORS: ACHING;DISCOMFORT;SORE
DESCRIPTORS: ACHING;DISCOMFORT
DESCRIPTORS: ACHING;DISCOMFORT;SORE
DESCRIPTORS: ACHING;DISCOMFORT;SORE

## 2024-07-16 ASSESSMENT — PAIN DESCRIPTION - PAIN TYPE
TYPE: CHRONIC PAIN
TYPE: CHRONIC PAIN

## 2024-07-16 ASSESSMENT — PAIN DESCRIPTION - ORIENTATION
ORIENTATION: LOWER
ORIENTATION: LOWER

## 2024-07-16 ASSESSMENT — PAIN DESCRIPTION - LOCATION
LOCATION: BACK
LOCATION: BACK

## 2024-07-16 ASSESSMENT — PAIN SCALES - GENERAL
PAINLEVEL_OUTOF10: 5
PAINLEVEL_OUTOF10: 6

## 2024-07-16 ASSESSMENT — PAIN - FUNCTIONAL ASSESSMENT
PAIN_FUNCTIONAL_ASSESSMENT: 0-10
PAIN_FUNCTIONAL_ASSESSMENT: 0-10

## 2024-07-16 NOTE — DISCHARGE INSTRUCTIONS
[facility] Pain Management Department  [place] N[facility]t-[phone]  Dr. Avery Tucker   Post-Pain Block/Radiofrequency  Home Going Instructions    1-Go home, rest for the remainder of the day  2-Please do not lift over 20 pounds the day of the injection  3-If you received sedation No: alcohol, driving, operating lawn mowers, plows, tractors or other dangerous equipment until next morning. Do not make important decisions or sign legal documents for 24 hours. You may experience light headedness, dizziness, nausea or sleepiness after sedation. Do not stay alone. A responsible adult must be with you for 24 hours. You could be nauseated from the medications you have received. Your IV site may be sore and bruised.    4-No dietary restrictions     5-Resume all medications the same day, blood thinners to be resumed 24 hours after injection if you were instructed to stop any.    6-Keep the surgical site clean and dry, you may shower the next morning and remove the      dressing.     7- No sitz baths, tub baths or hot tubs/swimming for 24 hours.       8- If you have any pain at the injection site(s), application of an ice pack to the area should be       helpful, 20 minutes on/20 minutes off for next 48 hours.  9- Call [facility] immediately at if you develop.  Fever greater than 100.4 F  Have bleeding or drainage from the puncture site  Have progressive Leg/arm numbness and or weakness  Loss of control of bowel and or bladder (wet/soil yourself)  Severe headache with inability to lift head  10-You may return to work the next day  
WDL

## 2024-07-16 NOTE — OP NOTE
2024    Patient: Nila Mcfarland  :  1950  Age:  74 y.o.  Sex:  female     PRE-OPERATIVE DIAGNOSIS: Lumbar disc displacement, lumbar neural foraminal stenosis, lumbar radiculopathy.     POST-OPERATIVE DIAGNOSIS: Same.    PROCEDURE: Bilateral Transforaminal epidural steroid injection under fluoroscopic guidance at foraminal level L5.    SURGEON: SEFERINO Tucker D.O.    ANESTHESIA: local    ESTIMATED BLOOD LOSS: None.  ______________________________________________________________________  BRIEF HISTORY: Nila Mcfarland comes in today for the Bilateral transforaminal epidural steroid injection under fluoroscopic guidance at foraminal level L5. The potential complications of this procedure were discussed with her again today.  She has elected to undergo the aforementioned procedure.     Nila’s complete History & Physical examination were reviewed in depth, a copy of which is in the chart.      DESCRIPTION OF PROCEDURE:    After confirming written and informed consent, a time-out was performed and Nila’s name and date of birth, the procedure to be performed as well as the plan for the location of the needle insertion were confirmed.    The patient was brought into the procedure room and placed in the prone position on the fluoroscopy table. Standard monitors were placed and vital signs were observed throughout the procedure. The area of the lumbar spine was prepped with chloraprep and draped in a sterile manner. The vertebral body was identified with AP fluoroscopy. An oblique view was obtained to better visualize the inferior junction of the pedicle and transverse process . The 6 o'clock position of the pedicle was marked and identified. The skin and subcutaneous tissue were anesthetized with 0.5% lidocaine. A # 22 gauge pencil point needle was directed toward the targeted point under fluoroscopy until bone was contacted. The needle was then walked inferiorly until the neural foramen was entered . A

## 2024-07-16 NOTE — H&P
[facility]        [address] [place] [id number]  Dept: [phone]    Procedure History & Physical      Nila Mcfarland     HPI:    Patient  is here for LBP BLE pain for b/l L5 TFESI  Labs/imaging studies reviewed   All question and concerns addressed including R/B/A associated with the procedure    Past Medical History:   Diagnosis Date    Arthritis     Symptoms respond to myofascial release. Not surgical candidate    Asthma     Cancer (HCC)     lung cancer- RML- 2008, stage 1A, RUL-2013 Stage 1A 2013    Chronic back pain     COPD (chronic obstructive pulmonary disease) (HCC)     Emphysema of lung (HCC)     GERD (gastroesophageal reflux disease)     Hashimoto's disease 2008    History of blood transfusion     Hyperlipidemia     Hypertension     Hyperthyroidism     Neuropathy     Seizure (HCC) 10/09/2020    Sleep difficulties        Past Surgical History:   Procedure Laterality Date    ABDOMEN SURGERY      APPENDECTOMY  1981    CATARACT REMOVAL WITH IMPLANT Bilateral     COLONOSCOPY  06/23/2015    polyp and diverticulosis    COLONOSCOPY  06/23/2015    colonoscopy    ENDOSCOPY, COLON, DIAGNOSTIC      EYE SURGERY      HERNIA REPAIR N/A 07/14/2021    LAPAROSCOPIC ROBOTIC XI ASSISTED INCISIONAL HERNIA REPAIR WITH MESH performed by Keyana Hamilton MD at [facility] OR    HYSTERECTOMY (CERVIX STATUS UNKNOWN)  1981    LUNG CANCER SURGERY Right     X 2    PAIN MANAGEMENT PROCEDURE Left 12/28/2023    LEFT L4 TRANSFORAMINAL EPIDURAL STEROID INJECTION performed by Myra Tucker DO at [facility] OR    PAIN MANAGEMENT PROCEDURE Bilateral 3/28/2024    BILATERAL L5 TRANSFORAMINAL EPIDURAL STEROID INJECTION performed by Myra Tucker DO at [facility] OR     BREAST BIOPSY W LOC DEVICE 1ST LESION RIGHT  10/19/2020    US BREAST NEEDLE BIOPSY RIGHT 10/19/2020 [facility]       Prior to Admission medications    Medication Sig Start Date End Date Taking? Authorizing Provider   ibuprofen (ADVIL;MOTRIN) 200

## 2024-07-19 DIAGNOSIS — G89.29 CHRONIC PAIN OF RIGHT KNEE: ICD-10-CM

## 2024-07-19 DIAGNOSIS — M54.16 LUMBAR RADICULOPATHY: ICD-10-CM

## 2024-07-19 DIAGNOSIS — M25.561 CHRONIC PAIN OF RIGHT KNEE: ICD-10-CM

## 2024-07-19 DIAGNOSIS — G89.4 CHRONIC PAIN SYNDROME: ICD-10-CM

## 2024-07-19 DIAGNOSIS — M25.551 RIGHT HIP PAIN: ICD-10-CM

## 2024-07-19 DIAGNOSIS — K21.9 GASTROESOPHAGEAL REFLUX DISEASE, UNSPECIFIED WHETHER ESOPHAGITIS PRESENT: ICD-10-CM

## 2024-07-19 RX ORDER — PREGABALIN 100 MG/1
100 CAPSULE ORAL 3 TIMES DAILY
Qty: 270 CAPSULE | Refills: 0 | Status: SHIPPED | OUTPATIENT
Start: 2024-07-19 | End: 2024-10-17

## 2024-07-19 RX ORDER — OMEPRAZOLE 20 MG/1
20 CAPSULE, DELAYED RELEASE ORAL DAILY
Qty: 90 CAPSULE | Refills: 0 | Status: SHIPPED | OUTPATIENT
Start: 2024-07-19

## 2024-07-19 NOTE — TELEPHONE ENCOUNTER
Medication Refill Request    LOV 6/6/2024  NOV Visit date not found    Lab Results   Component Value Date    CREATININE 0.9 04/09/2024

## 2024-07-23 DIAGNOSIS — M25.561 CHRONIC PAIN OF RIGHT KNEE: ICD-10-CM

## 2024-07-23 DIAGNOSIS — M54.16 LUMBAR RADICULOPATHY: ICD-10-CM

## 2024-07-23 DIAGNOSIS — M25.551 RIGHT HIP PAIN: ICD-10-CM

## 2024-07-23 DIAGNOSIS — G89.29 CHRONIC PAIN OF RIGHT KNEE: ICD-10-CM

## 2024-07-23 DIAGNOSIS — K21.9 GASTROESOPHAGEAL REFLUX DISEASE, UNSPECIFIED WHETHER ESOPHAGITIS PRESENT: ICD-10-CM

## 2024-07-23 DIAGNOSIS — G89.4 CHRONIC PAIN SYNDROME: ICD-10-CM

## 2024-07-23 LAB
T4 FREE: 1.8 NG/DL (ref 0.9–1.7)
TSH SERPL DL<=0.05 MIU/L-ACNC: 1.46 UIU/ML (ref 0.27–4.2)

## 2024-07-23 RX ORDER — PREGABALIN 100 MG/1
100 CAPSULE ORAL 3 TIMES DAILY
Qty: 270 CAPSULE | Refills: 0 | Status: SHIPPED | OUTPATIENT
Start: 2024-07-23 | End: 2024-10-21

## 2024-07-23 RX ORDER — OMEPRAZOLE 20 MG/1
20 CAPSULE, DELAYED RELEASE ORAL DAILY
Qty: 90 CAPSULE | Refills: 0 | Status: SHIPPED | OUTPATIENT
Start: 2024-07-23

## 2024-07-25 ENCOUNTER — OFFICE VISIT (OUTPATIENT)
Dept: ENDOCRINOLOGY | Age: 74
End: 2024-07-25
Payer: MEDICARE

## 2024-07-25 VITALS
BODY MASS INDEX: 24.4 KG/M2 | SYSTOLIC BLOOD PRESSURE: 148 MMHG | RESPIRATION RATE: 18 BRPM | WEIGHT: 161 LBS | HEIGHT: 68 IN | HEART RATE: 88 BPM | DIASTOLIC BLOOD PRESSURE: 81 MMHG | OXYGEN SATURATION: 99 %

## 2024-07-25 DIAGNOSIS — E04.2 NONTOXIC MULTINODULAR GOITER: ICD-10-CM

## 2024-07-25 DIAGNOSIS — E55.9 VITAMIN D DEFICIENCY: ICD-10-CM

## 2024-07-25 DIAGNOSIS — E03.9 HYPOTHYROIDISM, UNSPECIFIED TYPE: Primary | ICD-10-CM

## 2024-07-25 PROCEDURE — 3079F DIAST BP 80-89 MM HG: CPT | Performed by: CLINICAL NURSE SPECIALIST

## 2024-07-25 PROCEDURE — 3017F COLORECTAL CA SCREEN DOC REV: CPT | Performed by: CLINICAL NURSE SPECIALIST

## 2024-07-25 PROCEDURE — 99214 OFFICE O/P EST MOD 30 MIN: CPT | Performed by: CLINICAL NURSE SPECIALIST

## 2024-07-25 PROCEDURE — 1090F PRES/ABSN URINE INCON ASSESS: CPT | Performed by: CLINICAL NURSE SPECIALIST

## 2024-07-25 PROCEDURE — 1036F TOBACCO NON-USER: CPT | Performed by: CLINICAL NURSE SPECIALIST

## 2024-07-25 PROCEDURE — 3077F SYST BP >= 140 MM HG: CPT | Performed by: CLINICAL NURSE SPECIALIST

## 2024-07-25 PROCEDURE — G8399 PT W/DXA RESULTS DOCUMENT: HCPCS | Performed by: CLINICAL NURSE SPECIALIST

## 2024-07-25 PROCEDURE — G8427 DOCREV CUR MEDS BY ELIG CLIN: HCPCS | Performed by: CLINICAL NURSE SPECIALIST

## 2024-07-25 PROCEDURE — 1123F ACP DISCUSS/DSCN MKR DOCD: CPT | Performed by: CLINICAL NURSE SPECIALIST

## 2024-07-25 PROCEDURE — G8420 CALC BMI NORM PARAMETERS: HCPCS | Performed by: CLINICAL NURSE SPECIALIST

## 2024-07-25 RX ORDER — LEVOTHYROXINE SODIUM 100 MCG
TABLET ORAL
Qty: 90 TABLET | Refills: 1 | Status: SHIPPED | OUTPATIENT
Start: 2024-07-25

## 2024-07-25 NOTE — PROGRESS NOTES
[facility]  [facility] Department of Endocrinology Diabetes and Metabolism   [address], [place], [address]   Phone: [phone]  Fax: [phone]    Date of Service: 7/25/2024  Primary Care Physician: Izabella العلي MD.  Provider: KANWAL Meredith - CNS     Reason for the visit:  Primary Hypothyroidism, Prediabetes vitD deficiency, chronic fatigue     History of Present Illness:  The history is provided by the patient. No  was used. Accuracy of the patient data is excellent.  Nila Mcfarland is a very pleasant 74 y.o. female with past medical h/o lunch cancer seen seen today for follow up visit   The patient was diagnosed with hypothyroidism in 2008 and was told that she has Hashimoto's thyroid disease   She is currently on Synthroid 100 mcg 1 tab 6 days a week and Sunday. Patient takes Synthroid in the morning at empty stomach, wait one hour before eating , avoid multivitamins containing calcium  or iron with it.  Lab Results   Component Value Date/Time    TSH 1.46 07/23/2024 01:15 PM    T4FREE 1.8 (H) 07/23/2024 01:15 PM    FT3 2.6 03/02/2018 03:00 PM     The patient still c/o denies   She reported positive FH of thyroid disease in her mother and two sisters   Given tiredness and autoimmune thyroid disease, I ordered AM cortisol and was 9.9   Component 9/27/2018   Cortisol 9.90     Regarding MNG   The patient was also diagnosed with thyroid nodule at the same time of hypothyroidism   Thyroid US 8/2017   The right thyroid lobe measures 3.8 x 1.4 x 1.4 cm in size.  The left thyroid lobe measures  3.3 x 1.0 x 1.1 cm in size.  The isthmus measures 0.1 cm  in thickness.  The thyroid gland is heterogeneous. A 0.6 cm small hypoechoic nodule present in the thyroid isthmus on the right. No other suspicious nodules noted.     thyroid US 6/25/2019  Rt thyroid lobe measures 4.3 cm x 1.2 cm x 1.3 cm. There is heterogeneous echogenicity. No discrete nodule

## 2024-08-02 ENCOUNTER — OFFICE VISIT (OUTPATIENT)
Dept: PAIN MANAGEMENT | Age: 74
End: 2024-08-02
Payer: MEDICARE

## 2024-08-02 VITALS
HEART RATE: 77 BPM | TEMPERATURE: 97 F | DIASTOLIC BLOOD PRESSURE: 80 MMHG | HEIGHT: 68 IN | SYSTOLIC BLOOD PRESSURE: 167 MMHG | BODY MASS INDEX: 24.48 KG/M2

## 2024-08-02 DIAGNOSIS — G89.29 CHRONIC BILATERAL LOW BACK PAIN WITH BILATERAL SCIATICA: ICD-10-CM

## 2024-08-02 DIAGNOSIS — M54.42 CHRONIC BILATERAL LOW BACK PAIN WITH BILATERAL SCIATICA: ICD-10-CM

## 2024-08-02 DIAGNOSIS — M54.16 LUMBAR RADICULOPATHY: ICD-10-CM

## 2024-08-02 DIAGNOSIS — G89.4 CHRONIC PAIN SYNDROME: Primary | ICD-10-CM

## 2024-08-02 DIAGNOSIS — M48.061 LUMBAR FORAMINAL STENOSIS: ICD-10-CM

## 2024-08-02 DIAGNOSIS — M54.41 CHRONIC BILATERAL LOW BACK PAIN WITH BILATERAL SCIATICA: ICD-10-CM

## 2024-08-02 PROCEDURE — 99213 OFFICE O/P EST LOW 20 MIN: CPT

## 2024-08-02 NOTE — PROGRESS NOTES
Nila Mcfarland presents to the [facility] on 8/2/2024. Nila is complaining of pain Back . The pain is intermittent. The pain is described as aching. Pain is rated on her best day at a 7, on her worst day at a 10, and on average at a 5 on the VAS scale. She took her last dose of Motrin yesterday .     Any procedures since your last visit: yes, with 5 % relief.    Pacemaker or defibrillator: No     She is  on NSAIDS and is not on anticoagulation medications   Medication Contract and Consent for Opioid Use Documents Filed       Patient Documents       Type of Document Status Date Received Received By Description    Medication Contract Received 1/21/2020  8:16 AM AYSHA ALVARADO med contract    Medication Contract Received 8/16/2022 10:51 AM NICOLÁS ESPINO 03/29/2022  Controlled Substance Medication Agreement                    BP (!) 167/80 (Site: Left Upper Arm, Position: Sitting, Cuff Size: Medium Adult)   Pulse 77   Temp 97 °F (36.1 °C)   Ht 1.727 m (5' 8\")   BMI 24.48 kg/m²      No LMP recorded. Patient has had a hysterectomy.  
small cell lung cancer s/p RMLectomy 2008 at [facility] and RULectomy 2013 at [place] (Dr. Chandler Peter), Hashimoto's and \"autoimmune diseases\" and prediabetes (follows with Dr. Flores), concern for addiction tendencies in the family (she and  smoked for many years, quit together, also \"liked red wine\" but quit drinking in 2017 when son diagnosed with severe pancreatitis related to alcohol abuse)     The most recent injection was quite helpful but not long-lasting  She appears overall deconditioned and feels as though the more she does the worse pain she has  Will refer to PT     Plan:    Urine screen deferred  [place] report reviewed - Dr. العلي recently increase the dosing of pregabalin  Pamelor recently decreased due to dry mouth  Failed Ztlido samples  Left L4 TFESI with <50% relief  Left L4 and L5 TFESI with Kenalog ordered for improved benefit- r/b and procedure discussed, valium for presedation  B/l L5 TFESI with 75% relief - r/b and proc discussed, Valium sent for presedation  PT ordered at [facility]  Patient encouraged to stay active   Treatment plan discussed with the patient including medication and procedure side effects     Cc:  Referring physician    SEFERINO Tucker D.O.

## 2024-08-13 DIAGNOSIS — J30.9 ALLERGIC RHINITIS, UNSPECIFIED SEASONALITY, UNSPECIFIED TRIGGER: ICD-10-CM

## 2024-08-13 DIAGNOSIS — M54.16 LUMBAR RADICULOPATHY: ICD-10-CM

## 2024-08-13 DIAGNOSIS — E78.00 PURE HYPERCHOLESTEROLEMIA: ICD-10-CM

## 2024-08-13 DIAGNOSIS — I10 ESSENTIAL HYPERTENSION: ICD-10-CM

## 2024-08-13 DIAGNOSIS — F41.9 ANXIETY: ICD-10-CM

## 2024-08-13 DIAGNOSIS — J30.2 OTHER SEASONAL ALLERGIC RHINITIS: ICD-10-CM

## 2024-08-13 RX ORDER — EZETIMIBE 10 MG/1
10 TABLET ORAL DAILY
Qty: 90 TABLET | Refills: 0 | Status: SHIPPED | OUTPATIENT
Start: 2024-08-13

## 2024-08-13 RX ORDER — ESCITALOPRAM OXALATE 10 MG/1
10 TABLET ORAL DAILY
Qty: 90 TABLET | Refills: 0 | Status: SHIPPED | OUTPATIENT
Start: 2024-08-13

## 2024-08-13 RX ORDER — TIZANIDINE 4 MG/1
4 TABLET ORAL DAILY PRN
Qty: 90 TABLET | Refills: 0 | Status: SHIPPED | OUTPATIENT
Start: 2024-08-13

## 2024-08-13 RX ORDER — HYDRALAZINE HYDROCHLORIDE 50 MG/1
50 TABLET, FILM COATED ORAL EVERY 8 HOURS SCHEDULED
Qty: 270 TABLET | Refills: 0 | Status: SHIPPED | OUTPATIENT
Start: 2024-08-13

## 2024-08-13 RX ORDER — AMLODIPINE BESYLATE 10 MG/1
10 TABLET ORAL DAILY
Qty: 90 TABLET | Refills: 0 | Status: SHIPPED | OUTPATIENT
Start: 2024-08-13

## 2024-08-13 RX ORDER — LORATADINE 10 MG/1
10 TABLET ORAL DAILY
Qty: 90 TABLET | Refills: 0 | Status: SHIPPED | OUTPATIENT
Start: 2024-08-13

## 2024-08-13 RX ORDER — MONTELUKAST SODIUM 10 MG/1
10 TABLET ORAL NIGHTLY
Qty: 90 TABLET | Refills: 0 | Status: SHIPPED | OUTPATIENT
Start: 2024-08-13

## 2024-08-13 NOTE — TELEPHONE ENCOUNTER
Medication Refill Request    LOV 6/6/2024  NOV 8/13/2024    Lab Results   Component Value Date    CREATININE 0.9 04/09/2024

## 2024-08-22 ENCOUNTER — TELEPHONE (OUTPATIENT)
Dept: ENDOCRINOLOGY | Age: 74
End: 2024-08-22

## 2024-08-22 NOTE — TELEPHONE ENCOUNTER
----- Message from Gregg LEO sent at 8/21/2024  3:39 PM EDT -----  Please call pt and inform her I have reviewed US and stable when compared to previous

## 2024-08-29 ENCOUNTER — OFFICE VISIT (OUTPATIENT)
Dept: ORTHOPEDIC SURGERY | Age: 74
End: 2024-08-29

## 2024-08-29 DIAGNOSIS — M16.12 PRIMARY OSTEOARTHRITIS OF LEFT HIP: ICD-10-CM

## 2024-08-29 DIAGNOSIS — M25.552 LEFT HIP PAIN: ICD-10-CM

## 2024-08-29 DIAGNOSIS — M70.62 GREATER TROCHANTERIC BURSITIS OF LEFT HIP: Primary | ICD-10-CM

## 2024-08-29 RX ORDER — BUPIVACAINE HYDROCHLORIDE 2.5 MG/ML
3 INJECTION, SOLUTION INFILTRATION; PERINEURAL ONCE
Status: COMPLETED | OUTPATIENT
Start: 2024-08-29 | End: 2024-08-29

## 2024-08-29 RX ORDER — TRIAMCINOLONE ACETONIDE 40 MG/ML
80 INJECTION, SUSPENSION INTRA-ARTICULAR; INTRAMUSCULAR ONCE
Status: COMPLETED | OUTPATIENT
Start: 2024-08-29 | End: 2024-08-29

## 2024-08-29 RX ADMIN — TRIAMCINOLONE ACETONIDE 80 MG: 40 INJECTION, SUSPENSION INTRA-ARTICULAR; INTRAMUSCULAR at 11:14

## 2024-08-29 RX ADMIN — BUPIVACAINE HYDROCHLORIDE 7.5 MG: 2.5 INJECTION, SOLUTION INFILTRATION; PERINEURAL at 11:14

## 2024-08-29 NOTE — PROGRESS NOTES
Hip Patient     Referring Provider:   No referring provider defined for this encounter.    CHIEF COMPLAINT:   Chief Complaint   Patient presents with    Follow-up     Left hip pain        HPI:    Nila Mcfarland is a 74 y.o. year old female who is seen today  for evaluation of left hip pain.  She states this has been going on for several years.  Her primary complaint is on the lateral side of her hip.  She denies numbness and tingling.  She denies any injury to this hip.  She denies mechanical Symptoms of clicking and popping.  She states the pain worsens when she is walking for long periods of time and going to get out of her car.  She has tried Tylenol and ibuprofen without any relief.    PAST MEDICAL HISTORY  Past Medical History:   Diagnosis Date    Arthritis     Symptoms respond to myofascial release. Not surgical candidate    Asthma     Cancer (HCC)     lung cancer- RML- 2008, stage 1A, RUL-2013 Stage 1A 2013    Chronic back pain     COPD (chronic obstructive pulmonary disease) (HCC)     Emphysema of lung (HCC)     GERD (gastroesophageal reflux disease)     Hashimoto's disease 2008    History of blood transfusion     Hyperlipidemia     Hypertension     Hyperthyroidism     Neuropathy     Seizure (HCC) 10/09/2020    Sleep difficulties        PAST SURGICAL HISTORY  Past Surgical History:   Procedure Laterality Date    ABDOMEN SURGERY      APPENDECTOMY  1981    CATARACT REMOVAL WITH IMPLANT Bilateral     COLONOSCOPY  06/23/2015    polyp and diverticulosis    COLONOSCOPY  06/23/2015    colonoscopy    ENDOSCOPY, COLON, DIAGNOSTIC      EYE SURGERY      HERNIA REPAIR N/A 07/14/2021    LAPAROSCOPIC ROBOTIC XI ASSISTED INCISIONAL HERNIA REPAIR WITH MESH performed by Keyana Hamilton MD at [facility] OR    HYSTERECTOMY (CERVIX STATUS UNKNOWN)  1981    LUNG CANCER SURGERY Right     X 2    PAIN MANAGEMENT PROCEDURE Left 12/28/2023    LEFT L4 TRANSFORAMINAL EPIDURAL STEROID INJECTION performed by Myra Tucker DO at

## 2024-09-17 ENCOUNTER — OFFICE VISIT (OUTPATIENT)
Dept: FAMILY MEDICINE CLINIC | Age: 74
End: 2024-09-17

## 2024-09-17 VITALS
TEMPERATURE: 97.1 F | OXYGEN SATURATION: 96 % | SYSTOLIC BLOOD PRESSURE: 136 MMHG | DIASTOLIC BLOOD PRESSURE: 72 MMHG | BODY MASS INDEX: 24.52 KG/M2 | HEART RATE: 85 BPM | HEIGHT: 68 IN | WEIGHT: 161.8 LBS

## 2024-09-17 DIAGNOSIS — R05.9 COUGH, UNSPECIFIED TYPE: ICD-10-CM

## 2024-09-17 DIAGNOSIS — R09.81 CONGESTION OF NASAL SINUS: ICD-10-CM

## 2024-09-17 DIAGNOSIS — U07.1 COVID-19: Primary | ICD-10-CM

## 2024-09-17 LAB
INFLUENZA A ANTIGEN, POC: NEGATIVE
INFLUENZA B ANTIGEN, POC: NEGATIVE
Lab: ABNORMAL
PERFORMING INSTRUMENT: ABNORMAL
QC PASS/FAIL: ABNORMAL
S PYO AG THROAT QL: NORMAL
SARS-COV-2, POC: DETECTED

## 2024-09-17 RX ORDER — CHLORHEXIDINE GLUCONATE ORAL RINSE 1.2 MG/ML
15 SOLUTION DENTAL 2 TIMES DAILY
COMMUNITY
Start: 2024-08-13

## 2024-09-17 RX ORDER — BENZONATATE 100 MG/1
100 CAPSULE ORAL 3 TIMES DAILY PRN
Qty: 28 CAPSULE | Refills: 0 | Status: SHIPPED | OUTPATIENT
Start: 2024-09-17

## 2024-09-17 RX ORDER — BROMPHENIRAMINE MALEATE, PSEUDOEPHEDRINE HYDROCHLORIDE, AND DEXTROMETHORPHAN HYDROBROMIDE 2; 30; 10 MG/5ML; MG/5ML; MG/5ML
5 SYRUP ORAL 4 TIMES DAILY PRN
Qty: 118 ML | Refills: 0 | Status: SHIPPED | OUTPATIENT
Start: 2024-09-17

## 2024-09-23 ENCOUNTER — PROCEDURE VISIT (OUTPATIENT)
Dept: AUDIOLOGY | Age: 74
End: 2024-09-23
Payer: MEDICARE

## 2024-09-23 ENCOUNTER — OFFICE VISIT (OUTPATIENT)
Dept: ENT CLINIC | Age: 74
End: 2024-09-23
Payer: MEDICARE

## 2024-09-23 VITALS — WEIGHT: 161 LBS | BODY MASS INDEX: 24.4 KG/M2 | HEIGHT: 68 IN

## 2024-09-23 DIAGNOSIS — H90.42 SENSORINEURAL HEARING LOSS (SNHL) OF LEFT EAR WITH UNRESTRICTED HEARING OF RIGHT EAR: Primary | ICD-10-CM

## 2024-09-23 DIAGNOSIS — H91.22 SUDDEN LEFT HEARING LOSS: ICD-10-CM

## 2024-09-23 PROCEDURE — 1090F PRES/ABSN URINE INCON ASSESS: CPT | Performed by: OTOLARYNGOLOGY

## 2024-09-23 PROCEDURE — 92553 AUDIOMETRY AIR & BONE: CPT

## 2024-09-23 PROCEDURE — G8427 DOCREV CUR MEDS BY ELIG CLIN: HCPCS | Performed by: OTOLARYNGOLOGY

## 2024-09-23 PROCEDURE — 99213 OFFICE O/P EST LOW 20 MIN: CPT | Performed by: OTOLARYNGOLOGY

## 2024-09-23 PROCEDURE — 1123F ACP DISCUSS/DSCN MKR DOCD: CPT | Performed by: OTOLARYNGOLOGY

## 2024-09-23 PROCEDURE — G8420 CALC BMI NORM PARAMETERS: HCPCS | Performed by: OTOLARYNGOLOGY

## 2024-09-23 PROCEDURE — 3017F COLORECTAL CA SCREEN DOC REV: CPT | Performed by: OTOLARYNGOLOGY

## 2024-09-23 PROCEDURE — G8399 PT W/DXA RESULTS DOCUMENT: HCPCS | Performed by: OTOLARYNGOLOGY

## 2024-09-23 PROCEDURE — 1036F TOBACCO NON-USER: CPT | Performed by: OTOLARYNGOLOGY

## 2024-09-23 NOTE — PROGRESS NOTES
[facility]  BOO RomanoO. Ms.Ed        Patient Name:  Nila Mcfarland  :  1950     CHIEF C/O:    Chief Complaint   Patient presents with    Hearing Problem     Patient here for hearing loss 4 month f/u  Left ear can hear a pinging noise         HISTORY OBTAINED FROM:  patient    HISTORY OF PRESENT ILLNESS:       Nila is a 74 y.o. year old female, here today for follow up of:       Here for known history of a sudden SNHL starting in March was treated by Ferny Jaimes NP with steroids with no success. Right normal hearing unrestricted left ear. Left ear almost complete loss.  Previous records reviewed, discussed with the patient current history, no new complaints of hearing loss ear pain or vertigo.  Currently without any associated vertigo no new tinnitus.  No current complaints of difficulty swallowing fever chills or sore throat.           Past Medical History:   Diagnosis Date    Arthritis     Symptoms respond to myofascial release. Not surgical candidate    Asthma     Cancer (HCC)     lung cancer- RML- 2008, stage 1A, RUL-2013 Stage 1A 2013    Chronic back pain     COPD (chronic obstructive pulmonary disease) (HCC)     Emphysema of lung (HCC)     GERD (gastroesophageal reflux disease)     Hashimoto's disease 2008    History of blood transfusion     Hyperlipidemia     Hypertension     Hyperthyroidism     Neuropathy     Seizure (HCC) 10/09/2020    Sleep difficulties      Past Surgical History:   Procedure Laterality Date    ABDOMEN SURGERY      APPENDECTOMY      CATARACT REMOVAL WITH IMPLANT Bilateral     COLONOSCOPY  2015    polyp and diverticulosis    COLONOSCOPY  2015    colonoscopy    ENDOSCOPY, COLON, DIAGNOSTIC      EYE SURGERY      HERNIA REPAIR N/A 2021    LAPAROSCOPIC ROBOTIC XI ASSISTED INCISIONAL HERNIA REPAIR WITH MESH performed by Keyana Hamilton MD at [facility] OR    HYSTERECTOMY (CERVIX STATUS UNKNOWN)      LUNG CANCER SURGERY Right     X

## 2024-09-23 NOTE — PATIENT INSTRUCTIONS
sure to check with our office or the hospital on time frame for the drugs to be out of your system.    SHOULD YOUR INSURANCE CHANGE AT ANY TIME YOU MUST CONTACT OUR OFFICE. FAILURE TO DO SO MAY RESULT IN YOUR SURGERY BEING RESCHEDULED OR YOU MAY BE CHARGED AS SELF-PAY.    Due to the high demand for surgery at our practice, if you cancel or reschedule your surgery two (2) times we may not reschedule you.    If you need FMLA or Short Term Disability paperwork completed for your surgery, please complete your portion, ensure your name and date of birth are on them and fax them to [phone] asap. Paperwork can take up to 2 weeks to be completed.    If you have any questions or concerns regarding your surgery, please contact the Surgery [phone] option 2.    If you need medical clearance, you are responsible to contact your physician(s) to schedule an appointment for clearance. If clearance is not completed within 30 days of your surgery it may be cancelled. Our office will fax the appropriate forms that need to be completed to your physician(s).    ** ALL TONSILLECTOMY PATIENTS**-- IF YOU EXPERIENCE A POST OPERATIVE BLEED, PER REQUEST OF YOUR SURGEON PLEASE REPORT TO [facility] OR [facility] ONLY.     The location of your surgery will call you the day prior to your surgery date to let you know what time you have to be there and any other details. (they usually don't call until late afternoon- early evening.)- IF YOU HAVE QUESTIONS REGARDING THE TIME OF YOUR SURGERY, PLEASE CALL THE FACILITY YOU ARE SCHEDULED AT.           [facility], [address] [place] [address] will call you a couple days prior to surgery and give you further instructions, if you have any questions, you can reach them at [phone] (per Pre-Admission testing, EKG is required for all patients age 55+, have a diagnosis of hypertension, diabetes, or on dialysis).           [facility]

## 2024-09-25 ENCOUNTER — OFFICE VISIT (OUTPATIENT)
Dept: FAMILY MEDICINE CLINIC | Age: 74
End: 2024-09-25

## 2024-09-25 VITALS
TEMPERATURE: 97.4 F | WEIGHT: 160 LBS | SYSTOLIC BLOOD PRESSURE: 138 MMHG | HEART RATE: 81 BPM | DIASTOLIC BLOOD PRESSURE: 80 MMHG | BODY MASS INDEX: 24.33 KG/M2 | OXYGEN SATURATION: 98 %

## 2024-09-25 DIAGNOSIS — M25.561 CHRONIC PAIN OF RIGHT KNEE: ICD-10-CM

## 2024-09-25 DIAGNOSIS — G89.29 CHRONIC PAIN OF RIGHT KNEE: ICD-10-CM

## 2024-09-25 DIAGNOSIS — U07.1 COVID-19: ICD-10-CM

## 2024-09-25 DIAGNOSIS — M54.16 LUMBAR RADICULOPATHY: ICD-10-CM

## 2024-09-25 DIAGNOSIS — M25.551 RIGHT HIP PAIN: ICD-10-CM

## 2024-09-25 DIAGNOSIS — F41.9 ANXIETY: ICD-10-CM

## 2024-09-25 DIAGNOSIS — E78.00 PURE HYPERCHOLESTEROLEMIA: ICD-10-CM

## 2024-09-25 DIAGNOSIS — H69.90 DYSFUNCTION OF EUSTACHIAN TUBE, UNSPECIFIED LATERALITY: ICD-10-CM

## 2024-09-25 DIAGNOSIS — J30.2 OTHER SEASONAL ALLERGIC RHINITIS: ICD-10-CM

## 2024-09-25 DIAGNOSIS — I10 ESSENTIAL HYPERTENSION: ICD-10-CM

## 2024-09-25 DIAGNOSIS — K21.9 GASTROESOPHAGEAL REFLUX DISEASE, UNSPECIFIED WHETHER ESOPHAGITIS PRESENT: ICD-10-CM

## 2024-09-25 DIAGNOSIS — J30.9 ALLERGIC RHINITIS, UNSPECIFIED SEASONALITY, UNSPECIFIED TRIGGER: ICD-10-CM

## 2024-09-25 DIAGNOSIS — G89.4 CHRONIC PAIN SYNDROME: ICD-10-CM

## 2024-09-25 DIAGNOSIS — H66.003 ACUTE SUPPURATIVE OTITIS MEDIA OF BOTH EARS WITHOUT SPONTANEOUS RUPTURE OF TYMPANIC MEMBRANES, RECURRENCE NOT SPECIFIED: Primary | ICD-10-CM

## 2024-09-25 DIAGNOSIS — M70.52 PES ANSERINUS BURSITIS OF LEFT KNEE: ICD-10-CM

## 2024-09-25 DIAGNOSIS — H61.21 IMPACTED CERUMEN OF RIGHT EAR: ICD-10-CM

## 2024-09-25 DIAGNOSIS — R05.9 COUGH, UNSPECIFIED TYPE: ICD-10-CM

## 2024-09-25 RX ORDER — ESCITALOPRAM OXALATE 10 MG/1
10 TABLET ORAL DAILY
Qty: 90 TABLET | Refills: 1 | Status: SHIPPED | OUTPATIENT
Start: 2024-09-25

## 2024-09-25 RX ORDER — FLUTICASONE PROPIONATE 50 MCG
1 SPRAY, SUSPENSION (ML) NASAL DAILY
Qty: 32 G | Refills: 5 | Status: SHIPPED | OUTPATIENT
Start: 2024-09-25

## 2024-09-25 RX ORDER — BENZONATATE 100 MG/1
100 CAPSULE ORAL 3 TIMES DAILY PRN
Qty: 28 CAPSULE | Refills: 0 | Status: SHIPPED | OUTPATIENT
Start: 2024-09-25

## 2024-09-25 RX ORDER — AZELASTINE 1 MG/ML
1 SPRAY, METERED NASAL 2 TIMES DAILY
Qty: 60 ML | Refills: 5 | Status: SHIPPED | OUTPATIENT
Start: 2024-09-25

## 2024-09-25 RX ORDER — AMLODIPINE BESYLATE 10 MG/1
10 TABLET ORAL DAILY
Qty: 90 TABLET | Refills: 1 | Status: SHIPPED | OUTPATIENT
Start: 2024-09-25

## 2024-09-25 RX ORDER — PREGABALIN 100 MG/1
100 CAPSULE ORAL 3 TIMES DAILY
Qty: 270 CAPSULE | Refills: 1 | Status: SHIPPED | OUTPATIENT
Start: 2024-09-25 | End: 2025-03-24

## 2024-09-25 RX ORDER — HYDRALAZINE HYDROCHLORIDE 50 MG/1
50 TABLET, FILM COATED ORAL EVERY 8 HOURS SCHEDULED
Qty: 270 TABLET | Refills: 1 | Status: SHIPPED | OUTPATIENT
Start: 2024-09-25

## 2024-09-25 RX ORDER — MONTELUKAST SODIUM 10 MG/1
10 TABLET ORAL NIGHTLY
Qty: 90 TABLET | Refills: 1 | Status: SHIPPED | OUTPATIENT
Start: 2024-09-25

## 2024-09-25 RX ORDER — PREDNISONE 20 MG/1
20 TABLET ORAL 2 TIMES DAILY
Qty: 10 TABLET | Refills: 0 | Status: SHIPPED | OUTPATIENT
Start: 2024-09-25 | End: 2024-09-30

## 2024-09-25 RX ORDER — BROMPHENIRAMINE MALEATE, PSEUDOEPHEDRINE HYDROCHLORIDE, AND DEXTROMETHORPHAN HYDROBROMIDE 2; 30; 10 MG/5ML; MG/5ML; MG/5ML
5 SYRUP ORAL 4 TIMES DAILY PRN
Qty: 118 ML | Refills: 0 | Status: SHIPPED | OUTPATIENT
Start: 2024-09-25

## 2024-09-25 RX ORDER — EZETIMIBE 10 MG/1
10 TABLET ORAL DAILY
Qty: 90 TABLET | Refills: 1 | Status: SHIPPED | OUTPATIENT
Start: 2024-09-25

## 2024-09-25 RX ORDER — LORATADINE 10 MG/1
10 TABLET ORAL DAILY
Qty: 90 TABLET | Refills: 1 | Status: SHIPPED | OUTPATIENT
Start: 2024-09-25

## 2024-10-01 NOTE — PROGRESS NOTES
[facility]        [address] [address]  Dept: [phone]        Follow up Note      Nila Mcfarland     Date of Visit:  10/02/24     CC:  Patient presents for follow up   Chief Complaint   Patient presents with    Follow-up       HPI:    Pain is better.  Change in quality of symptoms:no   Medication side effects:not applicable .   Recent diagnostic testing:none.   Recent interventional procedures:left hip injection at IR with 50% relief    She has been on anticoagulation medications to include NSAIDS and has not been on herbal supplements.  She is not diabetic.     Imaging:   Skin biopsy in [facility]:  Oct 2023:   Moderate degree of small fiber neuropathy     9/2023 MRI lumbar w/o -  FINDINGS:  The prior lumbar spine plain films shows that there are 5 normal non  rib-bearing lumbar vertebral bodies with a transitional vertebra that is  designated as S1.  For the purposes of this report, the L5-S1 disc is seen on  axial series 6 image 18 and series 7, image 23.     BONES/ALIGNMENT: There is normal alignment of the spine apart from slight  grade 1 retrolisthesis of L3 on L4.  There is disc desiccation at multiple  levels.  Moderate disc space narrowing is seen at L2-3 with subchondral  signal change, consistent with degenerative disc disease.  There is mild  narrowing at L4-5.  The vertebral body heights are maintained. The bone  marrow signal appears unremarkable.     SPINAL CORD: The conus terminates normally.     SOFT TISSUES: No paraspinal mass identified.     L1-L2: There is no significant disc herniation, spinal canal stenosis or  neural foraminal narrowing.     L2-L3: There is no significant disc herniation, spinal canal stenosis or  neural foraminal narrowing.  There is mild disc bulge and mild bilateral  posterior facet degenerative change.     L3-L4: There is grade 1 retrolisthesis and mild to moderate bilateral  posterior facet degenerative change and

## 2024-10-02 ENCOUNTER — OFFICE VISIT (OUTPATIENT)
Dept: PAIN MANAGEMENT | Age: 74
End: 2024-10-02
Payer: MEDICARE

## 2024-10-02 ENCOUNTER — PREP FOR PROCEDURE (OUTPATIENT)
Dept: PAIN MANAGEMENT | Age: 74
End: 2024-10-02

## 2024-10-02 VITALS
TEMPERATURE: 96.8 F | BODY MASS INDEX: 24.26 KG/M2 | RESPIRATION RATE: 18 BRPM | WEIGHT: 160.05 LBS | DIASTOLIC BLOOD PRESSURE: 80 MMHG | HEIGHT: 68 IN | SYSTOLIC BLOOD PRESSURE: 146 MMHG | HEART RATE: 95 BPM | OXYGEN SATURATION: 96 %

## 2024-10-02 DIAGNOSIS — G89.4 CHRONIC PAIN SYNDROME: Primary | ICD-10-CM

## 2024-10-02 DIAGNOSIS — M54.16 LUMBAR RADICULOPATHY: ICD-10-CM

## 2024-10-02 DIAGNOSIS — M54.42 CHRONIC LEFT-SIDED LOW BACK PAIN WITH LEFT-SIDED SCIATICA: ICD-10-CM

## 2024-10-02 DIAGNOSIS — M48.061 LUMBAR FORAMINAL STENOSIS: ICD-10-CM

## 2024-10-02 DIAGNOSIS — G89.29 CHRONIC BILATERAL LOW BACK PAIN WITH BILATERAL SCIATICA: ICD-10-CM

## 2024-10-02 DIAGNOSIS — M54.16 LUMBAR RADICULOPATHY: Primary | ICD-10-CM

## 2024-10-02 DIAGNOSIS — M54.41 CHRONIC BILATERAL LOW BACK PAIN WITH BILATERAL SCIATICA: ICD-10-CM

## 2024-10-02 DIAGNOSIS — G89.29 CHRONIC LEFT-SIDED LOW BACK PAIN WITH LEFT-SIDED SCIATICA: ICD-10-CM

## 2024-10-02 DIAGNOSIS — M54.42 CHRONIC BILATERAL LOW BACK PAIN WITH BILATERAL SCIATICA: ICD-10-CM

## 2024-10-02 PROCEDURE — 99213 OFFICE O/P EST LOW 20 MIN: CPT

## 2024-10-02 PROCEDURE — 1123F ACP DISCUSS/DSCN MKR DOCD: CPT | Performed by: PAIN MEDICINE

## 2024-10-02 PROCEDURE — 3077F SYST BP >= 140 MM HG: CPT | Performed by: PAIN MEDICINE

## 2024-10-02 PROCEDURE — 1036F TOBACCO NON-USER: CPT | Performed by: PAIN MEDICINE

## 2024-10-02 PROCEDURE — 99213 OFFICE O/P EST LOW 20 MIN: CPT | Performed by: PAIN MEDICINE

## 2024-10-02 PROCEDURE — G8420 CALC BMI NORM PARAMETERS: HCPCS | Performed by: PAIN MEDICINE

## 2024-10-02 PROCEDURE — G8399 PT W/DXA RESULTS DOCUMENT: HCPCS | Performed by: PAIN MEDICINE

## 2024-10-02 PROCEDURE — 3017F COLORECTAL CA SCREEN DOC REV: CPT | Performed by: PAIN MEDICINE

## 2024-10-02 PROCEDURE — 1090F PRES/ABSN URINE INCON ASSESS: CPT | Performed by: PAIN MEDICINE

## 2024-10-02 PROCEDURE — G8427 DOCREV CUR MEDS BY ELIG CLIN: HCPCS | Performed by: PAIN MEDICINE

## 2024-10-02 PROCEDURE — 3079F DIAST BP 80-89 MM HG: CPT | Performed by: PAIN MEDICINE

## 2024-10-02 PROCEDURE — G8482 FLU IMMUNIZE ORDER/ADMIN: HCPCS | Performed by: PAIN MEDICINE

## 2024-10-02 RX ORDER — DIAZEPAM 5 MG
5 TABLET ORAL ONCE
Qty: 1 TABLET | Refills: 0 | Status: SHIPPED | OUTPATIENT
Start: 2024-10-02 | End: 2024-10-02

## 2024-10-02 NOTE — PROGRESS NOTES
Nila Mcfarland presents to the [facility] on 10/2/2024. Nila is complaining of pain in her left side. The pain is intermittent. The pain is described as aching and dull. Pain is rated on her best day at a 6, on her worst day at a 9, and on average at a 5 on the VAS scale. She took her last dose of Lyrica yesterday.      Any procedures since your last visit: No    She is  on NSAIDS and  is not on anticoagulation medications to include none.     Pacemaker or defibrillator: No .    Medication Contract and Consent for Opioid Use Documents Filed       Patient Documents       Type of Document Status Date Received Received By Description    Medication Contract Received 1/21/2020  8:16 AM AYSHA ALVARADO med contract    Medication Contract Received 8/16/2022 10:51 AM NICOLÁS ESPINO 03/29/2022  Controlled Substance Medication Agreement                       Resp 18   Ht 1.727 m (5' 7.99\")   Wt 72.6 kg (160 lb 0.9 oz)   BMI 24.34 kg/m²      No LMP recorded. Patient has had a hysterectomy.

## 2024-10-09 ENCOUNTER — OFFICE VISIT (OUTPATIENT)
Dept: FAMILY MEDICINE CLINIC | Age: 74
End: 2024-10-09

## 2024-10-09 VITALS
BODY MASS INDEX: 24.49 KG/M2 | SYSTOLIC BLOOD PRESSURE: 136 MMHG | HEART RATE: 85 BPM | DIASTOLIC BLOOD PRESSURE: 76 MMHG | WEIGHT: 161 LBS | OXYGEN SATURATION: 97 % | TEMPERATURE: 97.9 F

## 2024-10-09 DIAGNOSIS — N64.59 OTHER SIGNS AND SYMPTOMS IN BREAST: ICD-10-CM

## 2024-10-09 DIAGNOSIS — H61.21 IMPACTED CERUMEN OF RIGHT EAR: ICD-10-CM

## 2024-10-09 DIAGNOSIS — Z12.31 ENCOUNTER FOR SCREENING MAMMOGRAM FOR BREAST CANCER: ICD-10-CM

## 2024-10-09 DIAGNOSIS — U07.1 COVID-19: ICD-10-CM

## 2024-10-09 DIAGNOSIS — R92.333 HETEROGENEOUSLY DENSE TISSUE OF BOTH BREASTS ON MAMMOGRAPHY: ICD-10-CM

## 2024-10-09 DIAGNOSIS — B96.89 ACUTE BACTERIAL SINUSITIS: Primary | ICD-10-CM

## 2024-10-09 DIAGNOSIS — J01.90 ACUTE BACTERIAL SINUSITIS: Primary | ICD-10-CM

## 2024-10-09 RX ORDER — DIAZEPAM 5 MG
5 TABLET ORAL EVERY 6 HOURS PRN
COMMUNITY
Start: 2024-10-02

## 2024-10-09 RX ORDER — BROMPHENIRAMINE MALEATE, PSEUDOEPHEDRINE HYDROCHLORIDE, AND DEXTROMETHORPHAN HYDROBROMIDE 2; 30; 10 MG/5ML; MG/5ML; MG/5ML
5 SYRUP ORAL 4 TIMES DAILY PRN
Qty: 118 ML | Refills: 0 | Status: SHIPPED | OUTPATIENT
Start: 2024-10-09

## 2024-10-09 RX ORDER — DOXYCYCLINE HYCLATE 100 MG
100 TABLET ORAL 2 TIMES DAILY
Qty: 14 TABLET | Refills: 0 | Status: SHIPPED | OUTPATIENT
Start: 2024-10-09 | End: 2024-10-16

## 2024-10-09 NOTE — PROGRESS NOTES
CC: Nila Mcfarland is a 74 y.o. yo female is here for evaluation evaluation for the following acute & chronic medical concerns: Cerumen Impaction (2-week follow-up), Cough (And sinus drainage continuing), and Fatigue (With difficulty focusing)        HPI:    Acute illness / covid;   Extreme fatigue; coughing; dizziness; memory issue; exhaustion; not feeling well overall  Hx of Lung cancer x2 s/p R upper and middle lobectomy:Quite smoking 2007;COPD - on anoro ellipta + albuterol PRN; she has chronic coughing   Interval hx:  Completed treatement with paxlovid; tessalon and bromfed  Feeling better overall but now feels throat clogging; not sure if sinuses; completed meds; still coughing; a little more than baseline; no sob ; + HA and sinus pressure; ear pressure / tender R>L  Interval hx:  Treated for otitis media with augmentin; completed this  Today states she feels overall worse again; she is concerned for rebound symptoms  She has no fever chills, sweats  However, her R ear bothersome; feels she is coughing more stuff up which is colored mucus, yellow  She feels the cough is lingering and the drainage seems worse and this is causing her cough  She feels fatigued and has trouble focusing       Vitals:   /76   Pulse 85   Temp 97.9 °F (36.6 °C) (Temporal)   Wt 73 kg (161 lb)   SpO2 97%   BMI 24.49 kg/m²   Wt Readings from Last 3 Encounters:   10/09/24 73 kg (161 lb)   10/02/24 72.6 kg (160 lb 0.9 oz)   09/25/24 72.6 kg (160 lb)       PE:  Constitutional - alert, well appearing, and in no distress  Eyes - extraocular eye movements intact, left eye normal, right eye normal, no conjunctivitis noted  Ears: **R ear cerumen impaction** today this was removed by joel by myself; TM on the R side appeared wnl;  L ear slight bulge and opacity but appears overall improved from previous exam **  Neck - symmetric, no obvious masses noted  Respiratory- clear to auscultation, no wheezes, rales or rhonchi,

## 2024-10-10 ENCOUNTER — NURSE ONLY (OUTPATIENT)
Dept: FAMILY MEDICINE CLINIC | Age: 74
End: 2024-10-10
Payer: MEDICARE

## 2024-10-10 DIAGNOSIS — R09.81 SINUS CONGESTION: Primary | ICD-10-CM

## 2024-10-10 LAB
Lab: NORMAL
PERFORMING INSTRUMENT: NORMAL
QC PASS/FAIL: NORMAL
SARS-COV-2, POC: NORMAL

## 2024-10-10 PROCEDURE — 87426 SARSCOV CORONAVIRUS AG IA: CPT | Performed by: FAMILY MEDICINE

## 2024-10-12 ENCOUNTER — HOSPITAL ENCOUNTER (EMERGENCY)
Age: 74
Discharge: HOME | End: 2024-10-12
Payer: MEDICARE

## 2024-10-12 VITALS
DIASTOLIC BLOOD PRESSURE: 86 MMHG | OXYGEN SATURATION: 97 % | RESPIRATION RATE: 16 BRPM | SYSTOLIC BLOOD PRESSURE: 155 MMHG | TEMPERATURE: 98.1 F | HEART RATE: 78 BPM

## 2024-10-12 DIAGNOSIS — J02.9 VIRAL PHARYNGITIS: Primary | ICD-10-CM

## 2024-10-12 LAB
SPECIMEN SOURCE: NORMAL
STREP A, MOLECULAR: NEGATIVE

## 2024-10-12 PROCEDURE — 99211 OFF/OP EST MAY X REQ PHY/QHP: CPT

## 2024-10-12 PROCEDURE — 87651 STREP A DNA AMP PROBE: CPT

## 2024-10-12 NOTE — ED PROVIDER NOTES
[facility]  Department of Emergency Medicine     Encounter Note  Admit Date/RoomTime: 10/12/2024  4:14 PM   Room:     NAME: Nila Mcfarland  : 1950  MRN: [id number]     Chief Complaint:  Pharyngitis (Sore throat starting today Feeling tired + for covid in sept Pt is on 2nd round of antibiotic Currently on Doxycycline Started it on Wednesday Also test neg on Thursday for covid )    History of Present Illness       Nila Mcfarland is a 74 y.o. old female who presents to the urgent care with complaints of ongoing nasal congestion, sore throat.  Patient was positive for COVID-19 on 2024, was given Paxlovid, completed this medication, states that since that time she has had persistent nasal congestion and has developed a little sore throat over the last couple of days.  She has not been on antibiotics twice since late September, she is currently on doxycycline.  She has not had any fevers, throat closing sensation, difficulty breathing, difficulty swallowing, drooling.  ROS   Pertinent positives and negatives are stated within HPI, all other systems reviewed and are negative.    Past Medical History:  has a past medical history of Arthritis, Asthma, Cancer (HCC), Chronic back pain, COPD (chronic obstructive pulmonary disease) (HCC), Emphysema of lung (HCC), GERD (gastroesophageal reflux disease), Hashimoto's disease, History of blood transfusion, Hyperlipidemia, Hypertension, Hyperthyroidism, Neuropathy, Seizure (HCC), and Sleep difficulties.    Surgical History:  has a past surgical history that includes Appendectomy (); Hysterectomy (); Lung cancer surgery (Right); Colonoscopy (2015); Colonoscopy (2015); US BREAST BIOPSY W LOC DEVICE 1ST LESION RIGHT (10/19/2020); Cataract removal with implant (Bilateral); hernia repair (N/A, 2021); Abdomen surgery; Endoscopy, colon, diagnostic; eye surgery; Pain management procedure (Left, 2023); Pain management  28-Jan-2021 05:13

## 2024-10-12 NOTE — DISCHARGE INSTRUCTIONS
Your strep testing is negative.  Your symptoms are likely related to your most recent COVID infection and the use of Paxlovid, this can cause rebound symptoms that last several weeks to months.  Please follow-up with your regular doctor.

## 2024-10-13 DIAGNOSIS — R05.9 COUGH, UNSPECIFIED TYPE: ICD-10-CM

## 2024-10-14 RX ORDER — BENZONATATE 100 MG/1
100 CAPSULE ORAL 3 TIMES DAILY PRN
Qty: 90 CAPSULE | Refills: 0 | Status: SHIPPED | OUTPATIENT
Start: 2024-10-14

## 2024-10-14 NOTE — TELEPHONE ENCOUNTER
[facility] Clinical Staff  Phone Number: [phone]     Yes I'm still coughing from the sinus drainage.  I take 2 or 3 per day.  Call me at [phone] if you have any other questions.  Thanks!

## 2024-10-14 NOTE — TELEPHONE ENCOUNTER
Name of Medication(s) Requested:  Requested Prescriptions     Pending Prescriptions Disp Refills    benzonatate (TESSALON) 100 MG capsule 28 capsule 0     Sig: Take 1 capsule by mouth 3 times daily as needed for Cough       Medication is on current medication list Yes and left message for patient to return call for more information on request for cough medication.     Dosage and directions were verified? Yes    Quantity verified: 30 day supply     Pharmacy Verified?  Yes    Last Appointment:  10/9/2024    Future appts:  Future Appointments   Date Time Provider Department Center   11/21/2024  1:20 PM Sandy Cabrera MD [facility] NEURO Neurology -   12/2/2024  2:00 PM [facility]   1/2/2025  2:30 PM Myra Tucker DO [facility] PAIN MAR [facility]   1/22/2025  1:15 PM Jacob Flores MD BDM ENDO [facility]   1/29/2025 10:30 AM Izabella العلي MD [facility] ECC DEP   4/15/2025  1:30 PM [facility]        (If no appt send self scheduling link. .REFILLAPPT)  Scheduling request sent?     [] Yes  [x] No    Does patient need updated?  [] Yes  [x] No

## 2024-10-21 ENCOUNTER — TELEPHONE (OUTPATIENT)
Dept: PAIN MANAGEMENT | Age: 74
End: 2024-10-21

## 2024-10-21 DIAGNOSIS — M54.16 LUMBAR RADICULOPATHY: Primary | ICD-10-CM

## 2024-10-21 NOTE — TELEPHONE ENCOUNTER
Call to Nila Mcfarland and message left that procedure was scheduled for 10/29/2024 and that [facility] should call her a few days before for the pre op call and between 2:00 PM and 4:00 PM  the business day before with the arrival time. Instructed Nila to hold ibuprofen for 24 hours, Celebrex, Mobic, and naprosyn for 4 days and any aspirin containing products, CoQ-10, or fish oil for 7 days. Instructed to call office back. Advised that if they do not return the call it may result in their procedure being delayed.    Electronically signed by Gabriel Haywood RN on 10/21/2024 at 10:09 AM

## 2024-10-24 ENCOUNTER — OFFICE VISIT (OUTPATIENT)
Dept: FAMILY MEDICINE CLINIC | Age: 74
End: 2024-10-24

## 2024-10-24 VITALS
HEART RATE: 84 BPM | TEMPERATURE: 97.3 F | DIASTOLIC BLOOD PRESSURE: 64 MMHG | WEIGHT: 158.2 LBS | BODY MASS INDEX: 24.06 KG/M2 | SYSTOLIC BLOOD PRESSURE: 122 MMHG | OXYGEN SATURATION: 96 %

## 2024-10-24 DIAGNOSIS — R05.8 POST-VIRAL COUGH SYNDROME: ICD-10-CM

## 2024-10-24 DIAGNOSIS — R05.1 ACUTE COUGH: Primary | ICD-10-CM

## 2024-10-24 RX ORDER — PREDNISONE 20 MG/1
20 TABLET ORAL 2 TIMES DAILY
Qty: 10 TABLET | Refills: 0 | Status: SHIPPED | OUTPATIENT
Start: 2024-10-24 | End: 2024-10-29

## 2024-10-24 NOTE — PROGRESS NOTES
P:     Diagnosis Orders   1. Acute cough  predniSONE (DELTASONE) 20 MG tablet    XR CHEST STANDARD (2 VW)      2. Post-viral cough syndrome              Persisting cough and diminished airway  I will restart the prednisone for 5 day burst  CXR  She will calll if still not improving      RTO: Return in about 1 week (around 10/31/2024) for post viral check in.        An electronic signature was used to authenticate this note.  ---- Izabella العلي MD on 10/24/2024 at 2:28 PM

## 2024-10-25 NOTE — PROGRESS NOTES
[facility] PAIN MANAGEMENT  INSTRUCTIONS  ...........................................................................................................................................     [x] Parking the day of Surgery is located in the Main Entrance lot.  Upon entering the door, make immediate right into the surgery reception room    [x]  Bring photo ID and insurance card     [x] You may have a light breakfast day of procedure    [x]  Wear loose comfortable clothing    [x]  Please follow instructions for medications as given per Dr's office    [x] You can expect a call the business day prior to procedure to notify you of your arrival time     [x] Please arrange for     []  Other instructions

## 2024-10-29 ENCOUNTER — HOSPITAL ENCOUNTER (OUTPATIENT)
Dept: GENERAL RADIOLOGY | Age: 74
Discharge: HOME | End: 2024-10-31
Attending: PAIN MEDICINE
Payer: MEDICARE

## 2024-10-29 ENCOUNTER — HOSPITAL ENCOUNTER (OUTPATIENT)
Age: 74
Setting detail: OUTPATIENT SURGERY
Discharge: HOME | End: 2024-10-29
Attending: PAIN MEDICINE | Admitting: PAIN MEDICINE
Payer: MEDICARE

## 2024-10-29 VITALS
BODY MASS INDEX: 23.95 KG/M2 | OXYGEN SATURATION: 97 % | WEIGHT: 158 LBS | SYSTOLIC BLOOD PRESSURE: 155 MMHG | HEART RATE: 80 BPM | HEIGHT: 68 IN | TEMPERATURE: 97.2 F | DIASTOLIC BLOOD PRESSURE: 73 MMHG | RESPIRATION RATE: 16 BRPM

## 2024-10-29 DIAGNOSIS — R52 PAIN MANAGEMENT: ICD-10-CM

## 2024-10-29 DIAGNOSIS — U07.1 COVID-19: ICD-10-CM

## 2024-10-29 PROCEDURE — 6360000004 HC RX CONTRAST MEDICATION: Performed by: PAIN MEDICINE

## 2024-10-29 PROCEDURE — 7100000011 HC PHASE II RECOVERY - ADDTL 15 MIN: Performed by: PAIN MEDICINE

## 2024-10-29 PROCEDURE — 7100000010 HC PHASE II RECOVERY - FIRST 15 MIN: Performed by: PAIN MEDICINE

## 2024-10-29 PROCEDURE — 6360000002 HC RX W HCPCS: Performed by: PAIN MEDICINE

## 2024-10-29 PROCEDURE — 3600000002 HC SURGERY LEVEL 2 BASE: Performed by: PAIN MEDICINE

## 2024-10-29 PROCEDURE — 2500000003 HC RX 250 WO HCPCS: Performed by: PAIN MEDICINE

## 2024-10-29 PROCEDURE — 64483 NJX AA&/STRD TFRM EPI L/S 1: CPT | Performed by: PAIN MEDICINE

## 2024-10-29 PROCEDURE — 2709999900 HC NON-CHARGEABLE SUPPLY: Performed by: PAIN MEDICINE

## 2024-10-29 RX ORDER — IOPAMIDOL 612 MG/ML
INJECTION, SOLUTION INTRATHECAL PRN
Status: DISCONTINUED | OUTPATIENT
Start: 2024-10-29 | End: 2024-10-29 | Stop reason: ALTCHOICE

## 2024-10-29 RX ORDER — BROMPHENIRAMINE MALEATE, PSEUDOEPHEDRINE HYDROCHLORIDE, AND DEXTROMETHORPHAN HYDROBROMIDE 2; 30; 10 MG/5ML; MG/5ML; MG/5ML
5 SYRUP ORAL 4 TIMES DAILY PRN
Qty: 118 ML | Refills: 1 | Status: SHIPPED | OUTPATIENT
Start: 2024-10-29

## 2024-10-29 RX ORDER — LIDOCAINE HYDROCHLORIDE 5 MG/ML
INJECTION, SOLUTION INFILTRATION; INTRAVENOUS PRN
Status: DISCONTINUED | OUTPATIENT
Start: 2024-10-29 | End: 2024-10-29 | Stop reason: ALTCHOICE

## 2024-10-29 RX ORDER — METHYLPREDNISOLONE ACETATE 40 MG/ML
INJECTION, SUSPENSION INTRA-ARTICULAR; INTRALESIONAL; INTRAMUSCULAR; SOFT TISSUE PRN
Status: DISCONTINUED | OUTPATIENT
Start: 2024-10-29 | End: 2024-10-29 | Stop reason: ALTCHOICE

## 2024-10-29 ASSESSMENT — PAIN SCALES - GENERAL: PAINLEVEL_OUTOF10: 2

## 2024-10-29 ASSESSMENT — PAIN DESCRIPTION - PAIN TYPE: TYPE: CHRONIC PAIN

## 2024-10-29 ASSESSMENT — PAIN DESCRIPTION - LOCATION: LOCATION: BACK

## 2024-10-29 ASSESSMENT — PAIN DESCRIPTION - DESCRIPTORS: DESCRIPTORS: DULL;DISCOMFORT

## 2024-10-29 ASSESSMENT — PAIN - FUNCTIONAL ASSESSMENT
PAIN_FUNCTIONAL_ASSESSMENT: ACTIVITIES ARE NOT PREVENTED
PAIN_FUNCTIONAL_ASSESSMENT: NONE - DENIES PAIN

## 2024-10-29 ASSESSMENT — PAIN DESCRIPTION - ORIENTATION: ORIENTATION: LOWER

## 2024-10-29 NOTE — DISCHARGE INSTRUCTIONS
[facility]  [place] Jhwgqt-[phone]  Dr. Ingrid Tucker   Post-Pain Block/Radiofrequency  Home Going Instructions    1-Go home, rest for the remainder of the day  2-Please do not lift over 20 pounds the day of the injection  3-If you received sedation No: alcohol, driving, operating lawn mowers, plows, tractors or other dangerous equipment until next morning. Do not make important decisions or sign legal documents for 24 hours. You may experience light headedness, dizziness, nausea or sleepiness after sedation. Do not stay alone. A responsible adult must be with you for 24 hours. You could be nauseated from the medications you have received. Your IV site may be sore and bruised.    4-No dietary restrictions     5-Resume all medications the same day, blood thinners to be resumed 24 hours after injection if you were instructed to stop any.    6-Keep the surgical site clean and dry, you may shower the next morning and remove the      dressing.     7- No sitz baths, tub baths or hot tubs/swimming for 24 hours.       8- If you have any pain at the injection site(s), application of an ice pack to the area should be       helpful, 20 minutes on/20 minutes off for next 48 hours.  9- Call [facility] immediately at if you develop.  Fever greater than 100.4 F  Have bleeding or drainage from the puncture site  Have progressive Leg/arm numbness and or weakness  Loss of control of bowel and or bladder (wet/soil yourself)  Severe headache with inability to lift head  10-You may return to work the next day

## 2024-10-29 NOTE — OP NOTE
10/29/2024    Patient: Nila Mcfarland  :  1950  Age:  74 y.o.  Sex:  female     PRE-OPERATIVE DIAGNOSIS: Lumbar disc displacement, lumbar neural foraminal stenosis, lumbar radiculopathy.     POST-OPERATIVE DIAGNOSIS: Same.    PROCEDURE: Left Transforaminal epidural steroid injection under fluoroscopic guidance at foraminal level L5.    SURGEON: SEFERINO Tucker D.O.    ANESTHESIA: local    ESTIMATED BLOOD LOSS: None.  ______________________________________________________________________  BRIEF HISTORY: Nila Mcfarland comes in today for the Left transforaminal epidural steroid injection under fluoroscopic guidance at foraminal level L5. The potential complications of this procedure were discussed with her again today.  She has elected to undergo the aforementioned procedure.     Nila’s complete History & Physical examination were reviewed in depth, a copy of which is in the chart.      DESCRIPTION OF PROCEDURE:    After confirming written and informed consent, a time-out was performed and Nila’s name and date of birth, the procedure to be performed as well as the plan for the location of the needle insertion were confirmed.    The patient was brought into the procedure room and placed in the prone position on the fluoroscopy table. Standard monitors were placed and vital signs were observed throughout the procedure. The area of the lumbar spine was prepped with chloraprep and draped in a sterile manner. The vertebral body was identified with AP fluoroscopy. An oblique view was obtained to better visualize the inferior junction of the pedicle and transverse process . The 6 o'clock position of the pedicle was marked and identified. The skin and subcutaneous tissue were anesthetized with 0.5% lidocaine. A # 22 gauge pencil point needle was directed toward the targeted point under fluoroscopy until bone was contacted. The needle was then walked inferiorly until the neural foramen was entered . A lateral

## 2024-10-29 NOTE — H&P
Provider, MD Raj       No Known Allergies    Social History     Socioeconomic History    Marital status:      Spouse name: Not on file    Number of children: 3    Years of education: Not on file    Highest education level: Not on file   Occupational History    Occupation: retired- owned a company /sales   Tobacco Use    Smoking status: Former     Current packs/day: 0.00     Average packs/day: 1 pack/day for 30.7 years (30.7 ttl pk-yrs)     Types: Cigarettes     Start date:      Quit date: 2007     Years since quittin.1    Smokeless tobacco: Never   Vaping Use    Vaping status: Never Used   Substance and Sexual Activity    Alcohol use: Yes     Alcohol/week: 7.0 standard drinks of alcohol     Types: 7 Glasses of wine per week    Drug use: No    Sexual activity: Not on file   Other Topics Concern    Not on file   Social History Narrative    5 children, 3 are her own     Social Determinants of Health     Financial Resource Strain: Low Risk  (2024)    Overall Financial Resource Strain (CARDIA)     Difficulty of Paying Living Expenses: Not hard at all   Food Insecurity: No Food Insecurity (2024)    Hunger Vital Sign     Worried About Running Out of Food in the Last Year: Never true     Ran Out of Food in the Last Year: Never true   Transportation Needs: Unknown (2024)    PRAPARE - Transportation     Lack of Transportation (Medical): Not on file     Lack of Transportation (Non-Medical): No   Physical Activity: Inactive (2024)    Exercise Vital Sign     Days of Exercise per Week: 0 days     Minutes of Exercise per Session: 0 min   Stress: Not on file   Social Connections: Not on file   Intimate Partner Violence: Not on file   Housing Stability: Unknown (2024)    Housing Stability Vital Sign     Unable to Pay for Housing in the Last Year: Not on file     Number of Places Lived in the Last Year: Not on file     Unstable Housing in the Last Year: No       Family History

## 2024-10-29 NOTE — TELEPHONE ENCOUNTER
Name of Medication(s) Requested:  Requested Prescriptions     Pending Prescriptions Disp Refills    brompheniramine-pseudoephedrine-DM 2-30-10 MG/5ML syrup 118 mL 0     Sig: Take 5 mLs by mouth 4 times daily as needed for Congestion or Cough (cough / congetion)       Medication is on current medication list Yes    Dosage and directions were verified? Yes    Quantity verified: 30 day supply     Pharmacy Verified?  Yes    Last Appointment:  10/24/2024    Future appts:  Future Appointments   Date Time Provider Department Center   11/6/2024 12:30 PM SEYZ ABDU [facility] RM 2 SEYZ [facility]BDU [facility] Rad/Car   11/21/2024  1:20 PM Sandy Cabrera MD [facility] NEURO Neurology -   12/2/2024  2:00 PM [facility]ble [facility]   1/2/2025  2:30 PM Myra Tucker DO BDM PAIN MAR [facility]   1/16/2025  1:00 PM SEYZ ABDU US RM 3 SEYZ [facility] Rad/Car   1/22/2025  1:15 PM Jacob Flores MD BDM ENDO [facility]   1/29/2025 10:30 AM Izabella العلي MD [facility] ECC DEP   4/15/2025  1:30 PM [facility]        (If no appt send self scheduling link. .REFILLAPPT)  Scheduling request sent?     [] Yes  [x] No    Does patient need updated?  [] Yes  [x] No

## 2024-11-06 ENCOUNTER — HOSPITAL ENCOUNTER (OUTPATIENT)
Dept: GENERAL RADIOLOGY | Age: 74
Discharge: HOME | End: 2024-11-08
Attending: FAMILY MEDICINE
Payer: MEDICARE

## 2024-11-06 DIAGNOSIS — Z12.31 ENCOUNTER FOR SCREENING MAMMOGRAM FOR BREAST CANCER: ICD-10-CM

## 2024-11-06 PROCEDURE — 77063 BREAST TOMOSYNTHESIS BI: CPT

## 2024-11-09 DIAGNOSIS — G89.4 CHRONIC PAIN SYNDROME: ICD-10-CM

## 2024-11-09 DIAGNOSIS — G89.29 CHRONIC PAIN OF RIGHT KNEE: ICD-10-CM

## 2024-11-09 DIAGNOSIS — M54.16 LUMBAR RADICULOPATHY: ICD-10-CM

## 2024-11-09 DIAGNOSIS — J30.9 ALLERGIC RHINITIS, UNSPECIFIED SEASONALITY, UNSPECIFIED TRIGGER: ICD-10-CM

## 2024-11-09 DIAGNOSIS — M25.551 RIGHT HIP PAIN: ICD-10-CM

## 2024-11-09 DIAGNOSIS — M25.561 CHRONIC PAIN OF RIGHT KNEE: ICD-10-CM

## 2024-11-11 RX ORDER — PREGABALIN 100 MG/1
100 CAPSULE ORAL 3 TIMES DAILY
Qty: 270 CAPSULE | Refills: 0 | Status: SHIPPED | OUTPATIENT
Start: 2024-11-11 | End: 2024-11-12 | Stop reason: SDUPTHER

## 2024-11-11 RX ORDER — LORATADINE 10 MG/1
10 TABLET ORAL DAILY
Qty: 90 TABLET | Refills: 0 | Status: SHIPPED | OUTPATIENT
Start: 2024-11-11

## 2024-11-11 NOTE — TELEPHONE ENCOUNTER
Name of Medication(s) Requested:  Requested Prescriptions     Pending Prescriptions Disp Refills    pregabalin (LYRICA) 100 MG capsule 270 capsule 0     Sig: Take 1 capsule by mouth 3 times daily for 180 days. Max Daily Amount: 300 mg       Medication is on current medication list Yes    Dosage and directions were verified? Yes    Quantity verified: 90 day supply     Pharmacy Verified?  Yes    Last Appointment:  10/24/2024    Future appts:  Future Appointments   Date Time Provider Department Center   11/21/2024  1:20 PM Sandy Cabrera MD [facility] NEURO Neurology -   12/2/2024  2:00 PM Noni Gamble MD [facility]   1/2/2025  2:30 PM Myra Tucker DO BDM PAIN MAR [facility]   1/16/2025  1:00 PM SEYZ ABDU US RM 3 SEYZ ABD[facility] Rad/Car   1/22/2025  1:15 PM Jacob Flores MD BDM ENDO [facility]   1/29/2025 10:30 AM Izabella العلي MD [facility] ECC DEP   4/15/2025  1:30 PM Izabella العلي MD [facility] ECC DEP        (If no appt send self scheduling link. .REFILLAPPT)  Scheduling request sent?     [] Yes  [x] No    Does patient need updated?  [] Yes  [x] No

## 2024-11-11 NOTE — TELEPHONE ENCOUNTER
Name of Medication(s) Requested:  Requested Prescriptions     Pending Prescriptions Disp Refills    loratadine (CLARITIN) 10 MG tablet 90 tablet 1     Sig: Take 1 tablet by mouth daily       Medication is on current medication list Yes    Dosage and directions were verified? Yes    Quantity verified: 90 day supply     Pharmacy Verified?  Yes    Last Appointment:  10/24/2024    Future appts:  Future Appointments   Date Time Provider Department Center   11/21/2024  1:20 PM Sandy Cabrera MD [facility] NEURO Neurology -   12/2/2024  2:00 PM Noni Gamble MD [facility]   1/2/2025  2:30 PM Myra Tucker DO BDM PAIN MAR [facility]   1/16/2025  1:00 PM SEYZ ABDU US RM 3 [facility]Z [facility] Rad[facility]Car   1/22/2025  1:15 PM Jacob Flores MD BDM ENDO [facility]   1/29/2025 10:30 AM [facility]   4/15/2025  1:30 PM [facility]        (If no appt send self scheduling link. .REFILLAPPT)  Scheduling request sent?     [] Yes  [x] No    Does patient need updated?  [] Yes  [x] No

## 2024-11-12 DIAGNOSIS — M25.551 RIGHT HIP PAIN: ICD-10-CM

## 2024-11-12 DIAGNOSIS — G89.29 CHRONIC PAIN OF RIGHT KNEE: ICD-10-CM

## 2024-11-12 DIAGNOSIS — G89.4 CHRONIC PAIN SYNDROME: ICD-10-CM

## 2024-11-12 DIAGNOSIS — M25.561 CHRONIC PAIN OF RIGHT KNEE: ICD-10-CM

## 2024-11-12 DIAGNOSIS — M54.16 LUMBAR RADICULOPATHY: ICD-10-CM

## 2024-11-12 RX ORDER — PREGABALIN 100 MG/1
100 CAPSULE ORAL 3 TIMES DAILY
Qty: 270 CAPSULE | Refills: 0 | Status: SHIPPED | OUTPATIENT
Start: 2024-11-12 | End: 2025-05-11

## 2024-11-12 NOTE — TELEPHONE ENCOUNTER
Name of Medication(s) Requested:  Requested Prescriptions     Pending Prescriptions Disp Refills    pregabalin (LYRICA) 100 MG capsule 270 capsule 0     Sig: Take 1 capsule by mouth 3 times daily for 180 days. Max Daily Amount: 300 mg       Medication is on current medication list Yes    Dosage and directions were verified? Yes    Quantity verified: 90 day supply     Pharmacy Verified?  Yes    Last Appointment:  10/24/2024    Future appts:  Future Appointments   Date Time Provider Department Center   11/21/2024  1:20 PM Sandy Cabrera MD [facility] NEURO Neurology -   12/2/2024  2:00 PM Noni Gamble MD [facility]   1/2/2025  2:30 PM Myra Tucker DO BDM PAIN MAR [facility]   1/16/2025  1:00 PM SEYZ ABDU US RM 3 SEYZ [facility]BD[facility] Rad/Car   1/22/2025  1:15 PM Jacob Flores MD BDM ENDO [facility]   1/29/2025 10:30 AM Izabella العلي MD [facility] ECC DEP   4/15/2025  1:30 PM Izabella العلي MD [facility] ECC DEP        (If no appt send self scheduling link. .REFILLAPPT)  Scheduling request sent?     [] Yes  [x] No    Does patient need updated?  [] Yes  [x] No

## 2024-11-21 ENCOUNTER — OFFICE VISIT (OUTPATIENT)
Age: 74
End: 2024-11-21
Payer: MEDICARE

## 2024-11-21 VITALS
HEART RATE: 105 BPM | WEIGHT: 143 LBS | BODY MASS INDEX: 21.74 KG/M2 | DIASTOLIC BLOOD PRESSURE: 84 MMHG | SYSTOLIC BLOOD PRESSURE: 165 MMHG

## 2024-11-21 DIAGNOSIS — R55 RECURRENT SYNCOPE: ICD-10-CM

## 2024-11-21 DIAGNOSIS — G62.9 SMALL FIBER NEUROPATHY: Primary | ICD-10-CM

## 2024-11-21 DIAGNOSIS — R41.3 MEMORY LOSS: ICD-10-CM

## 2024-11-21 DIAGNOSIS — T88.7XXA MEDICATION SIDE EFFECT: ICD-10-CM

## 2024-11-21 PROCEDURE — 3077F SYST BP >= 140 MM HG: CPT | Performed by: PSYCHIATRY & NEUROLOGY

## 2024-11-21 PROCEDURE — 1123F ACP DISCUSS/DSCN MKR DOCD: CPT | Performed by: PSYCHIATRY & NEUROLOGY

## 2024-11-21 PROCEDURE — 1090F PRES/ABSN URINE INCON ASSESS: CPT | Performed by: PSYCHIATRY & NEUROLOGY

## 2024-11-21 PROCEDURE — 99214 OFFICE O/P EST MOD 30 MIN: CPT | Performed by: PSYCHIATRY & NEUROLOGY

## 2024-11-21 PROCEDURE — G8399 PT W/DXA RESULTS DOCUMENT: HCPCS | Performed by: PSYCHIATRY & NEUROLOGY

## 2024-11-21 PROCEDURE — 3079F DIAST BP 80-89 MM HG: CPT | Performed by: PSYCHIATRY & NEUROLOGY

## 2024-11-21 PROCEDURE — G8482 FLU IMMUNIZE ORDER/ADMIN: HCPCS | Performed by: PSYCHIATRY & NEUROLOGY

## 2024-11-21 PROCEDURE — 1159F MED LIST DOCD IN RCRD: CPT | Performed by: PSYCHIATRY & NEUROLOGY

## 2024-11-21 PROCEDURE — G8420 CALC BMI NORM PARAMETERS: HCPCS | Performed by: PSYCHIATRY & NEUROLOGY

## 2024-11-21 PROCEDURE — 1036F TOBACCO NON-USER: CPT | Performed by: PSYCHIATRY & NEUROLOGY

## 2024-11-21 PROCEDURE — G8427 DOCREV CUR MEDS BY ELIG CLIN: HCPCS | Performed by: PSYCHIATRY & NEUROLOGY

## 2024-11-21 PROCEDURE — 3017F COLORECTAL CA SCREEN DOC REV: CPT | Performed by: PSYCHIATRY & NEUROLOGY

## 2024-11-21 RX ORDER — MV-MIN NO.113/IRON/FOLIC ACID 4.5 MG-2
200 CAPSULE ORAL 3 TIMES DAILY
Qty: 90 CAPSULE | Refills: 5 | Status: SHIPPED | OUTPATIENT
Start: 2024-11-21

## 2024-11-21 RX ORDER — DONEPEZIL HYDROCHLORIDE 5 MG/1
5 TABLET, FILM COATED ORAL NIGHTLY
Qty: 30 TABLET | Refills: 0 | Status: SHIPPED | OUTPATIENT
Start: 2024-11-21

## 2024-11-21 RX ORDER — DONEPEZIL HYDROCHLORIDE 10 MG/1
10 TABLET, FILM COATED ORAL NIGHTLY
Qty: 30 TABLET | Refills: 3
Start: 2024-12-21

## 2024-11-21 NOTE — PROGRESS NOTES
5,000MCG ZINC 8MG COPPER 1MG, Disp: , Rfl:     Melatonin 1 MG SUBL, Place 2 mg under the tongue nightly, Disp: , Rfl:      No Known Allergies     REVIEW OF SYSTEMS    General:  Negativeor Change in Weight, Negativefor Fever   Eyes:   Negative for glaucoma, Negative for Cataracts, Negativefor Glasses  ENT:   Negative for Trouble Swallowing, Negative for Nose Bleeds, Negative for Dentures, Negative for Sinus Problems  Cardiac:  Negative for Chest Pain, Negative for Irregular Heart Beat, Negative for Heart failure, Negative for Angina, Negative for Murmur, Negative for High Blood Pressure, Negative for Pain in the legs w/exercise, Negative for Blood Clots, Negative for Leg Swelling  Respiratory:  Negative for Shortness of Breath, Negative for Cough/Wheezing, Negativefor Asthma, Negative for Other Lung Problems  Heme/Lymph:  Negative for Swollen Nodes/Glands, Negative for Bleeding Problems, Negative for Anemia  Musculoskeletal:   Negative for Joint Replacement, Negative for Broken Bones  GI:  Negative  for Gallbladder, Negative  for Blood in Stool, Negative for Diarrhea, Negative Intestinal Bleeding, Negative for Poor Appetite, Negative for Hiatal Hernia, Negative for Ulcer, Negative for Hemorrhoids, Negative for Nausea/Vomiting, Negative for Constipation  G/U:  NegativeNegative for Pain/Burning, Negative for Urinary Frequency/Urgency, Negative for Blood in Urine, Negative for Slow/Small Stream, Negative for poor Bladder Emptying, Negative for up at night to urinate, Negativesexual Dysfunction  Endo:  Negative for Cold or Heat Intolerance, Negative for Hot Flashes/Flushing, Negative for Abnormal Thirst, Negative for Change in Body Hair  Skin:   Negative for Lumps or Nodules, Negative for Breast Lumps, Negative for Rashes or Sores   Psych:  Negative for Anxiety/Depression       PHYSICAL EXAM  Nursing note and vitals  reviewed.   Constitutional: she is oriented to person, place, and time and well-developed.

## 2024-11-22 DIAGNOSIS — U07.1 COVID-19: ICD-10-CM

## 2024-11-22 NOTE — TELEPHONE ENCOUNTER
Name of Medication(s) Requested:  Requested Prescriptions     Pending Prescriptions Disp Refills    brompheniramine-pseudoephedrine-DM 2-30-10 MG/5ML syrup 118 mL 1     Sig: Take 5 mLs by mouth 4 times daily as needed for Congestion or Cough (cough / congetion)       Medication is on current medication list Yes    Dosage and directions were verified? Yes    Quantity verified: 30 day supply     Pharmacy Verified?  Yes    Last Appointment:  10/24/2024    Future appts:  Future Appointments   Date Time Provider Department Center   12/5/2024  3:15 PM Noni Gamble MD [facility] MedONC [facility]   1/2/2025  2:30 PM Myra Tuckre DO BDM PAIN MAR [facility]   1/16/2025  1:00 PM SEYZ ABDU US RM 3 SEYZ ABDU [facility] Rad/Car   1/22/2025  1:15 PM Jacob Flores MD BDM ENDO [facility]   1/29/2025 10:30 AM Izabella العلي MD [place] ECC DEP   4/15/2025  1:30 PM Izabella العلي MD [place] [facility] ECC DEP   5/5/2025  2:20 PM Sandy Cabrera MD [facility] NEURO Neurology -        (If no appt send self scheduling link. .REFILLAPPT)  Scheduling request sent?     [] Yes  [x] No    Does patient need updated?  [] Yes  [x] No

## 2024-11-25 DIAGNOSIS — U07.1 COVID-19: ICD-10-CM

## 2024-11-25 RX ORDER — BROMPHENIRAMINE MALEATE, PSEUDOEPHEDRINE HYDROCHLORIDE, AND DEXTROMETHORPHAN HYDROBROMIDE 2; 30; 10 MG/5ML; MG/5ML; MG/5ML
5 SYRUP ORAL 4 TIMES DAILY PRN
Qty: 118 ML | Refills: 1 | Status: SHIPPED
Start: 2024-11-25 | End: 2024-11-25 | Stop reason: SDUPTHER

## 2024-11-25 RX ORDER — BROMPHENIRAMINE MALEATE, PSEUDOEPHEDRINE HYDROCHLORIDE, AND DEXTROMETHORPHAN HYDROBROMIDE 2; 30; 10 MG/5ML; MG/5ML; MG/5ML
5 SYRUP ORAL 4 TIMES DAILY PRN
Qty: 118 ML | Refills: 0 | Status: SHIPPED | OUTPATIENT
Start: 2024-11-25

## 2024-11-26 DIAGNOSIS — C34.91 NON-SMALL CELL CANCER OF RIGHT LUNG (HCC): Primary | ICD-10-CM

## 2024-12-05 DIAGNOSIS — J30.2 OTHER SEASONAL ALLERGIC RHINITIS: ICD-10-CM

## 2024-12-05 DIAGNOSIS — I10 ESSENTIAL HYPERTENSION: ICD-10-CM

## 2024-12-05 DIAGNOSIS — F41.9 ANXIETY: ICD-10-CM

## 2024-12-05 RX ORDER — ESCITALOPRAM OXALATE 10 MG/1
10 TABLET ORAL DAILY
Qty: 90 TABLET | Refills: 0 | Status: SHIPPED | OUTPATIENT
Start: 2024-12-05

## 2024-12-05 RX ORDER — AMLODIPINE BESYLATE 10 MG/1
10 TABLET ORAL DAILY
Qty: 90 TABLET | Refills: 0 | Status: SHIPPED | OUTPATIENT
Start: 2024-12-05

## 2024-12-05 RX ORDER — MONTELUKAST SODIUM 10 MG/1
10 TABLET ORAL NIGHTLY
Qty: 90 TABLET | Refills: 0 | Status: SHIPPED | OUTPATIENT
Start: 2024-12-05

## 2024-12-05 RX ORDER — HYDRALAZINE HYDROCHLORIDE 50 MG/1
50 TABLET, FILM COATED ORAL EVERY 8 HOURS SCHEDULED
Qty: 270 TABLET | Refills: 0 | Status: SHIPPED | OUTPATIENT
Start: 2024-12-05

## 2024-12-05 NOTE — TELEPHONE ENCOUNTER
Name of Medication(s) Requested:  Requested Prescriptions     Pending Prescriptions Disp Refills    amLODIPine (NORVASC) 10 MG tablet 90 tablet 0     Sig: Take 1 tablet by mouth daily    escitalopram (LEXAPRO) 10 MG tablet 90 tablet 0     Sig: Take 1 tablet by mouth daily    hydrALAZINE (APRESOLINE) 50 MG tablet 270 tablet 0     Sig: Take 1 tablet by mouth every 8 hours    montelukast (SINGULAIR) 10 MG tablet 90 tablet 0     Sig: Take 1 tablet by mouth nightly take 1 tablet by mouth every evening       Medication is on current medication list Yes    Dosage and directions were verified? Yes    Quantity verified: 90 day supply     Pharmacy Verified?  Yes    Last Appointment:  10/24/2024    Future appts:  Future Appointments   Date Time Provider Department Center   12/6/2024  4:00 PM [facility] [place] [facility]   12/12/2024 11:00 AM Noni Gamble MD [facility] MedONC [facility]   1/2/2025  2:30 PM Myra Tucker DO BDM PAIN MAR [facility]   1/16/2025  1:00 PM SEYZ ABDU US RM 3 SEYZ ABDU [facility] Rad/Car   1/22/2025  1:15 PM Jacob Flores MD BDM ENDO [facility]   1/29/2025 10:30 AM Izabella العلي MD [place] [facility] DEP   4/15/2025  1:30 PM Izabella العلي MD [place] [facility] DEP   5/5/2025  2:20 PM Sandy Cabrera MD [facility] NEURO Neurology -        (If no appt send self scheduling link. .REFILLAPPT)  Scheduling request sent?     [] Yes  [x] No    Does patient need updated?  [] Yes  [x] No

## 2024-12-12 ENCOUNTER — OFFICE VISIT (OUTPATIENT)
Dept: ONCOLOGY | Age: 74
End: 2024-12-12
Payer: MEDICARE

## 2024-12-12 VITALS
WEIGHT: 161.7 LBS | SYSTOLIC BLOOD PRESSURE: 156 MMHG | HEIGHT: 68 IN | DIASTOLIC BLOOD PRESSURE: 83 MMHG | BODY MASS INDEX: 24.51 KG/M2 | OXYGEN SATURATION: 96 % | TEMPERATURE: 97 F | HEART RATE: 84 BPM

## 2024-12-12 DIAGNOSIS — C34.91 NON-SMALL CELL CANCER OF RIGHT LUNG (HCC): Primary | ICD-10-CM

## 2024-12-12 PROCEDURE — G8427 DOCREV CUR MEDS BY ELIG CLIN: HCPCS | Performed by: INTERNAL MEDICINE

## 2024-12-12 PROCEDURE — G8420 CALC BMI NORM PARAMETERS: HCPCS | Performed by: INTERNAL MEDICINE

## 2024-12-12 PROCEDURE — 1123F ACP DISCUSS/DSCN MKR DOCD: CPT | Performed by: INTERNAL MEDICINE

## 2024-12-12 PROCEDURE — 3077F SYST BP >= 140 MM HG: CPT | Performed by: INTERNAL MEDICINE

## 2024-12-12 PROCEDURE — 1160F RVW MEDS BY RX/DR IN RCRD: CPT | Performed by: INTERNAL MEDICINE

## 2024-12-12 PROCEDURE — G8482 FLU IMMUNIZE ORDER/ADMIN: HCPCS | Performed by: INTERNAL MEDICINE

## 2024-12-12 PROCEDURE — 3079F DIAST BP 80-89 MM HG: CPT | Performed by: INTERNAL MEDICINE

## 2024-12-12 PROCEDURE — 99214 OFFICE O/P EST MOD 30 MIN: CPT | Performed by: INTERNAL MEDICINE

## 2024-12-12 PROCEDURE — 1159F MED LIST DOCD IN RCRD: CPT | Performed by: INTERNAL MEDICINE

## 2024-12-12 PROCEDURE — 3017F COLORECTAL CA SCREEN DOC REV: CPT | Performed by: INTERNAL MEDICINE

## 2024-12-12 PROCEDURE — G8399 PT W/DXA RESULTS DOCUMENT: HCPCS | Performed by: INTERNAL MEDICINE

## 2024-12-12 PROCEDURE — 1036F TOBACCO NON-USER: CPT | Performed by: INTERNAL MEDICINE

## 2024-12-12 PROCEDURE — 1090F PRES/ABSN URINE INCON ASSESS: CPT | Performed by: INTERNAL MEDICINE

## 2024-12-12 PROCEDURE — 99213 OFFICE O/P EST LOW 20 MIN: CPT

## 2024-12-12 NOTE — PROGRESS NOTES
Department of [facility] Med Oncology     Attending Clinic Note    Reason for Visit: Follow-up on a patient with Hx of RML and RUL Lung Adenocarcinoma.     PCP: Izabella العلي MD    History of Present Illness:  73y/o  female, former smoker (1 pack/day for 30 years; Quit in 2008), with Hx of Stage IA RML Invasive Lung adenocarcinoma, s/p Bronchoscopy, cervical mediastinoscopy, right thoracotomy, right middle lobectomy, radical lymphadenectomy on 2/13/2008 at [facility] by Dr. John Corona. Pathology demonstrated:   1. LYMPH NODE #1, EXCISION (PART A) INFLAMED THYROID TISSUE.  2. LYMPH NODE R4, #7, R2, #3, LR, #7, R11, EXCISIONS (B-F) BENIGN REACTIVE LYMPH NODES.  3. RIGHT LUNG, MIDDLE LOBE, LOBECTOMY (PART G) ADENOCARCINOMA, MIXED TYPE WITH ACINAR AND BRONCHIOALVEOLAR CELL COMPONENTS.  -CENTRILOBULAR EMPHYSEMA.    COMMENT  Tumor Location: Right middle lobe  Tumor Size: 2.3 x 1.8 x 1.0 cm  Vascular Invasion: Absent  Lymphatic Invasion: Absent  Bronchial Margin: Negative  Vascular Margin: Negative  Parenchymal Margins: Negative  Pleura/Soft Tissue Margin: Visceral pleura is negative for neoplasm.  Pathologic Stage (AJCC): T1 N0 MX-Stage IA    No Postop RT or chemotherapy required at that time. She was put on surveillance and didn't follow with a medical oncologist;     She was then found to have RUL PET avid lesion in 2013; Flexible bronchoscopy, right redo VATS lobectomy and thoracic lymphadenectomy was performed on 11/04/2013 (at [facility]):  Tumor Location: Right upper lobe  Histologic Type: Invasive Adenocarcinoma  Tumor Size: Greatest dimension: 2 cm  Histologic Grade: G2 Moderately Differentiated  Lymph nodes: All 3 lymph nodes are negative for tumor.  Margins: Margins uninvolved by invasive carcinoma  Distance of invasive carcinoma from nearest margin: 1.9 cm  Specify margin: parenchymal resection margin  Venous/arterial invasion: Absent  Lymphatic invasion: Absent  Additional path findings: low

## 2024-12-14 ENCOUNTER — PATIENT MESSAGE (OUTPATIENT)
Dept: FAMILY MEDICINE CLINIC | Age: 74
End: 2024-12-14

## 2024-12-16 DIAGNOSIS — F41.9 ANXIETY: ICD-10-CM

## 2024-12-16 RX ORDER — ESCITALOPRAM OXALATE 10 MG/1
10 TABLET ORAL DAILY
Qty: 10 TABLET | Refills: 0 | Status: SHIPPED | OUTPATIENT
Start: 2024-12-16

## 2024-12-16 NOTE — TELEPHONE ENCOUNTER
Shaheed,  This is Antony Dotson' .   I forgot that I had to actually order the prescriptions that were sent to [facility] on 12/4.  I did that today but [facility] is completely out of Escitalopram Oxalate 10mg and it wont arrived for 5 to 10 days. I would like to know if I could get an interim script of 10 tablets filled at [facility]. for her?  Thanks!  Antony  & Nila (looking over my shoulder)    Name of Medication(s) Requested:  Requested Prescriptions     Pending Prescriptions Disp Refills    escitalopram (LEXAPRO) 10 MG tablet 10 tablet 0     Sig: Take 1 tablet by mouth daily       Medication is on current medication list Yes    Dosage and directions were verified? Yes    Quantity verified: 10 day supply     Pharmacy Verified?  Yes    Last Appointment:  10/24/2024    Future appts:  Future Appointments   Date Time Provider Department Center   1/2/2025  2:30 PM Myra Tucker DO BDM PAIN MAR [facility]   1/16/2025  1:00 PM SEY[facility] ABDU US RM 3 [facility] ABDU [facility] Rad/Car   1/22/2025  1:15 PM Jacob Flores MD BDM ENDO [facility]   1/29/2025 10:30 AM Izabella العلي MD [place] [facility] DEP   4/15/2025  1:30 PM Izabella العلي MD [place] DEP   5/5/2025  2:20 PM Sandy Cabrera MD [facility] NEURO Neurology -   12/11/2025 11:00 AM Noni Gamble MD [facility]        (If no appt send self scheduling link. .REFILLAPPT)  Scheduling request sent?     [] Yes  [x] No    Does patient need updated?  [] Yes  [x] No

## 2024-12-23 DIAGNOSIS — R05.9 COUGH, UNSPECIFIED TYPE: ICD-10-CM

## 2024-12-23 RX ORDER — DONEPEZIL HYDROCHLORIDE 10 MG/1
10 TABLET, FILM COATED ORAL NIGHTLY
Qty: 30 TABLET | Refills: 3 | Status: SHIPPED | OUTPATIENT
Start: 2024-12-23

## 2024-12-24 NOTE — TELEPHONE ENCOUNTER
Name of Medication(s) Requested:  Requested Prescriptions     Pending Prescriptions Disp Refills    benzonatate (TESSALON) 100 MG capsule 90 capsule 0     Sig: Take 1 capsule by mouth 3 times daily as needed for Cough       Medication is on current medication list Yes    Dosage and directions were verified? Yes    Quantity verified: 30 day supply     Pharmacy Verified?  Yes    Last Appointment:  10/24/2024    Future appts:  Future Appointments   Date Time Provider Department Center   1/2/2025  2:30 PM Myra Tucker DO BDM PAIN MAR [facility]   1/16/2025  1:00 PM SEYZ ABDU US RM 3 SEYZ ABDU BC SE Rad/Car   1/22/2025  1:15 PM Jacob Flores MD BDM ENDO [facility]   1/29/2025 10:30 AM [facility]   4/15/2025  1:30 PM Izabella العلي MD [place] ECC DEP   5/5/2025  2:20 PM Sandy Cabrera MD [facility] NEURO Neurology -   12/11/2025 11:00 AM Noni Gamble MD [facility]        (If no appt send self scheduling link. .REFILLAPPT)  Scheduling request sent?     [] Yes  [x] No    Does patient need updated?  [] Yes  [x] No

## 2024-12-27 RX ORDER — BENZONATATE 100 MG/1
100 CAPSULE ORAL 3 TIMES DAILY PRN
Qty: 90 CAPSULE | Refills: 0 | Status: SHIPPED | OUTPATIENT
Start: 2024-12-27

## 2025-01-02 ENCOUNTER — PREP FOR PROCEDURE (OUTPATIENT)
Dept: PAIN MANAGEMENT | Age: 75
End: 2025-01-02

## 2025-01-02 ENCOUNTER — OFFICE VISIT (OUTPATIENT)
Dept: PAIN MANAGEMENT | Age: 75
End: 2025-01-02
Payer: MEDICARE

## 2025-01-02 VITALS
RESPIRATION RATE: 18 BRPM | TEMPERATURE: 96.9 F | BODY MASS INDEX: 24.4 KG/M2 | HEART RATE: 73 BPM | HEIGHT: 68 IN | SYSTOLIC BLOOD PRESSURE: 127 MMHG | WEIGHT: 161 LBS | OXYGEN SATURATION: 97 % | DIASTOLIC BLOOD PRESSURE: 66 MMHG

## 2025-01-02 DIAGNOSIS — G89.29 CHRONIC BILATERAL LOW BACK PAIN WITH BILATERAL SCIATICA: ICD-10-CM

## 2025-01-02 DIAGNOSIS — M54.16 LUMBAR RADICULOPATHY: Primary | ICD-10-CM

## 2025-01-02 DIAGNOSIS — G89.4 CHRONIC PAIN SYNDROME: Primary | ICD-10-CM

## 2025-01-02 DIAGNOSIS — M79.10 MYALGIA: ICD-10-CM

## 2025-01-02 DIAGNOSIS — M54.41 CHRONIC BILATERAL LOW BACK PAIN WITH BILATERAL SCIATICA: ICD-10-CM

## 2025-01-02 DIAGNOSIS — M54.42 CHRONIC BILATERAL LOW BACK PAIN WITH BILATERAL SCIATICA: ICD-10-CM

## 2025-01-02 DIAGNOSIS — M54.16 LUMBAR RADICULOPATHY: ICD-10-CM

## 2025-01-02 DIAGNOSIS — M48.061 LUMBAR FORAMINAL STENOSIS: ICD-10-CM

## 2025-01-02 PROCEDURE — 99213 OFFICE O/P EST LOW 20 MIN: CPT | Performed by: PAIN MEDICINE

## 2025-01-02 RX ORDER — LIDOCAINE 50 MG/G
1 PATCH TOPICAL DAILY
Qty: 30 PATCH | Refills: 5 | Status: SHIPPED | OUTPATIENT
Start: 2025-01-02 | End: 2025-07-01

## 2025-01-02 NOTE — PATIENT INSTRUCTIONS
[email or web address]

## 2025-01-02 NOTE — PROGRESS NOTES
Nila Mcfarland presents to the [facility] on 1/2/2025. Nila is complaining of pain lower back with bilateral foot burning. The pain is persistent. The pain is described as aching and burning. Pain is rated on her best day at a 6, on her worst day at a 10, and on average at a 8 on the VAS scale. She took her last dose of Lyrica, Motrin, and Zanaflex yesterday. Uses Lidocaine patches    Any procedures since your last visit: Yes, with 75 % relief.    Pacemaker or defibrillator: No   She is  on NSAIDS and is not on anticoagulation medications   Do you want someone present when the provider examines you? no    Medication Contract and Consent for Opioid Use Documents Filed       Patient Documents       Type of Document Status Date Received Received By Description    Medication Contract Received 1/21/2020  8:16 AM AYSHA ALVARADO med contract    Medication Contract Received 8/16/2022 10:51 AM NICOLÁS ESPINO 03/29/2022  Controlled Substance Medication Agreement                    Resp 18   Ht 1.727 m (5' 8\")   Wt 73 kg (161 lb)   BMI 24.48 kg/m²      No LMP recorded. Patient has had a hysterectomy.    
ttl pk-yrs)     Types: Cigarettes     Start date: 10/5/1970     Quit date: 2007     Years since quittin.2    Smokeless tobacco: Never    Tobacco comments:     stopped smoking  2007   Vaping Use    Vaping status: Never Used   Substance and Sexual Activity    Alcohol use: Yes     Alcohol/week: 1.0 - 2.0 standard drink of alcohol     Types: 1 - 2 Glasses of wine per week     Comment: occasional glass of wine    Drug use: Never    Sexual activity: Yes     Partners: Male   Other Topics Concern    Not on file   Social History Narrative    5 children, 3 are her own     Social Determinants of Health     Financial Resource Strain: Low Risk  (2024)    Overall Financial Resource Strain (CARDIA)     Difficulty of Paying Living Expenses: Not hard at all   Food Insecurity: No Food Insecurity (2024)    Hunger Vital Sign     Worried About Running Out of Food in the Last Year: Never true     Ran Out of Food in the Last Year: Never true   Transportation Needs: Unknown (2024)    PRAPARE - Transportation     Lack of Transportation (Medical): Not on file     Lack of Transportation (Non-Medical): No   Physical Activity: Inactive (2024)    Exercise Vital Sign     Days of Exercise per Week: 0 days     Minutes of Exercise per Session: 0 min   Stress: Not on file   Social Connections: Not on file   Intimate Partner Violence: Not on file   Housing Stability: Unknown (2024)    Housing Stability Vital Sign     Unable to Pay for Housing in the Last Year: Not on file     Number of Places Lived in the Last Year: Not on file     Unstable Housing in the Last Year: No       Family History   Problem Relation Age of Onset    Arthritis Mother     Diabetes Mother     Heart Disease Mother     High Blood Pressure Mother     High Cholesterol Mother     Stroke Mother     Heart Attack Father         massiv MI    Asthma Sister     Cancer Sister 59        lung cancer    High Blood Pressure Sister     High Cholesterol

## 2025-01-16 ENCOUNTER — HOSPITAL ENCOUNTER (OUTPATIENT)
Dept: GENERAL RADIOLOGY | Age: 75
Discharge: HOME | End: 2025-01-18
Payer: MEDICARE

## 2025-01-16 DIAGNOSIS — N64.59 OTHER SIGNS AND SYMPTOMS IN BREAST: ICD-10-CM

## 2025-01-16 DIAGNOSIS — R92.333 HETEROGENEOUSLY DENSE TISSUE OF BOTH BREASTS ON MAMMOGRAPHY: ICD-10-CM

## 2025-01-16 PROCEDURE — 76641 ULTRASOUND BREAST COMPLETE: CPT

## 2025-01-22 ENCOUNTER — OFFICE VISIT (OUTPATIENT)
Dept: ENDOCRINOLOGY | Age: 75
End: 2025-01-22

## 2025-01-22 VITALS
WEIGHT: 160 LBS | TEMPERATURE: 97 F | BODY MASS INDEX: 22.9 KG/M2 | HEART RATE: 71 BPM | RESPIRATION RATE: 18 BRPM | SYSTOLIC BLOOD PRESSURE: 133 MMHG | DIASTOLIC BLOOD PRESSURE: 66 MMHG | HEIGHT: 70 IN | OXYGEN SATURATION: 99 %

## 2025-01-22 DIAGNOSIS — E03.9 HYPOTHYROIDISM, UNSPECIFIED TYPE: ICD-10-CM

## 2025-01-22 DIAGNOSIS — E04.2 NONTOXIC MULTINODULAR GOITER: Primary | ICD-10-CM

## 2025-01-22 DIAGNOSIS — E55.9 VITAMIN D DEFICIENCY: ICD-10-CM

## 2025-01-22 DIAGNOSIS — R53.82 CHRONIC FATIGUE: ICD-10-CM

## 2025-01-22 RX ORDER — LEVOTHYROXINE SODIUM 100 MCG
TABLET ORAL
Qty: 90 TABLET | Refills: 3 | Status: SHIPPED | OUTPATIENT
Start: 2025-01-22

## 2025-01-22 NOTE — PROGRESS NOTES
[facility] [employer] [facility] Department of Endocrinology Diabetes and Metabolism   [address] [place] [id number]   Phone: [phone]  Fax: [phone]    Date of Service: 1/22/2025  Primary Care Physician: Izabella العلي MD.  Provider: Jacob Flores MD    Reason for the visit:  Primary Hypothyroidism, Prediabetes vitD deficiency, chronic fatigue     History of Present Illness:  The history is provided by the patient. No  was used. Accuracy of the patient data is excellent.  Nila Mcfarland is a very pleasant 74 y.o. female with past medical h/o lunch cancer seen seen today for follow up visit   The patient was diagnosed with hypothyroidism in 2008 and was told that she has Hashimoto's thyroid disease   She is currently on Synthroid 100 mcg 1 tab 5 days a week and 0.5 tab on Saturday and Sunday. Patient takes Synthroid in the morning at empty stomach, wait one hour before eating , avoid multivitamins containing calcium  or iron with it.  Lab Results   Component Value Date/Time    TSH 1.46 07/23/2024 01:15 PM    T4FREE 1.8 (H) 07/23/2024 01:15 PM    FT3 2.6 03/02/2018 03:00 PM     The patient still c/o unexplained weight gain, fatigue, dry skin, brittle fingernails, and brittle hair  She reported positive FH of thyroid disease in her mother and two sisters   Given tiredness and autoimmune thyroid disease, I ordered AM cortisol and was 9.9   Component 9/27/2018   Cortisol 9.90     Regarding MNG   The patient was also diagnosed with thyroid nodule at the same time of hypothyroidism   Thyroid US 8/2017   The right thyroid lobe measures 3.8 x 1.4 x 1.4 cm in size.  The left thyroid lobe measures  3.3 x 1.0 x 1.1 cm in size.  The isthmus measures 0.1 cm  in thickness.  The thyroid gland is heterogeneous. A 0.6 cm small hypoechoic nodule present in the thyroid isthmus on the right. No other suspicious nodules noted.     thyroid US 6/25/2019  Rt thyroid lobe measures 4.3 cm x

## 2025-01-27 SDOH — ECONOMIC STABILITY: FOOD INSECURITY: WITHIN THE PAST 12 MONTHS, THE FOOD YOU BOUGHT JUST DIDN'T LAST AND YOU DIDN'T HAVE MONEY TO GET MORE.: NEVER TRUE

## 2025-01-27 SDOH — ECONOMIC STABILITY: FOOD INSECURITY: WITHIN THE PAST 12 MONTHS, YOU WORRIED THAT YOUR FOOD WOULD RUN OUT BEFORE YOU GOT MONEY TO BUY MORE.: NEVER TRUE

## 2025-01-27 SDOH — ECONOMIC STABILITY: INCOME INSECURITY: IN THE LAST 12 MONTHS, WAS THERE A TIME WHEN YOU WERE NOT ABLE TO PAY THE MORTGAGE OR RENT ON TIME?: NO

## 2025-01-27 ASSESSMENT — PATIENT HEALTH QUESTIONNAIRE - PHQ9
SUM OF ALL RESPONSES TO PHQ9 QUESTIONS 1 & 2: 2
1. LITTLE INTEREST OR PLEASURE IN DOING THINGS: SEVERAL DAYS
2. FEELING DOWN, DEPRESSED OR HOPELESS: SEVERAL DAYS
3. TROUBLE FALLING OR STAYING ASLEEP: SEVERAL DAYS
3. TROUBLE FALLING OR STAYING ASLEEP: SEVERAL DAYS
SUM OF ALL RESPONSES TO PHQ QUESTIONS 1-9: 5
1. LITTLE INTEREST OR PLEASURE IN DOING THINGS: SEVERAL DAYS
6. FEELING BAD ABOUT YOURSELF - OR THAT YOU ARE A FAILURE OR HAVE LET YOURSELF OR YOUR FAMILY DOWN: NOT AT ALL
9. THOUGHTS THAT YOU WOULD BE BETTER OFF DEAD, OR OF HURTING YOURSELF: NOT AT ALL
5. POOR APPETITE OR OVEREATING: NOT AT ALL
7. TROUBLE CONCENTRATING ON THINGS, SUCH AS READING THE NEWSPAPER OR WATCHING TELEVISION: NOT AT ALL
10. IF YOU CHECKED OFF ANY PROBLEMS, HOW DIFFICULT HAVE THESE PROBLEMS MADE IT FOR YOU TO DO YOUR WORK, TAKE CARE OF THINGS AT HOME, OR GET ALONG WITH OTHER PEOPLE: NOT DIFFICULT AT ALL
8. MOVING OR SPEAKING SO SLOWLY THAT OTHER PEOPLE COULD HAVE NOTICED. OR THE OPPOSITE - BEING SO FIDGETY OR RESTLESS THAT YOU HAVE BEEN MOVING AROUND A LOT MORE THAN USUAL: NOT AT ALL
9. THOUGHTS THAT YOU WOULD BE BETTER OFF DEAD, OR OF HURTING YOURSELF: NOT AT ALL
4. FEELING TIRED OR HAVING LITTLE ENERGY: MORE THAN HALF THE DAYS
8. MOVING OR SPEAKING SO SLOWLY THAT OTHER PEOPLE COULD HAVE NOTICED. OR THE OPPOSITE, BEING SO FIGETY OR RESTLESS THAT YOU HAVE BEEN MOVING AROUND A LOT MORE THAN USUAL: NOT AT ALL
SUM OF ALL RESPONSES TO PHQ QUESTIONS 1-9: 5
5. POOR APPETITE OR OVEREATING: NOT AT ALL
SUM OF ALL RESPONSES TO PHQ QUESTIONS 1-9: 5
SUM OF ALL RESPONSES TO PHQ QUESTIONS 1-9: 5
7. TROUBLE CONCENTRATING ON THINGS, SUCH AS READING THE NEWSPAPER OR WATCHING TELEVISION: NOT AT ALL
10. IF YOU CHECKED OFF ANY PROBLEMS, HOW DIFFICULT HAVE THESE PROBLEMS MADE IT FOR YOU TO DO YOUR WORK, TAKE CARE OF THINGS AT HOME, OR GET ALONG WITH OTHER PEOPLE: NOT DIFFICULT AT ALL
4. FEELING TIRED OR HAVING LITTLE ENERGY: MORE THAN HALF THE DAYS
SUM OF ALL RESPONSES TO PHQ QUESTIONS 1-9: 5
2. FEELING DOWN, DEPRESSED OR HOPELESS: SEVERAL DAYS
6. FEELING BAD ABOUT YOURSELF - OR THAT YOU ARE A FAILURE OR HAVE LET YOURSELF OR YOUR FAMILY DOWN: NOT AT ALL

## 2025-01-28 ENCOUNTER — TELEPHONE (OUTPATIENT)
Dept: PAIN MANAGEMENT | Age: 75
End: 2025-01-28

## 2025-01-28 DIAGNOSIS — M54.16 LUMBAR RADICULOPATHY: Primary | ICD-10-CM

## 2025-01-28 NOTE — TELEPHONE ENCOUNTER
Call to Nila Mcfarland that procedure was scheduled for 02/04/20025 and that [facility] should call her a few days before for the pre op call and between 2:00 PM and 4:00 PM  the business day before with the arrival time. Instructed Nila to hold ibuprofen for 24 hours, Celebrex, Mobic, and naprosyn for 4 days and any aspirin containing products, CoQ 10, or fish oil for 7 days. Instructed to call office back if any questions. Nila verbalized understanding.    Electronically signed by Elyse Pang RN on 1/28/2025 at 12:17 PM

## 2025-01-28 NOTE — TELEPHONE ENCOUNTER
Nila Mcfarland's  called in and stated that she has had a valium in the past for her injections and she wanted to have one ordered for her injection that is scheduled on 02-04-25.  Please advise.

## 2025-01-29 ENCOUNTER — OFFICE VISIT (OUTPATIENT)
Dept: FAMILY MEDICINE CLINIC | Age: 75
End: 2025-01-29
Payer: MEDICARE

## 2025-01-29 VITALS
WEIGHT: 158.8 LBS | OXYGEN SATURATION: 95 % | BODY MASS INDEX: 22.79 KG/M2 | SYSTOLIC BLOOD PRESSURE: 136 MMHG | TEMPERATURE: 97.4 F | DIASTOLIC BLOOD PRESSURE: 54 MMHG | HEART RATE: 70 BPM

## 2025-01-29 DIAGNOSIS — E03.9 HYPOTHYROIDISM, UNSPECIFIED TYPE: ICD-10-CM

## 2025-01-29 DIAGNOSIS — J30.9 ALLERGIC RHINITIS, UNSPECIFIED SEASONALITY, UNSPECIFIED TRIGGER: ICD-10-CM

## 2025-01-29 DIAGNOSIS — E55.9 VITAMIN D DEFICIENCY, UNSPECIFIED: ICD-10-CM

## 2025-01-29 DIAGNOSIS — R56.9 SEIZURE (HCC): ICD-10-CM

## 2025-01-29 DIAGNOSIS — R05.9 COUGH, UNSPECIFIED TYPE: ICD-10-CM

## 2025-01-29 DIAGNOSIS — F41.9 ANXIETY: ICD-10-CM

## 2025-01-29 DIAGNOSIS — F33.1 MODERATE EPISODE OF RECURRENT MAJOR DEPRESSIVE DISORDER (HCC): Primary | ICD-10-CM

## 2025-01-29 DIAGNOSIS — R73.9 ELEVATED BLOOD SUGAR: ICD-10-CM

## 2025-01-29 DIAGNOSIS — G95.9 CERVICAL MYELOPATHY (HCC): ICD-10-CM

## 2025-01-29 DIAGNOSIS — M25.50 POLYARTHRALGIA: ICD-10-CM

## 2025-01-29 DIAGNOSIS — J44.1 COPD EXACERBATION (HCC): ICD-10-CM

## 2025-01-29 DIAGNOSIS — C34.91 NON-SMALL CELL CANCER OF RIGHT LUNG (HCC): ICD-10-CM

## 2025-01-29 PROCEDURE — 1090F PRES/ABSN URINE INCON ASSESS: CPT | Performed by: FAMILY MEDICINE

## 2025-01-29 PROCEDURE — 1123F ACP DISCUSS/DSCN MKR DOCD: CPT | Performed by: FAMILY MEDICINE

## 2025-01-29 PROCEDURE — 3075F SYST BP GE 130 - 139MM HG: CPT | Performed by: FAMILY MEDICINE

## 2025-01-29 PROCEDURE — 1159F MED LIST DOCD IN RCRD: CPT | Performed by: FAMILY MEDICINE

## 2025-01-29 PROCEDURE — 1036F TOBACCO NON-USER: CPT | Performed by: FAMILY MEDICINE

## 2025-01-29 PROCEDURE — G8420 CALC BMI NORM PARAMETERS: HCPCS | Performed by: FAMILY MEDICINE

## 2025-01-29 PROCEDURE — 99214 OFFICE O/P EST MOD 30 MIN: CPT | Performed by: FAMILY MEDICINE

## 2025-01-29 PROCEDURE — 3017F COLORECTAL CA SCREEN DOC REV: CPT | Performed by: FAMILY MEDICINE

## 2025-01-29 PROCEDURE — G8427 DOCREV CUR MEDS BY ELIG CLIN: HCPCS | Performed by: FAMILY MEDICINE

## 2025-01-29 PROCEDURE — 3023F SPIROM DOC REV: CPT | Performed by: FAMILY MEDICINE

## 2025-01-29 PROCEDURE — 3078F DIAST BP <80 MM HG: CPT | Performed by: FAMILY MEDICINE

## 2025-01-29 PROCEDURE — G8399 PT W/DXA RESULTS DOCUMENT: HCPCS | Performed by: FAMILY MEDICINE

## 2025-01-29 RX ORDER — ESCITALOPRAM OXALATE 20 MG/1
20 TABLET ORAL DAILY
Qty: 90 TABLET | Refills: 1 | Status: SHIPPED | OUTPATIENT
Start: 2025-01-29

## 2025-01-29 RX ORDER — BENZONATATE 100 MG/1
100 CAPSULE ORAL 3 TIMES DAILY PRN
Qty: 30 CAPSULE | Refills: 0 | Status: SHIPPED | OUTPATIENT
Start: 2025-01-29

## 2025-01-29 RX ORDER — LORATADINE 10 MG/1
10 TABLET ORAL DAILY
Qty: 90 TABLET | Refills: 1 | Status: SHIPPED | OUTPATIENT
Start: 2025-01-29

## 2025-01-29 RX ORDER — METHYLPREDNISOLONE 4 MG/1
TABLET ORAL
Qty: 1 KIT | Refills: 0 | Status: SHIPPED | OUTPATIENT
Start: 2025-01-29 | End: 2025-02-04

## 2025-01-29 RX ORDER — LEVOTHYROXINE SODIUM 100 MCG
TABLET ORAL
Qty: 90 TABLET | Refills: 1 | Status: SHIPPED | OUTPATIENT
Start: 2025-01-29

## 2025-01-29 NOTE — PROGRESS NOTES
CC: Nila Mcfarland is a 74 y.o. yo female is here for evaluation evaluation for the following acute & chronic medical concerns: Hypertension (4-month follow-up), Hyperlipidemia, Hypothyroidism, and Depression        HPI:    Tired all the time; doesn't mater how much sleep; still always tired; sore / body aches; whole body ; including joints    Covid 19 9/2024 with persistent coughing since then; other symtpoms resolved; has been using bromfed and tesslaon regularly; overall dry but at times with clear sputum; no sinus symptoms    HTN - se to hctz (loc, dizzy); benicar (hyperkalemia); lopressor (stopped by cards due to possible sinus pause); she is on hydralazine 50mg q8 hours and norvasc 10mg  HLD - ASCVD 15%; hx of elevated CK on statin; currently on zetia;    Anxiety / depression  - on lexapro 10mg; follows with Jessica PRN; worsening symptoms lately; feels she would benefit from increase today  GERD - on prilosec 20mg  Prediabetes  Hashimoto's / thyroid nodule - on synthroid 100mcg 1/2 tab sunday; follows with Dr. Flores; ** states her hair is falling out lately  Hx of Lung cancer x2 s/p R upper and middle lobectomy: Follows with Dr. Hess, now Dr. Chandler Ojeda smoking 2007; Diagnosed with lung ca 2008  COPD - on anoro ellipta + albuterol PRN; she has chronic coughing and uses tessalon PRN; currently follows with Dr. Cortes - brea  Chronic pain  - Lumbar pain and hip pain; follows with PMR, Dr. Tucker,   Small fiber neuropathy/ neuropathy; follows now with Dr. Cabrera;  on lyrica now up to 100mg TID; she recommended to stop the pamelor 25mg which was being used for for sleep and pain; recommended trial of alpha lipoid acid which she has not started? She is having now worse pain and burning and her toes are curling in at times; Feet feel there are hot pokers on her fee 75% of the time ; they burn  Preventive: Tyrer Cuzick score of 5.9%; Heterogeneously dense; follows with JANETT for breast concerns    PDMP

## 2025-01-30 RX ORDER — DIAZEPAM 5 MG/1
5 TABLET ORAL ONCE
Qty: 1 TABLET | Refills: 0 | Status: SHIPPED | OUTPATIENT
Start: 2025-01-30 | End: 2025-01-30

## 2025-01-30 NOTE — PROGRESS NOTES
[facility] PAIN MANAGEMENT  INSTRUCTIONS  ...........................................................................................................................................     [x] Parking the day of Surgery is located in the Main Entrance lot.  Upon entering the door, make immediate right into the surgery reception room    [x]  Bring photo ID and insurance card     [x] You may have a light breakfast day of procedure    [x]  Wear loose comfortable clothing    [x]  Please follow instructions for medications as given per Dr's office    [x] You can expect a call the business day prior to procedure to notify you of your arrival time     [x] Please arrange for     []  Other instructions

## 2025-02-03 DIAGNOSIS — R53.82 CHRONIC FATIGUE: ICD-10-CM

## 2025-02-03 DIAGNOSIS — E03.9 HYPOTHYROIDISM, UNSPECIFIED TYPE: ICD-10-CM

## 2025-02-04 ENCOUNTER — TELEPHONE (OUTPATIENT)
Dept: ENDOCRINOLOGY | Age: 75
End: 2025-02-04

## 2025-02-04 ENCOUNTER — HOSPITAL ENCOUNTER (OUTPATIENT)
Dept: GENERAL RADIOLOGY | Age: 75
Discharge: HOME | End: 2025-02-06
Attending: PAIN MEDICINE
Payer: MEDICARE

## 2025-02-04 ENCOUNTER — HOSPITAL ENCOUNTER (OUTPATIENT)
Age: 75
Setting detail: OUTPATIENT SURGERY
Discharge: HOME | End: 2025-02-04
Attending: PAIN MEDICINE | Admitting: PAIN MEDICINE
Payer: MEDICARE

## 2025-02-04 VITALS
TEMPERATURE: 97.6 F | RESPIRATION RATE: 18 BRPM | BODY MASS INDEX: 24.55 KG/M2 | HEART RATE: 61 BPM | SYSTOLIC BLOOD PRESSURE: 134 MMHG | HEIGHT: 68 IN | DIASTOLIC BLOOD PRESSURE: 67 MMHG | WEIGHT: 162 LBS | OXYGEN SATURATION: 97 %

## 2025-02-04 DIAGNOSIS — R52 PAIN: ICD-10-CM

## 2025-02-04 LAB
GLUCOSE BLD-MCNC: 89 MG/DL (ref 74–99)
T4 FREE: 1.8 NG/DL (ref 0.9–1.7)
TSH SERPL DL<=0.05 MIU/L-ACNC: 0.27 UIU/ML (ref 0.27–4.2)
VITAMIN B-12: 478 PG/ML (ref 211–946)

## 2025-02-04 PROCEDURE — 2709999900 HC NON-CHARGEABLE SUPPLY: Performed by: PAIN MEDICINE

## 2025-02-04 PROCEDURE — 82962 GLUCOSE BLOOD TEST: CPT

## 2025-02-04 PROCEDURE — 6360000004 HC RX CONTRAST MEDICATION: Performed by: PAIN MEDICINE

## 2025-02-04 PROCEDURE — 6360000002 HC RX W HCPCS: Performed by: PAIN MEDICINE

## 2025-02-04 PROCEDURE — 64483 NJX AA&/STRD TFRM EPI L/S 1: CPT | Performed by: PAIN MEDICINE

## 2025-02-04 PROCEDURE — 7100000011 HC PHASE II RECOVERY - ADDTL 15 MIN: Performed by: PAIN MEDICINE

## 2025-02-04 PROCEDURE — 3600000002 HC SURGERY LEVEL 2 BASE: Performed by: PAIN MEDICINE

## 2025-02-04 PROCEDURE — 7100000010 HC PHASE II RECOVERY - FIRST 15 MIN: Performed by: PAIN MEDICINE

## 2025-02-04 RX ORDER — METHYLPREDNISOLONE ACETATE 40 MG/ML
INJECTION, SUSPENSION INTRA-ARTICULAR; INTRALESIONAL; INTRAMUSCULAR; SOFT TISSUE PRN
Status: DISCONTINUED | OUTPATIENT
Start: 2025-02-04 | End: 2025-02-04 | Stop reason: ALTCHOICE

## 2025-02-04 RX ORDER — LIDOCAINE HYDROCHLORIDE 5 MG/ML
INJECTION, SOLUTION INFILTRATION; INTRAVENOUS PRN
Status: DISCONTINUED | OUTPATIENT
Start: 2025-02-04 | End: 2025-02-04 | Stop reason: ALTCHOICE

## 2025-02-04 RX ORDER — IOPAMIDOL 612 MG/ML
INJECTION, SOLUTION INTRATHECAL PRN
Status: DISCONTINUED | OUTPATIENT
Start: 2025-02-04 | End: 2025-02-04 | Stop reason: ALTCHOICE

## 2025-02-04 ASSESSMENT — PAIN - FUNCTIONAL ASSESSMENT
PAIN_FUNCTIONAL_ASSESSMENT: 0-10
PAIN_FUNCTIONAL_ASSESSMENT: ACTIVITIES ARE NOT PREVENTED
PAIN_FUNCTIONAL_ASSESSMENT: NONE - DENIES PAIN

## 2025-02-04 NOTE — OP NOTE
lateral fluoroscopic view was then used to place the needle tip in the middle to anterior aspect of the foramen. Negative aspiration was confirmed for blood and CSF and 1 cc of Isovue-M 300 contrast was injected at each level under live AP fluoroscopy. Appropriate neurograms were observed under live AP fluoroscopy. Then after negative aspiration, a solution of the 2 cc of 0.5% lidocaine and 40 mg DepoMedrol was easily injected between each level. The needles were removed with constant aspiration technique. The patient's back was cleaned and a bandage was placed over the needle insertion points    Disposition the patient tolerated the procedure well and there were no complications . Vital signs remained stable throughout the procedure. The patient was escorted to the recovery area where they remained until discharge and written discharge instructions for the procedure were given.    Plan: Nila will return to our pain management center as scheduled.     Myra Tucker DO

## 2025-02-04 NOTE — TELEPHONE ENCOUNTER
Notify patient  Your thyroid hormones are slightly above goal.  High thyroid hormone can increase risk of cardiac arrhythmia especially A-fib.  This confirm she is currently on Synthroid 100 mcg 1 tablet 6 days a week and half tablet on Sunday.  If so, I want her to slightly adjust the dose to 1 tablet 6 days a week and skip Sundays.

## 2025-02-04 NOTE — H&P
sulfate HFA (PROVENTIL HFA) 108 (90 Base) MCG/ACT inhaler Inhale 2 puffs into the lungs every 6 hours as needed for Wheezing 22   Trista Cortes MD   lidocaine (LIDODERM) 5 % apply 3 patches to the affected area daily. Leave on for 12 hours and then off for 12 hours. 21   Izabella العلي MD   Coenzyme Q10 (CO Q 10 PO) Take by mouth daily  Patient not taking: Reported on 2025    Raj Chaney MD   Biotin w/ Vitamins C & E (HAIR/SKIN/NAILS PO) Take by mouth daily VIT C 90MG  VIT E 5,000MCG  ZINC 8MG  COPPER 1MG    Raj Chaney MD   Melatonin 1 MG SUBL Place 2 mg under the tongue nightly    Raj Chaney MD       No Known Allergies    Social History     Socioeconomic History    Marital status:      Spouse name: Not on file    Number of children: 3    Years of education: Not on file    Highest education level: Not on file   Occupational History    Occupation: retired- owned a company /sales   Tobacco Use    Smoking status: Former     Current packs/day: 0.00     Average packs/day: 1 pack/day for 37.0 years (37.0 ttl pk-yrs)     Types: Cigarettes     Start date: 10/5/1970     Quit date: 2007     Years since quittin.3    Smokeless tobacco: Never    Tobacco comments:     stopped smoking  2007   Vaping Use    Vaping status: Never Used   Substance and Sexual Activity    Alcohol use: Yes     Alcohol/week: 1.0 - 2.0 standard drink of alcohol     Types: 1 - 2 Glasses of wine per week     Comment: not currently    Drug use: Never    Sexual activity: Not on file   Other Topics Concern    Not on file   Social History Narrative    5 children, 3 are her own     Social Determinants of Health     Financial Resource Strain: Low Risk  (2024)    Overall Financial Resource Strain (CARDIA)     Difficulty of Paying Living Expenses: Not hard at all   Food Insecurity: No Food Insecurity (2025)    Hunger Vital Sign     Worried About Running Out of Food in the Last

## 2025-02-04 NOTE — DISCHARGE INSTRUCTIONS
[facility] Pain Management Department  [place] Fzluzz-[phone]  Dr. Avery Tucker   Post-Pain Block/Radiofrequency  Home Going Instructions    1-Go home, rest for the remainder of the day  2-Please do not lift over 20 pounds the day of the injection  3-If you received sedation No: alcohol, driving, operating lawn mowers, plows, tractors or other dangerous equipment until next morning. Do not make important decisions or sign legal documents for 24 hours. You may experience light headedness, dizziness, nausea or sleepiness after sedation. Do not stay alone. A responsible adult must be with you for 24 hours. You could be nauseated from the medications you have received. Your IV site may be sore and bruised.    4-No dietary restrictions     5-Resume all medications the same day, blood thinners to be resumed 24 hours after injection if you were instructed to stop any.    6-Keep the surgical site clean and dry, you may shower the next morning and remove the      dressing.     7- No sitz baths, tub baths or hot tubs/swimming for 24 hours.       8- If you have any pain at the injection site(s), application of an ice pack to the area should be       helpful, 20 minutes on/20 minutes off for next 48 hours.  9- Call [facility] Pain Management immediately at if you develop.  Fever greater than 100.4 F  Have bleeding or drainage from the puncture site  Have progressive Leg/arm numbness and or weakness  Loss of control of bowel and or bladder (wet/soil yourself)  Severe headache with inability to lift head  10-You may return to work the next day

## 2025-02-05 DIAGNOSIS — R05.9 COUGH, UNSPECIFIED TYPE: ICD-10-CM

## 2025-02-05 NOTE — TELEPHONE ENCOUNTER
Name of Medication(s) Requested:  Requested Prescriptions     Pending Prescriptions Disp Refills    benzonatate (TESSALON) 100 MG capsule 30 capsule 0     Sig: Take 1 capsule by mouth 3 times daily as needed for Cough       Medication is on current medication list Yes    Dosage and directions were verified? Yes    Quantity verified: 30 day supply     Pharmacy Verified?  Yes    Last Appointment:  1/29/2025    Future appts:  Future Appointments   Date Time Provider Department Center   4/4/2025  1:45 PM Myra Tucker DO BDM PAIN MAR [facility]   4/15/2025  1:30 PM [facility]   5/5/2025  2:20 PM Sandy Cabrera MD [facility] NEURO Neurology -   12/11/2025 11:00 AM Non[facility]ble [facility]   1/22/2026 11:30 AM Jacob Flores MD [facility] ENDO [facility]        (If no appt send self scheduling link. .REFILLAPPT)  Scheduling request sent?     [] Yes  [x] No    Does patient need updated?  [] Yes  [x] No

## 2025-02-06 RX ORDER — BENZONATATE 100 MG/1
100 CAPSULE ORAL 3 TIMES DAILY PRN
Qty: 90 CAPSULE | Refills: 0 | Status: SHIPPED | OUTPATIENT
Start: 2025-02-06

## 2025-02-06 NOTE — TELEPHONE ENCOUNTER
I can send this additional refill over for her recent cough.   However, I will need pt to f/u with pulmonary if she is to continue to receive this as this should not be a long term medication.  She may need additional lung evaluation for chronic cough which is not controlled.   Again, I cannot continue to fill this as a long term medication.

## 2025-02-15 ENCOUNTER — PATIENT MESSAGE (OUTPATIENT)
Dept: FAMILY MEDICINE CLINIC | Age: 75
End: 2025-02-15

## 2025-02-19 DIAGNOSIS — J11.1 INFLUENZA: Primary | ICD-10-CM

## 2025-02-19 DIAGNOSIS — Z23 NEEDS FLU SHOT: ICD-10-CM

## 2025-02-19 NOTE — PATIENT INSTRUCTIONS
good.  If the skin around your nose and lips becomes sore, put some petroleum jelly (such as Vaseline) on the area.  To ease coughing:  Suck on cough drops or plain, hard candy.  Try an over-the-counter cough or cold medicine. Read and follow all instructions on the label.  Raise your head at night with an extra pillow. This may help you rest if coughing keeps you awake.  To avoid spreading the flu  Wash your hands regularly, and keep your hands away from your face.  Stay home from school, work, and other public places until you are feeling better and your fever has been gone for at least 24 hours. The fever needs to have gone away on its own without the help of medicine.  Ask people living with you to talk to their doctors about preventing the flu. They may get antiviral medicine to keep from getting the flu from you.  To prevent the flu in the future, get the flu vaccine every fall. Encourage people living with you to get the vaccine.  Cover your mouth when you cough or sneeze. If you can, cough or sneeze into the bend of your elbow, not your hands.  When should you call for help?   Call 911 anytime you think you may need emergency care. For example, call if:    You have severe trouble breathing.     You have a seizure.   Call your doctor now or seek immediate medical care if:    You have trouble breathing.     You have a fever with a stiff neck or a severe headache.     You have pain or pressure in your chest or belly.     You have a fever or cough that returns after getting better.     You feel very sleepy, dizzy, or confused.     You are not urinating.     You have severe muscle pain.     You have severe weakness, or you are unsteady.     You have medical conditions that are getting worse.   Watch closely for changes in your health, and be sure to contact your doctor if:    You do not get better as expected.     You are having a problem with your medicine.   Where can you learn more?  Go to

## 2025-02-27 ENCOUNTER — OFFICE VISIT (OUTPATIENT)
Age: 75
End: 2025-02-27

## 2025-02-27 VITALS
DIASTOLIC BLOOD PRESSURE: 72 MMHG | HEART RATE: 66 BPM | SYSTOLIC BLOOD PRESSURE: 128 MMHG | RESPIRATION RATE: 17 BRPM | TEMPERATURE: 97.6 F | WEIGHT: 159.8 LBS | OXYGEN SATURATION: 98 % | BODY MASS INDEX: 24.3 KG/M2

## 2025-02-27 DIAGNOSIS — L03.032 PARONYCHIA OF GREAT TOE OF LEFT FOOT: Primary | ICD-10-CM

## 2025-02-27 RX ORDER — CEPHALEXIN 500 MG/1
500 CAPSULE ORAL 3 TIMES DAILY
Qty: 21 CAPSULE | Refills: 0 | Status: SHIPPED | OUTPATIENT
Start: 2025-02-27 | End: 2025-03-06

## 2025-02-27 NOTE — PROGRESS NOTES
Toe Pain (Left toe nail pain /Painful, red, white pocket/Swollen /Nail lifting/(-) trimming /(-) Drainage/Noticed x1.5 weeks ago )    History of Present Illness   Source of history provided by:  patient.      Nila Mcfarland is a 74 y.o. old female who has a past medical history of:   Past Medical History:   Diagnosis Date    Arthritis     Symptoms respond to myofascial release. Not surgical candidate    Asthma     Cancer (HCC)     lung cancer- RML- 2008, stage 1A, RUL-2013 Stage 1A 2013    Chronic back pain     COPD (chronic obstructive pulmonary disease) (HCC)     Emphysema of lung (HCC)     GERD (gastroesophageal reflux disease)     Hashimoto's disease 2008    History of blood transfusion     Hyperlipidemia     Hypertension     Hyperthyroidism     Neuropathy     feet    Seizure (HCC) 10/09/2020    Sleep difficulties     Presents to the walk in clinic for evaluation of redness to the skin around left great toe, which began 1.5 weeks ago. Denies recent nail trimming or trauma to the area. Reports the area is warm to touch, moderately painful, and swollen. Can see pus under the skin but no active drainage. Denies lymphangitic streaking. Denies any bleeding. Since onset the symptoms have progressed. Denies any fever, chills, HA, recent illness, myalgias, nausea, vomiting, or lethargy.       ROS    Unless otherwise stated in this report or unable to obtain because of the patient's clinical or mental status as evidenced by the medical record, this patients's positive and negative responses for Review of Systems, constitutional, psych, eyes, ENT, cardiovascular, respiratory, gastrointestinal, neurological, genitourinary, musculoskeletal, integument systems and systems related to the presenting problem are either stated in the preceding or were not pertinent or were negative for the symptoms and/or complaints related to the medical problem.    Past Surgical History:  has a past surgical history that includes

## 2025-03-03 DIAGNOSIS — M25.561 CHRONIC PAIN OF RIGHT KNEE: ICD-10-CM

## 2025-03-03 DIAGNOSIS — G89.4 CHRONIC PAIN SYNDROME: ICD-10-CM

## 2025-03-03 DIAGNOSIS — M54.16 LUMBAR RADICULOPATHY: ICD-10-CM

## 2025-03-03 DIAGNOSIS — M25.551 RIGHT HIP PAIN: ICD-10-CM

## 2025-03-03 DIAGNOSIS — G89.29 CHRONIC PAIN OF RIGHT KNEE: ICD-10-CM

## 2025-03-03 RX ORDER — PREGABALIN 100 MG/1
100 CAPSULE ORAL 3 TIMES DAILY
Qty: 270 CAPSULE | Refills: 0 | Status: SHIPPED | OUTPATIENT
Start: 2025-03-03 | End: 2025-08-30

## 2025-03-03 NOTE — TELEPHONE ENCOUNTER
Name of Medication(s) Requested:  Requested Prescriptions     Pending Prescriptions Disp Refills    pregabalin (LYRICA) 100 MG capsule 270 capsule 0     Sig: Take 1 capsule by mouth 3 times daily for 180 days. Max Daily Amount: 300 mg       Medication is on current medication list Yes    Dosage and directions were verified? Yes    Quantity verified: 90 day supply     Pharmacy Verified?  Yes    Last Appointment:  1/29/2025    Future appts:  Future Appointments   Date Time Provider Department Center   4/4/2025  1:45 PM Myra Tucker DO BDM PAIN MAR [facility]   4/15/2025  1:30 PM Izabella العلي MD [facility] ECC [facility]   5/5/2025  2:20 PM Sandy Cabrera MD [facility] NEURO Neurology -   12/11/2025 11:00 AM [facility]   1/22/2026 11:30 AM Jacob Flores MD [facility] ENDO [facility]        (If no appt send self scheduling link. .REFILLAPPT)  Scheduling request sent?     [] Yes  [x] No    Does patient need updated?  [] Yes  [x] No

## 2025-03-17 DIAGNOSIS — I10 ESSENTIAL HYPERTENSION: ICD-10-CM

## 2025-03-17 DIAGNOSIS — J30.2 OTHER SEASONAL ALLERGIC RHINITIS: ICD-10-CM

## 2025-03-17 NOTE — TELEPHONE ENCOUNTER
Name of Medication(s) Requested:  Requested Prescriptions     Pending Prescriptions Disp Refills    amLODIPine (NORVASC) 10 MG tablet 90 tablet 1     Sig: Take 1 tablet by mouth daily    hydrALAZINE (APRESOLINE) 50 MG tablet 270 tablet 1     Sig: Take 1 tablet by mouth every 8 hours    montelukast (SINGULAIR) 10 MG tablet 90 tablet 1     Sig: Take 1 tablet by mouth nightly take 1 tablet by mouth every evening       Medication is on current medication list Yes    Dosage and directions were verified? Yes    Quantity verified: 90 day supply     Pharmacy Verified?  Yes    Last Appointment:  1/29/2025    Future appts:  Future Appointments   Date Time Provider Department Center   3/19/2025 10:15 AM Izabella العلي MD [facility] DEP   4/4/2025  1:45 PM Myra Tucker DO BD PAIN MAR [facility]   4/15/2025  1:30 PM [facility]   5/5/2025  2:20 PM Sandy Cabrera MD [facility] NEURO Neurology -   12/11/2025 11:00 AM Noni Gamble MD [facility]   1/22/2026 11:30 AM Jacob Flores MD BD ENDO [facility]        (If no appt send self scheduling link. .REFILLAPPT)  Scheduling request sent?     [] Yes  [x] No    Does patient need updated?  [] Yes  [x] No

## 2025-03-18 RX ORDER — AMLODIPINE BESYLATE 10 MG/1
10 TABLET ORAL DAILY
Qty: 90 TABLET | Refills: 1 | Status: SHIPPED | OUTPATIENT
Start: 2025-03-18

## 2025-03-18 RX ORDER — MONTELUKAST SODIUM 10 MG/1
10 TABLET ORAL NIGHTLY
Qty: 90 TABLET | Refills: 1 | Status: SHIPPED | OUTPATIENT
Start: 2025-03-18

## 2025-03-18 RX ORDER — HYDRALAZINE HYDROCHLORIDE 50 MG/1
50 TABLET, FILM COATED ORAL EVERY 8 HOURS SCHEDULED
Qty: 270 TABLET | Refills: 1 | Status: SHIPPED | OUTPATIENT
Start: 2025-03-18

## 2025-03-19 ENCOUNTER — OFFICE VISIT (OUTPATIENT)
Dept: FAMILY MEDICINE CLINIC | Age: 75
End: 2025-03-19
Payer: MEDICARE

## 2025-03-19 VITALS
TEMPERATURE: 98.1 F | BODY MASS INDEX: 24.19 KG/M2 | RESPIRATION RATE: 16 BRPM | DIASTOLIC BLOOD PRESSURE: 78 MMHG | HEIGHT: 68 IN | OXYGEN SATURATION: 97 % | HEART RATE: 70 BPM | WEIGHT: 159.6 LBS | SYSTOLIC BLOOD PRESSURE: 116 MMHG

## 2025-03-19 DIAGNOSIS — B35.1 ONYCHOMYCOSIS: ICD-10-CM

## 2025-03-19 DIAGNOSIS — Z79.899 ENCOUNTER FOR LONG-TERM (CURRENT) DRUG USE: Primary | ICD-10-CM

## 2025-03-19 DIAGNOSIS — M79.675 PAIN OF TOE OF LEFT FOOT: ICD-10-CM

## 2025-03-19 PROCEDURE — 90653 IIV ADJUVANT VACCINE IM: CPT | Performed by: FAMILY MEDICINE

## 2025-03-19 PROCEDURE — G0008 ADMIN INFLUENZA VIRUS VAC: HCPCS | Performed by: FAMILY MEDICINE

## 2025-03-19 NOTE — PROGRESS NOTES
CC: Nila Mcfarland is a 74 y.o. yo female is here for evaluation evaluation for the following acute & chronic medical concerns: Nail Problem (Painful and feels like it  from nail bed and she has been soaking and using otc medication and not helping , denies any swelling  ongoing about a week or so  )        HPI:    Nail issue on the  L foot; concern for toenail fungus; it started as some skin breakdown and then discoloration of the nail; she has been soaking it and now the nail is coming off of the bed; she is concerned this is moving to the other toes as well      Vitals:   /78 (BP Site: Right Upper Arm)   Pulse 70   Temp 98.1 °F (36.7 °C)   Resp 16   Ht 1.727 m (5' 8\")   Wt 72.4 kg (159 lb 9.6 oz)   SpO2 97%   BMI 24.27 kg/m²   Wt Readings from Last 3 Encounters:   03/19/25 72.4 kg (159 lb 9.6 oz)   02/27/25 72.5 kg (159 lb 12.8 oz)   01/30/25 73.5 kg (162 lb)       PE:  Constitutional - alert, well appearing, and in no distress  Eyes - extraocular eye movements intact, left eye normal, right eye normal, no conjunctivitis noted  Ears: **R ear cerumen impaction; L ear slight bulge**  Neck - symmetric, no obvious masses noted  Respiratory- clear to auscultation, no wheezes, rales or rhonchi, symmetric air entry; no increased work of breathing  Cardiovascular - normal rate, regular rhythm, normal S1, S2, no murmurs, rubs, clicks or gallops  Extremities - no edema noted  Abdomen - soft, nontender, nondistended; umbilical hernia  Skin - L hallux with discolored nail and lateral nail is not attached to nailbed; there is some surrounding skin peeling; no induration or ttp; other toes with some erythematous changes at the tips of the nails      A / P:     Diagnosis Orders   1. Encounter for long-term (current) drug use  PAIN MANAGEMENT PROFILE 1 W/ CONFIRMATION, URINE      2. Onychomycosis  Jhon Haque DPM, Podiatry, [place]    Ciclopirox 8 % KIT      3. Pain of toe of left foot

## 2025-03-27 ENCOUNTER — OFFICE VISIT (OUTPATIENT)
Age: 75
End: 2025-03-27

## 2025-03-27 VITALS
RESPIRATION RATE: 17 BRPM | OXYGEN SATURATION: 96 % | DIASTOLIC BLOOD PRESSURE: 68 MMHG | SYSTOLIC BLOOD PRESSURE: 114 MMHG | WEIGHT: 157.2 LBS | TEMPERATURE: 97.8 F | HEART RATE: 70 BPM | BODY MASS INDEX: 23.9 KG/M2

## 2025-03-27 DIAGNOSIS — J06.9 UPPER RESPIRATORY TRACT INFECTION, UNSPECIFIED TYPE: Primary | ICD-10-CM

## 2025-03-27 LAB
INFLUENZA A ANTIGEN, POC: NEGATIVE
INFLUENZA B ANTIGEN, POC: NEGATIVE
LOT EXPIRE DATE: NORMAL
LOT KIT NUMBER: NORMAL
SARS-COV-2, POC: NORMAL
VALID INTERNAL CONTROL: NORMAL
VENDOR AND KIT NAME POC: NORMAL

## 2025-03-27 RX ORDER — AMOXICILLIN 500 MG/1
500 CAPSULE ORAL 2 TIMES DAILY
Qty: 20 CAPSULE | Refills: 0 | Status: SHIPPED | OUTPATIENT
Start: 2025-03-27 | End: 2025-04-06

## 2025-03-27 RX ORDER — ERYTHROMYCIN 5 MG/G
OINTMENT OPHTHALMIC
COMMUNITY
Start: 2025-03-26

## 2025-03-27 RX ORDER — BENZONATATE 100 MG/1
100 CAPSULE ORAL 3 TIMES DAILY PRN
Qty: 30 CAPSULE | Refills: 0 | Status: SHIPPED | OUTPATIENT
Start: 2025-03-27 | End: 2025-04-06

## 2025-03-27 NOTE — PROGRESS NOTES
Chief Complaint   Congestion (Nasal congestion /Cough: productive yellow/green /Sore throat /Right ear fullness /(-) Fever /(-) N&V/(-) HA /(-) Diarrhea /Started x2 days ago )    History of Present Illness   Source of history provided by: patient.    Nila Mcfarland is a 74 y.o. old female who has a past medical history of:   Past Medical History:   Diagnosis Date    Arthritis     Symptoms respond to myofascial release. Not surgical candidate    Asthma     Cancer (HCC)     lung cancer- RML- 2008, stage 1A, RUL-2013 Stage 1A 2013    Chronic back pain     COPD (chronic obstructive pulmonary disease) (HCC)     Emphysema of lung (HCC)     GERD (gastroesophageal reflux disease)     Hashimoto's disease 2008    History of blood transfusion     Hyperlipidemia     Hypertension     Hyperthyroidism     Neuropathy     feet    Seizure ([facility]) 10/09/2020    Sleep difficulties     Presents to the walk in clinic for evaluation of congestion, productive cough, headache, sore throat, and right ear fullness x 2 days. Was at eye doctor yesterday and diagnosed with eye infection. On eye drops. Denies any CP, dyspnea, LE edema, abdominal pain, vomiting, rash, or lethargy. Denies fever or chills. Has been taking Robitussin and Tylenol without symptomatic relief. History of asthma. Reports past hx of tobacco use.     ROS   Pertinent positives and negatives are stated within HPI, all other systems reviewed and are negative.    Surgical History:  has a past surgical history that includes Appendectomy (1981); Hysterectomy (1981); Lung cancer surgery (Right); Colonoscopy (06/23/2015); Colonoscopy (06/23/2015); US BREAST BIOPSY W LOC DEVICE 1ST LESION RIGHT (10/19/2020); Cataract removal with implant (Bilateral); hernia repair (N/A, 07/14/2021); Abdomen surgery; Endoscopy, colon, diagnostic; eye surgery; Pain management procedure (Left, 12/28/2023); Pain management procedure (Bilateral, 03/28/2024); Pain management procedure (Bilateral,

## 2025-03-28 ENCOUNTER — TELEPHONE (OUTPATIENT)
Dept: FAMILY MEDICINE CLINIC | Age: 75
End: 2025-03-28

## 2025-03-28 NOTE — TELEPHONE ENCOUNTER
Ciclopirox Denied    Recent Activity  PA Denied  3/28/2025 (about 2 hours ago)  Please see attachments from the plan: Attachment  Note from Payer: Denied. The National Drug Code (NDC) of the drug you are asking for cannot be covered by your Part D because it is listed as an unapproved drug on the Food and Drug Administration's NDC Structured Product Labeling (SPL) Data Elements file (or NSDE file). Therefore, it cannot be paid by your Medicare Part D. The NSDE file can be located on the FDA website (http://www.fda.gov/ForIndustry/DataStandards/StructuredProductLabeling/yxq139751.htm). This decision does not address the medical necessity or appropriateness of the drug you are asking for. It only means that it cannot be paid by your Part D.

## 2025-04-01 ENCOUNTER — OFFICE VISIT (OUTPATIENT)
Dept: FAMILY MEDICINE CLINIC | Age: 75
End: 2025-04-01

## 2025-04-01 VITALS
RESPIRATION RATE: 18 BRPM | BODY MASS INDEX: 23.92 KG/M2 | OXYGEN SATURATION: 96 % | HEART RATE: 71 BPM | HEIGHT: 68 IN | SYSTOLIC BLOOD PRESSURE: 122 MMHG | DIASTOLIC BLOOD PRESSURE: 72 MMHG | TEMPERATURE: 98.2 F | WEIGHT: 157.8 LBS

## 2025-04-01 DIAGNOSIS — J40 SINOBRONCHITIS: Primary | ICD-10-CM

## 2025-04-01 DIAGNOSIS — J32.9 SINOBRONCHITIS: Primary | ICD-10-CM

## 2025-04-01 DIAGNOSIS — R09.81 CONGESTION OF NASAL SINUS: ICD-10-CM

## 2025-04-01 LAB
INFLUENZA A ANTIGEN, POC: NORMAL
INFLUENZA B ANTIGEN, POC: NORMAL
Lab: NORMAL
PERFORMING INSTRUMENT: NORMAL
QC PASS/FAIL: NORMAL
SARS-COV-2, POC: NORMAL

## 2025-04-01 RX ORDER — AZITHROMYCIN 250 MG/1
TABLET, FILM COATED ORAL
Qty: 6 TABLET | Refills: 0 | Status: SHIPPED | OUTPATIENT
Start: 2025-04-01 | End: 2025-04-11

## 2025-04-01 NOTE — PROGRESS NOTES
CC: Nila Mcfarland is a 74 y.o. yo female is here for evaluation evaluation for the following acute & chronic medical concerns: Eye Problem (infection), Cold Symptoms, Congestion, and Eye Drainage        HPI:    Acute illness  Not getting better as hoped; symptoms started  one week ago started to have sore , hoarse voice; sore throat; brain fog; achiness; not much coughing but little; sometimes will cough up yellow , green sputum; she has been taking robutussion and sudaphed; sinuses are stuffy; no fever, did take the amoxil and the steroid which was prescribed by MP 3/27/25 with slight improvement with that; mostly symptoms include all over hurting pain; achiness; eye drainage but she does feel she is improving slightly      Vitals:   /72 (BP Site: Right Upper Arm, Patient Position: Sitting)   Pulse 71   Temp 98.2 °F (36.8 °C)   Resp 18   Ht 1.727 m (5' 8\")   Wt 71.6 kg (157 lb 12.8 oz)   SpO2 96%   BMI 23.99 kg/m²   Wt Readings from Last 3 Encounters:   04/01/25 71.6 kg (157 lb 12.8 oz)   03/27/25 71.3 kg (157 lb 3.2 oz)   03/19/25 72.4 kg (159 lb 9.6 oz)       PE:  Constitutional - alert, well appearing, and in no distress  Eyes - extraocular eye movements intact, left eye normal, right eye normal, no conjunctivitis noted  Neck - symmetric, no obvious masses noted  Throat: wnl **  Respiratory- clear to auscultation, no wheezes, rales or rhonchi, symmetric air entry; no increased work of breathing  Cardiovascular - normal rate, regular rhythm, normal S1, S2, no murmurs, rubs, clicks or gallops  Extremities - no edema noted  Abdomen - soft, nontender, nondistended; umbilical hernia  Skin - warm and dry      A / P:     Diagnosis Orders   1. Sinobronchitis  azithromycin (ZITHROMAX) 250 MG tablet      2. Congestion of nasal sinus  POCT Influenza A/B Antigen    POCT COVID-19, Antigen        Add zpack to cover for atypicals  Continue with fluids and rest  Call for new or worsening symptoms      RTO:

## 2025-04-08 ENCOUNTER — OFFICE VISIT (OUTPATIENT)
Dept: PODIATRY | Age: 75
End: 2025-04-08

## 2025-04-08 VITALS — HEIGHT: 68 IN | BODY MASS INDEX: 23.79 KG/M2 | WEIGHT: 157 LBS

## 2025-04-08 DIAGNOSIS — B35.1 ONYCHOMYCOSIS: Primary | ICD-10-CM

## 2025-04-08 DIAGNOSIS — B35.3 TINEA PEDIS OF BOTH FEET: ICD-10-CM

## 2025-04-08 RX ORDER — CICLOPIROX 80 MG/ML
SOLUTION TOPICAL
Qty: 6 ML | Refills: 2 | Status: SHIPPED | OUTPATIENT
Start: 2025-04-08

## 2025-04-08 RX ORDER — NYSTATIN AND TRIAMCINOLONE ACETONIDE 100000; 1 [USP'U]/G; MG/G
CREAM TOPICAL
Qty: 60 G | Refills: 3 | Status: SHIPPED | OUTPATIENT
Start: 2025-04-08

## 2025-04-08 NOTE — PROGRESS NOTES
Patient is in today for evaluation of left great toenail. Patient says the toenail seems to be lifting up. Pcp is Izabella العلي MD  Last ov 4/1/25

## 2025-04-08 NOTE — PROGRESS NOTES
25     Nila Mcfarland    : 1950 Sex: female   Age: 74 y.o.    Patient was referred by: None  Patient's PCP/Provider is:  Izabella العلي MD    Subjective:    Patient seen today for evaluation regarding left great toe dystrophy    Chief Complaint   Patient presents with    Nail Problem     Left great toenail        HPI: Patient stated left great toe has been causing discomfort for several months.  Patient also complaining of some chronic dryness and scaliness to the plantar forefoot and digital regions.  Patient has not tried any OTC treatments to this point in time.  No other additional abnormalities noted.    ROS:  Const: Positives and pertinent negatives as per HPI.     Musculo: Denies symptoms other than stated above.  Neuro: Denies symptoms other than stated above.  Skin: Denies symptoms other than stated above.    Current Medications:    Current Outpatient Medications:     azithromycin (ZITHROMAX) 250 MG tablet, 500mg on day 1 followed by 250mg on days 2 - 5, Disp: 6 tablet, Rfl: 0    erythromycin (ROMYCIN) 5 MG/GM ophthalmic ointment, , Disp: , Rfl:     Ciclopirox 8 % KIT, Apply once daily to affected nail and 5mm of surrounding skin and under nail plate if possible for 48 weeks, Disp: 34.6 mL, Rfl: 3    amLODIPine (NORVASC) 10 MG tablet, Take 1 tablet by mouth daily, Disp: 90 tablet, Rfl: 1    hydrALAZINE (APRESOLINE) 50 MG tablet, Take 1 tablet by mouth every 8 hours, Disp: 270 tablet, Rfl: 1    montelukast (SINGULAIR) 10 MG tablet, Take 1 tablet by mouth nightly take 1 tablet by mouth every evening, Disp: 90 tablet, Rfl: 1    pregabalin (LYRICA) 100 MG capsule, Take 1 capsule by mouth 3 times daily for 180 days. Max Daily Amount: 300 mg, Disp: 270 capsule, Rfl: 0    benzonatate (TESSALON) 100 MG capsule, Take 1 capsule by mouth 3 times daily as needed for Cough, Disp: 90 capsule, Rfl: 0    SYNTHROID 100 MCG tablet, Take 1 tablet 6 days a wk and 0.5 tab on , Disp: 90 tablet, Rfl:

## 2025-04-14 ENCOUNTER — OFFICE VISIT (OUTPATIENT)
Dept: PAIN MANAGEMENT | Age: 75
End: 2025-04-14
Payer: MEDICARE

## 2025-04-14 VITALS
RESPIRATION RATE: 18 BRPM | TEMPERATURE: 96.9 F | SYSTOLIC BLOOD PRESSURE: 96 MMHG | WEIGHT: 157 LBS | OXYGEN SATURATION: 96 % | HEART RATE: 66 BPM | DIASTOLIC BLOOD PRESSURE: 58 MMHG | BODY MASS INDEX: 23.79 KG/M2 | HEIGHT: 68 IN

## 2025-04-14 DIAGNOSIS — G89.29 CHRONIC BILATERAL LOW BACK PAIN WITH BILATERAL SCIATICA: ICD-10-CM

## 2025-04-14 DIAGNOSIS — G89.29 CHRONIC LEFT-SIDED LOW BACK PAIN WITH LEFT-SIDED SCIATICA: ICD-10-CM

## 2025-04-14 DIAGNOSIS — E55.9 VITAMIN D DEFICIENCY, UNSPECIFIED: ICD-10-CM

## 2025-04-14 DIAGNOSIS — M54.42 CHRONIC LEFT-SIDED LOW BACK PAIN WITH LEFT-SIDED SCIATICA: ICD-10-CM

## 2025-04-14 DIAGNOSIS — R73.9 ELEVATED BLOOD SUGAR: ICD-10-CM

## 2025-04-14 DIAGNOSIS — M54.41 CHRONIC BILATERAL LOW BACK PAIN WITH BILATERAL SCIATICA: ICD-10-CM

## 2025-04-14 DIAGNOSIS — E03.9 HYPOTHYROIDISM, UNSPECIFIED TYPE: ICD-10-CM

## 2025-04-14 DIAGNOSIS — M54.42 CHRONIC BILATERAL LOW BACK PAIN WITH BILATERAL SCIATICA: ICD-10-CM

## 2025-04-14 DIAGNOSIS — M48.061 LUMBAR FORAMINAL STENOSIS: ICD-10-CM

## 2025-04-14 DIAGNOSIS — M79.10 MYALGIA: ICD-10-CM

## 2025-04-14 DIAGNOSIS — M54.16 LUMBAR RADICULOPATHY: Primary | ICD-10-CM

## 2025-04-14 DIAGNOSIS — G89.4 CHRONIC PAIN SYNDROME: ICD-10-CM

## 2025-04-14 LAB
ALBUMIN: 4.3 G/DL (ref 3.5–5.2)
ALP BLD-CCNC: 79 U/L (ref 35–104)
ALT SERPL-CCNC: 18 U/L (ref 0–32)
ANION GAP SERPL CALCULATED.3IONS-SCNC: 12 MMOL/L (ref 7–16)
AST SERPL-CCNC: 24 U/L (ref 0–31)
BASOPHILS ABSOLUTE: 0.04 K/UL (ref 0–0.2)
BASOPHILS RELATIVE PERCENT: 1 % (ref 0–2)
BILIRUB SERPL-MCNC: 0.4 MG/DL (ref 0–1.2)
BUN BLDV-MCNC: 12 MG/DL (ref 6–23)
CALCIUM SERPL-MCNC: 9.7 MG/DL (ref 8.6–10.2)
CHLORIDE BLD-SCNC: 101 MMOL/L (ref 98–107)
CHOLESTEROL, TOTAL: 202 MG/DL
CO2: 26 MMOL/L (ref 22–29)
CREAT SERPL-MCNC: 0.9 MG/DL (ref 0.5–1)
EOSINOPHILS ABSOLUTE: 0.15 K/UL (ref 0.05–0.5)
EOSINOPHILS RELATIVE PERCENT: 3 % (ref 0–6)
GFR, ESTIMATED: 73 ML/MIN/1.73M2
GLUCOSE BLD-MCNC: 96 MG/DL (ref 74–99)
HBA1C MFR BLD: 5.3 % (ref 4–5.6)
HCT VFR BLD CALC: 42.7 % (ref 34–48)
HDLC SERPL-MCNC: 44 MG/DL
HEMOGLOBIN: 13.7 G/DL (ref 11.5–15.5)
IMMATURE GRANULOCYTES %: 0 % (ref 0–5)
IMMATURE GRANULOCYTES ABSOLUTE: <0.03 K/UL (ref 0–0.58)
LDL CHOLESTEROL: 134 MG/DL
LYMPHOCYTES ABSOLUTE: 1.6 K/UL (ref 1.5–4)
LYMPHOCYTES RELATIVE PERCENT: 31 % (ref 20–42)
MCH RBC QN AUTO: 29 PG (ref 26–35)
MCHC RBC AUTO-ENTMCNC: 32.1 G/DL (ref 32–34.5)
MCV RBC AUTO: 90.3 FL (ref 80–99.9)
MONOCYTES ABSOLUTE: 0.49 K/UL (ref 0.1–0.95)
MONOCYTES RELATIVE PERCENT: 10 % (ref 2–12)
NEUTROPHILS ABSOLUTE: 2.87 K/UL (ref 1.8–7.3)
NEUTROPHILS RELATIVE PERCENT: 56 % (ref 43–80)
PDW BLD-RTO: 14.1 % (ref 11.5–15)
PLATELET # BLD: 318 K/UL (ref 130–450)
PMV BLD AUTO: 10.5 FL (ref 7–12)
POTASSIUM SERPL-SCNC: 4.5 MMOL/L (ref 3.5–5)
RBC # BLD: 4.73 M/UL (ref 3.5–5.5)
SODIUM BLD-SCNC: 139 MMOL/L (ref 132–146)
TOTAL PROTEIN: 6.7 G/DL (ref 6.4–8.3)
TRIGL SERPL-MCNC: 122 MG/DL
TSH SERPL DL<=0.05 MIU/L-ACNC: 0.99 UIU/ML (ref 0.27–4.2)
VITAMIN D 25-HYDROXY: 31.9 NG/ML (ref 30–100)
VLDLC SERPL CALC-MCNC: 24 MG/DL
WBC # BLD: 5.2 K/UL (ref 4.5–11.5)

## 2025-04-14 PROCEDURE — G8427 DOCREV CUR MEDS BY ELIG CLIN: HCPCS | Performed by: PHYSICIAN ASSISTANT

## 2025-04-14 PROCEDURE — G8399 PT W/DXA RESULTS DOCUMENT: HCPCS | Performed by: PHYSICIAN ASSISTANT

## 2025-04-14 PROCEDURE — 3074F SYST BP LT 130 MM HG: CPT | Performed by: PHYSICIAN ASSISTANT

## 2025-04-14 PROCEDURE — 1036F TOBACCO NON-USER: CPT | Performed by: PHYSICIAN ASSISTANT

## 2025-04-14 PROCEDURE — 3017F COLORECTAL CA SCREEN DOC REV: CPT | Performed by: PHYSICIAN ASSISTANT

## 2025-04-14 PROCEDURE — 1090F PRES/ABSN URINE INCON ASSESS: CPT | Performed by: PHYSICIAN ASSISTANT

## 2025-04-14 PROCEDURE — 1159F MED LIST DOCD IN RCRD: CPT | Performed by: PHYSICIAN ASSISTANT

## 2025-04-14 PROCEDURE — G8420 CALC BMI NORM PARAMETERS: HCPCS | Performed by: PHYSICIAN ASSISTANT

## 2025-04-14 PROCEDURE — 1123F ACP DISCUSS/DSCN MKR DOCD: CPT | Performed by: PHYSICIAN ASSISTANT

## 2025-04-14 PROCEDURE — 3078F DIAST BP <80 MM HG: CPT | Performed by: PHYSICIAN ASSISTANT

## 2025-04-14 PROCEDURE — 99213 OFFICE O/P EST LOW 20 MIN: CPT | Performed by: PHYSICIAN ASSISTANT

## 2025-04-14 NOTE — PROGRESS NOTES
Nila Mcfarland presents to the [facility] on 4/14/2025. Nila is complaining of pain back, radiates to RLE, bilateral feet are burning. The pain is constant. The pain is described as aching and burning. Pain is rated on her best day at a 6, on her worst day at a 9, and on average at a 7 on the VAS scale. She took her last dose of Lyrica, Motrin, and Zanaflex yesterday.      Any procedures since your last visit: Yes, with 50 % relief.    She is  on NSAIDS and  is not on anticoagulation medications   Pacemaker or defibrillator: No .    Do you want someone present when the provider examines you? No    Medication Contract and Consent for Opioid Use Documents Filed       Patient Documents       Type of Document Status Date Received Received By Description    Medication Contract Received 1/21/2020  8:16 AM AYSHA ALVARADO med contract    Medication Contract Received 8/16/2022 10:51 AM NICOLÁS ESPINO 03/29/2022  Controlled Substance Medication Agreement                       Resp 18   Ht 1.727 m (5' 8\")   Wt 71.2 kg (157 lb)   BMI 23.87 kg/m²      No LMP recorded. Patient has had a hysterectomy.

## 2025-04-14 NOTE — PROGRESS NOTES
[facility]  [address], [place] [address]  [phone]    Follow up Note      Nila Mcfarland     Date of Visit:  4/14/2025    CC:  Patient presents for follow up   Chief Complaint   Patient presents with    Follow-up     BILATERAL LUMBAR 5 TRANSFORAMINAL EPIDURAL STEROID INJECTION       HPI:    Pain is better.  Appropriate analgesia with current medications regimen: n/a  Change in quality of symptoms:no.    Medication side effects:not applicable .   Recent diagnostic testing:none.   Recent interventional procedures: 02/05/2025 Bilateral Transforaminal epidural steroid injection under fluoroscopic guidance at foraminal level L5 - 40-50% relief for almost 2 months.     She has been on anticoagulation medications to include NSAIDS and has not been on herbal supplements.  She is not diabetic.     Imaging:   Skin biopsy in [facility]:  Oct 2023:   Moderate degree of small fiber neuropathy     9/2023 MRI lumbar w/o -  FINDINGS:  The prior lumbar spine plain films shows that there are 5 normal non  rib-bearing lumbar vertebral bodies with a transitional vertebra that is  designated as S1.  For the purposes of this report, the L5-S1 disc is seen on  axial series 6 image 18 and series 7, image 23.     BONES/ALIGNMENT: There is normal alignment of the spine apart from slight  grade 1 retrolisthesis of L3 on L4.  There is disc desiccation at multiple  levels.  Moderate disc space narrowing is seen at L2-3 with subchondral  signal change, consistent with degenerative disc disease.  There is mild  narrowing at L4-5.  The vertebral body heights are maintained. The bone  marrow signal appears unremarkable.     SPINAL CORD: The conus terminates normally.     SOFT TISSUES: No paraspinal mass identified.     L1-L2: There is no significant disc herniation, spinal canal stenosis or  neural foraminal narrowing.     L2-L3: There is no significant disc herniation, spinal canal stenosis

## 2025-04-15 ENCOUNTER — RESULTS FOLLOW-UP (OUTPATIENT)
Dept: FAMILY MEDICINE CLINIC | Age: 75
End: 2025-04-15

## 2025-04-18 ENCOUNTER — PREP FOR PROCEDURE (OUTPATIENT)
Dept: PAIN MANAGEMENT | Age: 75
End: 2025-04-18

## 2025-05-03 SDOH — HEALTH STABILITY: PHYSICAL HEALTH: ON AVERAGE, HOW MANY DAYS PER WEEK DO YOU ENGAGE IN MODERATE TO STRENUOUS EXERCISE (LIKE A BRISK WALK)?: 3 DAYS

## 2025-05-03 SDOH — HEALTH STABILITY: PHYSICAL HEALTH: ON AVERAGE, HOW MANY MINUTES DO YOU ENGAGE IN EXERCISE AT THIS LEVEL?: 20 MIN

## 2025-05-03 ASSESSMENT — LIFESTYLE VARIABLES
HOW MANY STANDARD DRINKS CONTAINING ALCOHOL DO YOU HAVE ON A TYPICAL DAY: 1 OR 2
HOW MANY STANDARD DRINKS CONTAINING ALCOHOL DO YOU HAVE ON A TYPICAL DAY: 1
HOW OFTEN DO YOU HAVE A DRINK CONTAINING ALCOHOL: 3
HOW OFTEN DO YOU HAVE SIX OR MORE DRINKS ON ONE OCCASION: 1
HOW OFTEN DO YOU HAVE A DRINK CONTAINING ALCOHOL: 2-4 TIMES A MONTH

## 2025-05-03 ASSESSMENT — PATIENT HEALTH QUESTIONNAIRE - PHQ9
SUM OF ALL RESPONSES TO PHQ QUESTIONS 1-9: 7
SUM OF ALL RESPONSES TO PHQ QUESTIONS 1-9: 7
6. FEELING BAD ABOUT YOURSELF - OR THAT YOU ARE A FAILURE OR HAVE LET YOURSELF OR YOUR FAMILY DOWN: NOT AT ALL
SUM OF ALL RESPONSES TO PHQ QUESTIONS 1-9: 7
7. TROUBLE CONCENTRATING ON THINGS, SUCH AS READING THE NEWSPAPER OR WATCHING TELEVISION: SEVERAL DAYS
3. TROUBLE FALLING OR STAYING ASLEEP: NEARLY EVERY DAY
1. LITTLE INTEREST OR PLEASURE IN DOING THINGS: NOT AT ALL
9. THOUGHTS THAT YOU WOULD BE BETTER OFF DEAD, OR OF HURTING YOURSELF: NOT AT ALL
10. IF YOU CHECKED OFF ANY PROBLEMS, HOW DIFFICULT HAVE THESE PROBLEMS MADE IT FOR YOU TO DO YOUR WORK, TAKE CARE OF THINGS AT HOME, OR GET ALONG WITH OTHER PEOPLE: SOMEWHAT DIFFICULT
2. FEELING DOWN, DEPRESSED OR HOPELESS: NOT AT ALL
5. POOR APPETITE OR OVEREATING: NOT AT ALL
8. MOVING OR SPEAKING SO SLOWLY THAT OTHER PEOPLE COULD HAVE NOTICED. OR THE OPPOSITE, BEING SO FIGETY OR RESTLESS THAT YOU HAVE BEEN MOVING AROUND A LOT MORE THAN USUAL: NOT AT ALL
SUM OF ALL RESPONSES TO PHQ QUESTIONS 1-9: 7
4. FEELING TIRED OR HAVING LITTLE ENERGY: NEARLY EVERY DAY

## 2025-05-06 ENCOUNTER — OFFICE VISIT (OUTPATIENT)
Dept: PODIATRY | Age: 75
End: 2025-05-06
Payer: MEDICARE

## 2025-05-06 VITALS — HEIGHT: 68 IN | BODY MASS INDEX: 23.79 KG/M2 | WEIGHT: 157 LBS

## 2025-05-06 DIAGNOSIS — B35.1 ONYCHOMYCOSIS: Primary | ICD-10-CM

## 2025-05-06 DIAGNOSIS — B35.3 TINEA PEDIS OF BOTH FEET: ICD-10-CM

## 2025-05-06 PROCEDURE — 1123F ACP DISCUSS/DSCN MKR DOCD: CPT | Performed by: PODIATRIST

## 2025-05-06 PROCEDURE — 99213 OFFICE O/P EST LOW 20 MIN: CPT | Performed by: PODIATRIST

## 2025-05-06 PROCEDURE — 1159F MED LIST DOCD IN RCRD: CPT | Performed by: PODIATRIST

## 2025-05-06 PROCEDURE — G8420 CALC BMI NORM PARAMETERS: HCPCS | Performed by: PODIATRIST

## 2025-05-06 PROCEDURE — 1036F TOBACCO NON-USER: CPT | Performed by: PODIATRIST

## 2025-05-06 PROCEDURE — G8427 DOCREV CUR MEDS BY ELIG CLIN: HCPCS | Performed by: PODIATRIST

## 2025-05-06 PROCEDURE — 1090F PRES/ABSN URINE INCON ASSESS: CPT | Performed by: PODIATRIST

## 2025-05-06 PROCEDURE — 3017F COLORECTAL CA SCREEN DOC REV: CPT | Performed by: PODIATRIST

## 2025-05-06 PROCEDURE — G8399 PT W/DXA RESULTS DOCUMENT: HCPCS | Performed by: PODIATRIST

## 2025-05-06 NOTE — PROGRESS NOTES
25     Nila Mcfarland    : 1950   Sex: female    Age: 74 y.o.    Patient's PCP/Provider is:  Izabella العلي MD    Subjective:  Patient is seen today for follow-up regarding continued care regarding chronic nail dystrophy issues tinea pedis issues.  Overall patient has noticed improvement in symptoms with current topical care regimen is being utilized.  Patient is pleased with current care and outcome.  Patient denies any additional abnormalities at this time.    Chief Complaint   Patient presents with    Nail Problem     Nail fungus        ROS:  Const: Positives and pertinent negatives as per HPI.    Musculo: Denies symptoms other than stated above.  Neuro: Denies symptoms other than stated above.  Skin: Denies symptoms other than stated above.    Current Medications:    Current Outpatient Medications:     ciclopirox (PENLAC) 8 % solution, Apply topically daily to affected nails., Disp: 6 mL, Rfl: 2    nystatin-triamcinolone (MYCOLOG II) 268719-7.1 UNIT/GM-% cream, Apply topically 2 times daily., Disp: 60 g, Rfl: 3    erythromycin (ROMYCIN) 5 MG/GM ophthalmic ointment, , Disp: , Rfl:     amLODIPine (NORVASC) 10 MG tablet, Take 1 tablet by mouth daily, Disp: 90 tablet, Rfl: 1    hydrALAZINE (APRESOLINE) 50 MG tablet, Take 1 tablet by mouth every 8 hours, Disp: 270 tablet, Rfl: 1    montelukast (SINGULAIR) 10 MG tablet, Take 1 tablet by mouth nightly take 1 tablet by mouth every evening, Disp: 90 tablet, Rfl: 1    pregabalin (LYRICA) 100 MG capsule, Take 1 capsule by mouth 3 times daily for 180 days. Max Daily Amount: 300 mg, Disp: 270 capsule, Rfl: 0    benzonatate (TESSALON) 100 MG capsule, Take 1 capsule by mouth 3 times daily as needed for Cough, Disp: 90 capsule, Rfl: 0    SYNTHROID 100 MCG tablet, Take 1 tablet 6 days a wk and 0.5 tab on , Disp: 90 tablet, Rfl: 1    loratadine (CLARITIN) 10 MG tablet, Take 1 tablet by mouth daily, Disp: 90 tablet, Rfl: 1    escitalopram (LEXAPRO) 20 MG

## 2025-05-07 ENCOUNTER — OFFICE VISIT (OUTPATIENT)
Dept: FAMILY MEDICINE CLINIC | Age: 75
End: 2025-05-07

## 2025-05-07 ENCOUNTER — TELEPHONE (OUTPATIENT)
Dept: PAIN MANAGEMENT | Age: 75
End: 2025-05-07

## 2025-05-07 VITALS
OXYGEN SATURATION: 95 % | SYSTOLIC BLOOD PRESSURE: 112 MMHG | WEIGHT: 157.2 LBS | BODY MASS INDEX: 24.67 KG/M2 | HEART RATE: 63 BPM | TEMPERATURE: 98.9 F | DIASTOLIC BLOOD PRESSURE: 52 MMHG | HEIGHT: 67 IN

## 2025-05-07 DIAGNOSIS — J30.9 ALLERGIC RHINITIS, UNSPECIFIED SEASONALITY, UNSPECIFIED TRIGGER: ICD-10-CM

## 2025-05-07 DIAGNOSIS — M25.511 ACUTE PAIN OF RIGHT SHOULDER: ICD-10-CM

## 2025-05-07 DIAGNOSIS — Z00.00 MEDICARE ANNUAL WELLNESS VISIT, SUBSEQUENT: ICD-10-CM

## 2025-05-07 DIAGNOSIS — R05.1 ACUTE COUGH: ICD-10-CM

## 2025-05-07 DIAGNOSIS — J02.9 PHARYNGITIS, UNSPECIFIED ETIOLOGY: ICD-10-CM

## 2025-05-07 DIAGNOSIS — K21.9 GASTROESOPHAGEAL REFLUX DISEASE, UNSPECIFIED WHETHER ESOPHAGITIS PRESENT: ICD-10-CM

## 2025-05-07 DIAGNOSIS — G62.9 SMALL FIBER NEUROPATHY: ICD-10-CM

## 2025-05-07 DIAGNOSIS — J18.9 PNEUMONIA DUE TO INFECTIOUS ORGANISM, UNSPECIFIED LATERALITY, UNSPECIFIED PART OF LUNG: Primary | ICD-10-CM

## 2025-05-07 DIAGNOSIS — F33.1 MODERATE EPISODE OF RECURRENT MAJOR DEPRESSIVE DISORDER (HCC): ICD-10-CM

## 2025-05-07 DIAGNOSIS — F41.9 ANXIETY: ICD-10-CM

## 2025-05-07 DIAGNOSIS — F41.9 ANXIETY: Primary | ICD-10-CM

## 2025-05-07 LAB
INFLUENZA A ANTIBODY: NEGATIVE
INFLUENZA B ANTIBODY: NEGATIVE
Lab: NORMAL
PERFORMING INSTRUMENT: NORMAL
QC PASS/FAIL: NORMAL
S PYO AG THROAT QL: NORMAL
SARS-COV-2, POC: NORMAL

## 2025-05-07 RX ORDER — DIAZEPAM 10 MG/1
10 TABLET ORAL ONCE
Qty: 1 TABLET | Refills: 0 | OUTPATIENT
Start: 2025-05-07 | End: 2025-05-07

## 2025-05-07 RX ORDER — DONEPEZIL HYDROCHLORIDE 10 MG/1
10 TABLET, FILM COATED ORAL NIGHTLY
Qty: 90 TABLET | Refills: 1 | Status: SHIPPED | OUTPATIENT
Start: 2025-05-07

## 2025-05-07 RX ORDER — OMEPRAZOLE 20 MG/1
20 CAPSULE, DELAYED RELEASE ORAL DAILY
Qty: 90 CAPSULE | Refills: 1 | Status: SHIPPED | OUTPATIENT
Start: 2025-05-07

## 2025-05-07 RX ORDER — MV-MIN NO.113/IRON/FOLIC ACID 4.5 MG-2
200 CAPSULE ORAL 3 TIMES DAILY
Qty: 270 CAPSULE | Refills: 1 | Status: SHIPPED | OUTPATIENT
Start: 2025-05-07

## 2025-05-07 RX ORDER — ESCITALOPRAM OXALATE 20 MG/1
20 TABLET ORAL DAILY
Qty: 90 TABLET | Refills: 1 | Status: SHIPPED | OUTPATIENT
Start: 2025-05-07

## 2025-05-07 RX ORDER — LEVOFLOXACIN 750 MG/1
750 TABLET, FILM COATED ORAL DAILY
Qty: 7 TABLET | Refills: 0 | Status: SHIPPED | OUTPATIENT
Start: 2025-05-07 | End: 2025-05-08 | Stop reason: SDUPTHER

## 2025-05-07 RX ORDER — LORATADINE 10 MG/1
10 TABLET ORAL DAILY
Qty: 90 TABLET | Refills: 1 | Status: SHIPPED | OUTPATIENT
Start: 2025-05-07

## 2025-05-07 NOTE — PATIENT INSTRUCTIONS
Name: Natalie Kitchen  : 1970   MRN: [id number]    ASSESSMENT & PLAN:   Natalie Kitchen is a 50 y.o. female presenting today for follow up blood sugars and evaluation abdominal pain.     1. Impaired fasting glucose  2. Obesity (BMI 35.0-39.9) with comorbidity (H)  - Glycosylated Hemoglobin A1c    Blood sugars improved, A1c is down. Congratulated patient on making positive changes.   Reviewed options for weight management - exercise, nutrition, calorie deficit +/- medication management.  With improved A1c, patient is not interested in medication management at this time and will continue to work on lifestyle modifications.    Would check A1c annually.    3. Pelvic pain in female  - Urinalysis-UC if Indicated  - US Pelvis With Transvaginal Non OB; Future  - Culture, Urine     DDx: ovarian cyst vs constipation vs uterine mass vs muscle strain/spasm vs UTI, less likely colon mass, kidney stone, appendicitis, or IBD. Will proceed with pelvic US for further evaluation. Consider bowel regimen. Stretch hip flexors. Rule out UTI. Follow up if not improving.        Return in about 2 weeks (around 2021) for follow up if not improving.    Yasmine Masterson DO  [facility]            Natalie Kitchen is a 50 y.o. female presenting to discuss the following:     CC:   Chief Complaint   Patient presents with     Abdominal Pain     Lower right side     Diabetes     fasting for labs.       HPI:  DIABETES: A1c was 6.5 on 20. Has been working on lifestyle changes. Worried about going on medication. Interested in working with someone on weight management.    ABDOMINAL PAIN: Seemed to come out of nowhere, improved a little bit. Worse with getting up or changing position. Feels like a stretching. Seems like a muscle but doesn't know. Very low pain. Postmenopausal, still has ovaries and uterus, LMP ~ 18 months or so. No constipation, changes in bowel movements, no night sweats or  "fevers. Not improved with going to the bathroom. No imaging. Getting used to the pain. Getting up and sitting down is when she feels it. No blood in urine or pain with urination. Present for the last month.       ROS:   See HPI above.     PMH:   Patient Active Problem List   Diagnosis     History of MRSA infection     ALESHA (generalized anxiety disorder)     history of Post partum depression     WALDEMAR on CPAP     Sleep difficulties     Menopausal symptoms     Obesity (BMI 35.0-39.9) with comorbidity (H)       Past Medical History:   Diagnosis Date     Abnormal Pap smear of cervix     in [facility]-cryotherapy      Ligament tear- right thumb 10/26/2015     Post partum depression      Rupture of right proximal hamstring tendon 2016       PSH:   Past Surgical History:   Procedure Laterality Date     NO PAST SURGERIES           MEDICATIONS:   Current Outpatient Medications on File Prior to Visit   Medication Sig Dispense Refill     citalopram (CELEXA) 40 MG tablet TAKE 1 TABLET BY MOUTH EVERY DAY 90 tablet 3     ALPRAZolam (XANAX) 1 MG tablet Take 1/2 tablet by mouth as needed for panic attack 15 tablet 0     No current facility-administered medications on file prior to visit.        ALLERGIES:  Allergies   Allergen Reactions     Amoxicillin        FAMHx:  Family History   Problem Relation Age of Onset     Hypertension Other         mother     Stroke Other         father-  at 60     Diabetes Other         mother     Brain cancer Other         dx at 32 father      Asthma Other         son     Depression Other         father        SOCIAL HISTORY:   Social History     Tobacco Use     Smoking status: Never Smoker     Smokeless tobacco: Never Used   Substance Use Topics     Alcohol use: Yes     Alcohol/week: 4.2 standard drinks     Types: 5 Standard drinks or equivalent per week     Comment: wine     Drug use: No       PHYSICAL EXAM:   /73   Pulse 79   Temp 97  F (36.1  C)   Ht 5' 6.75\" (1.695 m)   Wt (!) 245 lb " 6 oz (111.3 kg)   LMP  (LMP Unknown)   SpO2 99%   Breastfeeding No   BMI 38.72 kg/m     GENERAL: Natalie is a pleasant, obese female, in no acute distress.   HEART: Regular rate and rhythm, no murmurs.   LUNGS: Clear to auscultation bilaterally, unlabored.   ABDOMEN: Soft, mildly tender to palpation in RLQ, no guarding, no rigidity, no palpable masses.  MSK: Negative obturator sign, no pain with passive internal and external rotation of hip.        This can narrow the blood vessels and reduce blood flow. A heart attack happens when blood flow is completely blocked. A high-fat diet, smoking, and other factors increase the risk of heart disease.  Your doctor has found that you have a chance of having heart disease. A heart-healthy lifestyle can help keep your heart healthy and prevent heart disease. This lifestyle includes eating healthy, being active, staying at a weight that's healthy for you, and not smoking or using tobacco. It also includes taking medicines as directed, managing other health conditions, and trying to get a healthy amount of sleep.  Follow-up care is a key part of your treatment and safety. Be sure to make and go to all appointments, and call your doctor if you are having problems. It's also a good idea to know your test results and keep a list of the medicines you take.  How can you care for yourself at home?  Diet    Use less salt when you cook and eat. This helps lower your blood pressure. Taste food before salting. Add only a little salt when you think you need it. With time, your taste buds will adjust to less salt.     Eat fewer snack items, fast foods, canned soups, and other high-salt, high-fat, processed foods.     Read food labels and try to avoid saturated and trans fats. They increase your risk of heart disease by raising cholesterol levels.     Limit the amount of solid fat--butter, margarine, and shortening--you eat. Use olive, peanut, or canola oil when you cook. Bake, broil, and steam foods instead of frying them.     Eat a variety of fruit and vegetables every day. Dark green, deep orange, red, or yellow fruits and vegetables are especially good for you. Examples include spinach, carrots, peaches, and berries.     Foods high in fiber can reduce your cholesterol and provide important vitamins and minerals. High-fiber foods include whole-grain cereals and breads, oatmeal, beans, brown rice, citrus fruits, and apples.     Eat

## 2025-05-07 NOTE — TELEPHONE ENCOUNTER
Call to Nila Mcfarland that procedure was scheduled for 05/16/2025 and that [facility] should call her a few days before for the pre op call and after 3:00 PM the business day before with the arrival time. Instructed Nila to hold ibuprofen for 24 hours, Celebrex, Mobic, and naprosyn for 4 days and any aspirin containing products, CoQ 10, or fish oil for 7 days. Instructed to call office back if any questions. Nila verbalized understanding.    **Called into [facility] & instructed patient to take a one time dose of Valium 10mg PO 30 min-1 hour prior to procedure**    Electronically signed by Elyse Pang RN on 5/7/2025 at 11:51 AM

## 2025-05-07 NOTE — PROGRESS NOTES
Medicare Annual Wellness Visit    Nila Mcfarland is here for Medicare AWV, Pharyngitis, and Cough    Assessment & Plan   Pneumonia due to infectious organism, unspecified laterality, unspecified part of lung  -     levoFLOXacin (LEVAQUIN) 750 MG tablet; Take 1 tablet by mouth daily for 10 days, Disp-7 tablet, R-0Normal  Pharyngitis, unspecified etiology  -     POCT rapid strep A  Moderate episode of recurrent major depressive disorder (HCC)  -     escitalopram (LEXAPRO) 20 MG tablet; Take 1 tablet by mouth daily, Disp-90 tablet, R-1This is an emergency supply.  The patient is completely out.Normal  Anxiety  -     escitalopram (LEXAPRO) 20 MG tablet; Take 1 tablet by mouth daily, Disp-90 tablet, R-1This is an emergency supply.  The patient is completely out.Normal  Allergic rhinitis, unspecified seasonality, unspecified trigger  -     loratadine (CLARITIN) 10 MG tablet; Take 1 tablet by mouth daily, Disp-90 tablet, R-1Normal  Gastroesophageal reflux disease, unspecified whether esophagitis present  -     omeprazole (PRILOSEC) 20 MG delayed release capsule; Take 1 capsule by mouth Daily take 1 capsule by mouth once daily, Disp-90 capsule, R-1Normal  Acute cough  -     POCT COVID-19, Antigen  -     POCT Influenza A/B  Medicare annual wellness visit, subsequent  Small fiber neuropathy  -     Alpha Lipoic Acid 200 MG CAPS; Take 200 mg by mouth in the morning, at noon, and at bedtime, Disp-270 capsule, R-1Normal  -     donepezil (ARICEPT) 10 MG tablet; Take 1 tablet by mouth nightly, Disp-90 tablet, R-1Normal  Acute pain of right shoulder  -     Neel Strickland MD, Sports Medicine, [facility]       Return in 1 year (on 5/7/2026) for Medicare Annual Wellness Visit in 1 year, Medicare AWV.     Subjective   See alternate note    Patient's complete Health Risk Assessment and screening values have been reviewed and are found in Flowsheets. The following problems were reviewed today and where indicated 
pristiq at f/u; she has had seizure in past; no wellbutrin  Continue on the alph lipoic acid per neurology recommendations  Keep f/u with neuro  Continue norvasc 10mg daily  Continue with hydralazine 50mg TID  Continue lyrica 100mg TID; consider weaning to determine if this is affecting her memory  I do allow slight permissive htn; she will call with persistent elevated BP  Can continue B12 and folate for neuropathic pain of feet if helping  Continue to f/u with Dr. Cortes  Continue to f/u with Jessica PACEN  Referral for R shoulder pain  Ok to refill aricept at 10mg daily      RTO: Return in 1 year (on 5/7/2026) for Medicare Annual Wellness Visit in 1 year, Medicare AWV.        An electronic signature was used to authenticate this note.  ---- Izabella العلي MD on 5/7/2025 at 4:25 PM

## 2025-05-08 DIAGNOSIS — J18.9 PNEUMONIA DUE TO INFECTIOUS ORGANISM, UNSPECIFIED LATERALITY, UNSPECIFIED PART OF LUNG: ICD-10-CM

## 2025-05-08 RX ORDER — LEVOFLOXACIN 750 MG/1
750 TABLET, FILM COATED ORAL DAILY
Qty: 7 TABLET | Refills: 0 | Status: SHIPPED | OUTPATIENT
Start: 2025-05-08 | End: 2025-05-15

## 2025-05-08 NOTE — TELEPHONE ENCOUNTER
Name of Medication(s) Requested:  Requested Prescriptions     Pending Prescriptions Disp Refills    levoFLOXacin (LEVAQUIN) 750 MG tablet 7 tablet 0     Sig: Take 1 tablet by mouth daily for 7 days       Medication is on current medication list Yes    Dosage and directions were verified? Yes    Quantity verified: 7 day supply     Pharmacy Verified?  Yes    Last Appointment:  5/7/2025    Future appts:  Future Appointments   Date Time Provider Department Center   5/15/2025  1:00 PM Neel Parrish MD [facility] ORTHO [facility]   6/10/2025  1:15 PM Jenaro Shaw Jr., [facility]   6/16/2025 11:45 AM Annalisa Flowers PA [facility]   6/25/2025  1:15 PM [facility]   12/11/2025 11:00 AM Noni Gamble MD [facility]   1/22/2026 11:30 AM Jacob Flores MD [facility] ENDO [facility]   5/13/2026  1:00 PM [facility]        (If no appt send self scheduling link. .REFILLAPPT)  Scheduling request sent?     [] Yes  [x] No    Does patient need updated?  [] Yes  [x] No

## 2025-05-15 ENCOUNTER — OFFICE VISIT (OUTPATIENT)
Dept: ORTHOPEDIC SURGERY | Age: 75
End: 2025-05-15
Payer: MEDICARE

## 2025-05-15 VITALS
WEIGHT: 157 LBS | BODY MASS INDEX: 24.64 KG/M2 | OXYGEN SATURATION: 96 % | RESPIRATION RATE: 18 BRPM | HEIGHT: 67 IN | TEMPERATURE: 98.7 F

## 2025-05-15 DIAGNOSIS — M25.511 CHRONIC RIGHT SHOULDER PAIN: Primary | ICD-10-CM

## 2025-05-15 DIAGNOSIS — G89.29 CHRONIC RIGHT SHOULDER PAIN: Primary | ICD-10-CM

## 2025-05-15 PROCEDURE — G8420 CALC BMI NORM PARAMETERS: HCPCS | Performed by: FAMILY MEDICINE

## 2025-05-15 PROCEDURE — 99203 OFFICE O/P NEW LOW 30 MIN: CPT | Performed by: FAMILY MEDICINE

## 2025-05-15 PROCEDURE — 20611 DRAIN/INJ JOINT/BURSA W/US: CPT | Performed by: FAMILY MEDICINE

## 2025-05-15 PROCEDURE — G8399 PT W/DXA RESULTS DOCUMENT: HCPCS | Performed by: FAMILY MEDICINE

## 2025-05-15 PROCEDURE — 1123F ACP DISCUSS/DSCN MKR DOCD: CPT | Performed by: FAMILY MEDICINE

## 2025-05-15 PROCEDURE — G8427 DOCREV CUR MEDS BY ELIG CLIN: HCPCS | Performed by: FAMILY MEDICINE

## 2025-05-15 PROCEDURE — 3017F COLORECTAL CA SCREEN DOC REV: CPT | Performed by: FAMILY MEDICINE

## 2025-05-15 PROCEDURE — 1090F PRES/ABSN URINE INCON ASSESS: CPT | Performed by: FAMILY MEDICINE

## 2025-05-15 PROCEDURE — 1036F TOBACCO NON-USER: CPT | Performed by: FAMILY MEDICINE

## 2025-05-15 RX ORDER — LIDOCAINE HYDROCHLORIDE 10 MG/ML
3 INJECTION, SOLUTION INFILTRATION; PERINEURAL ONCE
Status: COMPLETED | OUTPATIENT
Start: 2025-05-15 | End: 2025-05-15

## 2025-05-15 RX ORDER — BETAMETHASONE SODIUM PHOSPHATE AND BETAMETHASONE ACETATE 3; 3 MG/ML; MG/ML
6 INJECTION, SUSPENSION INTRA-ARTICULAR; INTRALESIONAL; INTRAMUSCULAR; SOFT TISSUE ONCE
Status: COMPLETED | OUTPATIENT
Start: 2025-05-15 | End: 2025-05-15

## 2025-05-15 RX ADMIN — BETAMETHASONE SODIUM PHOSPHATE AND BETAMETHASONE ACETATE 6 MG: 3; 3 INJECTION, SUSPENSION INTRA-ARTICULAR; INTRALESIONAL; INTRAMUSCULAR; SOFT TISSUE at 16:29

## 2025-05-15 RX ADMIN — LIDOCAINE HYDROCHLORIDE 3 ML: 10 INJECTION, SOLUTION INFILTRATION; PERINEURAL at 16:30

## 2025-05-15 NOTE — PROGRESS NOTES
[facility]  PRIMARY CARE SPORTS MEDICINE  DATE OF VISIT : 5/15/2025    Patient : Nila Mcfarland  Age : 74 y.o.   : 1950  MRN : [id number]   ______________________________________________________________________    Chief Complaint :   Chief Complaint   Patient presents with    Shoulder Pain     Right  on and off for a few years  top of shoulder  in the joint           HPI : Nila Mcfarland is 74 y.o. female who presented to the clinic today for evaluation of right shoulder pain. The onset of the pain was a few years ago, with no known mechanism of injury.  Current symptoms include right lateral shoulder pain. No history of dislocation. Patient denies numbness and tingling. Symptoms are aggravated by repetitive use, work at or above shoulder height, and sleeping on affected side. Evaluation to date: XRs of the right shoulder which demonstrate no acute fractures or dislocations.  Treatment to date: avoidance of offending activity and OTC analgesics which are not very effective.     Past Medical History :  Past Medical History:   Diagnosis Date    Arthritis     Symptoms respond to myofascial release. Not surgical candidate    Asthma     Cancer (HCC)     lung cancer- RML- 2008, stage 1A, RUL-2013 Stage 1A 2013    Chronic back pain     COPD (chronic obstructive pulmonary disease) (HCC)     Emphysema of lung (HCC)     GERD (gastroesophageal reflux disease)     Hashimoto's disease 2008    History of blood transfusion     Hyperlipidemia     Hypertension     Hyperthyroidism     Neuropathy     feet    Seizure ([facility]) 10/09/2020    Sleep difficulties      Past Surgical History:   Procedure Laterality Date    ABDOMEN SURGERY      APPENDECTOMY  1981    BREAST SURGERY Right     Excisional Bx 1980s    CATARACT REMOVAL WITH IMPLANT Bilateral     COLONOSCOPY  2015    polyp and diverticulosis    COLONOSCOPY  2015    colonoscopy    ENDOSCOPY, COLON, DIAGNOSTIC      EYE SURGERY      HERNIA REPAIR N/A 2021

## 2025-05-16 ENCOUNTER — HOSPITAL ENCOUNTER (OUTPATIENT)
Age: 75
Setting detail: OUTPATIENT SURGERY
Discharge: HOME | End: 2025-05-16
Attending: ANESTHESIOLOGY | Admitting: ANESTHESIOLOGY
Payer: MEDICARE

## 2025-05-16 ENCOUNTER — HOSPITAL ENCOUNTER (OUTPATIENT)
Dept: OPERATING ROOM | Age: 75
Setting detail: OUTPATIENT SURGERY
Discharge: HOME | End: 2025-05-16
Attending: ANESTHESIOLOGY
Payer: MEDICARE

## 2025-05-16 VITALS
HEIGHT: 67 IN | SYSTOLIC BLOOD PRESSURE: 140 MMHG | OXYGEN SATURATION: 97 % | HEART RATE: 80 BPM | BODY MASS INDEX: 24.64 KG/M2 | RESPIRATION RATE: 16 BRPM | DIASTOLIC BLOOD PRESSURE: 66 MMHG | WEIGHT: 157 LBS

## 2025-05-16 DIAGNOSIS — M54.16 LUMBAR RADICULOPATHY: ICD-10-CM

## 2025-05-16 PROCEDURE — 64483 NJX AA&/STRD TFRM EPI L/S 1: CPT | Performed by: ANESTHESIOLOGY

## 2025-05-16 PROCEDURE — 7100000011 HC PHASE II RECOVERY - ADDTL 15 MIN: Performed by: ANESTHESIOLOGY

## 2025-05-16 PROCEDURE — 6360000004 HC RX CONTRAST MEDICATION: Performed by: ANESTHESIOLOGY

## 2025-05-16 PROCEDURE — 2709999900 HC NON-CHARGEABLE SUPPLY: Performed by: ANESTHESIOLOGY

## 2025-05-16 PROCEDURE — 64484 NJX AA&/STRD TFRM EPI L/S EA: CPT | Performed by: ANESTHESIOLOGY

## 2025-05-16 PROCEDURE — 3600000015 HC SURGERY LEVEL 5 ADDTL 15MIN: Performed by: ANESTHESIOLOGY

## 2025-05-16 PROCEDURE — 7100000010 HC PHASE II RECOVERY - FIRST 15 MIN: Performed by: ANESTHESIOLOGY

## 2025-05-16 PROCEDURE — 6360000002 HC RX W HCPCS: Performed by: ANESTHESIOLOGY

## 2025-05-16 PROCEDURE — 3600000005 HC SURGERY LEVEL 5 BASE: Performed by: ANESTHESIOLOGY

## 2025-05-16 RX ORDER — DEXAMETHASONE SODIUM PHOSPHATE 10 MG/ML
INJECTION, SOLUTION INTRAMUSCULAR; INTRAVENOUS PRN
Status: DISCONTINUED | OUTPATIENT
Start: 2025-05-16 | End: 2025-05-16 | Stop reason: ALTCHOICE

## 2025-05-16 RX ORDER — LIDOCAINE HYDROCHLORIDE 5 MG/ML
INJECTION, SOLUTION INFILTRATION; INTRAVENOUS PRN
Status: DISCONTINUED | OUTPATIENT
Start: 2025-05-16 | End: 2025-05-16 | Stop reason: ALTCHOICE

## 2025-05-16 ASSESSMENT — PAIN - FUNCTIONAL ASSESSMENT
PAIN_FUNCTIONAL_ASSESSMENT: 0-10
PAIN_FUNCTIONAL_ASSESSMENT: NONE - DENIES PAIN
PAIN_FUNCTIONAL_ASSESSMENT: NONE - DENIES PAIN

## 2025-05-16 ASSESSMENT — PAIN DESCRIPTION - DESCRIPTORS: DESCRIPTORS: ACHING

## 2025-05-16 NOTE — H&P
[facility]  [address], [place] [address]  [phone]    Procedure History & Physical      Nilauvaldo Mcfarland     HPI:    Patient  is here for Lumbar TFESI  for low back/ LE pain.  Labs/imaging studies reviewed   All question and concerns addressed including R/B/A associated with the procedure    Past Medical History:   Diagnosis Date    Arthritis     Symptoms respond to myofascial release. Not surgical candidate    Asthma     Cancer (HCC)     lung cancer- RML- 2008, stage 1A, RUL-2013 Stage 1A 2013    Chronic back pain     COPD (chronic obstructive pulmonary disease) (HCC)     Emphysema of lung (HCC)     GERD (gastroesophageal reflux disease)     Hashimoto's disease 2008    History of blood transfusion     Hyperlipidemia     Hypertension     Hyperthyroidism     Neuropathy     feet    Seizure (HCC) 10/09/2020    Sleep difficulties        Past Surgical History:   Procedure Laterality Date    ABDOMEN SURGERY      APPENDECTOMY  1981    BREAST SURGERY Right     Excisional Bx 1980s    CATARACT REMOVAL WITH IMPLANT Bilateral     COLONOSCOPY  06/23/2015    polyp and diverticulosis    COLONOSCOPY  06/23/2015    colonoscopy    ENDOSCOPY, COLON, DIAGNOSTIC      EYE SURGERY      HERNIA REPAIR N/A 07/14/2021    LAPAROSCOPIC ROBOTIC XI ASSISTED INCISIONAL HERNIA REPAIR WITH MESH performed by Keyana Hamilton MD at [facility] OR    HYSTERECTOMY (CERVIX STATUS UNKNOWN)  1981    LUNG CANCER SURGERY Right     X 2    PAIN MANAGEMENT PROCEDURE Left 12/28/2023    LEFT L4 TRANSFORAMINAL EPIDURAL STEROID INJECTION performed by Myra Tucker DO at [facility] OR    PAIN MANAGEMENT PROCEDURE Bilateral 03/28/2024    BILATERAL L5 TRANSFORAMINAL EPIDURAL STEROID INJECTION performed by Myra Tucker DO at [facility] OR    PAIN MANAGEMENT PROCEDURE Bilateral 07/16/2024    BILATERAL LUMBAR 5 TRANSFORAMINAL EPIDURAL STEROID INJECTION performed by Myra Tucker DO at [facility] OR    PAIN MANAGEMENT

## 2025-05-16 NOTE — OP NOTE
Operative Note      Patient: Nila Mcfarland  YOB: 1950  MRN: [id number]    Date of Procedure: 2025    Pre-Op Diagnosis Codes:      * Lumbar radiculopathy [M54.16]    Post-Op Diagnosis: Same       Procedure(s):  Right lumbar 5 and left sacral 1 transforaminal epidural steroid injection    Surgeon(s):  Montez Quintero MD    Assistant:   * No surgical staff found *    Anesthesia: Local    Estimated Blood Loss (mL): Minimal    Complications: None    Specimens:   * No specimens in log *    Implants:  * No implants in log *      Drains: * No LDAs found *    Findings:  Infection Present At Time Of Surgery (PATOS) (choose all levels that have infection present):  No infection present  Other Findings: good needle placement    Detailed Description of Procedure:   2025    Patient: Nila Mcfarland  :  1950  Age:  74 y.o.  Sex:  female     PRE-OPERATIVE DIAGNOSIS: Lumbar disc displacement, lumbar neural foraminal stenosis, lumbar radiculopathy.     POST-OPERATIVE DIAGNOSIS: Same.    PROCEDURE: Right L5 & Left S1 Transforaminal epidural steroid injection under fluoroscopic guidance.    SURGEON: Monetz Quintero MD    ANESTHESIA: Local    ESTIMATED BLOOD LOSS: None.  ______________________________________________________________________  BRIEF HISTORY: Nila Mcfarland comes in today for the  lumbar transforaminal epidural steroid injection under fluoroscopic guidance. The potential complications of this procedure were discussed with her again today.  She has elected to undergo the aforementioned procedure.     Nila’s complete History & Physical examination were reviewed in depth, a copy of which is in the chart.      DESCRIPTION OF PROCEDURE:    After confirming written and informed consent, a time-out was performed and Nila’s name and date of birth, the procedure to be performed as well as the plan for the location of the needle insertion were confirmed.    The patient was

## 2025-05-16 NOTE — DISCHARGE INSTRUCTIONS
[facility] Pain Management Department  [phone]   Post-Pain Block/ Radiofrequency Home Going Instructions    1-Go home, rest for the remainder of the day  2-Please do not lift over 20 pounds the day of the injection  3-If you received sedation No: alcohol, driving, operating lawn mowers, plows, tractors or other dangerous equipment until next morning. Do not make important decisions or sign legal documents for 24 hours. You may experience light headedness, dizziness, nausea or sleepiness after sedation. Do not stay alone. A responsible adult must be with you for 24 hours. You could be nauseated from the medications you have received. Your IV site may be sore and bruised.    4-No dietary restrictions     5-Resume all medications the same day, blood thinners to be resumed 24 hours after injection    6-Keep the surgical site clean and dry, you may shower the next morning and remove the      dressing.     7- No sitz baths, tub baths or hot tubs/swimming for 24 hours.       8- If you have any pain at the injection site(s), application of an ice pack to the area should be       helpful, 20 minutes on/20 minutes off for next 48 hours.  9- Call [facility] pain management immediately at if you develop.  Fever greater than 100.4 F  Have bleeding or drainage from the puncture site  Have progressive Leg/arm numbness and or weakness  Loss of control of bowel and or bladder (wet/soil yourself)  Severe headache with inability to lift head  10-You may return to work the next day

## 2025-06-10 ENCOUNTER — PROCEDURE VISIT (OUTPATIENT)
Dept: PODIATRY | Age: 75
End: 2025-06-10
Payer: MEDICARE

## 2025-06-10 DIAGNOSIS — B35.1 ONYCHOMYCOSIS: Primary | ICD-10-CM

## 2025-06-10 DIAGNOSIS — B35.3 TINEA PEDIS OF BOTH FEET: ICD-10-CM

## 2025-06-10 PROCEDURE — 1123F ACP DISCUSS/DSCN MKR DOCD: CPT | Performed by: PODIATRIST

## 2025-06-10 PROCEDURE — G8427 DOCREV CUR MEDS BY ELIG CLIN: HCPCS | Performed by: PODIATRIST

## 2025-06-10 PROCEDURE — 99213 OFFICE O/P EST LOW 20 MIN: CPT | Performed by: PODIATRIST

## 2025-06-10 PROCEDURE — G8399 PT W/DXA RESULTS DOCUMENT: HCPCS | Performed by: PODIATRIST

## 2025-06-10 PROCEDURE — 1159F MED LIST DOCD IN RCRD: CPT | Performed by: PODIATRIST

## 2025-06-10 PROCEDURE — 1036F TOBACCO NON-USER: CPT | Performed by: PODIATRIST

## 2025-06-10 PROCEDURE — 3017F COLORECTAL CA SCREEN DOC REV: CPT | Performed by: PODIATRIST

## 2025-06-10 PROCEDURE — 1090F PRES/ABSN URINE INCON ASSESS: CPT | Performed by: PODIATRIST

## 2025-06-10 PROCEDURE — G8420 CALC BMI NORM PARAMETERS: HCPCS | Performed by: PODIATRIST

## 2025-06-10 NOTE — PROGRESS NOTES
by mouth nightly take 1 tablet by mouth every evening, Disp: 90 tablet, Rfl: 1    pregabalin (LYRICA) 100 MG capsule, Take 1 capsule by mouth 3 times daily for 180 days. Max Daily Amount: 300 mg, Disp: 270 capsule, Rfl: 0    benzonatate (TESSALON) 100 MG capsule, Take 1 capsule by mouth 3 times daily as needed for Cough, Disp: 90 capsule, Rfl: 0    SYNTHROID 100 MCG tablet, Take 1 tablet 6 days a wk and 0.5 tab on Sunday, Disp: 90 tablet, Rfl: 1    lidocaine (LIDODERM) 5 %, Place 1 patch onto the skin daily 12 hours on, 12 hours off., Disp: 30 patch, Rfl: 5    azelastine (ASTELIN) 0.1 % nasal spray, 1 spray by Nasal route 2 times daily Use in each nostril as directed, Disp: 60 mL, Rfl: 5    ezetimibe (ZETIA) 10 MG tablet, Take 1 tablet by mouth daily, Disp: 90 tablet, Rfl: 1    tiZANidine (ZANAFLEX) 4 MG tablet, Take 1 tablet by mouth daily as needed (muscle spasm), Disp: 90 tablet, Rfl: 1    chlorhexidine (PERIDEX) 0.12 % solution, Swish and spit 15 mLs 2 times daily (Patient not taking: Reported on 5/7/2025), Disp: , Rfl:     ibuprofen (ADVIL;MOTRIN) 200 MG tablet, Take 1 tablet by mouth every 6 hours as needed for Pain, Disp: , Rfl:     Respiratory Therapy Supplies (NEBULIZER/TUBING/MOUTHPIECE) KIT, 1 kit by Does not apply route daily as needed (wheezing), Disp: 1 kit, Rfl: 0    ipratropium 0.5 mg-albuterol 2.5 mg (DUONEB) 0.5-2.5 (3) MG/3ML SOLN nebulizer solution, Inhale 3 mLs into the lungs every 4 hours, Disp: 360 mL, Rfl: 0    MULTIPLE VITAMIN PO, Take 1 tablet by mouth daily, Disp: , Rfl:     Ginger, Zingiber officinalis, (GINGER ROOT PO), Take 200 mg by mouth daily, Disp: , Rfl:     Umeclidinium-Vilanterol (ANORO ELLIPTA) 62.5-25 MCG/ACT AEPB, Inhale 1 puff into the lungs daily, Disp: 60 each, Rfl: 5    albuterol sulfate HFA (PROVENTIL HFA) 108 (90 Base) MCG/ACT inhaler, Inhale 2 puffs into the lungs every 6 hours as needed for Wheezing, Disp: 18 g, Rfl: 5    Coenzyme Q10 (CO Q 10 PO), Take by mouth daily,

## 2025-06-11 NOTE — PROGRESS NOTES
HCC coding opportunities       Chart reviewed, no opportunity found: CHART REVIEWED, NO OPPORTUNITY FOUND        Patients Insurance        Commercial Insurance: [employer]       
COVID-19, Antigen   Result Value Ref Range    SARS-COV-2, POC Not-Detected Not Detected    Lot Number [id number]     QC Pass/Fail pass     Performing Instrument BD Veritor    POCT Influenza A/B Antigen (BD Veritor)   Result Value Ref Range    Inflenza A Ag neg     Influenza B Ag neg      Imaging: All Radiology results interpreted by Radiologist unless otherwise noted. No results found. Medical Decision Making   Pt non-toxic, in no apparent distress and stable at time of discharge. Assessment/Plan   Luz Ambriz was seen today for cough and sore throat. Diagnoses and all orders for this visit:    Upper respiratory tract infection, unspecified type  -     doxycycline hyclate (VIBRA-TABS) 100 MG tablet; Take 1 tablet by mouth 2 times daily (with meals) for 10 days  -     benzonatate (TESSALON PERLES) 100 MG capsule; Take 1 capsule by mouth 3 times daily as needed for Cough  -     POCT rapid strep A  -     POCT COVID-19, Antigen  -     POCT Influenza A/B Antigen (BD Veritor)    Chronic obstructive pulmonary disease, unspecified COPD type (HCC)    Thrush  -     nystatin (MYCOSTATIN) 448401 UNIT/ML suspension; Take 5 mLs by mouth 4 times daily for 14 days    Rapid COVID, influenza, and strep negative. Increase fluids and rest. Symptomatic relief discussed including Tylenol prn pain/fever. Schedule f/u with PCP in 7-10 days if symptoms persist. To call or to ED sooner if symptoms worsen or change. ED immediately with high or refractory fever, progressive SOB, dyspnea, CP, calf pain/swelling, shaking chills, vomiting, abdominal pain, lethargy, flank pain, or decreased urinary output. Pt verbalizes understanding and is in agreement with plan of care. All questions answered.     Liya Love PA-C

## 2025-06-15 DIAGNOSIS — E78.00 PURE HYPERCHOLESTEROLEMIA: ICD-10-CM

## 2025-06-16 ENCOUNTER — OFFICE VISIT (OUTPATIENT)
Age: 75
End: 2025-06-16
Payer: MEDICARE

## 2025-06-16 VITALS
OXYGEN SATURATION: 96 % | DIASTOLIC BLOOD PRESSURE: 60 MMHG | BODY MASS INDEX: 24.64 KG/M2 | WEIGHT: 157 LBS | RESPIRATION RATE: 18 BRPM | HEART RATE: 73 BPM | SYSTOLIC BLOOD PRESSURE: 112 MMHG | TEMPERATURE: 96.4 F | HEIGHT: 67 IN

## 2025-06-16 DIAGNOSIS — M79.10 MYALGIA: Primary | ICD-10-CM

## 2025-06-16 DIAGNOSIS — M54.42 CHRONIC LEFT-SIDED LOW BACK PAIN WITH LEFT-SIDED SCIATICA: ICD-10-CM

## 2025-06-16 DIAGNOSIS — M54.42 CHRONIC BILATERAL LOW BACK PAIN WITH BILATERAL SCIATICA: ICD-10-CM

## 2025-06-16 DIAGNOSIS — M48.061 LUMBAR FORAMINAL STENOSIS: ICD-10-CM

## 2025-06-16 DIAGNOSIS — M54.41 CHRONIC BILATERAL LOW BACK PAIN WITH BILATERAL SCIATICA: ICD-10-CM

## 2025-06-16 DIAGNOSIS — M54.16 LUMBAR RADICULOPATHY: ICD-10-CM

## 2025-06-16 DIAGNOSIS — G89.29 CHRONIC LEFT-SIDED LOW BACK PAIN WITH LEFT-SIDED SCIATICA: ICD-10-CM

## 2025-06-16 DIAGNOSIS — G89.29 CHRONIC BILATERAL LOW BACK PAIN WITH BILATERAL SCIATICA: ICD-10-CM

## 2025-06-16 DIAGNOSIS — G89.4 CHRONIC PAIN SYNDROME: ICD-10-CM

## 2025-06-16 PROCEDURE — G8399 PT W/DXA RESULTS DOCUMENT: HCPCS | Performed by: PHYSICIAN ASSISTANT

## 2025-06-16 PROCEDURE — 1090F PRES/ABSN URINE INCON ASSESS: CPT | Performed by: PHYSICIAN ASSISTANT

## 2025-06-16 PROCEDURE — 1159F MED LIST DOCD IN RCRD: CPT | Performed by: PHYSICIAN ASSISTANT

## 2025-06-16 PROCEDURE — 3074F SYST BP LT 130 MM HG: CPT | Performed by: PHYSICIAN ASSISTANT

## 2025-06-16 PROCEDURE — 3078F DIAST BP <80 MM HG: CPT | Performed by: PHYSICIAN ASSISTANT

## 2025-06-16 PROCEDURE — 3017F COLORECTAL CA SCREEN DOC REV: CPT | Performed by: PHYSICIAN ASSISTANT

## 2025-06-16 PROCEDURE — G8420 CALC BMI NORM PARAMETERS: HCPCS | Performed by: PHYSICIAN ASSISTANT

## 2025-06-16 PROCEDURE — 99212 OFFICE O/P EST SF 10 MIN: CPT | Performed by: PHYSICIAN ASSISTANT

## 2025-06-16 PROCEDURE — 1036F TOBACCO NON-USER: CPT | Performed by: PHYSICIAN ASSISTANT

## 2025-06-16 PROCEDURE — 99213 OFFICE O/P EST LOW 20 MIN: CPT | Performed by: PHYSICIAN ASSISTANT

## 2025-06-16 PROCEDURE — G8427 DOCREV CUR MEDS BY ELIG CLIN: HCPCS | Performed by: PHYSICIAN ASSISTANT

## 2025-06-16 PROCEDURE — 1123F ACP DISCUSS/DSCN MKR DOCD: CPT | Performed by: PHYSICIAN ASSISTANT

## 2025-06-16 RX ORDER — MEMANTINE HYDROCHLORIDE 5 MG/1
5 TABLET ORAL 2 TIMES DAILY
COMMUNITY
Start: 2025-06-12

## 2025-06-16 RX ORDER — LIDOCAINE, MENTHOL 4.8; 1.2 G/120G; G/120G
1 GEL ORAL 2 TIMES DAILY
COMMUNITY
Start: 2025-06-12

## 2025-06-16 RX ORDER — EZETIMIBE 10 MG/1
10 TABLET ORAL DAILY
Qty: 90 TABLET | Refills: 1 | Status: SHIPPED | OUTPATIENT
Start: 2025-06-16

## 2025-06-16 NOTE — TELEPHONE ENCOUNTER
Name of Medication(s) Requested:  Requested Prescriptions     Pending Prescriptions Disp Refills    ezetimibe (ZETIA) 10 MG tablet 90 tablet 1     Sig: Take 1 tablet by mouth daily       Medication is on current medication list Yes    Dosage and directions were verified? Yes    Quantity verified: 90 day supply     Pharmacy Verified?  Yes    Last Appointment:  5/7/2025    Future appts:  Future Appointments   Date Time Provider Department Center   6/16/2025 11:45 AM Annalisa Flowers PA [facility]   6/25/2025  1:15 PM Izabella العلي MD [facility] DEP   7/10/2025  1:15 PM Jenaro Shaw Jr., [facility]   12/11/2025 11:00 AM Noni Gamble MD [facility]   1/22/2026 11:30 AM Jacob Flores MD [facility] ENDO [facility]   5/13/2026  1:00 PM [facility]        (If no appt send self scheduling link. .REFILLAPPT)  Scheduling request sent?     [] Yes  [x] No    Does patient need updated?  [] Yes  [x] No

## 2025-06-16 NOTE — PROGRESS NOTES
Nila Mcfarland presents to the [facility] on 6/16/2025. Nila is complaining of pain back. The pain is intermittent. The pain is described as aching. Pain is rated on her best day at a 5, on her worst day at a 10, and on average at a 7 on the VAS scale. She took her last dose of Lyrica and Zanaflex @ [facility].      Any procedures since your last visit: Yes, with 60 % relief.    She is not on NSAIDS and  is not on anticoagulation medications   Pacemaker or defibrillator: No  Do you want someone present when the provider examines you? No    Medication Contract and Consent for Opioid Use Documents Filed       Patient Documents       Type of Document Status Date Received Received By Description    Medication Contract Received 1/21/2020  8:16 AM AYSHA ALVARADO med contract    Medication Contract Received 8/16/2022 10:51 AM NICOLÁS ESPNIO 03/29/2022  Controlled Substance Medication Agreement                       Resp 18   Ht 1.702 m (5' 7\")   Wt 71.2 kg (157 lb)   BMI 24.59 kg/m²      No LMP recorded. Patient has had a hysterectomy.    
done.  Intact:Yes  Edema:Normal     Neurological:    Gait:normal    Dermatology:    Skin:no unusual rashes, no skin lesions, no palpable subcutaneous nodules, and good skin turgor    Impression:    LBP LLE pain in L4 distribution  + SFN dx on biopsy, due to preDM and hashimotos thyroiditis (all other blood w/u including paraneoplastic antibodies were negative), follows with Dr. Christy  Neuropathic right chest wall pain since below CA surgery  L hip injection with IR gave some relief  PMHx: non small cell lung cancer s/p RMLectomy 2008 at [facility] and RULectomy 2013 at [place] (Dr. Chandler Peter), Hashimoto's and \"autoimmune diseases\" and prediabetes (follows with Dr. Flores), concern for addiction tendencies in the family (she and  smoked for many years, quit together, also \"liked red wine\" but quit drinking in 2017 when son diagnosed with severe pancreatitis related to alcohol abuse)          Plan:     Urine screen deferred  OARRS report reviewed - Dr. العلي recently increased the dosing of pregabalin  On pamelor, higher dosages caused dry mouth  Failed Ztlido samples  Lidoderm patches escribed due to patient request  Left L4 TFESI with >80% relief  Left L4 and L5 TFESI with Kenalog ordered for improved benefit- r/b and procedure discussed, valium for presedation  B/l L5 TFESI with 75% relief, repeat on 02/04/2025 with 40-50% until recently, repeat with 80% relief that is slightly diminishing.  She can call for a repeat if she would like-(Valium sent for pre-sedation previously)  PT ordered at [facility] in [place] today  Patient encouraged to stay active   Treatment plan discussed with the patient including medication and procedure side effects                 We discussed with the patient that combining opioids, benzodiazepines, alcohol, illicit drugs or sleep aids increases the risk of respiratory depression including death. We discussed that these medications may cause drowsiness, sedation or dizziness and have

## 2025-06-25 ENCOUNTER — OFFICE VISIT (OUTPATIENT)
Dept: FAMILY MEDICINE CLINIC | Age: 75
End: 2025-06-25

## 2025-06-25 VITALS
RESPIRATION RATE: 18 BRPM | SYSTOLIC BLOOD PRESSURE: 134 MMHG | HEART RATE: 65 BPM | TEMPERATURE: 98.2 F | WEIGHT: 156.4 LBS | BODY MASS INDEX: 24.55 KG/M2 | OXYGEN SATURATION: 97 % | DIASTOLIC BLOOD PRESSURE: 78 MMHG | HEIGHT: 67 IN

## 2025-06-25 DIAGNOSIS — E78.5 HYPERLIPIDEMIA, UNSPECIFIED HYPERLIPIDEMIA TYPE: ICD-10-CM

## 2025-06-25 DIAGNOSIS — Z12.11 SCREEN FOR COLON CANCER: ICD-10-CM

## 2025-06-25 DIAGNOSIS — F33.1 MODERATE EPISODE OF RECURRENT MAJOR DEPRESSIVE DISORDER (HCC): ICD-10-CM

## 2025-06-25 DIAGNOSIS — E03.9 HYPOTHYROIDISM, UNSPECIFIED TYPE: ICD-10-CM

## 2025-06-25 DIAGNOSIS — I10 ESSENTIAL HYPERTENSION: Primary | ICD-10-CM

## 2025-06-25 NOTE — PROGRESS NOTES
CC: Nila Mcfarland is a 74 y.o. yo female is here for evaluation evaluation for the following acute & chronic medical concerns: 6 week follow up  and Memory Loss (Noticing more memory issues with short term  )        HPI:    Short term memory; always had good memory; noticing more she is mis placing things -- same; reports same thing today; she did see neuro in the past Dr. Cabrera who started her on donepazil; she is requesting refills of this medication; she is scheduleing with neuro 6/12/25 [facility] for more thorough evaluation of her memory and neuropathy concerns  Interval hx  Did seen neuro; Did see [facility]; completed MRI waiting on results;  Today resports she is not taking donepezil but not sure  Also no sure about namenda; she reports her  is preparing her pills at this time    Started PT this last week to help with strength and balance    HTN - se to hctz (loc, dizzy); benicar (hyperkalemia); lopressor (stopped by cards due to possible sinus pause); she is on hydralazine 50mg q8 hours and norvasc 10mg  HLD - ASCVD 15%; hx of elevated CK on statin; currently on zetia 10mg  Anxiety / depression  - on lexapro now up to 20mg; follows with Jessica OSHEA, not recently; feels lisa in caring for kelsey 2 years old; she is having more memory concerns, though; no energy; Hx of a seizure in the past;  GERD - on prilosec 20mg  Prediabetes - working on diet as able  Hashimoto's / thyroid nodule - on synthroid 100mcg 1/2 tab sunday; follows with Dr. Flores  Hx of Lung cancer x2 s/p R upper and middle lobectomy: Follows with Dr. Hess, now Dr. Chandler Ojeda smoking 2007; Diagnosed with lung ca 2008  COPD - on anoro ellipta + albuterol PRN; she has chronic coughing and uses tessalon PRN; currently follows with Dr. Cortes - brae  Chronic pain  - Lumbar pain and hip pain; follows with PMR, Dr. Tucker,   Small fiber neuropathy/ neuropathy; burning in toes; follows now with Dr. Cabrera;  on lyrica now up to 100mg

## 2025-07-09 DIAGNOSIS — M25.561 CHRONIC PAIN OF RIGHT KNEE: ICD-10-CM

## 2025-07-09 DIAGNOSIS — M25.551 RIGHT HIP PAIN: ICD-10-CM

## 2025-07-09 DIAGNOSIS — G89.29 CHRONIC PAIN OF RIGHT KNEE: ICD-10-CM

## 2025-07-09 DIAGNOSIS — M54.16 LUMBAR RADICULOPATHY: ICD-10-CM

## 2025-07-09 DIAGNOSIS — G89.4 CHRONIC PAIN SYNDROME: ICD-10-CM

## 2025-07-10 ENCOUNTER — PROCEDURE VISIT (OUTPATIENT)
Dept: PODIATRY | Age: 75
End: 2025-07-10
Payer: MEDICARE

## 2025-07-10 VITALS
DIASTOLIC BLOOD PRESSURE: 66 MMHG | BODY MASS INDEX: 24.43 KG/M2 | HEART RATE: 84 BPM | TEMPERATURE: 98.7 F | SYSTOLIC BLOOD PRESSURE: 123 MMHG | WEIGHT: 156 LBS

## 2025-07-10 DIAGNOSIS — B35.1 ONYCHOMYCOSIS: Primary | ICD-10-CM

## 2025-07-10 DIAGNOSIS — B35.3 TINEA PEDIS OF BOTH FEET: ICD-10-CM

## 2025-07-10 PROCEDURE — G8427 DOCREV CUR MEDS BY ELIG CLIN: HCPCS | Performed by: PODIATRIST

## 2025-07-10 PROCEDURE — 1036F TOBACCO NON-USER: CPT | Performed by: PODIATRIST

## 2025-07-10 PROCEDURE — 1123F ACP DISCUSS/DSCN MKR DOCD: CPT | Performed by: PODIATRIST

## 2025-07-10 PROCEDURE — 1090F PRES/ABSN URINE INCON ASSESS: CPT | Performed by: PODIATRIST

## 2025-07-10 PROCEDURE — 3074F SYST BP LT 130 MM HG: CPT | Performed by: PODIATRIST

## 2025-07-10 PROCEDURE — 3017F COLORECTAL CA SCREEN DOC REV: CPT | Performed by: PODIATRIST

## 2025-07-10 PROCEDURE — G8399 PT W/DXA RESULTS DOCUMENT: HCPCS | Performed by: PODIATRIST

## 2025-07-10 PROCEDURE — G8420 CALC BMI NORM PARAMETERS: HCPCS | Performed by: PODIATRIST

## 2025-07-10 PROCEDURE — 99213 OFFICE O/P EST LOW 20 MIN: CPT | Performed by: PODIATRIST

## 2025-07-10 PROCEDURE — 3078F DIAST BP <80 MM HG: CPT | Performed by: PODIATRIST

## 2025-07-10 PROCEDURE — 1159F MED LIST DOCD IN RCRD: CPT | Performed by: PODIATRIST

## 2025-07-10 RX ORDER — NYSTATIN AND TRIAMCINOLONE ACETONIDE 100000; 1 [USP'U]/G; MG/G
CREAM TOPICAL
Qty: 60 G | Refills: 3 | Status: SHIPPED | OUTPATIENT
Start: 2025-07-10

## 2025-07-10 RX ORDER — PREGABALIN 100 MG/1
100 CAPSULE ORAL 3 TIMES DAILY
Qty: 270 CAPSULE | Refills: 0 | Status: SHIPPED | OUTPATIENT
Start: 2025-07-10 | End: 2026-01-06

## 2025-07-10 NOTE — TELEPHONE ENCOUNTER
Name of Medication(s) Requested:  Requested Prescriptions     Pending Prescriptions Disp Refills    pregabalin (LYRICA) 100 MG capsule 270 capsule 0     Sig: Take 1 capsule by mouth 3 times daily for 180 days. Max Daily Amount: 300 mg       Medication is on current medication list Yes    Dosage and directions were verified? Yes    Quantity verified: 30 day supply     Pharmacy Verified?  Yes    Last Appointment:  6/25/2025    Future appts:  Future Appointments   Date Time Provider Department Center   7/10/2025  1:15 PM Jenaro Shaw Jr., DP [facility] POD [facility]   7/29/2025  1:15 PM Scott Martell MD [facility] SURG [facility]   9/24/2025  1:45 PM [facility]   12/11/2025 11:00 AM Noni Gamble MD [facility]   1/22/2026 11:30 AM Jacob Flores MD [facility] ENDO [facility]   5/13/2026  1:00 PM [facility]        (If no appt send self scheduling link. .REFILLAPPT)  Scheduling request sent?     [] Yes  [x] No    Does patient need updated?  [] Yes  [x] No

## 2025-07-11 NOTE — PROGRESS NOTES
F/u fungus better, bottom of feet burning  Izabella العلي MD  LOV 6/25/25  
1 tablet by mouth daily, Disp: 90 tablet, Rfl: 1    hydrALAZINE (APRESOLINE) 50 MG tablet, Take 1 tablet by mouth every 8 hours, Disp: 270 tablet, Rfl: 1    montelukast (SINGULAIR) 10 MG tablet, Take 1 tablet by mouth nightly take 1 tablet by mouth every evening, Disp: 90 tablet, Rfl: 1    SYNTHROID 100 MCG tablet, Take 1 tablet 6 days a wk and 0.5 tab on Sunday, Disp: 90 tablet, Rfl: 1    azelastine (ASTELIN) 0.1 % nasal spray, 1 spray by Nasal route 2 times daily Use in each nostril as directed, Disp: 60 mL, Rfl: 5    tiZANidine (ZANAFLEX) 4 MG tablet, Take 1 tablet by mouth daily as needed (muscle spasm), Disp: 90 tablet, Rfl: 1    chlorhexidine (PERIDEX) 0.12 % solution, Swish and spit 15 mLs 2 times daily, Disp: , Rfl:     ibuprofen (ADVIL;MOTRIN) 200 MG tablet, Take 1 tablet by mouth every 6 hours as needed for Pain, Disp: , Rfl:     Respiratory Therapy Supplies (NEBULIZER/TUBING/MOUTHPIECE) KIT, 1 kit by Does not apply route daily as needed (wheezing), Disp: 1 kit, Rfl: 0    ipratropium 0.5 mg-albuterol 2.5 mg (DUONEB) 0.5-2.5 (3) MG/3ML SOLN nebulizer solution, Inhale 3 mLs into the lungs every 4 hours, Disp: 360 mL, Rfl: 0    MULTIPLE VITAMIN PO, Take 1 tablet by mouth daily, Disp: , Rfl:     Ginger, Zingiber officinalis, (GINGER ROOT PO), Take 200 mg by mouth daily, Disp: , Rfl:     Umeclidinium-Vilanterol (ANORO ELLIPTA) 62.5-25 MCG/ACT AEPB, Inhale 1 puff into the lungs daily, Disp: 60 each, Rfl: 5    albuterol sulfate HFA (PROVENTIL HFA) 108 (90 Base) MCG/ACT inhaler, Inhale 2 puffs into the lungs every 6 hours as needed for Wheezing, Disp: 18 g, Rfl: 5    Coenzyme Q10 (CO Q 10 PO), Take by mouth daily, Disp: , Rfl:     Biotin w/ Vitamins C & E (HAIR/SKIN/NAILS PO), Take by mouth daily VIT C 90MG VIT E 5,000MCG ZINC 8MG COPPER 1MG, Disp: , Rfl:     Melatonin 1 MG SUBL, Place 2 mg under the tongue nightly, Disp: , Rfl:     Allergies:  No Known Allergies    Vitals:    07/10/25 1327   BP: 123/66   BP Site:

## 2025-07-19 DIAGNOSIS — J31.0 CHRONIC RHINITIS: ICD-10-CM

## 2025-07-21 RX ORDER — FLUTICASONE PROPIONATE 50 MCG
1 SPRAY, SUSPENSION (ML) NASAL DAILY
Qty: 32 G | Refills: 0 | OUTPATIENT
Start: 2025-07-21

## 2025-07-29 ENCOUNTER — OFFICE VISIT (OUTPATIENT)
Dept: SURGERY | Age: 75
End: 2025-07-29

## 2025-07-29 ENCOUNTER — TELEPHONE (OUTPATIENT)
Age: 75
End: 2025-07-29

## 2025-07-29 VITALS
WEIGHT: 160 LBS | HEART RATE: 83 BPM | HEIGHT: 67 IN | BODY MASS INDEX: 25.11 KG/M2 | SYSTOLIC BLOOD PRESSURE: 145 MMHG | DIASTOLIC BLOOD PRESSURE: 87 MMHG

## 2025-07-29 DIAGNOSIS — Z91.89 AT RISK FOR COLON CANCER: Primary | ICD-10-CM

## 2025-07-29 PROCEDURE — NBSRV NON-BILLABLE SERVICE: Performed by: SURGERY

## 2025-07-29 RX ORDER — POLYETHYLENE GLYCOL 3350, SODIUM SULFATE ANHYDROUS, SODIUM BICARBONATE, SODIUM CHLORIDE, POTASSIUM CHLORIDE 236; 22.74; 6.74; 5.86; 2.97 G/4L; G/4L; G/4L; G/4L; G/4L
4 POWDER, FOR SOLUTION ORAL ONCE
Qty: 4000 ML | Refills: 0 | Status: SHIPPED | OUTPATIENT
Start: 2025-07-29 | End: 2025-07-29

## 2025-07-29 NOTE — PATIENT INSTRUCTIONS
Call [phone] for any questions/concerns.      BOWEL PREP INSTRUCTIONS      It is very important that you follow all of the instructions listed on this sheet carefully to ensure that your colon is cleaned out or your risk of side effects could be increased.    What you will Need:  1. Nulytely, Golytely or Colyte Colon prep. You have been given a prescription.     2 Days or More Before Colonoscopy:  Obtain your colon prep from the pharmacy  Do not eat corn, tomatoes, peas, or watermelon 3 to 5 days before procedure.    On the Day Before Colonoscopy:  You may have clear liquids ONLY - No solid food.    Do not drink milk   Do not eat or drink anything that is red or purple in color   Do not drink alcohol or beer.     The following is OK to eat or drink:   Water, Limeade or lemonade   Strained fruit juices (no pulp) - including apple, orange, white grape or white  cranberry   Coffee or Tea - do not use any milk or creamer   Chicken broth   Jello without added fruit or toppings (No red or purple)    Directions to take Colon Prep:  Step 1. The colon prep can be used with or without the flavor packs. If using flavor, tear open packet and pour into bottle before mixing.   Step 2. Add lukewarm water to top line on bottle. Put cap on bottle and shake to dissolve the powder.  It should clear like water. Do not put anything else in the bottle. After mixing, keep in the refrigerator. You should drink it within 48 hours.   Step 3. The first bowel movement occurs about 1 hour after you start drinking the bowel prep. Continue to drink the bowel prep until the bowel movement is clear like water and free of solid bowel movement  Step 4. Drink 1 (8 oz) glass every 10 minutes. Try not to sip small amounts, but instead drink each glass within a few minutes.   Note:   Feelings of bloating and/or nausea are common.  This is temporary and will get better once bowel movements begin.   If you have nausea or vomiting, call the

## 2025-07-29 NOTE — TELEPHONE ENCOUNTER
Scheduled pt for colonoscopo with Dr. Martell on 10/16/25 at 1pm. Pt needs to arrive at [facility] at 12pm. Patient confirmed date and time for procedure. Address, directions, and prep instructions given in office.  Patient verbalized full understanding.      Electronically signed by Brandy Fuentes MA on 7/29/2025 at 2:21 PM

## 2025-07-29 NOTE — PROGRESS NOTES
[facility]  History and Physical    Patient's Name/Date of Birth: Nila Mcfarland / 1950 (75 y.o.)    PCP:  Dr. العلي    Chief Complaint:  colonoscopy eval    History of Present Illness:  75 yr old female with prior colonoscopy 2015.  Denies abd pain, change in bowel habits, blood in stool, unintentional weight loss, or family hx of colon cancer.     Past Medical History:   Diagnosis Date    Arthritis     Symptoms respond to myofascial release. Not surgical candidate    Asthma     Cancer (HCC)     lung cancer- RML- 2008, stage 1A, RUL-2013 Stage 1A 2013    Chronic back pain     COPD (chronic obstructive pulmonary disease) (HCC)     Emphysema of lung (HCC)     GERD (gastroesophageal reflux disease)     Hashimoto's disease 2008    History of blood transfusion     Hyperlipidemia     Hypertension     Hyperthyroidism     Neuropathy     feet    Seizure (HCC) 10/09/2020    Sleep difficulties        Past Surgical History:   Procedure Laterality Date    ABDOMEN SURGERY      APPENDECTOMY  1981    BREAST SURGERY Right     Excisional Bx 1980s    CATARACT REMOVAL WITH IMPLANT Bilateral     COLONOSCOPY  06/23/2015    polyp and diverticulosis    COLONOSCOPY  06/23/2015    colonoscopy    ENDOSCOPY, COLON, DIAGNOSTIC      EYE SURGERY      HERNIA REPAIR N/A 07/14/2021    LAPAROSCOPIC ROBOTIC XI ASSISTED INCISIONAL HERNIA REPAIR WITH MESH performed by Keyana Hamilton MD at [facility] OR    HYSTERECTOMY (CERVIX STATUS UNKNOWN)  1981    LUNG CANCER SURGERY Right     X 2    PAIN MANAGEMENT PROCEDURE Left 12/28/2023    LEFT L4 TRANSFORAMINAL EPIDURAL STEROID INJECTION [facility]    PAIN MANAGEMENT PROCEDURE Bilateral 03/28/2024    BILATERAL L5 TRANSFORAMINAL EPIDURAL STEROID INJECTION performed by Myra Tucker DO at [facility] OR    PAIN MANAGEMENT PROCEDURE Bilateral 07/16/2024    BILATERAL LUMBAR 5 TRANSFORAMINAL EPIDURAL STEROID INJECTION performed by Myra Tucker DO

## 2025-08-22 ENCOUNTER — TELEPHONE (OUTPATIENT)
Age: 75
End: 2025-08-22

## 2025-08-31 RX ORDER — SODIUM CHLORIDE 9 MG/ML
INJECTION, SOLUTION INTRAVENOUS PRN
Status: CANCELLED | OUTPATIENT
Start: 2025-08-31

## 2025-08-31 RX ORDER — SODIUM CHLORIDE 0.9 % (FLUSH) 0.9 %
5-40 SYRINGE (ML) INJECTION PRN
Status: CANCELLED | OUTPATIENT
Start: 2025-08-31

## 2025-08-31 RX ORDER — SODIUM CHLORIDE 9 MG/ML
INJECTION, SOLUTION INTRAVENOUS CONTINUOUS
Status: CANCELLED | OUTPATIENT
Start: 2025-08-31

## 2025-08-31 RX ORDER — SODIUM CHLORIDE 0.9 % (FLUSH) 0.9 %
5-40 SYRINGE (ML) INJECTION EVERY 12 HOURS SCHEDULED
Status: CANCELLED | OUTPATIENT
Start: 2025-08-31

## 2025-09-02 ENCOUNTER — ANESTHESIA EVENT (OUTPATIENT)
Dept: ENDOSCOPY | Age: 75
End: 2025-09-02
Payer: MEDICARE

## 2025-09-02 ENCOUNTER — HOSPITAL ENCOUNTER (OUTPATIENT)
Age: 75
Setting detail: OUTPATIENT SURGERY
Discharge: HOME | End: 2025-09-02
Attending: SURGERY | Admitting: SURGERY
Payer: MEDICARE

## 2025-09-02 ENCOUNTER — ANESTHESIA (OUTPATIENT)
Dept: ENDOSCOPY | Age: 75
End: 2025-09-02
Payer: MEDICARE

## 2025-09-02 VITALS
BODY MASS INDEX: 25.06 KG/M2 | HEIGHT: 67 IN | DIASTOLIC BLOOD PRESSURE: 69 MMHG | RESPIRATION RATE: 18 BRPM | OXYGEN SATURATION: 99 % | HEART RATE: 69 BPM | SYSTOLIC BLOOD PRESSURE: 135 MMHG | TEMPERATURE: 98.2 F

## 2025-09-02 PROCEDURE — 7100000010 HC PHASE II RECOVERY - FIRST 15 MIN: Performed by: SURGERY

## 2025-09-02 PROCEDURE — 7100000011 HC PHASE II RECOVERY - ADDTL 15 MIN: Performed by: SURGERY

## 2025-09-02 PROCEDURE — 2709999900 HC NON-CHARGEABLE SUPPLY: Performed by: SURGERY

## 2025-09-02 PROCEDURE — G0121 COLON CA SCRN NOT HI RSK IND: HCPCS | Performed by: SURGERY

## 2025-09-02 PROCEDURE — 3700000000 HC ANESTHESIA ATTENDED CARE: Performed by: SURGERY

## 2025-09-02 PROCEDURE — 3609027000 HC COLONOSCOPY: Performed by: SURGERY

## 2025-09-02 PROCEDURE — 6360000002 HC RX W HCPCS: Performed by: NURSE ANESTHETIST, CERTIFIED REGISTERED

## 2025-09-02 PROCEDURE — 2580000003 HC RX 258: Performed by: NURSE ANESTHETIST, CERTIFIED REGISTERED

## 2025-09-02 PROCEDURE — 3700000001 HC ADD 15 MINUTES (ANESTHESIA): Performed by: SURGERY

## 2025-09-02 RX ORDER — SODIUM CHLORIDE 9 MG/ML
INJECTION, SOLUTION INTRAVENOUS
Status: DISCONTINUED | OUTPATIENT
Start: 2025-09-02 | End: 2025-09-02 | Stop reason: SDUPTHER

## 2025-09-02 RX ORDER — PROPOFOL 10 MG/ML
INJECTION, EMULSION INTRAVENOUS
Status: DISCONTINUED | OUTPATIENT
Start: 2025-09-02 | End: 2025-09-02 | Stop reason: SDUPTHER

## 2025-09-02 RX ADMIN — PROPOFOL 250 MG: 10 INJECTION, EMULSION INTRAVENOUS at 09:40

## 2025-09-02 RX ADMIN — SODIUM CHLORIDE: 9 INJECTION, SOLUTION INTRAVENOUS at 09:34

## 2025-09-02 ASSESSMENT — LIFESTYLE VARIABLES: SMOKING_STATUS: 0

## 2025-09-02 ASSESSMENT — PAIN SCALES - GENERAL
PAINLEVEL_OUTOF10: 0
PAINLEVEL_OUTOF10: 0

## (undated) DEVICE — NEEDLE HYPO 18GA L1.5IN PNK POLYPR HUB S STL REG BVL STR

## (undated) DEVICE — GAUZE,SPONGE,4"X4",12PLY,STERILE,LF,2'S: Brand: MEDLINE

## (undated) DEVICE — INTENDED FOR TISSUE SEPARATION, AND OTHER PROCEDURES THAT REQUIRE A SHARP SURGICAL BLADE TO PUNCTURE OR CUT.: Brand: BARD-PARKER ® STAINLESS STEEL BLADES

## (undated) DEVICE — ELECTRODE PT RET AD L9FT HI MOIST COND ADH HYDRGEL CORDED

## (undated) DEVICE — COUNTER NDL 10 COUNT HLD 20 FOAM BLK SGL MAG

## (undated) DEVICE — GAUZE,SPONGE,4"X4",8PLY,STRL,LF,10/TRAY: Brand: MEDLINE

## (undated) DEVICE — 3M™ RED DOT™ MONITORING ELECTRODE WITH FOAM TAPE AND STICKY GEL 2560, 50/BAG, 20/CASE, 72/PLT: Brand: RED DOT™

## (undated) DEVICE — 6 ML SYRINGE LUER-LOCK TIP: Brand: MONOJECT

## (undated) DEVICE — ADHESIVE SKIN CLSR 0.7ML TOP DERMBND ADV

## (undated) DEVICE — NEEDLE SPNL 22GA L3.5IN BLK HUB S STL REG WALL FIT STYL

## (undated) DEVICE — INSUFFLATION TUBING SET WITH FILTER, FUNNEL CONNECTOR AND LUER LOCK: Brand: JOSNOE MEDICAL INC

## (undated) DEVICE — NEEDLE, QUINCKE, 22GX5": Brand: MEDLINE

## (undated) DEVICE — DRAPE,LAP,CHOLE,W/TROUGHS,STERILE: Brand: MEDLINE

## (undated) DEVICE — 12 ML SYRINGE,LUER-LOCK TIP: Brand: MONOJECT

## (undated) DEVICE — NEEDLE HYPO 25GA L1.5IN BLU POLYPR HUB S STL REG BVL STR

## (undated) DEVICE — BANDAGE ADH W1XL3IN NAT FAB WVN FLX DURABLE N ADH PD SEAL

## (undated) DEVICE — GLOVE ORANGE PI 7 1/2   MSG9075

## (undated) DEVICE — GOWN,SIRUS,FABRNF,XL,20/CS: Brand: MEDLINE

## (undated) DEVICE — Device: Brand: PORTEX

## (undated) DEVICE — NON-DEHP CATHETER EXTENSION SET, MALE LUER LOCK ADAPTER

## (undated) DEVICE — GOWN,SIRUS,FABRNF,L,20/CS: Brand: MEDLINE

## (undated) DEVICE — APPLICATOR MEDICATED 10.5 CC SOLUTION HI LT ORNG CHLORAPREP

## (undated) DEVICE — TOWEL,OR,DSP,ST,BLUE,STD,6/PK,12PK/CS: Brand: MEDLINE

## (undated) DEVICE — INSUFFLATION NEEDLE TO ESTABLISH PNEUMOPERITONEUM.: Brand: INSUFFLATION NEEDLE

## (undated) DEVICE — SPONGE GZ W4XL4IN 8 PLY 100% COTTON

## (undated) DEVICE — ADAPTER AIR/WATER CHANNEL CLEANING OLYMPUS COMPATIBLE NS

## (undated) DEVICE — KIT,ANTI FOG,W/SPONGE & FLUID,SOFT PACK: Brand: MEDLINE

## (undated) DEVICE — MARKER,SKIN,WI/RULER AND LABELS: Brand: MEDLINE

## (undated) DEVICE — PACK PROCEDURE SURG GEN CUST

## (undated) DEVICE — GLOVE SURG SZ 6 L12IN FNGR THK94MIL TRNSLUC YEL LTX HYDRGEL

## (undated) DEVICE — SYRINGE MED 5ML STD CLR PLAS LUERLOCK TIP N CTRL DISP

## (undated) DEVICE — SYRINGE, LUER LOCK, 5ML: Brand: MEDLINE

## (undated) DEVICE — CONNECTOR IRRIGATION AUXILIARY H2O JET W/ PRT MTL THRD HYDR

## (undated) DEVICE — SYRINGE 20ML LL S/C 50

## (undated) DEVICE — CHLORAPREP 26ML ORANGE

## (undated) DEVICE — VALVE E AIR H2O SUCT AND BX DISP DEFENDO

## (undated) DEVICE — 3 ML SYRINGE LUER-LOCK TIP: Brand: MONOJECT

## (undated) DEVICE — WARMER SCP 2 ANTIFOG LAP DISP

## (undated) DEVICE — SOLUTION IV IRRIG POUR BRL 0.9% SODIUM CHL 2F7124

## (undated) DEVICE — DOUBLE BASIN SET: Brand: MEDLINE INDUSTRIES, INC.

## (undated) DEVICE — INSTRUMENT CLAMP TOWEL LARGE REUSABLE